# Patient Record
Sex: MALE | Race: WHITE | NOT HISPANIC OR LATINO | Employment: OTHER | URBAN - METROPOLITAN AREA
[De-identification: names, ages, dates, MRNs, and addresses within clinical notes are randomized per-mention and may not be internally consistent; named-entity substitution may affect disease eponyms.]

---

## 2017-01-03 ENCOUNTER — COMMUNICATION - HEALTHEAST (OUTPATIENT)
Dept: SCHEDULING | Facility: CLINIC | Age: 82
End: 2017-01-03

## 2017-01-05 ENCOUNTER — RECORDS - HEALTHEAST (OUTPATIENT)
Dept: ADMINISTRATIVE | Facility: OTHER | Age: 82
End: 2017-01-05

## 2017-01-16 ENCOUNTER — COMMUNICATION - HEALTHEAST (OUTPATIENT)
Dept: INTERNAL MEDICINE | Facility: CLINIC | Age: 82
End: 2017-01-16

## 2017-01-16 DIAGNOSIS — I10 ESSENTIAL HYPERTENSION, BENIGN: ICD-10-CM

## 2017-01-16 DIAGNOSIS — E78.00 PURE HYPERCHOLESTEROLEMIA: ICD-10-CM

## 2017-01-26 ENCOUNTER — RECORDS - HEALTHEAST (OUTPATIENT)
Dept: ADMINISTRATIVE | Facility: OTHER | Age: 82
End: 2017-01-26

## 2017-02-13 ENCOUNTER — COMMUNICATION - HEALTHEAST (OUTPATIENT)
Dept: INTERNAL MEDICINE | Facility: CLINIC | Age: 82
End: 2017-02-13

## 2017-02-27 ENCOUNTER — OFFICE VISIT - HEALTHEAST (OUTPATIENT)
Dept: INTERNAL MEDICINE | Facility: CLINIC | Age: 82
End: 2017-02-27

## 2017-02-27 DIAGNOSIS — I10 HYPERTENSION: ICD-10-CM

## 2017-02-27 DIAGNOSIS — E11.9 TYPE 2 DIABETES MELLITUS (H): ICD-10-CM

## 2017-02-27 DIAGNOSIS — E78.1 HYPERTRIGLYCERIDEMIA: ICD-10-CM

## 2017-02-27 DIAGNOSIS — N20.0 KIDNEY STONE: ICD-10-CM

## 2017-02-27 DIAGNOSIS — H10.9 CONJUNCTIVITIS OF LEFT EYE, UNSPECIFIED CONJUNCTIVITIS TYPE: ICD-10-CM

## 2017-02-27 DIAGNOSIS — E78.00 HYPERCHOLESTEROLEMIA: ICD-10-CM

## 2017-02-27 DIAGNOSIS — E78.6 LOW HDL (UNDER 40): ICD-10-CM

## 2017-03-03 ENCOUNTER — COMMUNICATION - HEALTHEAST (OUTPATIENT)
Dept: INTERNAL MEDICINE | Facility: CLINIC | Age: 82
End: 2017-03-03

## 2017-03-06 ENCOUNTER — COMMUNICATION - HEALTHEAST (OUTPATIENT)
Dept: ENDOCRINOLOGY | Facility: CLINIC | Age: 82
End: 2017-03-06

## 2017-03-10 ENCOUNTER — OFFICE VISIT - HEALTHEAST (OUTPATIENT)
Dept: EDUCATION SERVICES | Facility: CLINIC | Age: 82
End: 2017-03-10

## 2017-03-10 DIAGNOSIS — E11.9 DIABETES (H): ICD-10-CM

## 2017-03-13 ENCOUNTER — COMMUNICATION - HEALTHEAST (OUTPATIENT)
Dept: EDUCATION SERVICES | Facility: CLINIC | Age: 82
End: 2017-03-13

## 2017-03-20 ENCOUNTER — COMMUNICATION - HEALTHEAST (OUTPATIENT)
Dept: EDUCATION SERVICES | Facility: CLINIC | Age: 82
End: 2017-03-20

## 2017-03-27 ENCOUNTER — OFFICE VISIT - HEALTHEAST (OUTPATIENT)
Dept: INTERNAL MEDICINE | Facility: CLINIC | Age: 82
End: 2017-03-27

## 2017-03-27 DIAGNOSIS — J20.9 ACUTE BRONCHITIS: ICD-10-CM

## 2017-03-27 DIAGNOSIS — E78.6 LOW HDL (UNDER 40): ICD-10-CM

## 2017-03-27 DIAGNOSIS — E11.9 DIABETES MELLITUS TYPE 2, CONTROLLED (H): ICD-10-CM

## 2017-03-27 DIAGNOSIS — I10 ESSENTIAL HYPERTENSION, BENIGN: ICD-10-CM

## 2017-03-27 DIAGNOSIS — E78.1 HYPERTRIGLYCERIDEMIA: ICD-10-CM

## 2017-03-27 DIAGNOSIS — Z77.090 ASBESTOS EXPOSURE: ICD-10-CM

## 2017-03-27 DIAGNOSIS — N18.30 CHRONIC KIDNEY DISEASE, STAGE III (MODERATE) (H): ICD-10-CM

## 2017-04-04 ENCOUNTER — COMMUNICATION - HEALTHEAST (OUTPATIENT)
Dept: INTERNAL MEDICINE | Facility: CLINIC | Age: 82
End: 2017-04-04

## 2017-04-04 DIAGNOSIS — J98.11 ATELECTASIS: ICD-10-CM

## 2017-04-05 ENCOUNTER — RECORDS - HEALTHEAST (OUTPATIENT)
Dept: GENERAL RADIOLOGY | Age: 82
End: 2017-04-05

## 2017-04-05 DIAGNOSIS — J98.11 ATELECTASIS: ICD-10-CM

## 2017-04-07 ENCOUNTER — COMMUNICATION - HEALTHEAST (OUTPATIENT)
Dept: INTERNAL MEDICINE | Facility: CLINIC | Age: 82
End: 2017-04-07

## 2017-04-07 DIAGNOSIS — R91.1 SOLITARY PULMONARY NODULE: ICD-10-CM

## 2017-04-17 ENCOUNTER — HOSPITAL ENCOUNTER (OUTPATIENT)
Dept: CT IMAGING | Facility: HOSPITAL | Age: 82
Discharge: HOME | End: 2017-04-17
Attending: INTERNAL MEDICINE

## 2017-04-17 DIAGNOSIS — R91.1 SOLITARY PULMONARY NODULE: ICD-10-CM

## 2017-04-22 ENCOUNTER — COMMUNICATION - HEALTHEAST (OUTPATIENT)
Dept: INTERNAL MEDICINE | Facility: CLINIC | Age: 82
End: 2017-04-22

## 2017-04-26 ENCOUNTER — COMMUNICATION - HEALTHEAST (OUTPATIENT)
Dept: INTERNAL MEDICINE | Facility: CLINIC | Age: 82
End: 2017-04-26

## 2017-05-02 ENCOUNTER — COMMUNICATION - HEALTHEAST (OUTPATIENT)
Dept: INTERNAL MEDICINE | Facility: CLINIC | Age: 82
End: 2017-05-02

## 2017-05-02 DIAGNOSIS — E11.9 DIABETES MELLITUS (H): ICD-10-CM

## 2017-05-05 ENCOUNTER — OFFICE VISIT - HEALTHEAST (OUTPATIENT)
Dept: INTERNAL MEDICINE | Facility: CLINIC | Age: 82
End: 2017-05-05

## 2017-05-05 ENCOUNTER — AMBULATORY - HEALTHEAST (OUTPATIENT)
Dept: INTERNAL MEDICINE | Facility: CLINIC | Age: 82
End: 2017-05-05

## 2017-05-05 DIAGNOSIS — E11.9 DIABETES MELLITUS TYPE 2, CONTROLLED (H): ICD-10-CM

## 2017-05-05 DIAGNOSIS — Z09 FOLLOW UP: ICD-10-CM

## 2017-05-08 ENCOUNTER — COMMUNICATION - HEALTHEAST (OUTPATIENT)
Dept: INTERNAL MEDICINE | Facility: CLINIC | Age: 82
End: 2017-05-08

## 2017-05-08 DIAGNOSIS — E11.9 DIABETES MELLITUS (H): ICD-10-CM

## 2017-05-09 ENCOUNTER — RECORDS - HEALTHEAST (OUTPATIENT)
Dept: ADMINISTRATIVE | Facility: OTHER | Age: 82
End: 2017-05-09

## 2017-05-09 ENCOUNTER — COMMUNICATION - HEALTHEAST (OUTPATIENT)
Dept: INTERNAL MEDICINE | Facility: CLINIC | Age: 82
End: 2017-05-09

## 2017-05-09 DIAGNOSIS — E11.9 DIABETES (H): ICD-10-CM

## 2017-05-18 ENCOUNTER — RECORDS - HEALTHEAST (OUTPATIENT)
Dept: ADMINISTRATIVE | Facility: OTHER | Age: 82
End: 2017-05-18

## 2017-05-19 ENCOUNTER — AMBULATORY - HEALTHEAST (OUTPATIENT)
Dept: INTERNAL MEDICINE | Facility: CLINIC | Age: 82
End: 2017-05-19

## 2017-05-19 ENCOUNTER — COMMUNICATION - HEALTHEAST (OUTPATIENT)
Dept: INTERNAL MEDICINE | Facility: CLINIC | Age: 82
End: 2017-05-19

## 2017-05-19 DIAGNOSIS — E11.9 DIABETES MELLITUS, TYPE 2 (H): ICD-10-CM

## 2017-05-22 ENCOUNTER — COMMUNICATION - HEALTHEAST (OUTPATIENT)
Dept: INTERNAL MEDICINE | Facility: CLINIC | Age: 82
End: 2017-05-22

## 2017-05-22 DIAGNOSIS — E78.00 PURE HYPERCHOLESTEROLEMIA: ICD-10-CM

## 2017-05-22 DIAGNOSIS — E11.9 DIABETES MELLITUS, TYPE 2 (H): ICD-10-CM

## 2017-05-22 DIAGNOSIS — K21.9 ESOPHAGEAL REFLUX: ICD-10-CM

## 2017-05-22 DIAGNOSIS — I10 ESSENTIAL HYPERTENSION, BENIGN: ICD-10-CM

## 2017-05-25 ENCOUNTER — COMMUNICATION - HEALTHEAST (OUTPATIENT)
Dept: INTERNAL MEDICINE | Facility: CLINIC | Age: 82
End: 2017-05-25

## 2017-06-19 ENCOUNTER — HOSPITAL ENCOUNTER (OUTPATIENT)
Dept: LAB | Age: 82
Setting detail: SPECIMEN
Discharge: HOME | End: 2017-06-19

## 2017-06-19 ENCOUNTER — OFFICE VISIT - HEALTHEAST (OUTPATIENT)
Dept: INTERNAL MEDICINE | Facility: CLINIC | Age: 82
End: 2017-06-19

## 2017-06-19 DIAGNOSIS — R35.0 FREQUENT URINATION: ICD-10-CM

## 2017-06-19 DIAGNOSIS — N30.00 ACUTE CYSTITIS WITHOUT HEMATURIA: ICD-10-CM

## 2017-06-21 ENCOUNTER — COMMUNICATION - HEALTHEAST (OUTPATIENT)
Dept: FAMILY MEDICINE | Facility: CLINIC | Age: 82
End: 2017-06-21

## 2017-08-04 ENCOUNTER — COMMUNICATION - HEALTHEAST (OUTPATIENT)
Dept: INTERNAL MEDICINE | Facility: CLINIC | Age: 82
End: 2017-08-04

## 2017-08-04 ENCOUNTER — RECORDS - HEALTHEAST (OUTPATIENT)
Dept: ADMINISTRATIVE | Facility: OTHER | Age: 82
End: 2017-08-04

## 2017-08-22 ENCOUNTER — OFFICE VISIT - HEALTHEAST (OUTPATIENT)
Dept: INTERNAL MEDICINE | Facility: CLINIC | Age: 82
End: 2017-08-22

## 2017-08-22 ENCOUNTER — COMMUNICATION - HEALTHEAST (OUTPATIENT)
Dept: INTERNAL MEDICINE | Facility: CLINIC | Age: 82
End: 2017-08-22

## 2017-08-22 DIAGNOSIS — H61.23 HEARING LOSS DUE TO CERUMEN IMPACTION, BILATERAL: ICD-10-CM

## 2017-10-23 ENCOUNTER — RECORDS - HEALTHEAST (OUTPATIENT)
Dept: ADMINISTRATIVE | Facility: OTHER | Age: 82
End: 2017-10-23

## 2017-12-12 ENCOUNTER — COMMUNICATION - HEALTHEAST (OUTPATIENT)
Dept: INTERNAL MEDICINE | Facility: CLINIC | Age: 82
End: 2017-12-12

## 2017-12-12 DIAGNOSIS — E11.9 DIABETES MELLITUS, TYPE 2 (H): ICD-10-CM

## 2017-12-20 ENCOUNTER — OFFICE VISIT - HEALTHEAST (OUTPATIENT)
Dept: INTERNAL MEDICINE | Facility: CLINIC | Age: 82
End: 2017-12-20

## 2017-12-20 DIAGNOSIS — E78.5 HYPERLIPIDEMIA: ICD-10-CM

## 2017-12-20 DIAGNOSIS — Z09 FOLLOW UP: ICD-10-CM

## 2017-12-20 DIAGNOSIS — E11.9 DIABETES MELLITUS TYPE 2, CONTROLLED (H): ICD-10-CM

## 2017-12-20 LAB — HBA1C MFR BLD: 6.2 % (ref 3.5–6)

## 2017-12-21 ENCOUNTER — COMMUNICATION - HEALTHEAST (OUTPATIENT)
Dept: INTERNAL MEDICINE | Facility: CLINIC | Age: 82
End: 2017-12-21

## 2018-01-09 ENCOUNTER — RECORDS - HEALTHEAST (OUTPATIENT)
Dept: ADMINISTRATIVE | Facility: OTHER | Age: 83
End: 2018-01-09

## 2018-01-22 ENCOUNTER — COMMUNICATION - HEALTHEAST (OUTPATIENT)
Dept: INTERNAL MEDICINE | Facility: CLINIC | Age: 83
End: 2018-01-22

## 2018-01-22 DIAGNOSIS — K21.9 ESOPHAGEAL REFLUX: ICD-10-CM

## 2018-01-22 DIAGNOSIS — E78.00 PURE HYPERCHOLESTEROLEMIA: ICD-10-CM

## 2018-01-22 DIAGNOSIS — I10 ESSENTIAL HYPERTENSION, BENIGN: ICD-10-CM

## 2018-01-22 DIAGNOSIS — E11.9 DIABETES MELLITUS, TYPE 2 (H): ICD-10-CM

## 2018-01-29 ENCOUNTER — OFFICE VISIT - HEALTHEAST (OUTPATIENT)
Dept: PODIATRY | Facility: CLINIC | Age: 83
End: 2018-01-29

## 2018-01-29 DIAGNOSIS — L60.2 ONYCHAUXIS: ICD-10-CM

## 2018-01-29 DIAGNOSIS — N18.30 CHRONIC KIDNEY DISEASE, STAGE III (MODERATE) (H): ICD-10-CM

## 2018-01-29 DIAGNOSIS — M79.673 PAIN OF FOOT, UNSPECIFIED LATERALITY: ICD-10-CM

## 2018-01-29 ASSESSMENT — MIFFLIN-ST. JEOR: SCORE: 1746.9

## 2018-01-30 ENCOUNTER — COMMUNICATION - HEALTHEAST (OUTPATIENT)
Dept: INTERNAL MEDICINE | Facility: CLINIC | Age: 83
End: 2018-01-30

## 2018-01-30 DIAGNOSIS — I10 ESSENTIAL HYPERTENSION, BENIGN: ICD-10-CM

## 2018-01-30 DIAGNOSIS — E78.00 PURE HYPERCHOLESTEROLEMIA: ICD-10-CM

## 2018-01-31 ENCOUNTER — OFFICE VISIT - HEALTHEAST (OUTPATIENT)
Dept: FAMILY MEDICINE | Facility: CLINIC | Age: 83
End: 2018-01-31

## 2018-01-31 DIAGNOSIS — K21.9 GERD (GASTROESOPHAGEAL REFLUX DISEASE): ICD-10-CM

## 2018-01-31 DIAGNOSIS — E78.00 PURE HYPERCHOLESTEROLEMIA: ICD-10-CM

## 2018-01-31 DIAGNOSIS — E11.9 DIABETES MELLITUS, TYPE 2 (H): ICD-10-CM

## 2018-01-31 DIAGNOSIS — N18.30 CHRONIC KIDNEY DISEASE, STAGE III (MODERATE) (H): ICD-10-CM

## 2018-01-31 DIAGNOSIS — I10 ESSENTIAL HYPERTENSION, BENIGN: ICD-10-CM

## 2018-02-01 ENCOUNTER — AMBULATORY - HEALTHEAST (OUTPATIENT)
Dept: LAB | Facility: CLINIC | Age: 83
End: 2018-02-01

## 2018-02-01 ENCOUNTER — COMMUNICATION - HEALTHEAST (OUTPATIENT)
Dept: LAB | Facility: CLINIC | Age: 83
End: 2018-02-01

## 2018-02-01 ENCOUNTER — AMBULATORY - HEALTHEAST (OUTPATIENT)
Dept: NURSING | Facility: CLINIC | Age: 83
End: 2018-02-01

## 2018-02-01 DIAGNOSIS — N18.9 CKD (CHRONIC KIDNEY DISEASE): ICD-10-CM

## 2018-02-01 DIAGNOSIS — E78.00 PURE HYPERCHOLESTEROLEMIA: ICD-10-CM

## 2018-02-01 DIAGNOSIS — I10 ESSENTIAL HYPERTENSION, BENIGN: ICD-10-CM

## 2018-02-01 DIAGNOSIS — N18.30 CHRONIC KIDNEY DISEASE, STAGE III (MODERATE) (H): ICD-10-CM

## 2018-02-01 LAB
ALT SERPL W P-5'-P-CCNC: 16 U/L (ref 0–45)
ANION GAP SERPL CALCULATED.3IONS-SCNC: 10 MMOL/L (ref 5–18)
BUN SERPL-MCNC: 43 MG/DL (ref 8–28)
CALCIUM SERPL-MCNC: 10 MG/DL (ref 8.5–10.5)
CHLORIDE BLD-SCNC: 104 MMOL/L (ref 98–107)
CO2 SERPL-SCNC: 26 MMOL/L (ref 22–31)
CREAT SERPL-MCNC: 2.11 MG/DL (ref 0.7–1.3)
GFR SERPL CREATININE-BSD FRML MDRD: 30 ML/MIN/1.73M2
GLUCOSE BLD-MCNC: 220 MG/DL (ref 70–125)
POTASSIUM BLD-SCNC: 3.7 MMOL/L (ref 3.5–5)
SODIUM SERPL-SCNC: 140 MMOL/L (ref 136–145)

## 2018-02-02 ENCOUNTER — COMMUNICATION - HEALTHEAST (OUTPATIENT)
Dept: FAMILY MEDICINE | Facility: CLINIC | Age: 83
End: 2018-02-02

## 2018-02-09 ENCOUNTER — COMMUNICATION - HEALTHEAST (OUTPATIENT)
Dept: INTERNAL MEDICINE | Facility: CLINIC | Age: 83
End: 2018-02-09

## 2018-02-09 ENCOUNTER — OFFICE VISIT - HEALTHEAST (OUTPATIENT)
Dept: FAMILY MEDICINE | Facility: CLINIC | Age: 83
End: 2018-02-09

## 2018-02-09 DIAGNOSIS — N18.30 CHRONIC KIDNEY DISEASE, STAGE III (MODERATE) (H): ICD-10-CM

## 2018-02-09 DIAGNOSIS — I10 ESSENTIAL HYPERTENSION, BENIGN: ICD-10-CM

## 2018-02-09 DIAGNOSIS — E78.00 PURE HYPERCHOLESTEROLEMIA: ICD-10-CM

## 2018-02-09 DIAGNOSIS — E11.9 DIABETES MELLITUS TYPE 2, CONTROLLED (H): ICD-10-CM

## 2018-02-09 LAB
ANION GAP SERPL CALCULATED.3IONS-SCNC: 8 MMOL/L (ref 5–18)
BUN SERPL-MCNC: 33 MG/DL (ref 8–28)
CALCIUM SERPL-MCNC: 10 MG/DL (ref 8.5–10.5)
CHLORIDE BLD-SCNC: 108 MMOL/L (ref 98–107)
CO2 SERPL-SCNC: 26 MMOL/L (ref 22–31)
CREAT SERPL-MCNC: 1.81 MG/DL (ref 0.7–1.3)
GFR SERPL CREATININE-BSD FRML MDRD: 36 ML/MIN/1.73M2
GLUCOSE BLD-MCNC: 153 MG/DL (ref 70–125)
POTASSIUM BLD-SCNC: 3.7 MMOL/L (ref 3.5–5)
SODIUM SERPL-SCNC: 142 MMOL/L (ref 136–145)

## 2018-02-12 ENCOUNTER — COMMUNICATION - HEALTHEAST (OUTPATIENT)
Dept: INTERNAL MEDICINE | Facility: CLINIC | Age: 83
End: 2018-02-12

## 2018-02-12 ENCOUNTER — RECORDS - HEALTHEAST (OUTPATIENT)
Dept: LAB | Facility: HOSPITAL | Age: 83
End: 2018-02-12

## 2018-02-12 LAB
ALBUMIN SERPL-MCNC: 3.6 G/DL (ref 3.5–5)
ALP SERPL-CCNC: 46 U/L (ref 45–120)
ALT SERPL W P-5'-P-CCNC: 16 U/L (ref 0–45)
AST SERPL W P-5'-P-CCNC: 20 U/L (ref 0–40)
BILIRUB DIRECT SERPL-MCNC: 0.3 MG/DL
BILIRUB SERPL-MCNC: 1.2 MG/DL (ref 0–1)
PROT SERPL-MCNC: 6.9 G/DL (ref 6–8)

## 2018-02-21 ENCOUNTER — COMMUNICATION - HEALTHEAST (OUTPATIENT)
Dept: FAMILY MEDICINE | Facility: CLINIC | Age: 83
End: 2018-02-21

## 2018-02-21 DIAGNOSIS — I10 ESSENTIAL HYPERTENSION, BENIGN: ICD-10-CM

## 2018-03-01 ENCOUNTER — RECORDS - HEALTHEAST (OUTPATIENT)
Dept: ADMINISTRATIVE | Facility: OTHER | Age: 83
End: 2018-03-01

## 2018-03-06 ENCOUNTER — COMMUNICATION - HEALTHEAST (OUTPATIENT)
Dept: FAMILY MEDICINE | Facility: CLINIC | Age: 83
End: 2018-03-06

## 2018-03-06 DIAGNOSIS — I10 ESSENTIAL HYPERTENSION, BENIGN: ICD-10-CM

## 2018-03-12 ENCOUNTER — COMMUNICATION - HEALTHEAST (OUTPATIENT)
Dept: INTERNAL MEDICINE | Facility: CLINIC | Age: 83
End: 2018-03-12

## 2018-03-12 DIAGNOSIS — E11.9 DIABETES MELLITUS, TYPE 2 (H): ICD-10-CM

## 2018-03-14 ENCOUNTER — OFFICE VISIT - HEALTHEAST (OUTPATIENT)
Dept: FAMILY MEDICINE | Facility: CLINIC | Age: 83
End: 2018-03-14

## 2018-03-14 ENCOUNTER — COMMUNICATION - HEALTHEAST (OUTPATIENT)
Dept: FAMILY MEDICINE | Facility: CLINIC | Age: 83
End: 2018-03-14

## 2018-03-14 DIAGNOSIS — I10 ESSENTIAL HYPERTENSION, BENIGN: ICD-10-CM

## 2018-03-14 DIAGNOSIS — E78.00 PURE HYPERCHOLESTEROLEMIA: ICD-10-CM

## 2018-03-14 DIAGNOSIS — L98.9 SKIN LESION: ICD-10-CM

## 2018-03-14 DIAGNOSIS — E11.9 DIABETES MELLITUS TYPE 2, CONTROLLED (H): ICD-10-CM

## 2018-03-15 ENCOUNTER — AMBULATORY - HEALTHEAST (OUTPATIENT)
Dept: FAMILY MEDICINE | Facility: CLINIC | Age: 83
End: 2018-03-15

## 2018-03-15 DIAGNOSIS — L98.9 SKIN LESION: ICD-10-CM

## 2018-03-16 LAB
LAB AP CHARGES (HE HISTORICAL CONVERSION): NORMAL
PATH REPORT.COMMENTS IMP SPEC: NORMAL
PATH REPORT.FINAL DX SPEC: NORMAL
PATH REPORT.GROSS SPEC: NORMAL
PATH REPORT.MICROSCOPIC SPEC OTHER STN: NORMAL
PATH REPORT.RELEVANT HX SPEC: NORMAL
RESULT FLAG (HE HISTORICAL CONVERSION): NORMAL

## 2018-03-19 ENCOUNTER — COMMUNICATION - HEALTHEAST (OUTPATIENT)
Dept: FAMILY MEDICINE | Facility: CLINIC | Age: 83
End: 2018-03-19

## 2018-03-22 ENCOUNTER — COMMUNICATION - HEALTHEAST (OUTPATIENT)
Dept: FAMILY MEDICINE | Facility: CLINIC | Age: 83
End: 2018-03-22

## 2018-03-22 DIAGNOSIS — E11.9 DIABETES MELLITUS, TYPE 2 (H): ICD-10-CM

## 2018-03-23 ENCOUNTER — AMBULATORY - HEALTHEAST (OUTPATIENT)
Dept: FAMILY MEDICINE | Facility: CLINIC | Age: 83
End: 2018-03-23

## 2018-04-02 ENCOUNTER — AMBULATORY - HEALTHEAST (OUTPATIENT)
Dept: PODIATRY | Facility: CLINIC | Age: 83
End: 2018-04-02

## 2018-04-02 DIAGNOSIS — L60.2 ONYCHAUXIS: ICD-10-CM

## 2018-04-02 DIAGNOSIS — M79.673 PAIN OF FOOT, UNSPECIFIED LATERALITY: ICD-10-CM

## 2018-04-02 DIAGNOSIS — N18.30 CHRONIC KIDNEY DISEASE, STAGE III (MODERATE) (H): ICD-10-CM

## 2018-04-16 ENCOUNTER — OFFICE VISIT - HEALTHEAST (OUTPATIENT)
Dept: FAMILY MEDICINE | Facility: CLINIC | Age: 83
End: 2018-04-16

## 2018-04-16 DIAGNOSIS — Z79.4 CONTROLLED TYPE 2 DIABETES MELLITUS WITHOUT COMPLICATION, WITH LONG-TERM CURRENT USE OF INSULIN (H): ICD-10-CM

## 2018-04-16 DIAGNOSIS — N18.30 CHRONIC KIDNEY DISEASE, STAGE III (MODERATE) (H): ICD-10-CM

## 2018-04-16 DIAGNOSIS — I10 ESSENTIAL HYPERTENSION, BENIGN: ICD-10-CM

## 2018-04-16 DIAGNOSIS — F03.90 DEMENTIA (H): ICD-10-CM

## 2018-04-16 DIAGNOSIS — E78.00 PURE HYPERCHOLESTEROLEMIA: ICD-10-CM

## 2018-04-16 DIAGNOSIS — E11.9 CONTROLLED TYPE 2 DIABETES MELLITUS WITHOUT COMPLICATION, WITH LONG-TERM CURRENT USE OF INSULIN (H): ICD-10-CM

## 2018-04-16 LAB
ANION GAP SERPL CALCULATED.3IONS-SCNC: 10 MMOL/L (ref 5–18)
BUN SERPL-MCNC: 43 MG/DL (ref 8–28)
CALCIUM SERPL-MCNC: 10.1 MG/DL (ref 8.5–10.5)
CHLORIDE BLD-SCNC: 106 MMOL/L (ref 98–107)
CO2 SERPL-SCNC: 24 MMOL/L (ref 22–31)
CREAT SERPL-MCNC: 2.21 MG/DL (ref 0.7–1.3)
GFR SERPL CREATININE-BSD FRML MDRD: 29 ML/MIN/1.73M2
GLUCOSE BLD-MCNC: 136 MG/DL (ref 70–125)
HBA1C MFR BLD: 6 % (ref 3.5–6)
POTASSIUM BLD-SCNC: 4.2 MMOL/L (ref 3.5–5)
SODIUM SERPL-SCNC: 140 MMOL/L (ref 136–145)

## 2018-05-04 ENCOUNTER — COMMUNICATION - HEALTHEAST (OUTPATIENT)
Dept: FAMILY MEDICINE | Facility: CLINIC | Age: 83
End: 2018-05-04

## 2018-05-04 DIAGNOSIS — E78.00 PURE HYPERCHOLESTEROLEMIA: ICD-10-CM

## 2018-05-07 ENCOUNTER — COMMUNICATION - HEALTHEAST (OUTPATIENT)
Dept: FAMILY MEDICINE | Facility: CLINIC | Age: 83
End: 2018-05-07

## 2018-05-07 DIAGNOSIS — E78.00 PURE HYPERCHOLESTEROLEMIA: ICD-10-CM

## 2018-05-16 ENCOUNTER — COMMUNICATION - HEALTHEAST (OUTPATIENT)
Dept: INTERNAL MEDICINE | Facility: CLINIC | Age: 83
End: 2018-05-16

## 2018-05-16 DIAGNOSIS — E11.9 DIABETES MELLITUS, TYPE 2 (H): ICD-10-CM

## 2018-05-17 ENCOUNTER — COMMUNICATION - HEALTHEAST (OUTPATIENT)
Dept: FAMILY MEDICINE | Facility: CLINIC | Age: 83
End: 2018-05-17

## 2018-05-17 DIAGNOSIS — I10 ESSENTIAL HYPERTENSION, BENIGN: ICD-10-CM

## 2018-05-17 DIAGNOSIS — E11.9 DIABETES MELLITUS, TYPE 2 (H): ICD-10-CM

## 2018-05-24 ENCOUNTER — AMBULATORY - HEALTHEAST (OUTPATIENT)
Dept: LAB | Facility: CLINIC | Age: 83
End: 2018-05-24

## 2018-05-24 DIAGNOSIS — N18.9 CKD (CHRONIC KIDNEY DISEASE): ICD-10-CM

## 2018-05-24 LAB
ANION GAP SERPL CALCULATED.3IONS-SCNC: 9 MMOL/L (ref 5–18)
BUN SERPL-MCNC: 28 MG/DL (ref 8–28)
CALCIUM SERPL-MCNC: 10.1 MG/DL (ref 8.5–10.5)
CHLORIDE BLD-SCNC: 106 MMOL/L (ref 98–107)
CO2 SERPL-SCNC: 25 MMOL/L (ref 22–31)
CREAT SERPL-MCNC: 1.71 MG/DL (ref 0.7–1.3)
GFR SERPL CREATININE-BSD FRML MDRD: 39 ML/MIN/1.73M2
GLUCOSE BLD-MCNC: 170 MG/DL (ref 70–125)
POTASSIUM BLD-SCNC: 3.8 MMOL/L (ref 3.5–5)
SODIUM SERPL-SCNC: 140 MMOL/L (ref 136–145)

## 2018-06-18 ENCOUNTER — COMMUNICATION - HEALTHEAST (OUTPATIENT)
Dept: INTERNAL MEDICINE | Facility: CLINIC | Age: 83
End: 2018-06-18

## 2018-06-18 DIAGNOSIS — I10 ESSENTIAL HYPERTENSION, BENIGN: ICD-10-CM

## 2018-06-19 ENCOUNTER — COMMUNICATION - HEALTHEAST (OUTPATIENT)
Dept: ADMINISTRATIVE | Facility: CLINIC | Age: 83
End: 2018-06-19

## 2018-06-19 DIAGNOSIS — E11.9 DIABETES MELLITUS, TYPE 2 (H): ICD-10-CM

## 2018-07-17 ENCOUNTER — OFFICE VISIT - HEALTHEAST (OUTPATIENT)
Dept: PODIATRY | Facility: CLINIC | Age: 83
End: 2018-07-17

## 2018-07-17 DIAGNOSIS — L60.2 ONYCHAUXIS: ICD-10-CM

## 2018-07-17 DIAGNOSIS — E11.9 TYPE 2 DIABETES MELLITUS WITHOUT COMPLICATION, WITHOUT LONG-TERM CURRENT USE OF INSULIN (H): ICD-10-CM

## 2018-07-17 DIAGNOSIS — B35.1 NAIL FUNGUS: ICD-10-CM

## 2018-09-05 ENCOUNTER — COMMUNICATION - HEALTHEAST (OUTPATIENT)
Dept: FAMILY MEDICINE | Facility: CLINIC | Age: 83
End: 2018-09-05

## 2018-09-05 DIAGNOSIS — I10 ESSENTIAL HYPERTENSION, BENIGN: ICD-10-CM

## 2018-09-24 ENCOUNTER — COMMUNICATION - HEALTHEAST (OUTPATIENT)
Dept: INTERNAL MEDICINE | Facility: CLINIC | Age: 83
End: 2018-09-24

## 2018-09-24 DIAGNOSIS — I10 ESSENTIAL HYPERTENSION, BENIGN: ICD-10-CM

## 2018-09-24 DIAGNOSIS — E11.9 DIABETES MELLITUS, TYPE 2 (H): ICD-10-CM

## 2018-10-10 ENCOUNTER — OFFICE VISIT - HEALTHEAST (OUTPATIENT)
Dept: FAMILY MEDICINE | Facility: CLINIC | Age: 83
End: 2018-10-10

## 2018-10-10 DIAGNOSIS — I10 ESSENTIAL HYPERTENSION, BENIGN: ICD-10-CM

## 2018-10-10 DIAGNOSIS — E78.00 PURE HYPERCHOLESTEROLEMIA: ICD-10-CM

## 2018-10-10 DIAGNOSIS — Z79.4 CONTROLLED TYPE 2 DIABETES MELLITUS WITHOUT COMPLICATION, WITH LONG-TERM CURRENT USE OF INSULIN (H): ICD-10-CM

## 2018-10-10 DIAGNOSIS — E11.9 CONTROLLED TYPE 2 DIABETES MELLITUS WITHOUT COMPLICATION, WITH LONG-TERM CURRENT USE OF INSULIN (H): ICD-10-CM

## 2018-10-10 DIAGNOSIS — N18.30 CHRONIC KIDNEY DISEASE, STAGE III (MODERATE) (H): ICD-10-CM

## 2018-10-29 ENCOUNTER — AMBULATORY - HEALTHEAST (OUTPATIENT)
Dept: LAB | Facility: CLINIC | Age: 83
End: 2018-10-29

## 2018-10-29 DIAGNOSIS — N18.30 CHRONIC KIDNEY DISEASE, STAGE III (MODERATE) (H): ICD-10-CM

## 2018-10-29 DIAGNOSIS — E78.5 HYPERLIPIDEMIA: ICD-10-CM

## 2018-10-29 DIAGNOSIS — Z79.4 CONTROLLED TYPE 2 DIABETES MELLITUS WITHOUT COMPLICATION, WITH LONG-TERM CURRENT USE OF INSULIN (H): ICD-10-CM

## 2018-10-29 DIAGNOSIS — E11.9 CONTROLLED TYPE 2 DIABETES MELLITUS WITHOUT COMPLICATION, WITH LONG-TERM CURRENT USE OF INSULIN (H): ICD-10-CM

## 2018-10-29 DIAGNOSIS — I10 ESSENTIAL HYPERTENSION, BENIGN: ICD-10-CM

## 2018-10-29 LAB
ANION GAP SERPL CALCULATED.3IONS-SCNC: 12 MMOL/L (ref 5–18)
BUN SERPL-MCNC: 25 MG/DL (ref 8–28)
CALCIUM SERPL-MCNC: 10.8 MG/DL (ref 8.5–10.5)
CHLORIDE BLD-SCNC: 107 MMOL/L (ref 98–107)
CHOLEST SERPL-MCNC: 151 MG/DL
CO2 SERPL-SCNC: 24 MMOL/L (ref 22–31)
CREAT SERPL-MCNC: 1.68 MG/DL (ref 0.7–1.3)
CREAT UR-MCNC: 371.3 MG/DL
FASTING STATUS PATIENT QL REPORTED: ABNORMAL
GFR SERPL CREATININE-BSD FRML MDRD: 39 ML/MIN/1.73M2
GLUCOSE BLD-MCNC: 169 MG/DL (ref 70–125)
HBA1C MFR BLD: 6.6 % (ref 3.5–6)
HDLC SERPL-MCNC: 31 MG/DL
LDLC SERPL CALC-MCNC: 54 MG/DL
MICROALBUMIN UR-MCNC: 30.54 MG/DL (ref 0–1.99)
MICROALBUMIN/CREAT UR: 82.3 MG/G
POTASSIUM BLD-SCNC: 4.2 MMOL/L (ref 3.5–5)
SODIUM SERPL-SCNC: 143 MMOL/L (ref 136–145)
TRIGL SERPL-MCNC: 329 MG/DL

## 2018-11-02 ENCOUNTER — COMMUNICATION - HEALTHEAST (OUTPATIENT)
Dept: INTERNAL MEDICINE | Facility: CLINIC | Age: 83
End: 2018-11-02

## 2018-11-06 ENCOUNTER — OFFICE VISIT - HEALTHEAST (OUTPATIENT)
Dept: FAMILY MEDICINE | Facility: CLINIC | Age: 83
End: 2018-11-06

## 2018-11-06 DIAGNOSIS — I10 ESSENTIAL HYPERTENSION, BENIGN: ICD-10-CM

## 2018-11-06 DIAGNOSIS — R80.9 MICROALBUMINURIA: ICD-10-CM

## 2018-11-06 DIAGNOSIS — N18.30 CHRONIC KIDNEY DISEASE, STAGE III (MODERATE) (H): ICD-10-CM

## 2018-11-06 DIAGNOSIS — Z79.4 TYPE 2 DIABETES MELLITUS WITHOUT COMPLICATION, WITH LONG-TERM CURRENT USE OF INSULIN (H): ICD-10-CM

## 2018-11-06 DIAGNOSIS — E11.9 TYPE 2 DIABETES MELLITUS WITHOUT COMPLICATION, WITH LONG-TERM CURRENT USE OF INSULIN (H): ICD-10-CM

## 2018-11-06 DIAGNOSIS — E78.1 HYPERTRIGLYCERIDEMIA: ICD-10-CM

## 2018-11-19 ENCOUNTER — COMMUNICATION - HEALTHEAST (OUTPATIENT)
Dept: SCHEDULING | Facility: CLINIC | Age: 83
End: 2018-11-19

## 2018-11-23 ENCOUNTER — OFFICE VISIT - HEALTHEAST (OUTPATIENT)
Dept: FAMILY MEDICINE | Facility: CLINIC | Age: 83
End: 2018-11-23

## 2018-11-23 DIAGNOSIS — Z79.4 TYPE 2 DIABETES MELLITUS WITHOUT COMPLICATION, WITH LONG-TERM CURRENT USE OF INSULIN (H): ICD-10-CM

## 2018-11-23 DIAGNOSIS — E11.9 TYPE 2 DIABETES MELLITUS WITHOUT COMPLICATION, WITH LONG-TERM CURRENT USE OF INSULIN (H): ICD-10-CM

## 2018-11-23 DIAGNOSIS — I10 ESSENTIAL HYPERTENSION, BENIGN: ICD-10-CM

## 2018-11-29 ENCOUNTER — COMMUNICATION - HEALTHEAST (OUTPATIENT)
Dept: FAMILY MEDICINE | Facility: CLINIC | Age: 83
End: 2018-11-29

## 2018-11-30 ENCOUNTER — AMBULATORY - HEALTHEAST (OUTPATIENT)
Dept: FAMILY MEDICINE | Facility: CLINIC | Age: 83
End: 2018-11-30

## 2018-11-30 ENCOUNTER — COMMUNICATION - HEALTHEAST (OUTPATIENT)
Dept: FAMILY MEDICINE | Facility: CLINIC | Age: 83
End: 2018-11-30

## 2018-11-30 DIAGNOSIS — E11.9 DIABETES MELLITUS, TYPE 2 (H): ICD-10-CM

## 2018-12-06 ENCOUNTER — COMMUNICATION - HEALTHEAST (OUTPATIENT)
Dept: INTERNAL MEDICINE | Facility: CLINIC | Age: 83
End: 2018-12-06

## 2018-12-06 DIAGNOSIS — K21.9 GERD (GASTROESOPHAGEAL REFLUX DISEASE): ICD-10-CM

## 2018-12-12 ENCOUNTER — COMMUNICATION - HEALTHEAST (OUTPATIENT)
Dept: FAMILY MEDICINE | Facility: CLINIC | Age: 83
End: 2018-12-12

## 2018-12-12 DIAGNOSIS — E11.9 DIABETES MELLITUS, TYPE 2 (H): ICD-10-CM

## 2018-12-20 ENCOUNTER — COMMUNICATION - HEALTHEAST (OUTPATIENT)
Dept: FAMILY MEDICINE | Facility: CLINIC | Age: 83
End: 2018-12-20

## 2018-12-20 DIAGNOSIS — Z79.4 CONTROLLED TYPE 2 DIABETES MELLITUS WITHOUT COMPLICATION, WITH LONG-TERM CURRENT USE OF INSULIN (H): ICD-10-CM

## 2018-12-20 DIAGNOSIS — E11.9 CONTROLLED TYPE 2 DIABETES MELLITUS WITHOUT COMPLICATION, WITH LONG-TERM CURRENT USE OF INSULIN (H): ICD-10-CM

## 2019-01-16 ENCOUNTER — OFFICE VISIT - HEALTHEAST (OUTPATIENT)
Dept: FAMILY MEDICINE | Facility: CLINIC | Age: 84
End: 2019-01-16

## 2019-01-16 DIAGNOSIS — Z79.4 CONTROLLED TYPE 2 DIABETES MELLITUS WITHOUT COMPLICATION, WITH LONG-TERM CURRENT USE OF INSULIN (H): ICD-10-CM

## 2019-01-16 DIAGNOSIS — E11.9 CONTROLLED TYPE 2 DIABETES MELLITUS WITHOUT COMPLICATION, WITH LONG-TERM CURRENT USE OF INSULIN (H): ICD-10-CM

## 2019-01-16 DIAGNOSIS — I10 ESSENTIAL HYPERTENSION, BENIGN: ICD-10-CM

## 2019-01-22 ENCOUNTER — RECORDS - HEALTHEAST (OUTPATIENT)
Dept: ADMINISTRATIVE | Facility: OTHER | Age: 84
End: 2019-01-22

## 2019-01-23 ENCOUNTER — COMMUNICATION - HEALTHEAST (OUTPATIENT)
Dept: PEDIATRICS | Facility: CLINIC | Age: 84
End: 2019-01-23

## 2019-01-23 DIAGNOSIS — E11.9 DIABETES MELLITUS, TYPE 2 (H): ICD-10-CM

## 2019-01-28 ENCOUNTER — COMMUNICATION - HEALTHEAST (OUTPATIENT)
Dept: INTERNAL MEDICINE | Facility: CLINIC | Age: 84
End: 2019-01-28

## 2019-01-28 DIAGNOSIS — E78.00 PURE HYPERCHOLESTEROLEMIA: ICD-10-CM

## 2019-02-04 ENCOUNTER — COMMUNICATION - HEALTHEAST (OUTPATIENT)
Dept: FAMILY MEDICINE | Facility: CLINIC | Age: 84
End: 2019-02-04

## 2019-02-04 DIAGNOSIS — E11.9 DIABETES MELLITUS, TYPE 2 (H): ICD-10-CM

## 2019-02-18 ENCOUNTER — RECORDS - HEALTHEAST (OUTPATIENT)
Dept: LAB | Facility: HOSPITAL | Age: 84
End: 2019-02-18

## 2019-02-18 LAB
ALBUMIN SERPL-MCNC: 3.9 G/DL (ref 3.5–5)
ALP SERPL-CCNC: 63 U/L (ref 45–120)
ALT SERPL W P-5'-P-CCNC: 27 U/L (ref 0–45)
AST SERPL W P-5'-P-CCNC: 29 U/L (ref 0–40)
BILIRUB DIRECT SERPL-MCNC: 0.3 MG/DL
BILIRUB SERPL-MCNC: 0.9 MG/DL (ref 0–1)
PROT SERPL-MCNC: 6.9 G/DL (ref 6–8)

## 2019-02-28 ENCOUNTER — COMMUNICATION - HEALTHEAST (OUTPATIENT)
Dept: FAMILY MEDICINE | Facility: CLINIC | Age: 84
End: 2019-02-28

## 2019-02-28 DIAGNOSIS — E11.9 CONTROLLED TYPE 2 DIABETES MELLITUS WITHOUT COMPLICATION, WITH LONG-TERM CURRENT USE OF INSULIN (H): ICD-10-CM

## 2019-02-28 DIAGNOSIS — Z79.4 CONTROLLED TYPE 2 DIABETES MELLITUS WITHOUT COMPLICATION, WITH LONG-TERM CURRENT USE OF INSULIN (H): ICD-10-CM

## 2019-02-28 DIAGNOSIS — E11.9 DIABETES MELLITUS, TYPE 2 (H): ICD-10-CM

## 2019-03-06 ENCOUNTER — OFFICE VISIT - HEALTHEAST (OUTPATIENT)
Dept: FAMILY MEDICINE | Facility: CLINIC | Age: 84
End: 2019-03-06

## 2019-03-06 DIAGNOSIS — R80.9 MICROALBUMINURIA: ICD-10-CM

## 2019-03-06 DIAGNOSIS — N18.30 CHRONIC KIDNEY DISEASE, STAGE III (MODERATE) (H): ICD-10-CM

## 2019-03-06 DIAGNOSIS — Z79.4 CONTROLLED TYPE 2 DIABETES MELLITUS WITHOUT COMPLICATION, WITH LONG-TERM CURRENT USE OF INSULIN (H): ICD-10-CM

## 2019-03-06 DIAGNOSIS — I10 ESSENTIAL HYPERTENSION, BENIGN: ICD-10-CM

## 2019-03-06 DIAGNOSIS — E11.9 CONTROLLED TYPE 2 DIABETES MELLITUS WITHOUT COMPLICATION, WITH LONG-TERM CURRENT USE OF INSULIN (H): ICD-10-CM

## 2019-03-06 LAB
ANION GAP SERPL CALCULATED.3IONS-SCNC: 9 MMOL/L (ref 5–18)
BUN SERPL-MCNC: 22 MG/DL (ref 8–28)
CALCIUM SERPL-MCNC: 10.1 MG/DL (ref 8.5–10.5)
CHLORIDE BLD-SCNC: 108 MMOL/L (ref 98–107)
CO2 SERPL-SCNC: 24 MMOL/L (ref 22–31)
CREAT SERPL-MCNC: 1.63 MG/DL (ref 0.7–1.3)
GFR SERPL CREATININE-BSD FRML MDRD: 41 ML/MIN/1.73M2
GLUCOSE BLD-MCNC: 272 MG/DL (ref 70–125)
HBA1C MFR BLD: 7.5 % (ref 3.5–6)
POTASSIUM BLD-SCNC: 3.8 MMOL/L (ref 3.5–5)
SODIUM SERPL-SCNC: 141 MMOL/L (ref 136–145)

## 2019-03-07 ENCOUNTER — AMBULATORY - HEALTHEAST (OUTPATIENT)
Dept: FAMILY MEDICINE | Facility: CLINIC | Age: 84
End: 2019-03-07

## 2019-03-07 ENCOUNTER — COMMUNICATION - HEALTHEAST (OUTPATIENT)
Dept: FAMILY MEDICINE | Facility: CLINIC | Age: 84
End: 2019-03-07

## 2019-03-08 ENCOUNTER — COMMUNICATION - HEALTHEAST (OUTPATIENT)
Dept: FAMILY MEDICINE | Facility: CLINIC | Age: 84
End: 2019-03-08

## 2019-04-03 ENCOUNTER — OFFICE VISIT - HEALTHEAST (OUTPATIENT)
Dept: FAMILY MEDICINE | Facility: CLINIC | Age: 84
End: 2019-04-03

## 2019-04-03 DIAGNOSIS — Z79.4 TYPE 2 DIABETES MELLITUS WITHOUT COMPLICATION, WITH LONG-TERM CURRENT USE OF INSULIN (H): ICD-10-CM

## 2019-04-03 DIAGNOSIS — L57.0 ACTINIC KERATOSIS: ICD-10-CM

## 2019-04-03 DIAGNOSIS — E11.9 TYPE 2 DIABETES MELLITUS WITHOUT COMPLICATION, WITH LONG-TERM CURRENT USE OF INSULIN (H): ICD-10-CM

## 2019-04-08 ENCOUNTER — COMMUNICATION - HEALTHEAST (OUTPATIENT)
Dept: INTERNAL MEDICINE | Facility: CLINIC | Age: 84
End: 2019-04-08

## 2019-04-08 DIAGNOSIS — I10 ESSENTIAL HYPERTENSION, BENIGN: ICD-10-CM

## 2019-04-18 ENCOUNTER — COMMUNICATION - HEALTHEAST (OUTPATIENT)
Dept: FAMILY MEDICINE | Facility: CLINIC | Age: 84
End: 2019-04-18

## 2019-04-18 DIAGNOSIS — E11.9 TYPE 2 DIABETES MELLITUS WITHOUT COMPLICATION, WITH LONG-TERM CURRENT USE OF INSULIN (H): ICD-10-CM

## 2019-04-18 DIAGNOSIS — Z79.4 TYPE 2 DIABETES MELLITUS WITHOUT COMPLICATION, WITH LONG-TERM CURRENT USE OF INSULIN (H): ICD-10-CM

## 2019-04-29 ENCOUNTER — OFFICE VISIT - HEALTHEAST (OUTPATIENT)
Dept: FAMILY MEDICINE | Facility: CLINIC | Age: 84
End: 2019-04-29

## 2019-04-29 DIAGNOSIS — Z79.4 TYPE 2 DIABETES MELLITUS WITHOUT COMPLICATION, WITH LONG-TERM CURRENT USE OF INSULIN (H): ICD-10-CM

## 2019-04-29 DIAGNOSIS — E11.9 TYPE 2 DIABETES MELLITUS WITHOUT COMPLICATION, WITH LONG-TERM CURRENT USE OF INSULIN (H): ICD-10-CM

## 2019-05-16 ENCOUNTER — OFFICE VISIT - HEALTHEAST (OUTPATIENT)
Dept: FAMILY MEDICINE | Facility: CLINIC | Age: 84
End: 2019-05-16

## 2019-05-16 DIAGNOSIS — H26.9 CATARACT OF RIGHT EYE, UNSPECIFIED CATARACT TYPE: ICD-10-CM

## 2019-05-16 DIAGNOSIS — N18.30 CHRONIC KIDNEY DISEASE, STAGE III (MODERATE) (H): ICD-10-CM

## 2019-05-16 DIAGNOSIS — Z01.818 PRE-OP EVALUATION: ICD-10-CM

## 2019-05-16 DIAGNOSIS — R53.81 PHYSICAL DEBILITY: ICD-10-CM

## 2019-05-16 DIAGNOSIS — I10 ESSENTIAL HYPERTENSION, BENIGN: ICD-10-CM

## 2019-05-16 DIAGNOSIS — F03.90 DEMENTIA WITHOUT BEHAVIORAL DISTURBANCE, UNSPECIFIED DEMENTIA TYPE: ICD-10-CM

## 2019-05-16 DIAGNOSIS — E11.9 TYPE 2 DIABETES MELLITUS WITHOUT COMPLICATION, WITH LONG-TERM CURRENT USE OF INSULIN (H): ICD-10-CM

## 2019-05-16 DIAGNOSIS — M25.50 PAIN IN JOINT, MULTIPLE SITES: ICD-10-CM

## 2019-05-16 DIAGNOSIS — Z79.4 TYPE 2 DIABETES MELLITUS WITHOUT COMPLICATION, WITH LONG-TERM CURRENT USE OF INSULIN (H): ICD-10-CM

## 2019-05-16 LAB
ALBUMIN SERPL-MCNC: 3.8 G/DL (ref 3.5–5)
ALP SERPL-CCNC: 61 U/L (ref 45–120)
ALT SERPL W P-5'-P-CCNC: 42 U/L (ref 0–45)
ANION GAP SERPL CALCULATED.3IONS-SCNC: 12 MMOL/L (ref 5–18)
AST SERPL W P-5'-P-CCNC: 31 U/L (ref 0–40)
ATRIAL RATE - MUSE: 74 BPM
BASOPHILS # BLD AUTO: 0 THOU/UL (ref 0–0.2)
BASOPHILS NFR BLD AUTO: 1 % (ref 0–2)
BILIRUB SERPL-MCNC: 1 MG/DL (ref 0–1)
BUN SERPL-MCNC: 24 MG/DL (ref 8–28)
CALCIUM SERPL-MCNC: 10.4 MG/DL (ref 8.5–10.5)
CHLORIDE BLD-SCNC: 104 MMOL/L (ref 98–107)
CO2 SERPL-SCNC: 24 MMOL/L (ref 22–31)
CREAT SERPL-MCNC: 1.6 MG/DL (ref 0.7–1.3)
DIASTOLIC BLOOD PRESSURE - MUSE: NORMAL MMHG
EOSINOPHIL # BLD AUTO: 0.2 THOU/UL (ref 0–0.4)
EOSINOPHIL NFR BLD AUTO: 3 % (ref 0–6)
ERYTHROCYTE [DISTWIDTH] IN BLOOD BY AUTOMATED COUNT: 14.1 % (ref 11–14.5)
GFR SERPL CREATININE-BSD FRML MDRD: 42 ML/MIN/1.73M2
GLUCOSE BLD-MCNC: 311 MG/DL (ref 70–125)
HCT VFR BLD AUTO: 42.9 % (ref 40–54)
HGB BLD-MCNC: 14.8 G/DL (ref 14–18)
INTERPRETATION ECG - MUSE: NORMAL
LYMPHOCYTES # BLD AUTO: 1.5 THOU/UL (ref 0.8–4.4)
LYMPHOCYTES NFR BLD AUTO: 24 % (ref 20–40)
MCH RBC QN AUTO: 30.8 PG (ref 27–34)
MCHC RBC AUTO-ENTMCNC: 34.6 G/DL (ref 32–36)
MCV RBC AUTO: 89 FL (ref 80–100)
MONOCYTES # BLD AUTO: 0.4 THOU/UL (ref 0–0.9)
MONOCYTES NFR BLD AUTO: 7 % (ref 2–10)
NEUTROPHILS # BLD AUTO: 4.2 THOU/UL (ref 2–7.7)
NEUTROPHILS NFR BLD AUTO: 66 % (ref 50–70)
P AXIS - MUSE: -11 DEGREES
PLATELET # BLD AUTO: 188 THOU/UL (ref 140–440)
PMV BLD AUTO: 6.8 FL (ref 7–10)
POTASSIUM BLD-SCNC: 3.7 MMOL/L (ref 3.5–5)
PR INTERVAL - MUSE: 186 MS
PROT SERPL-MCNC: 6.5 G/DL (ref 6–8)
QRS DURATION - MUSE: 108 MS
QT - MUSE: 402 MS
QTC - MUSE: 446 MS
R AXIS - MUSE: 12 DEGREES
RBC # BLD AUTO: 4.82 MILL/UL (ref 4.4–6.2)
SODIUM SERPL-SCNC: 140 MMOL/L (ref 136–145)
SYSTOLIC BLOOD PRESSURE - MUSE: NORMAL MMHG
T AXIS - MUSE: 19 DEGREES
VENTRICULAR RATE- MUSE: 74 BPM
WBC: 6.4 THOU/UL (ref 4–11)

## 2019-05-16 ASSESSMENT — MIFFLIN-ST. JEOR: SCORE: 1775.02

## 2019-05-22 ENCOUNTER — COMMUNICATION - HEALTHEAST (OUTPATIENT)
Dept: FAMILY MEDICINE | Facility: CLINIC | Age: 84
End: 2019-05-22

## 2019-06-21 ENCOUNTER — COMMUNICATION - HEALTHEAST (OUTPATIENT)
Dept: FAMILY MEDICINE | Facility: CLINIC | Age: 84
End: 2019-06-21

## 2019-06-21 DIAGNOSIS — E78.00 PURE HYPERCHOLESTEROLEMIA: ICD-10-CM

## 2019-06-21 DIAGNOSIS — E11.9 DIABETES MELLITUS, TYPE 2 (H): ICD-10-CM

## 2019-07-05 ENCOUNTER — OFFICE VISIT - HEALTHEAST (OUTPATIENT)
Dept: FAMILY MEDICINE | Facility: CLINIC | Age: 84
End: 2019-07-05

## 2019-07-05 DIAGNOSIS — E11.9 TYPE 2 DIABETES MELLITUS WITHOUT COMPLICATION, WITH LONG-TERM CURRENT USE OF INSULIN (H): ICD-10-CM

## 2019-07-05 DIAGNOSIS — M54.50 MIDLINE LOW BACK PAIN WITHOUT SCIATICA, UNSPECIFIED CHRONICITY: ICD-10-CM

## 2019-07-05 DIAGNOSIS — Z79.4 TYPE 2 DIABETES MELLITUS WITHOUT COMPLICATION, WITH LONG-TERM CURRENT USE OF INSULIN (H): ICD-10-CM

## 2019-10-14 ENCOUNTER — COMMUNICATION - HEALTHEAST (OUTPATIENT)
Dept: FAMILY MEDICINE | Facility: CLINIC | Age: 84
End: 2019-10-14

## 2019-10-14 DIAGNOSIS — E11.9 DIABETES MELLITUS, TYPE 2 (H): ICD-10-CM

## 2019-10-23 ENCOUNTER — COMMUNICATION - HEALTHEAST (OUTPATIENT)
Dept: FAMILY MEDICINE | Facility: CLINIC | Age: 84
End: 2019-10-23

## 2019-10-23 DIAGNOSIS — I10 ESSENTIAL HYPERTENSION, BENIGN: ICD-10-CM

## 2019-11-21 ENCOUNTER — OFFICE VISIT - HEALTHEAST (OUTPATIENT)
Dept: FAMILY MEDICINE | Facility: CLINIC | Age: 84
End: 2019-11-21

## 2019-11-21 DIAGNOSIS — Z79.4 TYPE 2 DIABETES MELLITUS WITHOUT COMPLICATION, WITH LONG-TERM CURRENT USE OF INSULIN (H): ICD-10-CM

## 2019-11-21 DIAGNOSIS — M54.9 UPPER BACK PAIN: ICD-10-CM

## 2019-11-21 DIAGNOSIS — K21.9 GERD (GASTROESOPHAGEAL REFLUX DISEASE): ICD-10-CM

## 2019-11-21 DIAGNOSIS — R32 URINARY INCONTINENCE, UNSPECIFIED TYPE: ICD-10-CM

## 2019-11-21 DIAGNOSIS — E11.9 TYPE 2 DIABETES MELLITUS WITHOUT COMPLICATION, WITH LONG-TERM CURRENT USE OF INSULIN (H): ICD-10-CM

## 2019-11-21 DIAGNOSIS — E78.00 PURE HYPERCHOLESTEROLEMIA: ICD-10-CM

## 2019-11-21 DIAGNOSIS — R29.898 WEAKNESS OF BOTH LOWER EXTREMITIES: ICD-10-CM

## 2019-11-21 LAB
ALBUMIN SERPL-MCNC: 3.7 G/DL (ref 3.5–5)
ALBUMIN UR-MCNC: ABNORMAL MG/DL
ALP SERPL-CCNC: 68 U/L (ref 45–120)
ALT SERPL W P-5'-P-CCNC: 39 U/L (ref 0–45)
ANION GAP SERPL CALCULATED.3IONS-SCNC: 9 MMOL/L (ref 5–18)
APPEARANCE UR: CLEAR
AST SERPL W P-5'-P-CCNC: 30 U/L (ref 0–40)
BACTERIA #/AREA URNS HPF: ABNORMAL HPF
BILIRUB SERPL-MCNC: 0.8 MG/DL (ref 0–1)
BILIRUB UR QL STRIP: NEGATIVE
BUN SERPL-MCNC: 26 MG/DL (ref 8–28)
CALCIUM SERPL-MCNC: 10.2 MG/DL (ref 8.5–10.5)
CHLORIDE BLD-SCNC: 107 MMOL/L (ref 98–107)
CHOLEST SERPL-MCNC: 153 MG/DL
CO2 SERPL-SCNC: 25 MMOL/L (ref 22–31)
COLOR UR AUTO: YELLOW
CREAT SERPL-MCNC: 2 MG/DL (ref 0.7–1.3)
FASTING STATUS PATIENT QL REPORTED: ABNORMAL
GFR SERPL CREATININE-BSD FRML MDRD: 32 ML/MIN/1.73M2
GLUCOSE BLD-MCNC: 318 MG/DL (ref 70–125)
GLUCOSE UR STRIP-MCNC: ABNORMAL MG/DL
HBA1C MFR BLD: 7.8 % (ref 3.5–6)
HDLC SERPL-MCNC: 29 MG/DL
HGB UR QL STRIP: ABNORMAL
HYALINE CASTS #/AREA URNS LPF: ABNORMAL LPF
KETONES UR STRIP-MCNC: ABNORMAL MG/DL
LDLC SERPL CALC-MCNC: ABNORMAL MG/DL
LEUKOCYTE ESTERASE UR QL STRIP: NEGATIVE
NITRATE UR QL: NEGATIVE
PH UR STRIP: 5.5 [PH] (ref 5–8)
POTASSIUM BLD-SCNC: 4 MMOL/L (ref 3.5–5)
PROT SERPL-MCNC: 6.7 G/DL (ref 6–8)
RBC #/AREA URNS AUTO: ABNORMAL HPF
SODIUM SERPL-SCNC: 141 MMOL/L (ref 136–145)
SP GR UR STRIP: >=1.03 (ref 1–1.03)
SQUAMOUS #/AREA URNS AUTO: ABNORMAL LPF
TRIGL SERPL-MCNC: 487 MG/DL
UROBILINOGEN UR STRIP-ACNC: ABNORMAL
WBC #/AREA URNS AUTO: ABNORMAL HPF

## 2019-11-25 ENCOUNTER — COMMUNICATION - HEALTHEAST (OUTPATIENT)
Dept: INTERNAL MEDICINE | Facility: CLINIC | Age: 84
End: 2019-11-25

## 2020-01-08 ENCOUNTER — COMMUNICATION - HEALTHEAST (OUTPATIENT)
Dept: SCHEDULING | Facility: CLINIC | Age: 85
End: 2020-01-08

## 2020-01-15 ENCOUNTER — OFFICE VISIT - HEALTHEAST (OUTPATIENT)
Dept: FAMILY MEDICINE | Facility: CLINIC | Age: 85
End: 2020-01-15

## 2020-01-15 DIAGNOSIS — L91.8 SKIN TAG: ICD-10-CM

## 2020-01-15 DIAGNOSIS — I10 ESSENTIAL HYPERTENSION, BENIGN: ICD-10-CM

## 2020-01-16 ENCOUNTER — COMMUNICATION - HEALTHEAST (OUTPATIENT)
Dept: FAMILY MEDICINE | Facility: CLINIC | Age: 85
End: 2020-01-16

## 2020-01-16 DIAGNOSIS — I10 ESSENTIAL HYPERTENSION, BENIGN: ICD-10-CM

## 2020-02-19 ENCOUNTER — AMBULATORY - HEALTHEAST (OUTPATIENT)
Dept: LAB | Facility: CLINIC | Age: 85
End: 2020-02-19

## 2020-02-19 DIAGNOSIS — G30.1 ALZHEIMER'S DISEASE WITH LATE ONSET (H): ICD-10-CM

## 2020-02-19 DIAGNOSIS — F02.80 ALZHEIMER'S DISEASE WITH LATE ONSET (H): ICD-10-CM

## 2020-02-19 LAB
ALBUMIN SERPL-MCNC: 3.4 G/DL (ref 3.5–5)
ALP SERPL-CCNC: 64 U/L (ref 45–120)
ALT SERPL W P-5'-P-CCNC: 33 U/L (ref 0–45)
AST SERPL W P-5'-P-CCNC: 24 U/L (ref 0–40)
BILIRUB DIRECT SERPL-MCNC: 0.3 MG/DL
BILIRUB SERPL-MCNC: 1.1 MG/DL (ref 0–1)
PROT SERPL-MCNC: 6.2 G/DL (ref 6–8)

## 2020-03-16 ENCOUNTER — COMMUNICATION - HEALTHEAST (OUTPATIENT)
Dept: FAMILY MEDICINE | Facility: CLINIC | Age: 85
End: 2020-03-16

## 2020-03-16 DIAGNOSIS — Z79.4 TYPE 2 DIABETES MELLITUS WITHOUT COMPLICATION, WITH LONG-TERM CURRENT USE OF INSULIN (H): ICD-10-CM

## 2020-03-16 DIAGNOSIS — E11.9 TYPE 2 DIABETES MELLITUS WITHOUT COMPLICATION, WITH LONG-TERM CURRENT USE OF INSULIN (H): ICD-10-CM

## 2020-03-16 DIAGNOSIS — E11.9 DIABETES MELLITUS, TYPE 2 (H): ICD-10-CM

## 2020-03-25 ENCOUNTER — OFFICE VISIT - HEALTHEAST (OUTPATIENT)
Dept: FAMILY MEDICINE | Facility: CLINIC | Age: 85
End: 2020-03-25

## 2020-03-25 DIAGNOSIS — E11.9 TYPE 2 DIABETES MELLITUS WITHOUT COMPLICATION, WITH LONG-TERM CURRENT USE OF INSULIN (H): ICD-10-CM

## 2020-03-25 DIAGNOSIS — Z79.4 TYPE 2 DIABETES MELLITUS WITHOUT COMPLICATION, WITH LONG-TERM CURRENT USE OF INSULIN (H): ICD-10-CM

## 2020-04-10 ENCOUNTER — COMMUNICATION - HEALTHEAST (OUTPATIENT)
Dept: SCHEDULING | Facility: CLINIC | Age: 85
End: 2020-04-10

## 2020-04-10 DIAGNOSIS — I10 ESSENTIAL HYPERTENSION, BENIGN: ICD-10-CM

## 2020-04-20 ENCOUNTER — COMMUNICATION - HEALTHEAST (OUTPATIENT)
Dept: SCHEDULING | Facility: CLINIC | Age: 85
End: 2020-04-20

## 2020-04-20 DIAGNOSIS — E11.9 DIABETES MELLITUS, TYPE 2 (H): ICD-10-CM

## 2020-06-12 ENCOUNTER — COMMUNICATION - HEALTHEAST (OUTPATIENT)
Dept: FAMILY MEDICINE | Facility: CLINIC | Age: 85
End: 2020-06-12

## 2020-06-12 DIAGNOSIS — Z79.4 TYPE 2 DIABETES MELLITUS WITHOUT COMPLICATION, WITH LONG-TERM CURRENT USE OF INSULIN (H): ICD-10-CM

## 2020-06-12 DIAGNOSIS — I10 ESSENTIAL HYPERTENSION, BENIGN: ICD-10-CM

## 2020-06-12 DIAGNOSIS — E78.00 PURE HYPERCHOLESTEROLEMIA: ICD-10-CM

## 2020-06-12 DIAGNOSIS — E11.9 TYPE 2 DIABETES MELLITUS WITHOUT COMPLICATION, WITH LONG-TERM CURRENT USE OF INSULIN (H): ICD-10-CM

## 2020-06-19 ENCOUNTER — OFFICE VISIT - HEALTHEAST (OUTPATIENT)
Dept: FAMILY MEDICINE | Facility: CLINIC | Age: 85
End: 2020-06-19

## 2020-06-19 ENCOUNTER — COMMUNICATION - HEALTHEAST (OUTPATIENT)
Dept: FAMILY MEDICINE | Facility: CLINIC | Age: 85
End: 2020-06-19

## 2020-06-19 DIAGNOSIS — Z79.4 TYPE 2 DIABETES MELLITUS WITHOUT COMPLICATION, WITH LONG-TERM CURRENT USE OF INSULIN (H): ICD-10-CM

## 2020-06-19 DIAGNOSIS — E11.9 TYPE 2 DIABETES MELLITUS WITHOUT COMPLICATION, WITH LONG-TERM CURRENT USE OF INSULIN (H): ICD-10-CM

## 2020-06-19 ASSESSMENT — MIFFLIN-ST. JEOR: SCORE: 1769.58

## 2020-07-06 ENCOUNTER — AMBULATORY - HEALTHEAST (OUTPATIENT)
Dept: LAB | Facility: CLINIC | Age: 85
End: 2020-07-06

## 2020-07-06 ENCOUNTER — OFFICE VISIT - HEALTHEAST (OUTPATIENT)
Dept: FAMILY MEDICINE | Facility: CLINIC | Age: 85
End: 2020-07-06

## 2020-07-06 DIAGNOSIS — E11.9 TYPE 2 DIABETES MELLITUS WITHOUT COMPLICATION, WITH LONG-TERM CURRENT USE OF INSULIN (H): ICD-10-CM

## 2020-07-06 DIAGNOSIS — Z79.4 TYPE 2 DIABETES MELLITUS WITHOUT COMPLICATION, WITH LONG-TERM CURRENT USE OF INSULIN (H): ICD-10-CM

## 2020-07-06 LAB — HBA1C MFR BLD: 7.4 % (ref 3.5–6)

## 2020-07-07 ENCOUNTER — COMMUNICATION - HEALTHEAST (OUTPATIENT)
Dept: FAMILY MEDICINE | Facility: CLINIC | Age: 85
End: 2020-07-07

## 2020-08-13 ENCOUNTER — COMMUNICATION - HEALTHEAST (OUTPATIENT)
Dept: SCHEDULING | Facility: CLINIC | Age: 85
End: 2020-08-13

## 2020-08-13 DIAGNOSIS — E11.9 DIABETES MELLITUS, TYPE 2 (H): ICD-10-CM

## 2020-09-16 ENCOUNTER — COMMUNICATION - HEALTHEAST (OUTPATIENT)
Dept: FAMILY MEDICINE | Facility: CLINIC | Age: 85
End: 2020-09-16

## 2020-09-16 DIAGNOSIS — K21.9 GERD (GASTROESOPHAGEAL REFLUX DISEASE): ICD-10-CM

## 2020-09-16 DIAGNOSIS — I10 ESSENTIAL HYPERTENSION, BENIGN: ICD-10-CM

## 2020-09-25 ENCOUNTER — COMMUNICATION - HEALTHEAST (OUTPATIENT)
Dept: FAMILY MEDICINE | Facility: CLINIC | Age: 85
End: 2020-09-25

## 2020-09-25 DIAGNOSIS — E11.9 TYPE 2 DIABETES MELLITUS WITHOUT COMPLICATION, WITH LONG-TERM CURRENT USE OF INSULIN (H): ICD-10-CM

## 2020-09-25 DIAGNOSIS — I10 ESSENTIAL HYPERTENSION, BENIGN: ICD-10-CM

## 2020-09-25 DIAGNOSIS — E78.00 PURE HYPERCHOLESTEROLEMIA: ICD-10-CM

## 2020-09-25 DIAGNOSIS — Z79.4 TYPE 2 DIABETES MELLITUS WITHOUT COMPLICATION, WITH LONG-TERM CURRENT USE OF INSULIN (H): ICD-10-CM

## 2020-10-07 ENCOUNTER — OFFICE VISIT - HEALTHEAST (OUTPATIENT)
Dept: FAMILY MEDICINE | Facility: CLINIC | Age: 85
End: 2020-10-07

## 2020-10-07 DIAGNOSIS — I10 ESSENTIAL HYPERTENSION, BENIGN: ICD-10-CM

## 2020-10-07 DIAGNOSIS — E11.9 TYPE 2 DIABETES MELLITUS WITHOUT COMPLICATION, WITH LONG-TERM CURRENT USE OF INSULIN (H): ICD-10-CM

## 2020-10-07 DIAGNOSIS — Z23 NEED FOR VACCINATION: ICD-10-CM

## 2020-10-07 DIAGNOSIS — Z79.4 TYPE 2 DIABETES MELLITUS WITHOUT COMPLICATION, WITH LONG-TERM CURRENT USE OF INSULIN (H): ICD-10-CM

## 2020-10-07 LAB
ALBUMIN SERPL-MCNC: 3.6 G/DL (ref 3.5–5)
ALP SERPL-CCNC: 59 U/L (ref 45–120)
ALT SERPL W P-5'-P-CCNC: 31 U/L (ref 0–45)
ANION GAP SERPL CALCULATED.3IONS-SCNC: 10 MMOL/L (ref 5–18)
AST SERPL W P-5'-P-CCNC: 25 U/L (ref 0–40)
BILIRUB SERPL-MCNC: 0.8 MG/DL (ref 0–1)
BUN SERPL-MCNC: 29 MG/DL (ref 8–28)
CALCIUM SERPL-MCNC: 9.8 MG/DL (ref 8.5–10.5)
CHLORIDE BLD-SCNC: 106 MMOL/L (ref 98–107)
CO2 SERPL-SCNC: 25 MMOL/L (ref 22–31)
CREAT SERPL-MCNC: 1.99 MG/DL (ref 0.7–1.3)
GFR SERPL CREATININE-BSD FRML MDRD: 32 ML/MIN/1.73M2
GLUCOSE BLD-MCNC: 246 MG/DL (ref 70–125)
HBA1C MFR BLD: 7.5 %
POTASSIUM BLD-SCNC: 3.9 MMOL/L (ref 3.5–5)
PROT SERPL-MCNC: 6.4 G/DL (ref 6–8)
SODIUM SERPL-SCNC: 141 MMOL/L (ref 136–145)

## 2020-10-09 ENCOUNTER — COMMUNICATION - HEALTHEAST (OUTPATIENT)
Dept: FAMILY MEDICINE | Facility: CLINIC | Age: 85
End: 2020-10-09

## 2020-10-27 ENCOUNTER — COMMUNICATION - HEALTHEAST (OUTPATIENT)
Dept: SCHEDULING | Facility: CLINIC | Age: 85
End: 2020-10-27

## 2020-10-27 DIAGNOSIS — E11.9 DIABETES MELLITUS, TYPE 2 (H): ICD-10-CM

## 2021-01-01 ENCOUNTER — RECORDS - HEALTHEAST (OUTPATIENT)
Dept: ADMINISTRATIVE | Facility: OTHER | Age: 86
End: 2021-01-01

## 2021-01-01 ENCOUNTER — LAB (OUTPATIENT)
Dept: FAMILY MEDICINE | Facility: CLINIC | Age: 86
End: 2021-01-01
Attending: UROLOGY
Payer: MEDICARE

## 2021-01-01 ENCOUNTER — ANESTHESIA EVENT (OUTPATIENT)
Dept: SURGERY | Facility: HOSPITAL | Age: 86
End: 2021-01-01
Payer: MEDICARE

## 2021-01-01 ENCOUNTER — OFFICE VISIT - HEALTHEAST (OUTPATIENT)
Dept: FAMILY MEDICINE | Facility: CLINIC | Age: 86
End: 2021-01-01

## 2021-01-01 ENCOUNTER — MEDICAL CORRESPONDENCE (OUTPATIENT)
Dept: HEALTH INFORMATION MANAGEMENT | Facility: CLINIC | Age: 86
End: 2021-01-01

## 2021-01-01 ENCOUNTER — OFFICE VISIT (OUTPATIENT)
Dept: FAMILY MEDICINE | Facility: CLINIC | Age: 86
End: 2021-01-01
Payer: MEDICARE

## 2021-01-01 ENCOUNTER — SURGERY - HEALTHEAST (OUTPATIENT)
Dept: SURGERY | Facility: HOSPITAL | Age: 86
End: 2021-01-01
Payer: MEDICARE

## 2021-01-01 ENCOUNTER — VIRTUAL VISIT (OUTPATIENT)
Dept: FAMILY MEDICINE | Facility: CLINIC | Age: 86
End: 2021-01-01
Payer: MEDICARE

## 2021-01-01 ENCOUNTER — TRANSITIONAL CARE UNIT VISIT (OUTPATIENT)
Dept: GERIATRICS | Facility: CLINIC | Age: 86
End: 2021-01-01
Payer: MEDICARE

## 2021-01-01 ENCOUNTER — TELEPHONE (OUTPATIENT)
Dept: FAMILY MEDICINE | Facility: CLINIC | Age: 86
End: 2021-01-01

## 2021-01-01 ENCOUNTER — HOSPITAL ENCOUNTER (OUTPATIENT)
Facility: HOSPITAL | Age: 86
Setting detail: OBSERVATION
Discharge: HOME | End: 2021-08-06
Attending: UROLOGY | Admitting: UROLOGY
Payer: MEDICARE

## 2021-01-01 ENCOUNTER — COMMUNICATION - HEALTHEAST (OUTPATIENT)
Dept: SCHEDULING | Facility: CLINIC | Age: 86
End: 2021-01-01

## 2021-01-01 ENCOUNTER — COMMUNICATION - HEALTHEAST (OUTPATIENT)
Dept: INTERNAL MEDICINE | Facility: CLINIC | Age: 86
End: 2021-01-01

## 2021-01-01 ENCOUNTER — APPOINTMENT (OUTPATIENT)
Dept: RADIOLOGY | Facility: HOSPITAL | Age: 86
End: 2021-01-01
Attending: UROLOGY
Payer: MEDICARE

## 2021-01-01 ENCOUNTER — APPOINTMENT (OUTPATIENT)
Dept: LAB | Facility: CLINIC | Age: 86
End: 2021-01-01
Payer: MEDICARE

## 2021-01-01 ENCOUNTER — HEALTH MAINTENANCE LETTER (OUTPATIENT)
Age: 86
End: 2021-01-01

## 2021-01-01 ENCOUNTER — DOCUMENTATION ONLY (OUTPATIENT)
Dept: OTHER | Facility: CLINIC | Age: 86
End: 2021-01-01

## 2021-01-01 ENCOUNTER — ANESTHESIA (OUTPATIENT)
Dept: SURGERY | Facility: HOSPITAL | Age: 86
End: 2021-01-01
Payer: MEDICARE

## 2021-01-01 ENCOUNTER — LAB REQUISITION (OUTPATIENT)
Dept: LAB | Facility: CLINIC | Age: 86
End: 2021-01-01
Payer: MEDICARE

## 2021-01-01 ENCOUNTER — AMBULATORY - HEALTHEAST (OUTPATIENT)
Dept: LAB | Facility: CLINIC | Age: 86
End: 2021-01-01

## 2021-01-01 ENCOUNTER — PATIENT OUTREACH (OUTPATIENT)
Dept: CARE COORDINATION | Facility: CLINIC | Age: 86
End: 2021-01-01

## 2021-01-01 VITALS
HEIGHT: 70 IN | WEIGHT: 223.3 LBS | BODY MASS INDEX: 32.14 KG/M2 | HEIGHT: 72 IN | BODY MASS INDEX: 32.04 KG/M2 | WEIGHT: 224 LBS | BODY MASS INDEX: 32.14 KG/M2 | BODY MASS INDEX: 31.6 KG/M2

## 2021-01-01 VITALS
TEMPERATURE: 99.1 F | HEART RATE: 78 BPM | DIASTOLIC BLOOD PRESSURE: 86 MMHG | HEIGHT: 70 IN | SYSTOLIC BLOOD PRESSURE: 151 MMHG | WEIGHT: 204.8 LBS | BODY MASS INDEX: 29.32 KG/M2 | RESPIRATION RATE: 96 BRPM

## 2021-01-01 VITALS
RESPIRATION RATE: 20 BRPM | DIASTOLIC BLOOD PRESSURE: 74 MMHG | TEMPERATURE: 97.8 F | HEART RATE: 70 BPM | OXYGEN SATURATION: 97 % | WEIGHT: 203.9 LBS | BODY MASS INDEX: 29.26 KG/M2 | SYSTOLIC BLOOD PRESSURE: 148 MMHG

## 2021-01-01 VITALS
RESPIRATION RATE: 18 BRPM | WEIGHT: 206.5 LBS | TEMPERATURE: 98.4 F | DIASTOLIC BLOOD PRESSURE: 81 MMHG | BODY MASS INDEX: 29.56 KG/M2 | OXYGEN SATURATION: 99 % | HEIGHT: 70 IN | SYSTOLIC BLOOD PRESSURE: 135 MMHG | HEART RATE: 72 BPM

## 2021-01-01 VITALS
OXYGEN SATURATION: 96 % | RESPIRATION RATE: 14 BRPM | SYSTOLIC BLOOD PRESSURE: 110 MMHG | TEMPERATURE: 98.8 F | DIASTOLIC BLOOD PRESSURE: 70 MMHG | HEART RATE: 90 BPM

## 2021-01-01 VITALS
HEART RATE: 64 BPM | TEMPERATURE: 97.3 F | WEIGHT: 189 LBS | HEIGHT: 72 IN | SYSTOLIC BLOOD PRESSURE: 143 MMHG | RESPIRATION RATE: 18 BRPM | DIASTOLIC BLOOD PRESSURE: 64 MMHG | BODY MASS INDEX: 25.6 KG/M2 | OXYGEN SATURATION: 93 %

## 2021-01-01 VITALS
HEART RATE: 83 BPM | HEIGHT: 70 IN | SYSTOLIC BLOOD PRESSURE: 150 MMHG | TEMPERATURE: 98.3 F | RESPIRATION RATE: 20 BRPM | DIASTOLIC BLOOD PRESSURE: 98 MMHG | BODY MASS INDEX: 29.56 KG/M2 | WEIGHT: 206.5 LBS | OXYGEN SATURATION: 94 %

## 2021-01-01 VITALS
HEART RATE: 70 BPM | DIASTOLIC BLOOD PRESSURE: 83 MMHG | WEIGHT: 203.9 LBS | HEIGHT: 70 IN | TEMPERATURE: 98.2 F | SYSTOLIC BLOOD PRESSURE: 156 MMHG | RESPIRATION RATE: 20 BRPM | OXYGEN SATURATION: 84 % | BODY MASS INDEX: 29.19 KG/M2

## 2021-01-01 VITALS
SYSTOLIC BLOOD PRESSURE: 122 MMHG | OXYGEN SATURATION: 95 % | DIASTOLIC BLOOD PRESSURE: 78 MMHG | WEIGHT: 241 LBS | BODY MASS INDEX: 35.85 KG/M2 | HEART RATE: 79 BPM

## 2021-01-01 VITALS — OXYGEN SATURATION: 97 % | HEART RATE: 68 BPM | DIASTOLIC BLOOD PRESSURE: 60 MMHG | SYSTOLIC BLOOD PRESSURE: 140 MMHG

## 2021-01-01 VITALS — SYSTOLIC BLOOD PRESSURE: 173 MMHG | DIASTOLIC BLOOD PRESSURE: 94 MMHG

## 2021-01-01 DIAGNOSIS — M54.50 CHRONIC BILATERAL LOW BACK PAIN WITHOUT SCIATICA: ICD-10-CM

## 2021-01-01 DIAGNOSIS — E11.9 TYPE 2 DIABETES MELLITUS WITHOUT COMPLICATION, WITH LONG-TERM CURRENT USE OF INSULIN (H): ICD-10-CM

## 2021-01-01 DIAGNOSIS — Z79.4 TYPE 2 DIABETES MELLITUS WITHOUT COMPLICATION, WITH LONG-TERM CURRENT USE OF INSULIN (H): ICD-10-CM

## 2021-01-01 DIAGNOSIS — A41.9 SEPSIS, DUE TO UNSPECIFIED ORGANISM, UNSPECIFIED WHETHER ACUTE ORGAN DYSFUNCTION PRESENT (H): Primary | ICD-10-CM

## 2021-01-01 DIAGNOSIS — N13.30 HYDRONEPHROSIS, UNSPECIFIED HYDRONEPHROSIS TYPE: ICD-10-CM

## 2021-01-01 DIAGNOSIS — I10 ESSENTIAL HYPERTENSION: ICD-10-CM

## 2021-01-01 DIAGNOSIS — E78.00 HYPERCHOLESTEROLEMIA: ICD-10-CM

## 2021-01-01 DIAGNOSIS — N18.32 STAGE 3B CHRONIC KIDNEY DISEASE (H): ICD-10-CM

## 2021-01-01 DIAGNOSIS — T14.8XXA BRUISE: Primary | ICD-10-CM

## 2021-01-01 DIAGNOSIS — Z11.59 ENCOUNTER FOR SCREENING FOR OTHER VIRAL DISEASES: ICD-10-CM

## 2021-01-01 DIAGNOSIS — I10 ESSENTIAL HYPERTENSION, BENIGN: ICD-10-CM

## 2021-01-01 DIAGNOSIS — I10 ESSENTIAL HYPERTENSION: Primary | ICD-10-CM

## 2021-01-01 DIAGNOSIS — D49.4 BLADDER TUMOR: ICD-10-CM

## 2021-01-01 DIAGNOSIS — R53.81 PHYSICAL DECONDITIONING: ICD-10-CM

## 2021-01-01 DIAGNOSIS — R35.0 INCREASED FREQUENCY OF URINATION: Primary | ICD-10-CM

## 2021-01-01 DIAGNOSIS — Z71.89 OTHER SPECIFIED COUNSELING: ICD-10-CM

## 2021-01-01 DIAGNOSIS — K59.00 CONSTIPATION, UNSPECIFIED CONSTIPATION TYPE: ICD-10-CM

## 2021-01-01 DIAGNOSIS — G30.1 LATE ONSET ALZHEIMER'S DISEASE WITH BEHAVIORAL DISTURBANCE (H): ICD-10-CM

## 2021-01-01 DIAGNOSIS — Z01.818 PRE-OP EVALUATION: Primary | ICD-10-CM

## 2021-01-01 DIAGNOSIS — Z53.9 DIAGNOSIS NOT YET DEFINED: Primary | ICD-10-CM

## 2021-01-01 DIAGNOSIS — R35.0 URINARY FREQUENCY: ICD-10-CM

## 2021-01-01 DIAGNOSIS — R31.9 HEMATURIA, UNSPECIFIED: ICD-10-CM

## 2021-01-01 DIAGNOSIS — R35.0 INCREASED URINARY FREQUENCY: Primary | ICD-10-CM

## 2021-01-01 DIAGNOSIS — N20.0 NEPHROLITHIASIS: ICD-10-CM

## 2021-01-01 DIAGNOSIS — G89.29 CHRONIC BILATERAL LOW BACK PAIN WITHOUT SCIATICA: ICD-10-CM

## 2021-01-01 DIAGNOSIS — N20.1 URETERAL STONE: Primary | ICD-10-CM

## 2021-01-01 DIAGNOSIS — R40.0 SOMNOLENCE: ICD-10-CM

## 2021-01-01 DIAGNOSIS — K21.9 GASTROESOPHAGEAL REFLUX DISEASE WITHOUT ESOPHAGITIS: ICD-10-CM

## 2021-01-01 DIAGNOSIS — R63.4 WEIGHT LOSS: ICD-10-CM

## 2021-01-01 DIAGNOSIS — F02.818 LATE ONSET ALZHEIMER'S DISEASE WITH BEHAVIORAL DISTURBANCE (H): ICD-10-CM

## 2021-01-01 DIAGNOSIS — N12 PYELONEPHRITIS: ICD-10-CM

## 2021-01-01 DIAGNOSIS — R68.89 COLD FEELING: ICD-10-CM

## 2021-01-01 DIAGNOSIS — R35.0 INCREASED FREQUENCY OF URINATION: ICD-10-CM

## 2021-01-01 DIAGNOSIS — Z01.818 PRE-OP EVALUATION: ICD-10-CM

## 2021-01-01 LAB
ALBUMIN UR-MCNC: >=300 MG/DL
ALBUMIN UR-MCNC: ABNORMAL G/DL
ANION GAP SERPL CALCULATED.3IONS-SCNC: 10 MMOL/L (ref 5–18)
ANION GAP SERPL CALCULATED.3IONS-SCNC: 12 MMOL/L (ref 5–18)
ANION GAP SERPL CALCULATED.3IONS-SCNC: 6 MMOL/L (ref 5–18)
APPEARANCE STONE: NORMAL
APPEARANCE UR: ABNORMAL
APPEARANCE UR: CLEAR
BACTERIA #/AREA URNS HPF: ABNORMAL /HPF
BACTERIA #/AREA URNS HPF: ABNORMAL /[HPF]
BACTERIA SPEC CULT: ABNORMAL
BACTERIA UR CULT: ABNORMAL
BILIRUB UR QL STRIP: NEGATIVE
BILIRUB UR QL STRIP: NEGATIVE
BUN SERPL-MCNC: 37 MG/DL (ref 8–28)
BUN SERPL-MCNC: 39 MG/DL (ref 8–28)
BUN SERPL-MCNC: 44 MG/DL (ref 8–28)
CALCIUM SERPL-MCNC: 9.2 MG/DL (ref 8.5–10.5)
CALCIUM SERPL-MCNC: 9.4 MG/DL (ref 8.5–10.5)
CALCIUM SERPL-MCNC: 9.9 MG/DL (ref 8.5–10.5)
CHLORIDE BLD-SCNC: 108 MMOL/L (ref 98–107)
CHLORIDE BLD-SCNC: 109 MMOL/L (ref 98–107)
CHLORIDE BLD-SCNC: 111 MMOL/L (ref 98–107)
CO2 SERPL-SCNC: 20 MMOL/L (ref 22–31)
CO2 SERPL-SCNC: 21 MMOL/L (ref 22–31)
CO2 SERPL-SCNC: 22 MMOL/L (ref 22–31)
COLOR UR AUTO: YELLOW
COLOR UR AUTO: YELLOW
COMPN STONE: NORMAL
CREAT SERPL-MCNC: 2.23 MG/DL (ref 0.7–1.3)
CREAT SERPL-MCNC: 2.28 MG/DL (ref 0.7–1.3)
CREAT SERPL-MCNC: 2.46 MG/DL (ref 0.7–1.3)
ERYTHROCYTE [DISTWIDTH] IN BLOOD BY AUTOMATED COUNT: 15.2 % (ref 10–15)
ERYTHROCYTE [DISTWIDTH] IN BLOOD BY AUTOMATED COUNT: 15.7 % (ref 10–15)
GFR SERPL CREATININE-BSD FRML MDRD: 23 ML/MIN/1.73M2
GFR SERPL CREATININE-BSD FRML MDRD: 25 ML/MIN/1.73M2
GFR SERPL CREATININE-BSD FRML MDRD: 26 ML/MIN/1.73M2
GLUCOSE BLD-MCNC: 110 MG/DL (ref 70–125)
GLUCOSE BLD-MCNC: 127 MG/DL (ref 70–125)
GLUCOSE BLD-MCNC: 154 MG/DL (ref 70–125)
GLUCOSE BLDC GLUCOMTR-MCNC: 104 MG/DL (ref 70–125)
GLUCOSE BLDC GLUCOMTR-MCNC: 110 MG/DL (ref 70–125)
GLUCOSE BLDC GLUCOMTR-MCNC: 130 MG/DL (ref 70–125)
GLUCOSE BLDC GLUCOMTR-MCNC: 156 MG/DL (ref 70–125)
GLUCOSE BLDC GLUCOMTR-MCNC: 165 MG/DL (ref 70–125)
GLUCOSE BLDC GLUCOMTR-MCNC: 170 MG/DL (ref 70–125)
GLUCOSE BLDC GLUCOMTR-MCNC: 232 MG/DL (ref 70–125)
GLUCOSE UR STRIP-MCNC: 100 MG/DL
GLUCOSE UR STRIP-MCNC: ABNORMAL MG/DL
HCT VFR BLD AUTO: 31.8 % (ref 40–53)
HCT VFR BLD AUTO: 36.6 % (ref 40–53)
HGB BLD-MCNC: 10.2 G/DL (ref 13.3–17.7)
HGB BLD-MCNC: 10.8 G/DL (ref 13.3–17.7)
HGB BLD-MCNC: 12 G/DL (ref 13.3–17.7)
HGB UR QL STRIP: NEGATIVE
HGB UR QL STRIP: NEGATIVE
HOLD SPECIMEN: NORMAL
KETONES UR STRIP-MCNC: NEGATIVE MG/DL
KETONES UR STRIP-MCNC: NEGATIVE MG/DL
LEUKOCYTE ESTERASE UR QL STRIP: ABNORMAL
LEUKOCYTE ESTERASE UR QL STRIP: ABNORMAL
MCH RBC QN AUTO: 26.4 PG (ref 26.5–33)
MCH RBC QN AUTO: 29.3 PG (ref 26.5–33)
MCHC RBC AUTO-ENTMCNC: 32.8 G/DL (ref 31.5–36.5)
MCHC RBC AUTO-ENTMCNC: 34 G/DL (ref 31.5–36.5)
MCV RBC AUTO: 81 FL (ref 78–100)
MCV RBC AUTO: 86 FL (ref 78–100)
NITRATE UR QL: NEGATIVE
NITRATE UR QL: NEGATIVE
NUMBER STONE: NORMAL
PATH REPORT.COMMENTS IMP SPEC: ABNORMAL
PATH REPORT.COMMENTS IMP SPEC: YES
PATH REPORT.FINAL DX SPEC: ABNORMAL
PATH REPORT.GROSS SPEC: ABNORMAL
PATH REPORT.MICROSCOPIC SPEC OTHER STN: ABNORMAL
PATH REPORT.RELEVANT HX SPEC: ABNORMAL
PATHOLOGY SYNOPTIC REPORT: ABNORMAL
PH UR STRIP: 5 [PH] (ref 5–8)
PH UR STRIP: 5.5 [PH] (ref 5–8)
PHOTO IMAGE: ABNORMAL
PLATELET # BLD AUTO: 184 10E3/UL (ref 150–450)
PLATELET # BLD AUTO: 204 10E3/UL (ref 150–450)
POTASSIUM BLD-SCNC: 3.3 MMOL/L (ref 3.5–5)
POTASSIUM BLD-SCNC: 3.4 MMOL/L (ref 3.5–5)
POTASSIUM BLD-SCNC: 3.7 MMOL/L (ref 3.5–5)
POTASSIUM BLD-SCNC: 3.9 MMOL/L (ref 3.5–5)
RBC # BLD AUTO: 3.68 10E6/UL (ref 4.4–5.9)
RBC # BLD AUTO: 4.54 10E6/UL (ref 4.4–5.9)
RBC #/AREA URNS AUTO: ABNORMAL /HPF
RBC #/AREA URNS AUTO: ABNORMAL HPF
SARS-COV-2 RNA RESP QL NAA+PROBE: NEGATIVE
SIZE STONE: NORMAL MM
SODIUM SERPL-SCNC: 139 MMOL/L (ref 136–145)
SODIUM SERPL-SCNC: 140 MMOL/L (ref 136–145)
SODIUM SERPL-SCNC: 140 MMOL/L (ref 136–145)
SP GR UR STRIP: 1.02 (ref 1–1.03)
SP GR UR STRIP: >=1.03 (ref 1–1.03)
SPECIMEN WT: 420 MG
SQUAMOUS #/AREA URNS AUTO: ABNORMAL /LPF
SQUAMOUS #/AREA URNS AUTO: ABNORMAL LPF
TSH SERPL DL<=0.005 MIU/L-ACNC: 1.86 UIU/ML (ref 0.3–5)
UROBILINOGEN UR STRIP-ACNC: 0.2 E.U./DL
UROBILINOGEN UR STRIP-ACNC: ABNORMAL
WBC # BLD AUTO: 5.1 10E3/UL (ref 4–11)
WBC # BLD AUTO: 6.3 10E3/UL (ref 4–11)
WBC #/AREA URNS AUTO: >100 /HPF
WBC #/AREA URNS AUTO: ABNORMAL HPF
WBC CLUMPS #/AREA URNS HPF: PRESENT /[HPF]

## 2021-01-01 PROCEDURE — 85027 COMPLETE CBC AUTOMATED: CPT

## 2021-01-01 PROCEDURE — 360N000083 HC SURGERY LEVEL 3 W/ FLUORO, PER MIN: Performed by: UROLOGY

## 2021-01-01 PROCEDURE — 99215 OFFICE O/P EST HI 40 MIN: CPT | Performed by: FAMILY MEDICINE

## 2021-01-01 PROCEDURE — 85027 COMPLETE CBC AUTOMATED: CPT | Performed by: FAMILY MEDICINE

## 2021-01-01 PROCEDURE — 250N000025 HC SEVOFLURANE, PER MIN: Performed by: UROLOGY

## 2021-01-01 PROCEDURE — 96372 THER/PROPH/DIAG INJ SC/IM: CPT | Performed by: INTERNAL MEDICINE

## 2021-01-01 PROCEDURE — 258N000003 HC RX IP 258 OP 636: Performed by: HOSPITALIST

## 2021-01-01 PROCEDURE — 81001 URINALYSIS AUTO W/SCOPE: CPT

## 2021-01-01 PROCEDURE — 99309 SBSQ NF CARE MODERATE MDM 30: CPT | Performed by: FAMILY MEDICINE

## 2021-01-01 PROCEDURE — 36415 COLL VENOUS BLD VENIPUNCTURE: CPT | Performed by: FAMILY MEDICINE

## 2021-01-01 PROCEDURE — 250N000013 HC RX MED GY IP 250 OP 250 PS 637: Performed by: HOSPITALIST

## 2021-01-01 PROCEDURE — 96372 THER/PROPH/DIAG INJ SC/IM: CPT | Performed by: HOSPITALIST

## 2021-01-01 PROCEDURE — 99309 SBSQ NF CARE MODERATE MDM 30: CPT | Performed by: NURSE PRACTITIONER

## 2021-01-01 PROCEDURE — 250N000012 HC RX MED GY IP 250 OP 636 PS 637: Performed by: INTERNAL MEDICINE

## 2021-01-01 PROCEDURE — 258N000003 HC RX IP 258 OP 636: Performed by: ANESTHESIOLOGY

## 2021-01-01 PROCEDURE — 88305 TISSUE EXAM BY PATHOLOGIST: CPT | Mod: TC | Performed by: UROLOGY

## 2021-01-01 PROCEDURE — 80048 BASIC METABOLIC PNL TOTAL CA: CPT | Performed by: HOSPITALIST

## 2021-01-01 PROCEDURE — 250N000013 HC RX MED GY IP 250 OP 250 PS 637: Performed by: NURSE PRACTITIONER

## 2021-01-01 PROCEDURE — 99316 NF DSCHRG MGMT 30 MIN+: CPT | Performed by: FAMILY MEDICINE

## 2021-01-01 PROCEDURE — 255N000002 HC RX 255 OP 636: Performed by: UROLOGY

## 2021-01-01 PROCEDURE — 84132 ASSAY OF SERUM POTASSIUM: CPT | Performed by: INTERNAL MEDICINE

## 2021-01-01 PROCEDURE — U0005 INFEC AGEN DETEC AMPLI PROBE: HCPCS

## 2021-01-01 PROCEDURE — P9604 ONE-WAY ALLOW PRORATED TRIP: HCPCS | Performed by: FAMILY MEDICINE

## 2021-01-01 PROCEDURE — G0378 HOSPITAL OBSERVATION PER HR: HCPCS

## 2021-01-01 PROCEDURE — 250N000013 HC RX MED GY IP 250 OP 250 PS 637: Performed by: UROLOGY

## 2021-01-01 PROCEDURE — 87186 SC STD MICRODIL/AGAR DIL: CPT

## 2021-01-01 PROCEDURE — 250N000009 HC RX 250: Performed by: NURSE ANESTHETIST, CERTIFIED REGISTERED

## 2021-01-01 PROCEDURE — 88307 TISSUE EXAM BY PATHOLOGIST: CPT | Mod: 26 | Performed by: PATHOLOGY

## 2021-01-01 PROCEDURE — 36415 COLL VENOUS BLD VENIPUNCTURE: CPT | Performed by: HOSPITALIST

## 2021-01-01 PROCEDURE — 272N000001 HC OR GENERAL SUPPLY STERILE: Performed by: UROLOGY

## 2021-01-01 PROCEDURE — 80048 BASIC METABOLIC PNL TOTAL CA: CPT

## 2021-01-01 PROCEDURE — 99214 OFFICE O/P EST MOD 30 MIN: CPT | Performed by: INTERNAL MEDICINE

## 2021-01-01 PROCEDURE — 36415 COLL VENOUS BLD VENIPUNCTURE: CPT | Performed by: UROLOGY

## 2021-01-01 PROCEDURE — 999N000141 HC STATISTIC PRE-PROCEDURE NURSING ASSESSMENT: Performed by: UROLOGY

## 2021-01-01 PROCEDURE — 370N000017 HC ANESTHESIA TECHNICAL FEE, PER MIN: Performed by: UROLOGY

## 2021-01-01 PROCEDURE — 710N000009 HC RECOVERY PHASE 1, LEVEL 1, PER MIN: Performed by: UROLOGY

## 2021-01-01 PROCEDURE — 84443 ASSAY THYROID STIM HORMONE: CPT | Performed by: FAMILY MEDICINE

## 2021-01-01 PROCEDURE — C2617 STENT, NON-COR, TEM W/O DEL: HCPCS | Performed by: UROLOGY

## 2021-01-01 PROCEDURE — 85018 HEMOGLOBIN: CPT | Performed by: FAMILY MEDICINE

## 2021-01-01 PROCEDURE — 250N000012 HC RX MED GY IP 250 OP 636 PS 637: Performed by: HOSPITALIST

## 2021-01-01 PROCEDURE — 99207 PR CDG-CODE CATEGORY CHANGED: CPT | Performed by: INTERNAL MEDICINE

## 2021-01-01 PROCEDURE — 36415 COLL VENOUS BLD VENIPUNCTURE: CPT

## 2021-01-01 PROCEDURE — 87086 URINE CULTURE/COLONY COUNT: CPT

## 2021-01-01 PROCEDURE — 36415 COLL VENOUS BLD VENIPUNCTURE: CPT | Performed by: INTERNAL MEDICINE

## 2021-01-01 PROCEDURE — 74420 UROGRAPHY RTRGR +-KUB: CPT

## 2021-01-01 PROCEDURE — C1894 INTRO/SHEATH, NON-LASER: HCPCS | Performed by: UROLOGY

## 2021-01-01 PROCEDURE — 99442 PR PHYSICIAN TELEPHONE EVALUATION 11-20 MIN: CPT | Mod: 95 | Performed by: FAMILY MEDICINE

## 2021-01-01 PROCEDURE — 250N000013 HC RX MED GY IP 250 OP 250 PS 637: Performed by: ANESTHESIOLOGY

## 2021-01-01 PROCEDURE — C1769 GUIDE WIRE: HCPCS | Performed by: UROLOGY

## 2021-01-01 PROCEDURE — 250N000011 HC RX IP 250 OP 636: Performed by: ANESTHESIOLOGY

## 2021-01-01 PROCEDURE — 258N000001 HC RX 258: Performed by: UROLOGY

## 2021-01-01 PROCEDURE — G0180 MD CERTIFICATION HHA PATIENT: HCPCS | Performed by: FAMILY MEDICINE

## 2021-01-01 PROCEDURE — 99213 OFFICE O/P EST LOW 20 MIN: CPT | Performed by: HOSPITALIST

## 2021-01-01 PROCEDURE — 99207 PR CDG-CODE CATEGORY CHANGED: CPT | Performed by: HOSPITALIST

## 2021-01-01 PROCEDURE — 99305 1ST NF CARE MODERATE MDM 35: CPT | Performed by: FAMILY MEDICINE

## 2021-01-01 PROCEDURE — 250N000011 HC RX IP 250 OP 636: Performed by: NURSE ANESTHETIST, CERTIFIED REGISTERED

## 2021-01-01 PROCEDURE — 250N000011 HC RX IP 250 OP 636: Performed by: NURSE PRACTITIONER

## 2021-01-01 PROCEDURE — U0003 INFECTIOUS AGENT DETECTION BY NUCLEIC ACID (DNA OR RNA); SEVERE ACUTE RESPIRATORY SYNDROME CORONAVIRUS 2 (SARS-COV-2) (CORONAVIRUS DISEASE [COVID-19]), AMPLIFIED PROBE TECHNIQUE, MAKING USE OF HIGH THROUGHPUT TECHNOLOGIES AS DESCRIBED BY CMS-2020-01-R: HCPCS

## 2021-01-01 PROCEDURE — 82365 CALCULUS SPECTROSCOPY: CPT | Performed by: UROLOGY

## 2021-01-01 DEVICE — URETERAL STENT
Type: IMPLANTABLE DEVICE | Site: BLADDER | Status: FUNCTIONAL
Brand: PERCUFLEX™ PLUS

## 2021-01-01 RX ORDER — LIDOCAINE 40 MG/G
CREAM TOPICAL
Status: DISCONTINUED | OUTPATIENT
Start: 2021-01-01 | End: 2021-01-01 | Stop reason: HOSPADM

## 2021-01-01 RX ORDER — HYDROMORPHONE HCL IN WATER/PF 6 MG/30 ML
0.2 PATIENT CONTROLLED ANALGESIA SYRINGE INTRAVENOUS
Status: DISCONTINUED | OUTPATIENT
Start: 2021-01-01 | End: 2021-01-01 | Stop reason: HOSPADM

## 2021-01-01 RX ORDER — SODIUM CHLORIDE 9 MG/ML
INJECTION, SOLUTION INTRAVENOUS CONTINUOUS
Status: ACTIVE | OUTPATIENT
Start: 2021-01-01 | End: 2021-01-01

## 2021-01-01 RX ORDER — INSULIN ASPART 100 [IU]/ML
INJECTION, SOLUTION INTRAVENOUS; SUBCUTANEOUS
COMMUNITY
Start: 2021-01-01 | End: 2022-01-01

## 2021-01-01 RX ORDER — CEPHALEXIN 500 MG/1
500 CAPSULE ORAL 3 TIMES DAILY
Qty: 21 CAPSULE | Refills: 0 | Status: SHIPPED | OUTPATIENT
Start: 2021-01-01 | End: 2021-01-01

## 2021-01-01 RX ORDER — NICOTINE POLACRILEX 4 MG
15-30 LOZENGE BUCCAL
Status: DISCONTINUED | OUTPATIENT
Start: 2021-01-01 | End: 2021-01-01 | Stop reason: HOSPADM

## 2021-01-01 RX ORDER — HYDRALAZINE HYDROCHLORIDE 100 MG/1
100 TABLET, FILM COATED ORAL 4 TIMES DAILY
Qty: 360 TABLET | Refills: 1 | Status: SHIPPED | OUTPATIENT
Start: 2021-01-01 | End: 2022-01-01

## 2021-01-01 RX ORDER — MULTIVITAMIN,THERAPEUTIC
1 TABLET ORAL DAILY
Status: ON HOLD | COMMUNITY
End: 2022-01-01

## 2021-01-01 RX ORDER — FENTANYL CITRATE 50 UG/ML
INJECTION, SOLUTION INTRAMUSCULAR; INTRAVENOUS PRN
Status: DISCONTINUED | OUTPATIENT
Start: 2021-01-01 | End: 2021-01-01

## 2021-01-01 RX ORDER — HYDROCODONE BITARTRATE AND ACETAMINOPHEN 5; 325 MG/1; MG/1
TABLET ORAL
COMMUNITY
End: 2022-01-01

## 2021-01-01 RX ORDER — CLONIDINE HYDROCHLORIDE 0.1 MG/1
0.1 TABLET ORAL 2 TIMES DAILY PRN
COMMUNITY
End: 2021-01-01

## 2021-01-01 RX ORDER — AMLODIPINE BESYLATE 10 MG/1
10 TABLET ORAL DAILY
Qty: 90 TABLET | Refills: 1 | Status: SHIPPED | OUTPATIENT
Start: 2021-01-01 | End: 2022-01-01

## 2021-01-01 RX ORDER — ONDANSETRON 4 MG/1
4 TABLET, ORALLY DISINTEGRATING ORAL EVERY 30 MIN PRN
Status: DISCONTINUED | OUTPATIENT
Start: 2021-01-01 | End: 2021-01-01 | Stop reason: HOSPADM

## 2021-01-01 RX ORDER — AMLODIPINE BESYLATE 5 MG/1
TABLET ORAL
COMMUNITY
Start: 2021-01-01 | End: 2022-01-01 | Stop reason: ALTCHOICE

## 2021-01-01 RX ORDER — AMLODIPINE BESYLATE 5 MG/1
10 TABLET ORAL DAILY
Status: DISCONTINUED | OUTPATIENT
Start: 2021-01-01 | End: 2021-01-01 | Stop reason: HOSPADM

## 2021-01-01 RX ORDER — NALOXONE HYDROCHLORIDE 0.4 MG/ML
0.2 INJECTION, SOLUTION INTRAMUSCULAR; INTRAVENOUS; SUBCUTANEOUS
Status: DISCONTINUED | OUTPATIENT
Start: 2021-01-01 | End: 2021-01-01 | Stop reason: HOSPADM

## 2021-01-01 RX ORDER — ACETAMINOPHEN 325 MG/1
975 TABLET ORAL ONCE
Status: COMPLETED | OUTPATIENT
Start: 2021-01-01 | End: 2021-01-01

## 2021-01-01 RX ORDER — ONDANSETRON 2 MG/ML
4 INJECTION INTRAMUSCULAR; INTRAVENOUS EVERY 30 MIN PRN
Status: DISCONTINUED | OUTPATIENT
Start: 2021-01-01 | End: 2021-01-01 | Stop reason: HOSPADM

## 2021-01-01 RX ORDER — GLIMEPIRIDE 4 MG/1
4 TABLET ORAL
Qty: 90 TABLET | Refills: 1 | Status: SHIPPED | OUTPATIENT
Start: 2021-01-01 | End: 2022-01-01

## 2021-01-01 RX ORDER — ESMOLOL HYDROCHLORIDE 10 MG/ML
INJECTION INTRAVENOUS PRN
Status: DISCONTINUED | OUTPATIENT
Start: 2021-01-01 | End: 2021-01-01

## 2021-01-01 RX ORDER — POTASSIUM CHLORIDE 1500 MG/1
20 TABLET, EXTENDED RELEASE ORAL ONCE
Status: COMPLETED | OUTPATIENT
Start: 2021-01-01 | End: 2021-01-01

## 2021-01-01 RX ORDER — CEFAZOLIN SODIUM 2 G/100ML
2 INJECTION, SOLUTION INTRAVENOUS SEE ADMIN INSTRUCTIONS
Status: DISCONTINUED | OUTPATIENT
Start: 2021-01-01 | End: 2021-01-01 | Stop reason: HOSPADM

## 2021-01-01 RX ORDER — SIMVASTATIN 10 MG
20 TABLET ORAL AT BEDTIME
Status: DISCONTINUED | OUTPATIENT
Start: 2021-01-01 | End: 2021-01-01 | Stop reason: HOSPADM

## 2021-01-01 RX ORDER — HYDROCODONE BITARTRATE AND ACETAMINOPHEN 5; 325 MG/1; MG/1
1 TABLET ORAL EVERY 6 HOURS PRN
Qty: 18 TABLET | Refills: 0 | Status: SHIPPED | OUTPATIENT
Start: 2021-01-01 | End: 2021-01-01

## 2021-01-01 RX ORDER — HYDRALAZINE HYDROCHLORIDE 50 MG/1
TABLET, FILM COATED ORAL
COMMUNITY
End: 2021-01-01

## 2021-01-01 RX ORDER — CLONIDINE HYDROCHLORIDE 0.1 MG/1
0.1 TABLET ORAL 2 TIMES DAILY PRN
Qty: 60 TABLET | Refills: 5 | Status: SHIPPED | OUTPATIENT
Start: 2021-01-01 | End: 2022-01-01

## 2021-01-01 RX ORDER — CLONIDINE HYDROCHLORIDE 0.1 MG/1
0.1 TABLET ORAL 2 TIMES DAILY
Status: DISCONTINUED | OUTPATIENT
Start: 2021-01-01 | End: 2021-01-01 | Stop reason: HOSPADM

## 2021-01-01 RX ORDER — HYDRALAZINE HYDROCHLORIDE 100 MG/1
100 TABLET, FILM COATED ORAL 4 TIMES DAILY
COMMUNITY
End: 2021-01-01

## 2021-01-01 RX ORDER — ONDANSETRON 2 MG/ML
INJECTION INTRAMUSCULAR; INTRAVENOUS PRN
Status: DISCONTINUED | OUTPATIENT
Start: 2021-01-01 | End: 2021-01-01

## 2021-01-01 RX ORDER — CEFAZOLIN SODIUM 2 G/100ML
2 INJECTION, SOLUTION INTRAVENOUS
Status: COMPLETED | OUTPATIENT
Start: 2021-01-01 | End: 2021-01-01

## 2021-01-01 RX ORDER — DEXAMETHASONE SODIUM PHOSPHATE 4 MG/ML
INJECTION, SOLUTION INTRA-ARTICULAR; INTRALESIONAL; INTRAMUSCULAR; INTRAVENOUS; SOFT TISSUE PRN
Status: DISCONTINUED | OUTPATIENT
Start: 2021-01-01 | End: 2021-01-01

## 2021-01-01 RX ORDER — NALOXONE HYDROCHLORIDE 0.4 MG/ML
0.4 INJECTION, SOLUTION INTRAMUSCULAR; INTRAVENOUS; SUBCUTANEOUS
Status: DISCONTINUED | OUTPATIENT
Start: 2021-01-01 | End: 2021-01-01 | Stop reason: HOSPADM

## 2021-01-01 RX ORDER — SIMVASTATIN 20 MG
20 TABLET ORAL EVERY EVENING
Qty: 90 TABLET | Refills: 1 | Status: SHIPPED | OUTPATIENT
Start: 2021-01-01 | End: 2022-01-01

## 2021-01-01 RX ORDER — RIVASTIGMINE 13.3 MG/24H
1 PATCH, EXTENDED RELEASE TRANSDERMAL DAILY
Status: DISCONTINUED | OUTPATIENT
Start: 2021-01-01 | End: 2021-01-01 | Stop reason: CLARIF

## 2021-01-01 RX ORDER — ONDANSETRON 2 MG/ML
4 INJECTION INTRAMUSCULAR; INTRAVENOUS EVERY 6 HOURS PRN
Status: DISCONTINUED | OUTPATIENT
Start: 2021-01-01 | End: 2021-01-01 | Stop reason: HOSPADM

## 2021-01-01 RX ORDER — PROPOFOL 10 MG/ML
INJECTION, EMULSION INTRAVENOUS PRN
Status: DISCONTINUED | OUTPATIENT
Start: 2021-01-01 | End: 2021-01-01

## 2021-01-01 RX ORDER — FENTANYL CITRATE 50 UG/ML
25 INJECTION, SOLUTION INTRAMUSCULAR; INTRAVENOUS EVERY 5 MIN PRN
Status: DISCONTINUED | OUTPATIENT
Start: 2021-01-01 | End: 2021-01-01 | Stop reason: HOSPADM

## 2021-01-01 RX ORDER — MEPERIDINE HYDROCHLORIDE 25 MG/ML
12.5 INJECTION INTRAMUSCULAR; INTRAVENOUS; SUBCUTANEOUS
Status: DISCONTINUED | OUTPATIENT
Start: 2021-01-01 | End: 2021-01-01 | Stop reason: HOSPADM

## 2021-01-01 RX ORDER — CEPHALEXIN 500 MG/1
500 CAPSULE ORAL 3 TIMES DAILY
COMMUNITY
Start: 2021-01-01 | End: 2021-01-01

## 2021-01-01 RX ORDER — HALOPERIDOL 5 MG/ML
1 INJECTION INTRAMUSCULAR
Status: DISCONTINUED | OUTPATIENT
Start: 2021-01-01 | End: 2021-01-01 | Stop reason: HOSPADM

## 2021-01-01 RX ORDER — CEPHALEXIN 500 MG/1
CAPSULE ORAL
COMMUNITY
End: 2022-01-01

## 2021-01-01 RX ORDER — MEMANTINE HYDROCHLORIDE 10 MG/1
10 TABLET ORAL 2 TIMES DAILY
Status: DISCONTINUED | OUTPATIENT
Start: 2021-01-01 | End: 2021-01-01 | Stop reason: HOSPADM

## 2021-01-01 RX ORDER — ONDANSETRON 4 MG/1
4 TABLET, ORALLY DISINTEGRATING ORAL EVERY 6 HOURS PRN
Status: DISCONTINUED | OUTPATIENT
Start: 2021-01-01 | End: 2021-01-01 | Stop reason: HOSPADM

## 2021-01-01 RX ORDER — LIDOCAINE HYDROCHLORIDE 20 MG/ML
INJECTION, SOLUTION INFILTRATION; PERINEURAL PRN
Status: DISCONTINUED | OUTPATIENT
Start: 2021-01-01 | End: 2021-01-01

## 2021-01-01 RX ORDER — HYDRALAZINE HYDROCHLORIDE 50 MG/1
100 TABLET, FILM COATED ORAL 4 TIMES DAILY
Status: DISCONTINUED | OUTPATIENT
Start: 2021-01-01 | End: 2021-01-01 | Stop reason: HOSPADM

## 2021-01-01 RX ORDER — METOPROLOL SUCCINATE 50 MG/1
50 TABLET, EXTENDED RELEASE ORAL DAILY
Qty: 90 TABLET | Refills: 3 | Status: SHIPPED | OUTPATIENT
Start: 2021-01-01 | End: 2022-01-01

## 2021-01-01 RX ORDER — SODIUM CHLORIDE, SODIUM LACTATE, POTASSIUM CHLORIDE, CALCIUM CHLORIDE 600; 310; 30; 20 MG/100ML; MG/100ML; MG/100ML; MG/100ML
INJECTION, SOLUTION INTRAVENOUS CONTINUOUS
Status: DISCONTINUED | OUTPATIENT
Start: 2021-01-01 | End: 2021-01-01 | Stop reason: HOSPADM

## 2021-01-01 RX ORDER — CEFDINIR 300 MG/1
CAPSULE ORAL
COMMUNITY
End: 2022-01-01

## 2021-01-01 RX ORDER — LABETALOL HYDROCHLORIDE 5 MG/ML
5 INJECTION, SOLUTION INTRAVENOUS EVERY 5 MIN PRN
Status: COMPLETED | OUTPATIENT
Start: 2021-01-01 | End: 2021-01-01

## 2021-01-01 RX ORDER — METOPROLOL SUCCINATE 25 MG/1
50 TABLET, EXTENDED RELEASE ORAL DAILY
Status: DISCONTINUED | OUTPATIENT
Start: 2021-01-01 | End: 2021-01-01 | Stop reason: HOSPADM

## 2021-01-01 RX ORDER — OXYCODONE HYDROCHLORIDE 5 MG/1
5 TABLET ORAL EVERY 4 HOURS PRN
Status: DISCONTINUED | OUTPATIENT
Start: 2021-01-01 | End: 2021-01-01 | Stop reason: HOSPADM

## 2021-01-01 RX ORDER — HYDRALAZINE HYDROCHLORIDE 20 MG/ML
10 INJECTION INTRAMUSCULAR; INTRAVENOUS EVERY 6 HOURS PRN
Status: DISCONTINUED | OUTPATIENT
Start: 2021-01-01 | End: 2021-01-01 | Stop reason: HOSPADM

## 2021-01-01 RX ORDER — DEXTROSE MONOHYDRATE 25 G/50ML
25-50 INJECTION, SOLUTION INTRAVENOUS
Status: DISCONTINUED | OUTPATIENT
Start: 2021-01-01 | End: 2021-01-01 | Stop reason: HOSPADM

## 2021-01-01 RX ORDER — RIVASTIGMINE 4.6 MG/24H
1 PATCH, EXTENDED RELEASE TRANSDERMAL DAILY
Status: DISCONTINUED | OUTPATIENT
Start: 2021-01-01 | End: 2021-01-01 | Stop reason: HOSPADM

## 2021-01-01 RX ORDER — POLYETHYLENE GLYCOL 3350 17 G/17G
17 POWDER, FOR SOLUTION ORAL DAILY
Qty: 510 G | Refills: 0 | Status: SHIPPED | OUTPATIENT
Start: 2021-01-01 | End: 2022-01-01

## 2021-01-01 RX ORDER — RIVASTIGMINE 9.5 MG/24H
1 PATCH, EXTENDED RELEASE TRANSDERMAL DAILY
Status: DISCONTINUED | OUTPATIENT
Start: 2021-01-01 | End: 2021-01-01 | Stop reason: HOSPADM

## 2021-01-01 RX ORDER — AMLODIPINE BESYLATE 10 MG/1
10 TABLET ORAL DAILY
Qty: 90 TABLET | Refills: 1 | Status: SHIPPED | OUTPATIENT
Start: 2021-01-01 | End: 2021-01-01

## 2021-01-01 RX ADMIN — INSULIN ASPART 2 UNITS: 100 INJECTION, SOLUTION INTRAVENOUS; SUBCUTANEOUS at 12:23

## 2021-01-01 RX ADMIN — CLONIDINE HYDROCHLORIDE 0.1 MG: 0.1 TABLET ORAL at 08:24

## 2021-01-01 RX ADMIN — ROCURONIUM BROMIDE 40 MG: 10 INJECTION, SOLUTION INTRAVENOUS at 19:14

## 2021-01-01 RX ADMIN — MEMANTINE HYDROCHLORIDE 10 MG: 10 TABLET, FILM COATED ORAL at 23:01

## 2021-01-01 RX ADMIN — METOPROLOL SUCCINATE 50 MG: 25 TABLET, EXTENDED RELEASE ORAL at 08:24

## 2021-01-01 RX ADMIN — HYDRALAZINE HYDROCHLORIDE 100 MG: 50 TABLET ORAL at 22:52

## 2021-01-01 RX ADMIN — LABETALOL HYDROCHLORIDE 5 MG: 5 INJECTION, SOLUTION INTRAVENOUS at 21:00

## 2021-01-01 RX ADMIN — ESMOLOL HYDROCHLORIDE 30 MG: 100 INJECTION, SOLUTION INTRAVENOUS at 20:27

## 2021-01-01 RX ADMIN — LABETALOL HYDROCHLORIDE 5 MG: 5 INJECTION, SOLUTION INTRAVENOUS at 21:07

## 2021-01-01 RX ADMIN — ESMOLOL HYDROCHLORIDE 20 MG: 100 INJECTION, SOLUTION INTRAVENOUS at 20:30

## 2021-01-01 RX ADMIN — PROPOFOL 20 MG: 10 INJECTION, EMULSION INTRAVENOUS at 20:12

## 2021-01-01 RX ADMIN — SIMVASTATIN 20 MG: 10 TABLET, FILM COATED ORAL at 22:52

## 2021-01-01 RX ADMIN — PROPOFOL 150 MG: 10 INJECTION, EMULSION INTRAVENOUS at 19:14

## 2021-01-01 RX ADMIN — INSULIN GLARGINE 10 UNITS: 100 INJECTION, SOLUTION SUBCUTANEOUS at 09:53

## 2021-01-01 RX ADMIN — INSULIN ASPART 1 UNITS: 100 INJECTION, SOLUTION INTRAVENOUS; SUBCUTANEOUS at 08:19

## 2021-01-01 RX ADMIN — ESMOLOL HYDROCHLORIDE 30 MG: 100 INJECTION, SOLUTION INTRAVENOUS at 20:34

## 2021-01-01 RX ADMIN — LIDOCAINE HYDROCHLORIDE 60 MG: 20 INJECTION, SOLUTION INFILTRATION; PERINEURAL at 19:14

## 2021-01-01 RX ADMIN — POTASSIUM CHLORIDE 20 MEQ: 20 TABLET, EXTENDED RELEASE ORAL at 00:10

## 2021-01-01 RX ADMIN — FENTANYL CITRATE 100 MCG: 50 INJECTION, SOLUTION INTRAMUSCULAR; INTRAVENOUS at 19:14

## 2021-01-01 RX ADMIN — LABETALOL HYDROCHLORIDE 5 MG: 5 INJECTION, SOLUTION INTRAVENOUS at 21:12

## 2021-01-01 RX ADMIN — CLONIDINE HYDROCHLORIDE 0.1 MG: 0.1 TABLET ORAL at 22:52

## 2021-01-01 RX ADMIN — CEFAZOLIN SODIUM 2 G: 2 INJECTION, SOLUTION INTRAVENOUS at 19:21

## 2021-01-01 RX ADMIN — OXYCODONE HYDROCHLORIDE 5 MG: 5 TABLET ORAL at 06:34

## 2021-01-01 RX ADMIN — DEXAMETHASONE SODIUM PHOSPHATE 4 MG: 4 INJECTION, SOLUTION INTRA-ARTICULAR; INTRALESIONAL; INTRAMUSCULAR; INTRAVENOUS; SOFT TISSUE at 19:24

## 2021-01-01 RX ADMIN — RIVASTIGMINE TRANSDERMAL SYSTEM 1 PATCH: 9.5 PATCH, EXTENDED RELEASE TRANSDERMAL at 08:22

## 2021-01-01 RX ADMIN — RIVASTIGMINE 1 PATCH: 4.6 PATCH TRANSDERMAL at 08:22

## 2021-01-01 RX ADMIN — LABETALOL HYDROCHLORIDE 5 MG: 5 INJECTION, SOLUTION INTRAVENOUS at 20:47

## 2021-01-01 RX ADMIN — SUGAMMADEX 200 MG: 100 INJECTION, SOLUTION INTRAVENOUS at 20:25

## 2021-01-01 RX ADMIN — OMEPRAZOLE 20 MG: 20 CAPSULE, DELAYED RELEASE ORAL at 06:34

## 2021-01-01 RX ADMIN — HYDRALAZINE HYDROCHLORIDE 100 MG: 50 TABLET ORAL at 08:25

## 2021-01-01 RX ADMIN — INSULIN ASPART 1 UNITS: 100 INJECTION, SOLUTION INTRAVENOUS; SUBCUTANEOUS at 17:27

## 2021-01-01 RX ADMIN — ONDANSETRON 4 MG: 2 INJECTION INTRAMUSCULAR; INTRAVENOUS at 20:03

## 2021-01-01 RX ADMIN — ACETAMINOPHEN 975 MG: 325 TABLET ORAL at 16:17

## 2021-01-01 RX ADMIN — SODIUM CHLORIDE: 9 INJECTION, SOLUTION INTRAVENOUS at 00:12

## 2021-01-01 RX ADMIN — HYDRALAZINE HYDROCHLORIDE 100 MG: 50 TABLET ORAL at 17:30

## 2021-01-01 RX ADMIN — ROCURONIUM BROMIDE 10 MG: 10 INJECTION, SOLUTION INTRAVENOUS at 19:47

## 2021-01-01 RX ADMIN — HYDRALAZINE HYDROCHLORIDE 100 MG: 50 TABLET ORAL at 14:11

## 2021-01-01 RX ADMIN — ESMOLOL HYDROCHLORIDE 20 MG: 100 INJECTION, SOLUTION INTRAVENOUS at 20:38

## 2021-01-01 RX ADMIN — SODIUM CHLORIDE, POTASSIUM CHLORIDE, SODIUM LACTATE AND CALCIUM CHLORIDE: 600; 310; 30; 20 INJECTION, SOLUTION INTRAVENOUS at 16:18

## 2021-01-01 RX ADMIN — AMLODIPINE BESYLATE 10 MG: 5 TABLET ORAL at 08:25

## 2021-01-01 RX ADMIN — MEMANTINE HYDROCHLORIDE 10 MG: 10 TABLET, FILM COATED ORAL at 08:25

## 2021-01-01 RX ADMIN — PROPOFOL 30 MG: 10 INJECTION, EMULSION INTRAVENOUS at 19:49

## 2021-01-01 ASSESSMENT — COLUMBIA-SUICIDE SEVERITY RATING SCALE - C-SSRS
2. HAVE YOU ACTUALLY HAD ANY THOUGHTS OF KILLING YOURSELF IN THE PAST MONTH?: NO
1. IN THE PAST MONTH, HAVE YOU WISHED YOU WERE DEAD OR WISHED YOU COULD GO TO SLEEP AND NOT WAKE UP?: NO

## 2021-01-01 ASSESSMENT — MIFFLIN-ST. JEOR
SCORE: 1585.3
SCORE: 1712.31
SCORE: 1621.13
SCORE: 1632.93
SCORE: 1709.13
SCORE: 1632.93
SCORE: 1625.22

## 2021-01-01 ASSESSMENT — PATIENT HEALTH QUESTIONNAIRE - PHQ9: SUM OF ALL RESPONSES TO PHQ QUESTIONS 1-9: 16

## 2021-02-05 ENCOUNTER — VIRTUAL VISIT (OUTPATIENT)
Dept: NEUROLOGY | Facility: CLINIC | Age: 86
End: 2021-02-05
Payer: MEDICARE

## 2021-02-05 ENCOUNTER — AMBULATORY - HEALTHEAST (OUTPATIENT)
Dept: LAB | Facility: CLINIC | Age: 86
End: 2021-02-05

## 2021-02-05 VITALS — BODY MASS INDEX: 35.55 KG/M2 | HEIGHT: 69 IN | WEIGHT: 240 LBS

## 2021-02-05 DIAGNOSIS — G30.1 ALZHEIMER'S DISEASE WITH LATE ONSET (H): ICD-10-CM

## 2021-02-05 DIAGNOSIS — G30.1 LATE ONSET ALZHEIMER'S DISEASE WITHOUT BEHAVIORAL DISTURBANCE (H): Primary | ICD-10-CM

## 2021-02-05 DIAGNOSIS — F02.80 LATE ONSET ALZHEIMER'S DISEASE WITHOUT BEHAVIORAL DISTURBANCE (H): Primary | ICD-10-CM

## 2021-02-05 DIAGNOSIS — F02.80 ALZHEIMER'S DISEASE WITH LATE ONSET (H): ICD-10-CM

## 2021-02-05 PROBLEM — E78.00 PURE HYPERCHOLESTEROLEMIA: Status: ACTIVE | Noted: 2021-02-05

## 2021-02-05 PROBLEM — N18.30 CHRONIC KIDNEY DISEASE, STAGE 3 (H): Status: ACTIVE | Noted: 2021-02-05

## 2021-02-05 PROBLEM — I10 ESSENTIAL HYPERTENSION, BENIGN: Status: ACTIVE | Noted: 2021-02-05

## 2021-02-05 PROBLEM — H90.5 SENSORINEURAL HEARING LOSS (SNHL): Status: ACTIVE | Noted: 2021-02-05

## 2021-02-05 PROBLEM — E11.9 TYPE 2 DIABETES MELLITUS (H): Status: ACTIVE | Noted: 2021-02-05

## 2021-02-05 PROCEDURE — 99443 PR PHYSICIAN TELEPHONE EVALUATION 21-30 MIN: CPT | Performed by: PSYCHIATRY & NEUROLOGY

## 2021-02-05 RX ORDER — INSULIN GLARGINE 100 [IU]/ML
26 INJECTION, SOLUTION SUBCUTANEOUS
COMMUNITY
Start: 2021-01-20 | End: 2022-01-01

## 2021-02-05 RX ORDER — ASPIRIN 81 MG/1
81 TABLET, CHEWABLE ORAL DAILY
Status: ON HOLD | COMMUNITY
End: 2022-01-01

## 2021-02-05 RX ORDER — RIVASTIGMINE 13.3 MG/24H
1 PATCH, EXTENDED RELEASE TRANSDERMAL DAILY
COMMUNITY
Start: 2021-01-12 | End: 2021-02-05

## 2021-02-05 RX ORDER — METOPROLOL SUCCINATE 50 MG/1
50 TABLET, EXTENDED RELEASE ORAL
COMMUNITY
Start: 2020-09-30 | End: 2021-01-01

## 2021-02-05 RX ORDER — SIMVASTATIN 20 MG
20 TABLET ORAL
COMMUNITY
Start: 2020-09-30 | End: 2021-01-01

## 2021-02-05 RX ORDER — DULAGLUTIDE 1.5 MG/.5ML
INJECTION, SOLUTION SUBCUTANEOUS
COMMUNITY
Start: 2021-01-20 | End: 2022-01-01

## 2021-02-05 RX ORDER — AMLODIPINE BESYLATE 10 MG/1
TABLET ORAL
COMMUNITY
Start: 2021-01-28 | End: 2021-01-01

## 2021-02-05 RX ORDER — RIVASTIGMINE 13.3 MG/24H
1 PATCH, EXTENDED RELEASE TRANSDERMAL DAILY
Qty: 30 PATCH | Refills: 11 | Status: SHIPPED | OUTPATIENT
Start: 2021-02-05 | End: 2022-01-01

## 2021-02-05 RX ORDER — MEMANTINE HYDROCHLORIDE 10 MG/1
10 TABLET ORAL 2 TIMES DAILY
COMMUNITY
Start: 2021-01-12 | End: 2021-02-05

## 2021-02-05 RX ORDER — MEMANTINE HYDROCHLORIDE 10 MG/1
10 TABLET ORAL 2 TIMES DAILY
Qty: 60 TABLET | Refills: 11 | Status: SHIPPED | OUTPATIENT
Start: 2021-02-05 | End: 2022-01-01

## 2021-02-05 RX ORDER — LISINOPRIL 20 MG/1
TABLET ORAL
COMMUNITY
Start: 2021-01-28 | End: 2021-01-01

## 2021-02-05 RX ORDER — GLIMEPIRIDE 4 MG/1
TABLET ORAL
COMMUNITY
Start: 2020-09-30 | End: 2021-01-01

## 2021-02-05 SDOH — HEALTH STABILITY: MENTAL HEALTH: HOW OFTEN DO YOU HAVE A DRINK CONTAINING ALCOHOL?: NOT ASKED

## 2021-02-05 SDOH — HEALTH STABILITY: MENTAL HEALTH: HOW MANY STANDARD DRINKS CONTAINING ALCOHOL DO YOU HAVE ON A TYPICAL DAY?: NOT ASKED

## 2021-02-05 SDOH — HEALTH STABILITY: MENTAL HEALTH: HOW OFTEN DO YOU HAVE 6 OR MORE DRINKS ON ONE OCCASION?: NOT ASKED

## 2021-02-05 ASSESSMENT — MIFFLIN-ST. JEOR: SCORE: 1774.01

## 2021-02-05 NOTE — NURSING NOTE
Chief Complaint   Patient presents with     Dementia     Annual follow up- complains of low back pain, difficulty walking, memory worsening, loss of appetite      Phone visit     Call [phone]     Zabrina Dowling CMA on 2/5/2021 at 9:35 AM

## 2021-02-05 NOTE — LETTER
2021         RE: Laz Kingston   [place] [facility] [place] [address]        Dear Colleague,    Thank you for referring your patient, Laz Kingston, to the [facility] [place]. Please see a copy of my visit note below.    NEUROLOGY FOLLOW UP VISIT  NOTE       [facility] [place]  [address] [place] [address]  Tel: [phone]  Fax: [phone]  [email or web address]     Laz Kingston,  1935, MRN [id number]  PCP: Bianca Rodriguez, [phone]  Date: 2021     ASSESSMENT & PLAN     Diagnosis code  1. Late onset Alzheimer's disease without behavioral disturbance (H)     Late onset Alzheimer's dementia  85-year-old male with late onset Alzheimer's dementia with progressive decline.  He is currently on Exelon and Namenda.  He is pretty much total care and his wife has bathe him and also to help him change his clothes.  She realizes that his condition is advanced and not much can be done.  I have refilled her prescription both for Namenda and Exelon.  I am checking hepatic profile regular follow-up will be in 1 year.    Lower back pain  Patient is complaining of lower back pain that tends to respond to Tylenol.  This mostly is in the morning and likely is due to arthritic changes.  However, wife reports occasionally he has bladder incontinence.  I recommended getting an MRI of his lumbar spine to rule out stenosis but due to his advanced age wife does not think he will be a candidate for any kind of surgical intervention and would rather not do any testing.  Follow-up will be in 1 year.    Thank you again for this referral, please feel free to contact me if you have any questions.    Adair Hernandez MD  [facility]  (Formerly, [facility]     HISTORY OF PRESENT ILLNESS     Patient is a 85-year-old male with advanced Alzheimer's dementia, HTN, HLD, DM who returns for  yearly follow-up. He continues to have decline in his cognition. At times he has some behavioral issues but wife does not think it is a major problem. Most of the time he sits at home watching TV and is minimally interactive. He also needs prompting for personal hygiene. His son helps him to get to the bathroom. Currently he is on Namenda and Exelon. Work-up in the past included PET scan that was consistent with Alzheimer's dementia. He could not tolerate Aricept and was switched to Exelon and due to the progressive decline Namenda was added. There is no history of any hallucinations.  Since the last visit he reports difficulty walking, memory is gradually getting worse and he has loss of appetite.  According to wife when he wakes up he complains of back pain but after she gives him 1 Tylenol his symptoms improved.  Occasionally he has some urine incontinence but no bowel incontinence.  He does not complain of back pain during the daytime.     PROBLEM LIST   Patient Active Problem List   Diagnosis Code     Type 2 diabetes mellitus without complication, with long-term current use of insulin (H) E11.9, Z79.4     Hearing loss H91.90     Hypercholesterolemia E78.00     Late onset Alzheimer disease (H) G30.1, F02.80     Hypertension I10     Chronic kidney disease, stage 3 N18.30     Essential hypertension, benign I10     Gastroesophageal reflux disease K21.9     S/P total knee replacement Z96.659         PAST MEDICAL & SURGICAL HISTORY     Past Medical History:   Patient  has no past medical history on file.    Surgical History:  He  has no past surgical history on file.     SOCIAL HISTORY     Reviewed, and he  reports that he has quit smoking. His smoking use included cigarettes. He quit after 4.00 years of use. He has never used smokeless tobacco. He reports previous alcohol use.     FAMILY HISTORY     Reviewed, and family history includes Alzheimer Disease in his mother; Coronary Artery Disease in his father.  "    ALLERGIES     Allergies   Allergen Reactions     Ibuprofen      Other reaction(s): GI Upset  Rash on legs, per wife. TBrinkMD - 10/19/15  Other reaction(s): GI Upset           REVIEW OF SYSTEMS     A 12 point review of system was performed and was negative except as outlined in the history of present illness.     HOME MEDICATIONS       Current Outpatient Medications:      amLODIPine (NORVASC) 10 MG tablet, , Disp: , Rfl:      aspirin (ASA) 81 MG chewable tablet, Take 81 mg by mouth, Disp: , Rfl:      glimepiride (AMARYL) 4 MG tablet, TK 1 T PO QD WITH FIRST MEAL OF THE DAY, Disp: , Rfl:      LANTUS SOLOSTAR 100 UNIT/ML soln, ADMINISTER 48 UNITS UNDER THE SKIN EVERY NIGHT AT BEDTIME, Disp: , Rfl:      lisinopril (ZESTRIL) 20 MG tablet, , Disp: , Rfl:      memantine (NAMENDA) 10 MG tablet, Take 1 tablet (10 mg) by mouth 2 times daily, Disp: 60 tablet, Rfl: 11     metoprolol succinate ER (TOPROL-XL) 50 MG 24 hr tablet, Take 50 mg by mouth, Disp: , Rfl:      omeprazole (PRILOSEC) 20 MG DR capsule, TAKE 1 DAILY, 1/2 HOUR  BEFORE BREAKFAST FOR  HEARTBURN., Disp: , Rfl:      rivastigmine (EXELON) 13.3 MG/24HR 24 hr patch, Apply 1 patch topically daily, Disp: 30 patch, Rfl: 11     simvastatin (ZOCOR) 20 MG tablet, Take 20 mg by mouth, Disp: , Rfl:      TRULICITY 1.5 MG/0.5ML pen, INJECT 1.5 MG UNDER THE SKIN ONCE A WEEK, Disp: , Rfl:       PHYSICAL EXAM     Vital signs  Ht 1.753 m (5' 9\")   Wt 108.9 kg (240 lb)   BMI 35.44 kg/m      Weight:   240 lbs 0 oz    Patient is alert on oriented x1 he mumbles few words hearing appeared normal.  Rest of exam was not possible on the phone     DIAGNOSTIC STUDIES     PERTINENT RADIOLOGY  Following imaging studies were reviewed:     PET scan done in 2014 was consistent with Alzheimer s dementia    MRI BRAIN 2/12/2014  1.  No acute intracranial findings. No evidence for recent ischemia,  intracranial hemorrhage or mass.  2.  Mild generalized atrophy slightly more pronounced within " "the temporal lobe  structures with more pronounced involvement of the right versus left  hippocampus.  3.  Presumed changes of chronic small vessel ischemic injury within the white  matter. Few small foci of hemosiderin deposition from remote bleeding.  4.  Mild inflammatory changes within the paranasal sinuses.    NEUROPSYCHOLOGICAL EVALUATION 4/17/2014  DIAGNOSTIC IMPRESSIONS:    1. Dementia, Unspecified, without behavioral disturbance.   2. Rule out depressive disorder, not otherwise specified.    RECOMMENDATIONS:    1. Mr. Kingston should continue to work with Dr. Hernandez to address concerns related to cognitive changes.  2. Consideration of cognitive enhancing medications would be appropriate as tolerated.  3. Mr. Kingston is strongly encouraged to contact the Alzheimer s Association. It is further recommended that they request the booklet \"Living Well\" to provide excellent information regarding how to address early stages of cognitive declines. Additionally, the [place] may wish to consider setting up a consultation with the Alzheimer s Association to explore available resources within the community for individuals and care providers.  4. Mr. Kingston is strongly encouraged to continue to engage socially as much as tolerated. This may help in minimizing impact of depression and assists with keeping brain functioning engaged.  5. At this time there does not appear to be significant cognitive deficits in areas typically associated with driving. However, given recent difficulty of becoming confused and losing track where he is going while driving, restricting such to familiar places will minimize these issues. An on-road driving evaluation may be beneficial if concerns of Mr. Kingston or care providers are increased in the future.  6. Recheck hearing acuity and make hearing aid adjustments as needed.  7. Retest cognitive functioning in 6 to 12 months depending on observed changes to determine the " "presence of any changes.  8. Emphasize that this is not \"the end\" and that there is a lot of evidence of well-preserved   functions. Quality of life can continue to be quite high, particularly with the above recommendations.  9. Continue to monitor emotional functionings for signs of changes. If depression including increased irritability continues to worsen, consider a combination of psychotherapy and SSRI medications.  10. Mr. Kingston and his wife may wish to explore assisted living facilities as a potential long term disposition change if cognitive functioning continues to decline. Discussing this with the Alzheimer s Association to determine available resources within the community is recommended.       PERTINENT LABS  Following labs were reviewed:  Component Name Value Ref Range   Sodium 141 136 - 145 mmol/L   Potassium 3.9 3.5 - 5 mmol/L   Chloride 106 98 - 107 mmol/L   CO2 25 22 - 31 mmol/L   Anion Gap, Calculation 10 5 - 18 mmol/L   Glucose 246 (H) 70 - 125 mg/dL   BUN 29 (H) 8 - 28 mg/dL   Creatinine 1.99 (H) 0.7 - 1.3 mg/dL   GFR MDRD Af Amer 39 (L) >60 mL/min/1.73m2   GFR MDRD Non Af Amer 32 (L) >60 mL/min/1.73m2   Bilirubin, Total 0.8 0 - 1 mg/dL   Calcium 9.8 8.5 - 10.5 mg/dL   Protein, Total 6.4 6 - 8 g/dL   Albumin 3.6 3.5 - 5 g/dL   Alkaline Phosphatase 59 45 - 120 U/L   AST 25 0 - 40 U/L   ALT 31 0 - 45 U/L   Specimen Collected on   Blood 10/7/2020 12:19 PM               TELEPHONE VISIT CONSENT   This telephone visit was done in lieu of a face to face visit due to COVID 19 pandemic.   Total Time: visit 30 minutes     Total time spent for face to face visit, reviewing labs/imaging studies, counseling and coordination of care was: 45 Minutes spent on the date of the encounter doing chart review, review of outside records, review of test results, interpretation of tests, patient visit, documentation and discussion with family       This note was dictated using voice recognition software.  Any " grammatical or context distortions are unintentional and inherent to the software.             Again, thank you for allowing me to participate in the care of your patient.        Sincerely,        Adair Hernandez MD

## 2021-02-05 NOTE — PROGRESS NOTES
NEUROLOGY FOLLOW UP VISIT  NOTE       [facility] NEUROLOGY [place]  [address] [place] [address]  Tel: [phone]  Fax: [phone]  [email or web address]     Laz Kingston,  1935, MRN [id number]  PCP: Bianca Rodriguez, [phone]  Date: 2021     ASSESSMENT & PLAN     Diagnosis code  1. Late onset Alzheimer's disease without behavioral disturbance (H)     Late onset Alzheimer's dementia  85-year-old male with late onset Alzheimer's dementia with progressive decline.  He is currently on Exelon and Namenda.  He is pretty much total care and his wife has bathe him and also to help him change his clothes.  She realizes that his condition is advanced and not much can be done.  I have refilled her prescription both for Namenda and Exelon.  I am checking hepatic profile regular follow-up will be in 1 year.    Lower back pain  Patient is complaining of lower back pain that tends to respond to Tylenol.  This mostly is in the morning and likely is due to arthritic changes.  However, wife reports occasionally he has bladder incontinence.  I recommended getting an MRI of his lumbar spine to rule out stenosis but due to his advanced age wife does not think he will be a candidate for any kind of surgical intervention and would rather not do any testing.  Follow-up will be in 1 year.    Thank you again for this referral, please feel free to contact me if you have any questions.    Adair Hernandez MD  [facility]  ([facility], [facility]     HISTORY OF PRESENT ILLNESS     Patient is a 85-year-old male with advanced Alzheimer's dementia, HTN, HLD, DM who returns for yearly follow-up. He continues to have decline in his cognition. At times he has some behavioral issues but wife does not think it is a major problem. Most of the time he sits at home watching TV and is minimally interactive. He also needs prompting for personal  hygiene. His son helps him to get to the bathroom. Currently he is on Namenda and Exelon. Work-up in the past included PET scan that was consistent with Alzheimer's dementia. He could not tolerate Aricept and was switched to Exelon and due to the progressive decline Namenda was added. There is no history of any hallucinations.  Since the last visit he reports difficulty walking, memory is gradually getting worse and he has loss of appetite.  According to wife when he wakes up he complains of back pain but after she gives him 1 Tylenol his symptoms improved.  Occasionally he has some urine incontinence but no bowel incontinence.  He does not complain of back pain during the daytime.     PROBLEM LIST   Patient Active Problem List   Diagnosis Code     Type 2 diabetes mellitus without complication, with long-term current use of insulin (H) E11.9, Z79.4     Hearing loss H91.90     Hypercholesterolemia E78.00     Late onset Alzheimer disease (H) G30.1, F02.80     Hypertension I10     Chronic kidney disease, stage 3 N18.30     Essential hypertension, benign I10     Gastroesophageal reflux disease K21.9     S/P total knee replacement Z96.659         PAST MEDICAL & SURGICAL HISTORY     Past Medical History:   Patient  has no past medical history on file.    Surgical History:  He  has no past surgical history on file.     SOCIAL HISTORY     Reviewed, and he  reports that he has quit smoking. His smoking use included cigarettes. He quit after 4.00 years of use. He has never used smokeless tobacco. He reports previous alcohol use.     FAMILY HISTORY     Reviewed, and family history includes Alzheimer Disease in his mother; Coronary Artery Disease in his father.     ALLERGIES     Allergies   Allergen Reactions     Ibuprofen      Other reaction(s): GI Upset  Rash on legs, per wife. TBrinkMD - 10/19/15  Other reaction(s): GI Upset           REVIEW OF SYSTEMS     A 12 point review of system was performed and was negative except as  "outlined in the history of present illness.     HOME MEDICATIONS       Current Outpatient Medications:      amLODIPine (NORVASC) 10 MG tablet, , Disp: , Rfl:      aspirin (ASA) 81 MG chewable tablet, Take 81 mg by mouth, Disp: , Rfl:      glimepiride (AMARYL) 4 MG tablet, TK 1 T PO QD WITH FIRST MEAL OF THE DAY, Disp: , Rfl:      LANTUS SOLOSTAR 100 UNIT/ML soln, ADMINISTER 48 UNITS UNDER THE SKIN EVERY NIGHT AT BEDTIME, Disp: , Rfl:      lisinopril (ZESTRIL) 20 MG tablet, , Disp: , Rfl:      memantine (NAMENDA) 10 MG tablet, Take 1 tablet (10 mg) by mouth 2 times daily, Disp: 60 tablet, Rfl: 11     metoprolol succinate ER (TOPROL-XL) 50 MG 24 hr tablet, Take 50 mg by mouth, Disp: , Rfl:      omeprazole (PRILOSEC) 20 MG DR capsule, TAKE 1 DAILY, 1/2 HOUR  BEFORE BREAKFAST FOR  HEARTBURN., Disp: , Rfl:      rivastigmine (EXELON) 13.3 MG/24HR 24 hr patch, Apply 1 patch topically daily, Disp: 30 patch, Rfl: 11     simvastatin (ZOCOR) 20 MG tablet, Take 20 mg by mouth, Disp: , Rfl:      TRULICITY 1.5 MG/0.5ML pen, INJECT 1.5 MG UNDER THE SKIN ONCE A WEEK, Disp: , Rfl:       PHYSICAL EXAM     Vital signs  Ht 1.753 m (5' 9\")   Wt 108.9 kg (240 lb)   BMI 35.44 kg/m      Weight:   240 lbs 0 oz    Patient is alert on oriented x1 he mumbles few words hearing appeared normal.  Rest of exam was not possible on the phone     DIAGNOSTIC STUDIES     PERTINENT RADIOLOGY  Following imaging studies were reviewed:     PET scan done in 2014 was consistent with Alzheimer s dementia    MRI BRAIN 2/12/2014  1.  No acute intracranial findings. No evidence for recent ischemia,  intracranial hemorrhage or mass.  2.  Mild generalized atrophy slightly more pronounced within the temporal lobe  structures with more pronounced involvement of the right versus left  hippocampus.  3.  Presumed changes of chronic small vessel ischemic injury within the white  matter. Few small foci of hemosiderin deposition from remote bleeding.  4.  Mild " "inflammatory changes within the paranasal sinuses.    NEUROPSYCHOLOGICAL EVALUATION 4/17/2014  DIAGNOSTIC IMPRESSIONS:    1. Dementia, Unspecified, without behavioral disturbance.   2. Rule out depressive disorder, not otherwise specified.    RECOMMENDATIONS:    1. Mr. Kingston should continue to work with Dr. Hernandez to address concerns related to cognitive changes.  2. Consideration of cognitive enhancing medications would be appropriate as tolerated.  3. Mr. Kingston is strongly encouraged to contact the Alzheimer s Association. It is further recommended that they request the booklet \"Living Well\" to provide excellent information regarding how to address early stages of cognitive declines. Additionally, the [place] may wish to consider setting up a consultation with the Alzheimer s Association to explore available resources within the community for individuals and care providers.  4. Mr. Kingston is strongly encouraged to continue to engage socially as much as tolerated. This may help in minimizing impact of depression and assists with keeping brain functioning engaged.  5. At this time there does not appear to be significant cognitive deficits in areas typically associated with driving. However, given recent difficulty of becoming confused and losing track where he is going while driving, restricting such to familiar places will minimize these issues. An on-road driving evaluation may be beneficial if concerns of Mr. Kingston or care providers are increased in the future.  6. Recheck hearing acuity and make hearing aid adjustments as needed.  7. Retest cognitive functioning in 6 to 12 months depending on observed changes to determine the presence of any changes.  8. Emphasize that this is not \"the end\" and that there is a lot of evidence of well-preserved   functions. Quality of life can continue to be quite high, particularly with the above recommendations.  9. Continue to monitor emotional " functionings for signs of changes. If depression including increased irritability continues to worsen, consider a combination of psychotherapy and SSRI medications.  10. Mr. Kingston and his wife may wish to explore assisted living facilities as a potential long term disposition change if cognitive functioning continues to decline. Discussing this with the Alzheimer s Association to determine available resources within the community is recommended.       PERTINENT LABS  Following labs were reviewed:  Component Name Value Ref Range   Sodium 141 136 - 145 mmol/L   Potassium 3.9 3.5 - 5 mmol/L   Chloride 106 98 - 107 mmol/L   CO2 25 22 - 31 mmol/L   Anion Gap, Calculation 10 5 - 18 mmol/L   Glucose 246 (H) 70 - 125 mg/dL   BUN 29 (H) 8 - 28 mg/dL   Creatinine 1.99 (H) 0.7 - 1.3 mg/dL   GFR MDRD Af Amer 39 (L) >60 mL/min/1.73m2   GFR MDRD Non Af Amer 32 (L) >60 mL/min/1.73m2   Bilirubin, Total 0.8 0 - 1 mg/dL   Calcium 9.8 8.5 - 10.5 mg/dL   Protein, Total 6.4 6 - 8 g/dL   Albumin 3.6 3.5 - 5 g/dL   Alkaline Phosphatase 59 45 - 120 U/L   AST 25 0 - 40 U/L   ALT 31 0 - 45 U/L   Specimen Collected on   Blood 10/7/2020 12:19 PM               TELEPHONE VISIT CONSENT   This telephone visit was done in lieu of a face to face visit due to COVID 19 pandemic.   Total Time: visit 30 minutes     Total time spent for face to face visit, reviewing labs/imaging studies, counseling and coordination of care was: 45 Minutes spent on the date of the encounter doing chart review, review of outside records, review of test results, interpretation of tests, patient visit, documentation and discussion with family       This note was dictated using voice recognition software.  Any grammatical or context distortions are unintentional and inherent to the software.

## 2021-02-12 ENCOUNTER — AMBULATORY - HEALTHEAST (OUTPATIENT)
Dept: NURSING | Facility: CLINIC | Age: 86
End: 2021-02-12

## 2021-02-15 ENCOUNTER — AMBULATORY - HEALTHEAST (OUTPATIENT)
Dept: LAB | Facility: CLINIC | Age: 86
End: 2021-02-15

## 2021-02-15 ENCOUNTER — OFFICE VISIT - HEALTHEAST (OUTPATIENT)
Dept: FAMILY MEDICINE | Facility: CLINIC | Age: 86
End: 2021-02-15

## 2021-02-15 DIAGNOSIS — H61.23 BILATERAL IMPACTED CERUMEN: ICD-10-CM

## 2021-02-15 DIAGNOSIS — E11.9 TYPE 2 DIABETES MELLITUS WITHOUT COMPLICATION, WITH LONG-TERM CURRENT USE OF INSULIN (H): ICD-10-CM

## 2021-02-15 DIAGNOSIS — F02.80 ALZHEIMER'S DISEASE WITH LATE ONSET (H): ICD-10-CM

## 2021-02-15 DIAGNOSIS — N18.4 CKD (CHRONIC KIDNEY DISEASE) STAGE 4, GFR 15-29 ML/MIN (H): ICD-10-CM

## 2021-02-15 DIAGNOSIS — I10 ESSENTIAL HYPERTENSION, BENIGN: ICD-10-CM

## 2021-02-15 DIAGNOSIS — E78.00 PURE HYPERCHOLESTEROLEMIA: ICD-10-CM

## 2021-02-15 DIAGNOSIS — Z79.4 TYPE 2 DIABETES MELLITUS WITHOUT COMPLICATION, WITH LONG-TERM CURRENT USE OF INSULIN (H): ICD-10-CM

## 2021-02-15 DIAGNOSIS — G30.1 ALZHEIMER'S DISEASE WITH LATE ONSET (H): ICD-10-CM

## 2021-02-15 LAB
ALBUMIN SERPL-MCNC: 3.5 G/DL (ref 3.5–5)
ALP SERPL-CCNC: 62 U/L (ref 45–120)
ALT SERPL W P-5'-P-CCNC: 23 U/L (ref 0–45)
ANION GAP SERPL CALCULATED.3IONS-SCNC: 8 MMOL/L (ref 5–18)
AST SERPL W P-5'-P-CCNC: 21 U/L (ref 0–40)
BILIRUB DIRECT SERPL-MCNC: 0.2 MG/DL
BILIRUB SERPL-MCNC: 0.6 MG/DL (ref 0–1)
BUN SERPL-MCNC: 33 MG/DL (ref 8–28)
CALCIUM SERPL-MCNC: 9.8 MG/DL (ref 8.5–10.5)
CHLORIDE BLD-SCNC: 105 MMOL/L (ref 98–107)
CHOLEST SERPL-MCNC: 160 MG/DL
CO2 SERPL-SCNC: 27 MMOL/L (ref 22–31)
CREAT SERPL-MCNC: 2.15 MG/DL (ref 0.7–1.3)
FASTING STATUS PATIENT QL REPORTED: YES
GFR SERPL CREATININE-BSD FRML MDRD: 29 ML/MIN/1.73M2
GLUCOSE BLD-MCNC: 261 MG/DL (ref 70–125)
HBA1C MFR BLD: 7.2 %
HDLC SERPL-MCNC: 32 MG/DL
LDLC SERPL CALC-MCNC: 56 MG/DL
POTASSIUM BLD-SCNC: 3.8 MMOL/L (ref 3.5–5)
PROT SERPL-MCNC: 6.4 G/DL (ref 6–8)
SODIUM SERPL-SCNC: 140 MMOL/L (ref 136–145)
TRIGL SERPL-MCNC: 359 MG/DL

## 2021-02-17 ENCOUNTER — COMMUNICATION - HEALTHEAST (OUTPATIENT)
Dept: FAMILY MEDICINE | Facility: CLINIC | Age: 86
End: 2021-02-17

## 2021-02-22 ENCOUNTER — AMBULATORY - HEALTHEAST (OUTPATIENT)
Dept: LAB | Facility: CLINIC | Age: 86
End: 2021-02-22

## 2021-02-22 DIAGNOSIS — N18.4 CKD (CHRONIC KIDNEY DISEASE) STAGE 4, GFR 15-29 ML/MIN (H): ICD-10-CM

## 2021-02-22 LAB
ANION GAP SERPL CALCULATED.3IONS-SCNC: 7 MMOL/L (ref 5–18)
BUN SERPL-MCNC: 27 MG/DL (ref 8–28)
CALCIUM SERPL-MCNC: 9.5 MG/DL (ref 8.5–10.5)
CHLORIDE BLD-SCNC: 105 MMOL/L (ref 98–107)
CO2 SERPL-SCNC: 28 MMOL/L (ref 22–31)
CREAT SERPL-MCNC: 1.96 MG/DL (ref 0.7–1.3)
GFR SERPL CREATININE-BSD FRML MDRD: 33 ML/MIN/1.73M2
GLUCOSE BLD-MCNC: 279 MG/DL (ref 70–125)
POTASSIUM BLD-SCNC: 3.7 MMOL/L (ref 3.5–5)
SODIUM SERPL-SCNC: 140 MMOL/L (ref 136–145)

## 2021-02-24 ENCOUNTER — COMMUNICATION - HEALTHEAST (OUTPATIENT)
Dept: INTERNAL MEDICINE | Facility: CLINIC | Age: 86
End: 2021-02-24

## 2021-02-25 ENCOUNTER — COMMUNICATION - HEALTHEAST (OUTPATIENT)
Dept: INTERNAL MEDICINE | Facility: CLINIC | Age: 86
End: 2021-02-25

## 2021-03-05 ENCOUNTER — AMBULATORY - HEALTHEAST (OUTPATIENT)
Dept: NURSING | Facility: CLINIC | Age: 86
End: 2021-03-05

## 2021-04-05 ENCOUNTER — COMMUNICATION - HEALTHEAST (OUTPATIENT)
Dept: ADMINISTRATIVE | Facility: CLINIC | Age: 86
End: 2021-04-05

## 2021-04-05 DIAGNOSIS — E11.9 DIABETES MELLITUS, TYPE 2 (H): ICD-10-CM

## 2021-04-05 DIAGNOSIS — E11.9 TYPE 2 DIABETES MELLITUS WITHOUT COMPLICATION, WITH LONG-TERM CURRENT USE OF INSULIN (H): ICD-10-CM

## 2021-04-05 DIAGNOSIS — Z79.4 TYPE 2 DIABETES MELLITUS WITHOUT COMPLICATION, WITH LONG-TERM CURRENT USE OF INSULIN (H): ICD-10-CM

## 2021-04-06 ENCOUNTER — COMMUNICATION - HEALTHEAST (OUTPATIENT)
Dept: INTERNAL MEDICINE | Facility: CLINIC | Age: 86
End: 2021-04-06

## 2021-04-06 DIAGNOSIS — Z79.4 TYPE 2 DIABETES MELLITUS WITHOUT COMPLICATION, WITH LONG-TERM CURRENT USE OF INSULIN (H): ICD-10-CM

## 2021-04-06 DIAGNOSIS — E11.9 DIABETES MELLITUS, TYPE 2 (H): ICD-10-CM

## 2021-04-06 DIAGNOSIS — E11.9 TYPE 2 DIABETES MELLITUS WITHOUT COMPLICATION, WITH LONG-TERM CURRENT USE OF INSULIN (H): ICD-10-CM

## 2021-05-02 ENCOUNTER — RECORDS - HEALTHEAST (OUTPATIENT)
Dept: ADMINISTRATIVE | Facility: OTHER | Age: 86
End: 2021-05-02

## 2021-05-02 ENCOUNTER — COMMUNICATION - HEALTHEAST (OUTPATIENT)
Dept: SCHEDULING | Facility: CLINIC | Age: 86
End: 2021-05-02

## 2021-05-05 ENCOUNTER — COMMUNICATION - HEALTHEAST (OUTPATIENT)
Dept: HOME HEALTH SERVICES | Facility: HOME HEALTH | Age: 86
End: 2021-05-05

## 2021-05-07 ENCOUNTER — OFFICE VISIT - HEALTHEAST (OUTPATIENT)
Dept: PHARMACY | Facility: CLINIC | Age: 86
End: 2021-05-07

## 2021-05-07 ENCOUNTER — OFFICE VISIT - HEALTHEAST (OUTPATIENT)
Dept: FAMILY MEDICINE | Facility: CLINIC | Age: 86
End: 2021-05-07

## 2021-05-07 ENCOUNTER — COMMUNICATION - HEALTHEAST (OUTPATIENT)
Dept: INTERNAL MEDICINE | Facility: CLINIC | Age: 86
End: 2021-05-07

## 2021-05-07 DIAGNOSIS — N18.30 ACUTE RENAL FAILURE WITH ACUTE TUBULAR NECROSIS SUPERIMPOSED ON STAGE 3 CHRONIC KIDNEY DISEASE, UNSPECIFIED WHETHER STAGE 3A OR 3B CKD (H): ICD-10-CM

## 2021-05-07 DIAGNOSIS — A41.9 SEPSIS, DUE TO UNSPECIFIED ORGANISM, UNSPECIFIED WHETHER ACUTE ORGAN DYSFUNCTION PRESENT (H): ICD-10-CM

## 2021-05-07 DIAGNOSIS — E11.9 TYPE 2 DIABETES MELLITUS WITHOUT COMPLICATION, WITH LONG-TERM CURRENT USE OF INSULIN (H): ICD-10-CM

## 2021-05-07 DIAGNOSIS — K21.9 GASTROESOPHAGEAL REFLUX DISEASE, UNSPECIFIED WHETHER ESOPHAGITIS PRESENT: ICD-10-CM

## 2021-05-07 DIAGNOSIS — I10 ESSENTIAL HYPERTENSION, BENIGN: ICD-10-CM

## 2021-05-07 DIAGNOSIS — Z79.4 TYPE 2 DIABETES MELLITUS WITHOUT COMPLICATION, WITH LONG-TERM CURRENT USE OF INSULIN (H): ICD-10-CM

## 2021-05-07 DIAGNOSIS — R41.89 COGNITIVE AND BEHAVIORAL CHANGES: ICD-10-CM

## 2021-05-07 DIAGNOSIS — N17.9 AKI (ACUTE KIDNEY INJURY) (H): ICD-10-CM

## 2021-05-07 DIAGNOSIS — R46.89 COGNITIVE AND BEHAVIORAL CHANGES: ICD-10-CM

## 2021-05-07 DIAGNOSIS — N17.0 ACUTE RENAL FAILURE WITH ACUTE TUBULAR NECROSIS SUPERIMPOSED ON STAGE 3 CHRONIC KIDNEY DISEASE, UNSPECIFIED WHETHER STAGE 3A OR 3B CKD (H): ICD-10-CM

## 2021-05-07 LAB
ANION GAP SERPL CALCULATED.3IONS-SCNC: 11 MMOL/L (ref 5–18)
BUN SERPL-MCNC: 28 MG/DL (ref 8–28)
CALCIUM SERPL-MCNC: 9.2 MG/DL (ref 8.5–10.5)
CHLORIDE BLD-SCNC: 107 MMOL/L (ref 98–107)
CO2 SERPL-SCNC: 22 MMOL/L (ref 22–31)
CREAT SERPL-MCNC: 2.37 MG/DL (ref 0.7–1.3)
GFR SERPL CREATININE-BSD FRML MDRD: 26 ML/MIN/1.73M2
GLUCOSE BLD-MCNC: 298 MG/DL (ref 70–125)
POTASSIUM BLD-SCNC: 3.9 MMOL/L (ref 3.5–5)
SODIUM SERPL-SCNC: 140 MMOL/L (ref 136–145)

## 2021-05-07 PROCEDURE — 99605 MTMS BY PHARM NP 15 MIN: CPT | Performed by: PHARMACIST

## 2021-05-07 ASSESSMENT — MIFFLIN-ST. JEOR: SCORE: 1706.87

## 2021-05-10 ENCOUNTER — COMMUNICATION - HEALTHEAST (OUTPATIENT)
Dept: INTERNAL MEDICINE | Facility: CLINIC | Age: 86
End: 2021-05-10

## 2021-05-10 ENCOUNTER — COMMUNICATION - HEALTHEAST (OUTPATIENT)
Dept: SCHEDULING | Facility: CLINIC | Age: 86
End: 2021-05-10

## 2021-05-10 DIAGNOSIS — I10 BENIGN ESSENTIAL HYPERTENSION: ICD-10-CM

## 2021-05-11 ENCOUNTER — COMMUNICATION - HEALTHEAST (OUTPATIENT)
Dept: FAMILY MEDICINE | Facility: CLINIC | Age: 86
End: 2021-05-11

## 2021-05-13 ENCOUNTER — COMMUNICATION - HEALTHEAST (OUTPATIENT)
Dept: SCHEDULING | Facility: CLINIC | Age: 86
End: 2021-05-13

## 2021-05-13 ENCOUNTER — COMMUNICATION - HEALTHEAST (OUTPATIENT)
Dept: INTERNAL MEDICINE | Facility: CLINIC | Age: 86
End: 2021-05-13

## 2021-05-13 DIAGNOSIS — I10 BENIGN ESSENTIAL HYPERTENSION: ICD-10-CM

## 2021-05-14 ENCOUNTER — RECORDS - HEALTHEAST (OUTPATIENT)
Dept: ADMINISTRATIVE | Facility: OTHER | Age: 86
End: 2021-05-14

## 2021-05-19 ENCOUNTER — OFFICE VISIT - HEALTHEAST (OUTPATIENT)
Dept: FAMILY MEDICINE | Facility: CLINIC | Age: 86
End: 2021-05-19

## 2021-05-19 ENCOUNTER — RECORDS - HEALTHEAST (OUTPATIENT)
Dept: ADMINISTRATIVE | Facility: OTHER | Age: 86
End: 2021-05-19

## 2021-05-19 DIAGNOSIS — N18.4 CKD (CHRONIC KIDNEY DISEASE) STAGE 4, GFR 15-29 ML/MIN (H): ICD-10-CM

## 2021-05-19 DIAGNOSIS — I10 ESSENTIAL HYPERTENSION, BENIGN: ICD-10-CM

## 2021-05-19 DIAGNOSIS — Z79.4 TYPE 2 DIABETES MELLITUS WITHOUT COMPLICATION, WITH LONG-TERM CURRENT USE OF INSULIN (H): ICD-10-CM

## 2021-05-19 DIAGNOSIS — E11.9 TYPE 2 DIABETES MELLITUS WITHOUT COMPLICATION, WITH LONG-TERM CURRENT USE OF INSULIN (H): ICD-10-CM

## 2021-05-25 ENCOUNTER — RECORDS - HEALTHEAST (OUTPATIENT)
Dept: ADMINISTRATIVE | Facility: OTHER | Age: 86
End: 2021-05-25

## 2021-05-26 ENCOUNTER — RECORDS - HEALTHEAST (OUTPATIENT)
Dept: ADMINISTRATIVE | Facility: CLINIC | Age: 86
End: 2021-05-26

## 2021-05-26 ENCOUNTER — OFFICE VISIT - HEALTHEAST (OUTPATIENT)
Dept: FAMILY MEDICINE | Facility: CLINIC | Age: 86
End: 2021-05-26

## 2021-05-26 DIAGNOSIS — Z79.4 TYPE 2 DIABETES MELLITUS WITHOUT COMPLICATION, WITH LONG-TERM CURRENT USE OF INSULIN (H): ICD-10-CM

## 2021-05-26 DIAGNOSIS — I10 ESSENTIAL HYPERTENSION, BENIGN: ICD-10-CM

## 2021-05-26 DIAGNOSIS — E11.9 TYPE 2 DIABETES MELLITUS WITHOUT COMPLICATION, WITH LONG-TERM CURRENT USE OF INSULIN (H): ICD-10-CM

## 2021-05-27 ENCOUNTER — RECORDS - HEALTHEAST (OUTPATIENT)
Dept: ADMINISTRATIVE | Facility: OTHER | Age: 86
End: 2021-05-27

## 2021-05-27 ENCOUNTER — RECORDS - HEALTHEAST (OUTPATIENT)
Dept: ADMINISTRATIVE | Facility: CLINIC | Age: 86
End: 2021-05-27

## 2021-05-27 NOTE — TELEPHONE ENCOUNTER
Pharmacy requested refill of Lotrel and Metoprolol.    Last OV: 04/03/2019    Last Refill: 11/06/2018    Upcoming appt 4/29/2019

## 2021-05-27 NOTE — TELEPHONE ENCOUNTER
Medication Question or Clarification  Who is calling: Other: Wife  What medication are you calling about? (include dose and sig)   dulaglutide (TRULICITY) 0.75 mg/0.5 mL PnIj 4 Syringe 0 4/3/2019     Sig - Route: Inject 0.75 mg under the skin once a week. - Subcutaneous    Sent to pharmacy as: dulaglutide (TRULICITY) 0.75 mg/0.5 mL PnIj        Who prescribed the medication?: Adan Rodriguez MD  What is your question/concern?: wife is calling to say one of these injections broke and I am 1 vial short for next week. I need a new Rx for 1 and I also requested a refill on this.  Pharmacy: [facility] [place]  Okay to leave a detailed message?: Yes  Site CMT - Please call the pharmacy to obtain any additional needed information.

## 2021-05-27 NOTE — PATIENT INSTRUCTIONS - HE
1. Type 2 diabetes mellitus without complication, with long-term current use of insulin (H)  - dulaglutide (TRULICITY) 0.75 mg/0.5 mL PnIj; Inject 0.75 mg under the skin once a week.  Dispense: 4 Syringe; Refill: 0    Continue with Lantus, and may increase the dosing to 50 units every 2 units from 45, every 2-3 nights.   Continue on Amaryl as is.     Follow up in a month to recheck Diabetes and skin.

## 2021-05-27 NOTE — TELEPHONE ENCOUNTER
Refill pended, please advise.    Last visit 04/03/19 with Dr. Rodriguez:  Assessment/Plan:        1. Type 2 diabetes mellitus without complication, with long-term current use of insulin (H)  Discussed addition of :   - dulaglutide (TRULICITY) 0.75 mg/0.5 mL PnIj; Inject 0.75 mg under the skin once a week.  Dispense: 4 Syringe; Refill: 0     Continue with Lantus, and may increase the dosing to 50 units every 2 units from 45, every 2-3 nights.   Continue on Amaryl as is.      Follow up in a month to recheck Diabetes and skin.      30 minutes or greater were spent with the patient today with greater than 50% of the times spent in counseling and management of care.        2. Actinic keratosis      14 lesions treated with liquid nitrogen

## 2021-05-27 NOTE — PROGRESS NOTES
Assessment/Plan:        1. Type 2 diabetes mellitus without complication, with long-term current use of insulin (H)  Discussed addition of :   - dulaglutide (TRULICITY) 0.75 mg/0.5 mL PnIj; Inject 0.75 mg under the skin once a week.  Dispense: 4 Syringe; Refill: 0    Continue with Lantus, and may increase the dosing to 50 units every 2 units from 45, every 2-3 nights.   Continue on Amaryl as is.     Follow up in a month to recheck Diabetes and skin.     30 minutes or greater were spent with the patient today with greater than 50% of the times spent in counseling and management of care.      2. Actinic keratosis      14 lesions treated with liquid nitrogen         Subjective:    Patient ID:   Laz Kingston is a 84 y.o. male here for med check/ review     Diabetes:     Lantus now at 45 units  BG : low 200's     He is also having a few skin lesions on his forearm, would like to be checked.   He also reports that at some point needs to have cataracts removed.       Review of Systems  Allergy: reviewed  General : negative  Ophthalmic : See HPI   ENT : negative  Respiratory : no cough, shortness of breath, or wheezing  Cardiovascular : no chest pain or dyspnea on exertion  Gastrointestinal : no abdominal pain, change in bowel habits, or black or bloody stools  Genito-Urinary :  no dysuria, trouble voiding, or hematuria  Dermatological : See HPI   Musculoskeletal : negative  Neurological : negative  Hematological and Lymphatic : negative  Endocrine : See HPI     The following patient's history were reviewed and updated as appropriate:   He  has a past medical history of 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 Ackerman - January, 2017.) (1/10/2017)..      Outpatient  "Encounter Medications as of 4/3/2019   Medication Sig Dispense Refill     amLODIPine-benazepril (LOTREL) 10-20 mg per capsule Take 1 capsule by mouth daily. 90 capsule 1     aspirin 81 mg chewable tablet Chew 81 mg daily.       blood glucose test strips Contour Next Test Strips :  Test twice daily as instructed. 200 strip 3     glimepiride (AMARYL) 4 MG tablet TAKE 1 TABLET BY MOUTH  DAILY, WITH THE FIRST MEAL  OF THE DAY FOR DIABETES. 90 tablet 1     insulin glargine (LANTUS SOLOSTAR U-100 INSULIN) 100 unit/mL (3 mL) pen Inject 40 Units under the skin at bedtime. 5 adj dose pen 5     LANCETS MISC Use As Directed 3 (three) times a day. [employer] Microlet Lancets Miscellaneous       linagliptin 5 mg Tab Take 1 tablet (5 mg total) by mouth once daily. With or without food 30 tablet 0     memantine (NAMENDA) 10 MG tablet        metoprolol succinate (TOPROL XL) 50 MG 24 hr tablet Take 1 tablet (50 mg total) by mouth daily. 90 tablet 1     omeprazole (PRILOSEC) 20 MG capsule TAKE 1 DAILY, 1/2 HOUR  BEFORE BREAKFAST FOR  HEARTBURN.. 90 capsule 3     pen needle, diabetic (BD ULTRA-FINE BRITTANY PEN NEEDLE) 32 gauge x 5/32\" Ndle Inject 1 each under the skin daily. 100 each 3     rivastigmine (EXELON) 13.3 mg/24 hour PT24 patch Apply 1 patch topically daily.   6     simvastatin (ZOCOR) 20 MG tablet Take 1 tablet (20 mg total) by mouth at bedtime. . 90 tablet 1     No facility-administered encounter medications on file as of 4/3/2019.          Objective:   /78 (Patient Site: Right Arm, Patient Position: Sitting, Cuff Size: Adult Large)   Pulse 98   Wt (!) 245 lb (111.1 kg)   SpO2 94%   BMI 36.44 kg/m        Physical Exam  General Appearance:    Alert, well hydrated, no distress   Throat:   mucous membranes moist, pharynx normal without lesions   Neck:   Supple, symmetrical, trachea midline, no adenopathy;     thyroid:  no enlargement/tenderness/nodules;    Lungs:     clear to auscultation, no wheezes, rales or rhonchi, " symmetric air entry     Heart:    Regular rate and rhythm, S1 and S2 normal, no murmur, rub   or gallop, no edema    Skin:   A few AK's on the forearms noted, no other rashes or lesions

## 2021-05-28 NOTE — PROGRESS NOTES
Assessment/Plan:        1. Type 2 diabetes mellitus without complication, with long-term current use of insulin (H)  Continue current management   Keep BG under 180   Follow up in 1-3 months     As for concerns about movement and getting out of the chair, he clearly demonstrated that there is no need for a chair lift at this time.     We'll revisit this issue in a few months if it is brought forward.         Subjective:    Patient ID:   Laz Kingston is a 84 y.o. male presenting with his wife with a follow up on Diabetes, and concerns about him not being able to get up from the chair as easy.  she has brought a form to request for a chair lift.  However, the patient contested the idea and said there is nothing wrong with me trying to get out of the chair.  He then went on to standing up to show.     As for the diabetes, the blood glucose levels have been in the mid 100's mostly below 180.  She said that will be increasing the Lantus to 40 units and currently at 38 units.   There are no problems with using the Trulicity.        Review of Systems  Allergy: reviewed  General : negative  A complete 5 point review of systems was obtained and is negative other than what is stated in the HPI.      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 Ackerman - January, 2017.) (1/10/2017)..      Outpatient Encounter Medications as of 4/29/2019   Medication Sig Dispense Refill     amLODIPine-benazepril (LOTREL) 10-20 mg per capsule Take 1 capsule by mouth daily. 90 capsule 1     aspirin 81 mg chewable tablet Chew 81 mg daily.       blood glucose test strips Contour Next  "Test Strips :  Test twice daily as instructed. 200 strip 3     dulaglutide (TRULICITY) 0.75 mg/0.5 mL PnIj Inject 0.75 mg under the skin once a week. 4 Syringe 0     glimepiride (AMARYL) 4 MG tablet TAKE 1 TABLET BY MOUTH  DAILY, WITH THE FIRST MEAL  OF THE DAY FOR DIABETES. 90 tablet 1     insulin glargine (LANTUS SOLOSTAR U-100 INSULIN) 100 unit/mL (3 mL) pen Inject 40 Units under the skin at bedtime. 5 adj dose pen 5     LANCETS MISC Use As Directed 3 (three) times a day. [employer]UM[employer] Microlet Lancets Miscellaneous       memantine (NAMENDA) 10 MG tablet        metoprolol succinate (TOPROL XL) 50 MG 24 hr tablet Take 1 tablet (50 mg total) by mouth daily. 90 tablet 1     omeprazole (PRILOSEC) 20 MG capsule TAKE 1 DAILY, 1/2 HOUR  BEFORE BREAKFAST FOR  HEARTBURN.. 90 capsule 3     pen needle, diabetic (BD ULTRA-FINE BRITTANY PEN NEEDLE) 32 gauge x 5/32\" Ndle Inject 1 each under the skin daily. 100 each 3     rivastigmine (EXELON) 13.3 mg/24 hour PT24 patch Apply 1 patch topically daily.   6     simvastatin (ZOCOR) 20 MG tablet Take 1 tablet (20 mg total) by mouth at bedtime. . 90 tablet 1     No facility-administered encounter medications on file as of 4/29/2019.          Objective:   /78 (Patient Site: Right Arm, Patient Position: Sitting, Cuff Size: Adult Large)   Pulse 98   Wt (!) 244 lb 8 oz (110.9 kg)   SpO2 93%   BMI 36.37 kg/m        Physical Exam  General Appearance:    Alert, well hydrated, no distress   Lungs:     clear to auscultation, no wheezes, rales or rhonchi, symmetric air entry     Heart:    Regular rate and rhythm, S1 and S2 normal, no murmur, rub   or gallop, no edema    Skin:   Skin color, texture, turgor normal, no rashes or lesions      "

## 2021-05-28 NOTE — PROGRESS NOTES
Patient Will check with the pharmacy on [facility] to see if the insurance would cover up to 3 months at a time.     His wife is also requesting a prescription for a lift chair, as he has difficulty with getting out of his chair, causing balance problems and risk for falls.

## 2021-05-28 NOTE — PROGRESS NOTES
Preoperative Exam    Scheduled Procedure: R eye cataract   Surgery Date:  5/28/19  Surgery Location: [facility]     Surgeon:  Dr. Mcbride     Assessment/Plan:     1. Pre-op evaluation    - HM1(CBC and Differential)  - Electrocardiogram Perform - Clinic  - Comprehensive Metabolic Panel  - HM1 (CBC with Diff)    2. Cataract of right eye, unspecified cataract type    3. Type 2 diabetes mellitus without complication, with long-term current use of insulin (H)  Refill   - dulaglutide (TRULICITY) 0.75 mg/0.5 mL PnIj; Inject 0.75 mg under the skin once a week.  Dispense: 4 Syringe; Refill: 0    4. Essential hypertension, benign  Normotensive     5. Chronic Kidney Disease, Stage 3       Surgical Procedure Risk: Low (reported cardiac risk generally < 1%)  Have you had prior anesthesia?: Yes  Have you or any family members had a previous anesthesia reaction:  No  Do you or any family members have a history of a clotting or bleeding disorder?: No  Cardiac Risk Assessment: no increased risk for major cardiac complications    Patient approved for surgery with general or local anesthesia.        Functional Status: Independent  Patient plans to recover at home with family.     Subjective:      Laz Kingston is a 84 y.o. male who presents for a preoperative consultation undergoing the aforementioned procedure for treatment of cataract.     All other systems reviewed and are negative, other than those listed in the HPI.    Pertinent History  Do you have difficulty breathing or chest pain after walking up a flight of stairs: Yes: Possible SOB  History of obstructive sleep apnea: No  Steroid use in the last 6 months: No  Frequent Aspirin/NSAID use: Yes: Baby asprin everyday   Prior Blood Transfusion: No  Prior Blood Transfusion Reaction: No  If for some reason prior to, during or after the procedure, if it is medically indicated, would you be willing to have a blood transfusion?:  There is no transfusion  "refusal.    Current Outpatient Medications   Medication Sig Dispense Refill     amLODIPine-benazepril (LOTREL) 10-20 mg per capsule Take 1 capsule by mouth daily. 90 capsule 1     aspirin 81 mg chewable tablet Chew 81 mg daily.       blood glucose test strips Contour Next Test Strips :  Test twice daily as instructed. 200 strip 3     dulaglutide (TRULICITY) 0.75 mg/0.5 mL PnIj Inject 0.75 mg under the skin once a week. 4 Syringe 0     glimepiride (AMARYL) 4 MG tablet TAKE 1 TABLET BY MOUTH  DAILY, WITH THE FIRST MEAL  OF THE DAY FOR DIABETES. 90 tablet 1     insulin glargine (LANTUS SOLOSTAR U-100 INSULIN) 100 unit/mL (3 mL) pen Inject 40 Units under the skin at bedtime. 5 adj dose pen 5     LANCETS MISC Use As Directed 3 (three) times a day. [employer] Microlet Lancets Miscellaneous       memantine (NAMENDA) 10 MG tablet        metoprolol succinate (TOPROL XL) 50 MG 24 hr tablet Take 1 tablet (50 mg total) by mouth daily. 90 tablet 1     omeprazole (PRILOSEC) 20 MG capsule TAKE 1 DAILY, 1/2 HOUR  BEFORE BREAKFAST FOR  HEARTBURN.. 90 capsule 3     pen needle, diabetic (BD ULTRA-FINE BRITTANY PEN NEEDLE) 32 gauge x 5/32\" Ndle Inject 1 each under the skin daily. 100 each 3     rivastigmine (EXELON) 13.3 mg/24 hour PT24 patch Apply 1 patch topically daily.   6     simvastatin (ZOCOR) 20 MG tablet Take 1 tablet (20 mg total) by mouth at bedtime. . 90 tablet 1     No current facility-administered medications for this visit.         Allergies   Allergen Reactions     Ibuprofen      Rash on legs, per wife. TBrinkMD - 10/19/15       Patient Active Problem List   Diagnosis     Hypercholesterolemia     Esophageal Reflux     Arthralgias In Multiple Sites     Essential hypertension, benign     Nephrolithiasis     Chronic Kidney Disease, Stage 3     Diabetes mellitus type 2     Chronic diarrhea - not an issue as far as he is concerned.  TBrinkMD - 10/19/15     HISTORY of prostate cancer - has had a prostatectomy - sees Dr. 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     Hearing loss - bilateral hearing aids, but does not use them.     Skin tag - right face, in advance of and below the ear lobe.     **NO** diabetic retinopathy - Felice Brannon MD - [facility] - 1/26/17     Cataract - RIGHT eye - Felice Brannon MD - [facility] - 1/25/16     Dementia - treated by Dr. Hernandez - [facility]     Elevated AST (SGOT) - Dr. Hernandez - 11/29/16 - 46 (0-45)     Eye pain, left - injection of the lower half of his left eye sclera, started today.     Splenomegaly - 16 cm, stable versus April and December, 2014 ([facility] imaging for stone for Dr. 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 Ackerman - January, 2017.) 1/10/2017       Past Surgical History:   Procedure Laterality Date     JOINT REPLACEMENT Bilateral     Both knees     ID ARTHROPLASTY TIBIAL PLATEAU      Description: Knee Replacement;  Recorded: 11/06/2013;     PROSTATECTOMY         Social History     Socioeconomic History     Marital status:      Spouse name: Not on file     Number of children: Not on file     Years of education: Not on file     Highest education level: Not on file   Occupational History     Not on file   Social Needs     Financial resource strain: Not on file     Food insecurity:     Worry: Not on file     Inability: Not on file     Transportation needs:     Medical: Not on file     Non-medical: Not on file   Tobacco Use     Smoking status: Former Smoker     Smokeless tobacco: Never  "Used     Tobacco comment: Quit prior to 1966.   Substance and Sexual Activity     Alcohol use: No     Comment: Never a problem for him, he states.     Drug use: No     Sexual activity: Not on file   Lifestyle     Physical activity:     Days per week: Not on file     Minutes per session: Not on file     Stress: Not on file   Relationships     Social connections:     Talks on phone: Not on file     Gets together: Not on file     Attends Synagogue service: Not on file     Active member of club or organization: Not on file     Attends meetings of clubs or organizations: Not on file     Relationship status: Not on file     Intimate partner violence:     Fear of current or ex partner: Not on file     Emotionally abused: Not on file     Physically abused: Not on file     Forced sexual activity: Not on file   Other Topics Concern     Not on file   Social History Narrative     Not on file             Objective:     Vitals:    05/16/19 1002   Weight: (!) 242 lb 3.2 oz (109.9 kg)   Height: 5' 8.75\" (1.746 m)         Physical Exam:  General Appearance:    Alert, well hydrated, no distress,    Eyes:    PERRL, conjunctiva clear,    Throat:   Lips, mucosa, and tongue normal; teeth and gums normal   Neck:   Supple, symmetrical, trachea midline, no adenopathy;        thyroid:  No enlargement/tenderness/nodules; no carotid    bruit or JVD   Lungs:     Clear to auscultation bilaterally, respirations unlabored   Heart:    Regular rate and rhythm, S1 and S2 normal, no murmur, rub   or gallop   Abdomen:     Obese, Soft, non-tender, normal bowel sounds, no rebound or guarding, no masses, no organomegaly   Extremities:    atraumatic, no cyanosis or edema   Skin:   Skin color, texture, turgor normal, no rashes or lesions          Labs:  Recent Results (from the past 168 hour(s))   Electrocardiogram Perform - Clinic    Collection Time: 05/16/19 10:13 AM   Result Value Ref Range    SYSTOLIC BLOOD PRESSURE  mmHg    DIASTOLIC BLOOD PRESSURE  " mmHg    VENTRICULAR RATE 74 BPM    ATRIAL RATE 74 BPM    P-R INTERVAL 186 ms    QRS DURATION 108 ms    Q-T INTERVAL 402 ms    QTC CALCULATION (BEZET) 446 ms    P Axis -11 degrees    R AXIS 12 degrees    T AXIS 19 degrees    MUSE DIAGNOSIS       Normal sinus rhythm  Normal ECG  No previous ECGs available  Confirmed by DAMEON PERAZA MD LOC: ([id number]) on 5/16/2019 2:42:25 PM     Comprehensive Metabolic Panel    Collection Time: 05/16/19 10:34 AM   Result Value Ref Range    Sodium 140 136 - 145 mmol/L    Potassium 3.7 3.5 - 5.0 mmol/L    Chloride 104 98 - 107 mmol/L    CO2 24 22 - 31 mmol/L    Anion Gap, Calculation 12 5 - 18 mmol/L    Glucose 311 (H) 70 - 125 mg/dL    BUN 24 8 - 28 mg/dL    Creatinine 1.60 (H) 0.70 - 1.30 mg/dL    GFR MDRD Af Amer 50 (L) >60 mL/min/1.73m2    GFR MDRD Non Af Amer 42 (L) >60 mL/min/1.73m2    Bilirubin, Total 1.0 0.0 - 1.0 mg/dL    Calcium 10.4 8.5 - 10.5 mg/dL    Protein, Total 6.5 6.0 - 8.0 g/dL    Albumin 3.8 3.5 - 5.0 g/dL    Alkaline Phosphatase 61 45 - 120 U/L    AST 31 0 - 40 U/L    ALT 42 0 - 45 U/L   HM1 (CBC with Diff)    Collection Time: 05/16/19 10:34 AM   Result Value Ref Range    WBC 6.4 4.0 - 11.0 thou/uL    RBC 4.82 4.40 - 6.20 mill/uL    Hemoglobin 14.8 14.0 - 18.0 g/dL    Hematocrit 42.9 40.0 - 54.0 %    MCV 89 80 - 100 fL    MCH 30.8 27.0 - 34.0 pg    MCHC 34.6 32.0 - 36.0 g/dL    RDW 14.1 11.0 - 14.5 %    Platelets 188 140 - 440 thou/uL    MPV 6.8 (L) 7.0 - 10.0 fL    Neutrophils % 66 50 - 70 %    Lymphocytes % 24 20 - 40 %    Monocytes % 7 2 - 10 %    Eosinophils % 3 0 - 6 %    Basophils % 1 0 - 2 %    Neutrophils Absolute 4.2 2.0 - 7.7 thou/uL    Lymphocytes Absolute 1.5 0.8 - 4.4 thou/uL    Monocytes Absolute 0.4 0.0 - 0.9 thou/uL    Eosinophils Absolute 0.2 0.0 - 0.4 thou/uL    Basophils Absolute 0.0 0.0 - 0.2 thou/uL       Immunization History   Administered Date(s) Administered     Influenza high dose, seasonal 10/19/2015, 09/27/2017     Influenza, inj,  historic,unspecified 10/04/2016     Influenza,seasonal quad, PF 10/07/2014     Pneumo Conj 13-V (2010&after) 04/21/2015     Pneumo Polysac 23-V 07/28/2014     Tdap 07/28/2014           Electronically signed by Adan Rodriguez MD 05/16/19 10:03 AM

## 2021-05-29 NOTE — TELEPHONE ENCOUNTER
[facility] requesting refill of Zocor and Amaryl     Last Refill:  Zocor 1/29/19                     Amaryl 2/4/19    Last OV: 4/29/19           1. Type 2 diabetes mellitus without complication, with long-term current use of insulin (H)  Continue current management   Keep BG under 180   Follow up in 1-3 months      As for concerns about movement and getting out of the chair, he clearly demonstrated that there is no need for a chair lift at this time.      We'll revisit this issue in a few months if it is brought forward.          Subjective:    Patient ID:   Laz Kingston is a 84 y.o. male presenting with his wife with a follow up on Diabetes, and concerns about him not being able to get up from the chair as easy.  she has brought a form to request for a chair lift.  However, the patient contested the idea and said there is nothing wrong with me trying to get out of the chair.  He then went on to standing up to show.      As for the diabetes, the blood glucose levels have been in the mid 100's mostly below 180.  She said that will be increasing the Lantus to 40 units and currently at 38 units.   There are no problems with using the Trulicity.

## 2021-05-30 ENCOUNTER — RECORDS - HEALTHEAST (OUTPATIENT)
Dept: ADMINISTRATIVE | Facility: CLINIC | Age: 86
End: 2021-05-30

## 2021-05-30 VITALS — BODY MASS INDEX: 33.8 KG/M2 | WEIGHT: 227.25 LBS

## 2021-05-30 VITALS — WEIGHT: 228.4 LBS | BODY MASS INDEX: 33.97 KG/M2

## 2021-05-30 VITALS — BODY MASS INDEX: 34.03 KG/M2 | WEIGHT: 228.8 LBS

## 2021-05-30 NOTE — PROGRESS NOTES
Assessment/Plan:        Problem List Items Addressed This Visit        ENT/CARD/PULM/ENDO Problems    Type 2 diabetes mellitus without complication, with long-term current use of insulin (H)    Relevant Medications    Discussed keeping the blood glucose lower than 180   Continue with   glimepiride (AMARYL) 4 MG tablet    dulaglutide (TRULICITY) 0.75 mg/0.5 mL PnIj    Increase Lantus up to 44 units nightly   follow-up in 3 to 6 months      Other Visit Diagnoses     Midline low back pain without sciatica, unspecified chronicity    -  Primary        Exam findings were discussed and recommended physical therapy.  His wife stating that he would never follow through with the recommendation.   Also advised taking tylenol prn.     At the conclusion of the encounter the plan of care, disposition and all questions were answered and reviewed, and the patient acknowledged understanding and was involved in the decision making regarding the overall care plan.           Subjective:    Patient ID:   Laz Kingston is a 84 y.o. male was presenting with his wife is complaining of having lower mid back pain with waxing and waning severity over the past 6 months mainly worse in the mornings.  There is no pain radiation.      Diabetes    Blood sugars averaging anywhere between 150-200s.  On Trulicity, glimepiride and needs them refilled  Lantus taking 42 units    Lab Results   Component Value Date    HGBA1C 7.5 (H) 03/06/2019            Review of Systems  Allergy: reviewed  General : negative  A complete 5 point review of systems was obtained and is negative other than what is stated in the HPI.       The following patient's history were reviewed and updated as appropriate:   He  has a past medical history of BCE (basal cell epithelioma) - [facility] ([facility]), Diabetes mellitus (H), Dysplastic nevus - with atypia - 2014 - [facility] ([facility]), Onychomycosis, and Splenomegaly - 16 cm,  "stable versus April and December, 2014 ([facility] imaging for stone for Dr. Ackerman - January, 2017.) (1/10/2017)..      Outpatient Encounter Medications as of 7/5/2019   Medication Sig Dispense Refill     amLODIPine-benazepril (LOTREL) 10-20 mg per capsule Take 1 capsule by mouth daily. 90 capsule 1     aspirin 81 mg chewable tablet Chew 81 mg daily.       blood glucose test strips Contour Next Test Strips :  Test twice daily as instructed. 200 strip 3     dulaglutide (TRULICITY) 0.75 mg/0.5 mL PnIj Inject 0.75 mg under the skin once a week. 4 Syringe 0     glimepiride (AMARYL) 4 MG tablet TAKE 1 TABLET BY MOUTH  DAILY, WITH THE FIRST MEAL  OF THE DAY FOR DIABETES. 90 tablet 1     insulin glargine (LANTUS SOLOSTAR U-100 INSULIN) 100 unit/mL (3 mL) pen Inject 40 Units under the skin at bedtime. 5 adj dose pen 5     LANCETS MISC Use As Directed 3 (three) times a day. [employer] Microlet Lancets Miscellaneous       memantine (NAMENDA) 10 MG tablet        metoprolol succinate (TOPROL XL) 50 MG 24 hr tablet Take 1 tablet (50 mg total) by mouth daily. 90 tablet 1     omeprazole (PRILOSEC) 20 MG capsule TAKE 1 DAILY, 1/2 HOUR  BEFORE BREAKFAST FOR  HEARTBURN.. 90 capsule 3     pen needle, diabetic (BD ULTRA-FINE BRITTANY PEN NEEDLE) 32 gauge x 5/32\" Ndle Inject 1 each under the skin daily. 100 each 3     rivastigmine (EXELON) 13.3 mg/24 hour PT24 patch Apply 1 patch topically daily.   6     simvastatin (ZOCOR) 20 MG tablet Take 1 tablet (20 mg total) by mouth at bedtime. . 90 tablet 1     No facility-administered encounter medications on file as of 7/5/2019.        Objective:   /80 (Patient Site: Right Arm, Patient Position: Sitting, Cuff Size: Adult Large)   Pulse 85   Wt (!) 234 lb (106.1 kg)   SpO2 93%   BMI 34.81 kg/m        Physical Exam  General Appearance:    Alert, well hydrated, no distress   Throat:   mucous membranes moist, pharynx normal without lesions   Neck:   Supple, symmetrical, trachea midline, no adenopathy; "     thyroid:  no enlargement/tenderness/nodules;    Lungs:     clear to auscultation, no wheezes, rales or rhonchi, symmetric air entry     Heart:    Regular rate and rhythm, S1 and S2 normal, no murmur, rub   or gallop, no edema    Back:  Normal to inspection , palpable tenderness to the mid lower back musculature   Skin:   Skin color, texture, turgor normal, no rashes or lesions

## 2021-05-31 ENCOUNTER — RECORDS - HEALTHEAST (OUTPATIENT)
Dept: ADMINISTRATIVE | Facility: CLINIC | Age: 86
End: 2021-05-31

## 2021-05-31 VITALS — BODY MASS INDEX: 34.96 KG/M2 | HEIGHT: 69 IN | WEIGHT: 236 LBS

## 2021-05-31 VITALS — BODY MASS INDEX: 35.08 KG/M2 | WEIGHT: 235.8 LBS

## 2021-05-31 VITALS — WEIGHT: 233 LBS | BODY MASS INDEX: 34.66 KG/M2

## 2021-05-31 VITALS — BODY MASS INDEX: 34.21 KG/M2 | WEIGHT: 230 LBS

## 2021-05-31 VITALS — WEIGHT: 237.6 LBS | BODY MASS INDEX: 35.34 KG/M2

## 2021-05-31 VITALS — WEIGHT: 237.9 LBS | BODY MASS INDEX: 35.39 KG/M2

## 2021-06-01 VITALS — WEIGHT: 235.6 LBS | BODY MASS INDEX: 35.05 KG/M2

## 2021-06-01 VITALS — WEIGHT: 233 LBS | BODY MASS INDEX: 34.66 KG/M2

## 2021-06-01 VITALS — BODY MASS INDEX: 34.79 KG/M2 | WEIGHT: 233.9 LBS

## 2021-06-02 ENCOUNTER — RECORDS - HEALTHEAST (OUTPATIENT)
Dept: ADMINISTRATIVE | Facility: CLINIC | Age: 86
End: 2021-06-02

## 2021-06-02 ENCOUNTER — COMMUNICATION - HEALTHEAST (OUTPATIENT)
Dept: SCHEDULING | Facility: CLINIC | Age: 86
End: 2021-06-02

## 2021-06-02 VITALS — WEIGHT: 245 LBS | BODY MASS INDEX: 36.44 KG/M2

## 2021-06-02 VITALS — BODY MASS INDEX: 36.52 KG/M2 | WEIGHT: 245.5 LBS

## 2021-06-02 VITALS — BODY MASS INDEX: 36.16 KG/M2 | WEIGHT: 243.1 LBS

## 2021-06-02 VITALS — BODY MASS INDEX: 36.19 KG/M2 | WEIGHT: 243.3 LBS

## 2021-06-02 VITALS — BODY MASS INDEX: 36.68 KG/M2 | WEIGHT: 246.6 LBS

## 2021-06-02 NOTE — TELEPHONE ENCOUNTER
Refill Approved    Rx renewed per Medication Renewal Policy. Medication was last renewed on 4/18/19.    Aleisha Suggs, [facility] Triage/Med Refill 10/23/2019     Requested Prescriptions   Pending Prescriptions Disp Refills     amLODIPine-benazepril (LOTREL) 10-20 mg per capsule 90 capsule 1     Sig: Take 1 capsule by mouth daily.       Ace Inhibitors Refill Protocol Passed - 10/23/2019  7:48 AM        Passed - PCP or prescribing provider visit in past 12 months       Last office visit with prescriber/PCP: 7/5/2019 Adan Rodriguez MD OR same dept: 7/5/2019 Adan Rodriguez MD OR same specialty: 7/5/2019 Adan Rodriguez MD  Last physical: 5/16/2019 Last MTM visit: Visit date not found   Next visit within 3 mo: Visit date not found  Next physical within 3 mo: Visit date not found  Prescriber OR PCP: Adan Rodriguez MD  Last diagnosis associated with med order: 1. Essential hypertension, benign  - amLODIPine-benazepril (LOTREL) 10-20 mg per capsule; Take 1 capsule by mouth daily.  Dispense: 90 capsule; Refill: 1  - metoprolol succinate (TOPROL XL) 50 MG 24 hr tablet; Take 1 tablet (50 mg total) by mouth daily.  Dispense: 90 tablet; Refill: 1    If protocol passes may refill for 12 months if within 3 months of last provider visit (or a total of 15 months).             Passed - Serum Potassium in past 12 months     Lab Results   Component Value Date    Potassium 3.7 05/16/2019             Passed - Blood pressure filed in past 12 months     BP Readings from Last 1 Encounters:   07/05/19 138/80             Passed - Serum Creatinine in past 12 months     Creatinine   Date Value Ref Range Status   05/16/2019 1.60 (H) 0.70 - 1.30 mg/dL Final             metoprolol succinate (TOPROL XL) 50 MG 24 hr tablet 90 tablet 1     Sig: Take 1 tablet (50 mg total) by mouth daily.       Beta-Blockers Refill Protocol Passed - 10/23/2019  7:48 AM        Passed - PCP or prescribing provider visit in past 12  months or next 3 months     Last office visit with prescriber/PCP: 7/5/2019 Adan Rodriguez MD OR same dept: 7/5/2019 Adan Rodriguez MD OR same specialty: 7/5/2019 Adan Rodriguez MD  Last physical: 5/16/2019 Last MTM visit: Visit date not found   Next visit within 3 mo: Visit date not found  Next physical within 3 mo: Visit date not found  Prescriber OR PCP: Adan Rodriguez MD  Last diagnosis associated with med order: 1. Essential hypertension, benign  - amLODIPine-benazepril (LOTREL) 10-20 mg per capsule; Take 1 capsule by mouth daily.  Dispense: 90 capsule; Refill: 1  - metoprolol succinate (TOPROL XL) 50 MG 24 hr tablet; Take 1 tablet (50 mg total) by mouth daily.  Dispense: 90 tablet; Refill: 1    If protocol passes may refill for 12 months if within 3 months of last provider visit (or a total of 15 months).             Passed - Blood pressure filed in past 12 months     BP Readings from Last 1 Encounters:   07/05/19 138/80

## 2021-06-02 NOTE — TELEPHONE ENCOUNTER
RN cannot approve Refill Request    RN can NOT refill this medication Protocol failed and NO refill given        Aleisha Suggs, [facility] Triage/Med Refill 10/15/2019    Requested Prescriptions   Pending Prescriptions Disp Refills     insulin glargine (LANTUS SOLOSTAR U-100 INSULIN) 100 unit/mL (3 mL) pen 5 adj dose pen 5     Sig: Inject 40 Units under the skin at bedtime.       Insulin/GLP-1 Refill Protocol Failed - 10/14/2019  7:37 AM        Failed - A1C in last 6 months     Hemoglobin A1c   Date Value Ref Range Status   03/06/2019 7.5 (H) 3.5 - 6.0 % Final               Passed - Visit with PCP or prescribing provider visit in last 6 months     Last office visit with prescriber/PCP: 7/5/2019 OR same dept: 7/5/2019 Adan Rodriguez MD OR same specialty: 7/5/2019 Adan Rodriguez MD Last physical: 5/16/2019 Last MTM visit: Visit date not found     Next appt within 3 mo: Visit date not found  Next physical within 3 mo: Visit date not found  Prescriber OR PCP: Adan Rodriguez MD  Last diagnosis associated with med order: 1. Diabetes mellitus, type 2 (H)  - insulin glargine (LANTUS SOLOSTAR U-100 INSULIN) 100 unit/mL (3 mL) pen; Inject 40 Units under the skin at bedtime.  Dispense: 5 adj dose pen; Refill: 5    If protocol passes may refill for 6 months if within 3 months of last provider visit (or a total of 9 months).              Passed - Microalbumin in last year     Microalbumin, Random Urine   Date Value Ref Range Status   10/29/2018 30.54 (H) 0.00 - 1.99 mg/dL Final                  Passed - Blood pressure in last year     BP Readings from Last 1 Encounters:   07/05/19 138/80             Passed - Creatinine done in last year     Creatinine   Date Value Ref Range Status   05/16/2019 1.60 (H) 0.70 - 1.30 mg/dL Final

## 2021-06-03 VITALS — BODY MASS INDEX: 35.87 KG/M2 | HEIGHT: 69 IN | WEIGHT: 242.2 LBS

## 2021-06-03 VITALS — BODY MASS INDEX: 34.81 KG/M2 | WEIGHT: 234 LBS

## 2021-06-03 VITALS — BODY MASS INDEX: 36.37 KG/M2 | WEIGHT: 244.5 LBS

## 2021-06-03 NOTE — TELEPHONE ENCOUNTER
Patient Returning Call  Reason for call:  Patient's wife returning call to the clinic. Patient gave verbal approval to discuss medical information.   Information relayed to patient:  Yes as instructed and wife agrees with plan.  Patient has additional questions:  No  If YES, what are your questions/concerns:  none  Okay to leave a detailed message?: Yes

## 2021-06-03 NOTE — TELEPHONE ENCOUNTER
----- Message from Adan Rodriguez MD sent at 11/25/2019  3:34 PM CST -----  pls call:     Elevated HgA1c, elevated serum Glucose, elevated serum Creatinine with decrease in renal function, positive urine for sugar, and protein, no infection     Better care of diabetes is advised. Recommend the current plan as discussed in office and follow up in 3 months    Elevated Triglycerides   Continue current management with the statin

## 2021-06-03 NOTE — PROGRESS NOTES
Assessment/Plan:        1. Type 2 diabetes mellitus without complication, with long-term current use of insulin (H)  - Glycosylated Hemoglobin A1c  - Comprehensive Metabolic Panel  Elevated HgA1c, elevated serum Glucose, elevated serum Creatinine with decrease in renal function, positive urine for sugar, and protein, no infection     Better care of diabetes is advised.   Take extra dose of Trulicity weekly,   Continue on amaryl and lantus as is     follow up in 3 months     Refill:   - glimepiride (AMARYL) 4 MG tablet; TAKE 1 TABLET BY MOUTH  DAILY, WITH THE FIRST MEAL  OF THE DAY FOR DIABETES.  Dispense: 90 tablet; Refill: 1  - dulaglutide (TRULICITY) 1.5 mg/0.5 mL PnIj; Inject 1.5 mg under the skin once a week.  Dispense: 5 Syringe; Refill: 3      2. GERD (gastroesophageal reflux disease)  Refill   - omeprazole (PRILOSEC) 20 MG capsule; TAKE 1 DAILY, 1/2 HOUR  BEFORE BREAKFAST FOR  HEARTBURN.  Dispense: 90 capsule; Refill: 3    3. Hypercholesterolemia  - simvastatin (ZOCOR) 20 MG tablet; Take 1 tablet (20 mg total) by mouth at bedtime. .  Dispense: 90 tablet; Refill: 1  - Lipid Profile    4. Urinary incontinence, unspecified type  - Urinalysis-UC if Indicated    5. Upper back pain/ leg weakness   Consider PT , however patient refusing further tx at this time.        40 minutes or greater were spent with the patient today with greater than 50% of the times spent in counseling and management of care.       Subjective:    Patient ID:   Laz Kingston is a 84 y.o. male here for med check/ review.     DIABETES:   Having higher BG readings close to 200  takingTrulicity 0.75 mg weekly, Lantus 48 units at night and glimepiride 4 milligrams daily     His wife is reporting that he his legs are progressively getting weaker, attesting to sitting all day with little or no movement. He is also having pain in his upper back recently       Review of Systems  Allergy: reviewed  General : negative  A complete 5 point review of  "systems was obtained and is negative other than what is stated in the HPI.      The following patient's history were reviewed and updated as appropriate:   He  has a past medical history of BCE (basal cell epithelioma) - [facility]), Diabetes mellitus (H), Dysplastic nevus - with atypia - 2014 [facility] ([facility]), Onychomycosis, and Splenomegaly - 16 cm, stable versus April and December, 2014 ([facility] imaging for stone for Dr. Ackerman - January, 2017.) (1/10/2017)..      Outpatient Encounter Medications as of 11/21/2019   Medication Sig Dispense Refill     amLODIPine-benazepril (LOTREL) 10-20 mg per capsule Take 1 capsule by mouth daily. 90 capsule 2     aspirin 81 mg chewable tablet Chew 81 mg daily.       blood glucose test strips Contour Next Test Strips :  Test twice daily as instructed. 200 strip 3     dulaglutide (TRULICITY) 0.75 mg/0.5 mL PnIj Inject 0.75 mg under the skin once a week. 4 Syringe 5     glimepiride (AMARYL) 4 MG tablet TAKE 1 TABLET BY MOUTH  DAILY, WITH THE FIRST MEAL  OF THE DAY FOR DIABETES. 90 tablet 1     insulin glargine (LANTUS SOLOSTAR U-100 INSULIN) 100 unit/mL (3 mL) pen Inject 40 Units under the skin at bedtime. 5 adj dose pen 3     LANCETS MISC Use As Directed 3 (three) times a day. [employer] Microlet Lancets Miscellaneous       memantine (NAMENDA) 10 MG tablet        metoprolol succinate (TOPROL XL) 50 MG 24 hr tablet Take 1 tablet (50 mg total) by mouth daily. 90 tablet 2     omeprazole (PRILOSEC) 20 MG capsule TAKE 1 DAILY, 1/2 HOUR  BEFORE BREAKFAST FOR  HEARTBURN.. 90 capsule 3     pen needle, diabetic (BD ULTRA-FINE BRITTANY PEN NEEDLE) 32 gauge x 5/32\" Ndle Inject 1 each under the skin daily. 100 each 3     rivastigmine (EXELON) 13.3 mg/24 hour PT24 patch Apply 1 patch topically daily.   6     simvastatin (ZOCOR) 20 MG tablet Take 1 tablet (20 mg total) by mouth at bedtime. . 90 tablet 1     No facility-administered encounter " medications on file as of 11/21/2019.          Objective:   /78 (Patient Site: Right Arm, Patient Position: Sitting, Cuff Size: Adult Large)   Pulse 79   Wt (!) 241 lb (109.3 kg)   SpO2 95%   BMI 35.85 kg/m        Physical Exam  General Appearance:    Alert, well hydrated, no distress   Throat:   mucous membranes moist, pharynx normal without lesions   Neck:   Supple, symmetrical, trachea midline, no adenopathy;     thyroid:  no enlargement/tenderness/nodules;    Lungs:     clear to auscultation, no wheezes, rales or rhonchi, symmetric air entry     Heart:    Regular rate and rhythm, S1 and S2 normal, no murmur, rub   or gallop, no edema    Musc/ EXT:   decreased thigh muscle bulk, palpable tenderness to the upper back musculature.     Skin:   Skin color, texture, turgor normal, no rashes or lesions

## 2021-06-05 NOTE — TELEPHONE ENCOUNTER
Change in pharmacy. Medication last filled 1/15/2020 by provider qty 90 refill 0.       José Miguel Lopez, RN Supervisor Triage Nurse Advisor

## 2021-06-05 NOTE — TELEPHONE ENCOUNTER
Medication Question or Clarification  Who is calling: Wife  What medication are you calling about (include dose and sig)?:   amLODIPine-benazepril (LOTREL) 10-20 mg per capsule 90 capsule 2 10/23/2019     Sig - Route: Take 1 capsule by mouth daily. - Oral    Sent to pharmacy as: amLODIPine 10 mg-benazepril 20 mg capsule (LOTREL)    E-Prescribing Status: Receipt confirmed by pharmacy (10/23/2019 10:27 AM CDT)        Who prescribed the medication?: Adan Rodriguez MD    What is your question/concern?: This medication, the co-pay has gone up to 120.00 dollars, this is so expensive, asking if there is another medication that would be more cost effective for the patient please advise.  Requested Pharmacy: [facility]   Okay to leave a detailed message?: Yes

## 2021-06-05 NOTE — PROGRESS NOTES
Assessment/Plan:        1. Essential hypertension, benign  Treatment options reviewed, and discussed sending 2 prescriptions for lisinopril and amlodipine to check with her pharmacy for the price versus the price of the combo.  In addition we will send the medication to [place] for better pricing    - amLODIPine (NORVASC) 10 MG tablet; Take 1 tablet (10 mg total) by mouth daily.  Dispense: 90 tablet; Refill: 0  - lisinopril (PRINIVIL,ZESTRIL) 20 MG tablet; Take 1 tablet (20 mg total) by mouth daily.  Dispense: 90 tablet; Refill: 0    2. Skin tag  Exam findings were discussed and plan to excise.   Skin tag  snipped off after cleansing the skin with alcohol using a sterile iris scissors. Silver nitrate used to control minor bleeding. The tag was not sent for analysis due to its usual and common presentation       At the conclusion of the encounter the plan of care, disposition and all questions were answered and reviewed, and the patient acknowledged understanding and was involved in the decision making regarding the overall care plan.           Subjective:    Patient ID:   Laz Kingston is a 84 y.o. male comes in with his wife following up on his blood pressure, stating that his pharmacy is now charging his genetic Lotrel $140, substantially higher than what he was previously paying for.  The current combination however is working well for him with no side effects or intolerance keeping his blood pressure at goal.    He also has a skin tag in his right forehead , causing him discomfort when being touched or rubbed on.     Review of Systems  Allergy: reviewed  General : negative  A complete 5 point review of systems was obtained and is negative other than what is stated in the HPI.     The following patient's history were reviewed and updated as appropriate:   He  has a past medical history of BCE (basal cell epithelioma) - [facility] Diabetes mellitus (H), Dysplastic  "nevus - with atypia - 2014 - [facility] (10/19/2015), Onychomycosis, and Splenomegaly - 16 cm, stable versus April and December, 2014 ([facility] imaging for stone for Dr. Ackerman - January, 2017.) (1/10/2017)..      Outpatient Encounter Medications as of 1/15/2020   Medication Sig Dispense Refill     amLODIPine-benazepril (LOTREL) 10-20 mg per capsule Take 1 capsule by mouth daily. 90 capsule 2     aspirin 81 mg chewable tablet Chew 81 mg daily.       blood glucose test strips Contour Next Test Strips :  Test twice daily as instructed. 200 strip 3     dulaglutide (TRULICITY) 1.5 mg/0.5 mL PnIj Inject 1.5 mg under the skin once a week. 5 Syringe 3     glimepiride (AMARYL) 4 MG tablet TAKE 1 TABLET BY MOUTH  DAILY, WITH THE FIRST MEAL  OF THE DAY FOR DIABETES. 90 tablet 1     insulin glargine (LANTUS SOLOSTAR U-100 INSULIN) 100 unit/mL (3 mL) pen Inject 40 Units under the skin at bedtime. 5 adj dose pen 3     LANCETS MISC Use As Directed 3 (three) times a day. [facility] Microlet Lancets Miscellaneous       memantine (NAMENDA) 10 MG tablet        metoprolol succinate (TOPROL XL) 50 MG 24 hr tablet Take 1 tablet (50 mg total) by mouth daily. 90 tablet 2     omeprazole (PRILOSEC) 20 MG capsule TAKE 1 DAILY, 1/2 HOUR  BEFORE BREAKFAST FOR  HEARTBURN. 90 capsule 3     pen needle, diabetic (BD ULTRA-FINE BRITTANY PEN NEEDLE) 32 gauge x 5/32\" Ndle Inject 1 each under the skin daily. 100 each 3     rivastigmine (EXELON) 13.3 mg/24 hour PT24 patch Apply 1 patch topically daily.   6     simvastatin (ZOCOR) 20 MG tablet Take 1 tablet (20 mg total) by mouth at bedtime. . 90 tablet 1     dulaglutide (TRULICITY) 0.75 mg/0.5 mL PnIj Inject 0.75 mg under the skin once a week. 4 Syringe 5     No facility-administered encounter medications on file as of 1/15/2020.          Objective:   /72 (Patient Site: Right Arm, Patient Position: Sitting, Cuff Size: Adult Regular)   Pulse 83   SpO2 96%       Physical Exam  General Appearance:    " Alert, well hydrated, no distress   Throat:   mucous membranes moist, pharynx normal without lesions   Neck:   Supple, symmetrical, trachea midline, no adenopathy;     thyroid:  no enlargement/tenderness/nodules;    Lungs:     clear to auscultation, no wheezes, rales or rhonchi, symmetric air entry     Heart:    Regular rate and rhythm, S1 and S2 normal, no murmur, rub   or gallop, no edema    Skin:   Skin tag noted on the right forehead, no other rashes or lesions

## 2021-06-05 NOTE — TELEPHONE ENCOUNTER
Refill Request  Did you contact pharmacy: No  Medication name:   Requested Prescriptions     Pending Prescriptions Disp Refills     amLODIPine (NORVASC) 10 MG tablet 90 tablet 0     Sig: Take 1 tablet (10 mg total) by mouth daily.     lisinopril (PRINIVIL,ZESTRIL) 20 MG tablet 90 tablet 0     Sig: Take 1 tablet (20 mg total) by mouth daily.     Who prescribed the medication: Dr Rodriguez    Requested Pharmacy: PLEASE change pharmacy to [facility] per patients wife.    Okay to leave a detailed message: yes

## 2021-06-06 NOTE — TELEPHONE ENCOUNTER
Who is calling:  Patient spouse   Reason for Call:  Caller is demanding for refills to be sent over as soon as possible due to will be out this week. Caller stated that she will like to be able to pick by today.   Date of last appointment with primary care:   Okay to leave a detailed message: Yes

## 2021-06-06 NOTE — TELEPHONE ENCOUNTER
Last Office Visit  1/15/2020 Adan Rodriguez MD  Notes:      1. Essential hypertension, benign  Treatment options reviewed, and discussed sending 2 prescriptions for lisinopril and amlodipine to check with her pharmacy for the price versus the price of the combo.  In addition we will send the medication to [facility] for better pricing     - amLODIPine (NORVASC) 10 MG tablet; Take 1 tablet (10 mg total) by mouth daily.  Dispense: 90 tablet; Refill: 0  - lisinopril (PRINIVIL,ZESTRIL) 20 MG tablet; Take 1 tablet (20 mg total) by mouth daily.  Dispense: 90 tablet; Refill: 0     11/21/2019- Dr Rodriguez  1. Type 2 diabetes mellitus without complication, with long-term current use of insulin (H)  - Glycosylated Hemoglobin A1c  - Comprehensive Metabolic Panel  Elevated HgA1c, elevated serum Glucose, elevated serum Creatinine with decrease in renal function, positive urine for sugar, and protein, no infection       Last Filled:  dulaglutide (TRULICITY) 1.5 mg/0.5 mL PnIj  5 Syringe  3  11/21/2019   --    Sig - Route: Inject 1.5 mg under the skin once a week. - Subcutaneous    Class: Print    Notes to Pharmacy: This will replace the 0.75 mg dosage     insulin glargine (LANTUS SOLOSTAR U-100 INSULIN) 100 unit/mL (3 mL) pen  5 adj dose pen  3  10/15/2019   No    Sig - Route: Inject 40 Units under the skin at bedtime. - Subcutaneous    Sent to pharmacy as: insulin glargine (U-100) 100 unit/mL (3 mL) subcutaneous pen (LANTUS SOLOSTAR U-100 INSULIN)    Notes to Pharmacy: If LANTUS is not covered by insurance, may substitute BASAGLAR at same dose and frequency.      E-Prescribing Status: Receipt confirmed by pharmacy (10/15/2019  8:21 AM CDT)        Next OV:  Visit date not found        Medication teed up for provider signature

## 2021-06-06 NOTE — TELEPHONE ENCOUNTER
RN cannot approve Refill Request    RN can NOT refill this medication Protocol failed and NO refill given.      Aleisha Suggs, [facility] Triage/Med Refill 3/17/2020    Requested Prescriptions   Pending Prescriptions Disp Refills     insulin glargine (LANTUS SOLOSTAR U-100 INSULIN) 100 unit/mL (3 mL) pen 5 adj dose pen 3     Sig: Inject 40 Units under the skin at bedtime.       Insulin/GLP-1 Refill Protocol Failed - 3/16/2020 12:31 PM        Failed - Microalbumin in last year     Microalbumin, Random Urine   Date Value Ref Range Status   10/29/2018 30.54 (H) 0.00 - 1.99 mg/dL Final                  Passed - Visit with PCP or prescribing provider visit in last 6 months     Last office visit with prescriber/PCP: 1/15/2020 OR same dept: 1/15/2020 Adan Rodriguez MD OR same specialty: 1/15/2020 Adan Rodriguez MD Last physical: Visit date not found Last MTM visit: Visit date not found     Next appt within 3 mo: Visit date not found  Next physical within 3 mo: Visit date not found  Prescriber OR PCP: Adan Rodriguez MD  Last diagnosis associated with med order: 1. Diabetes mellitus, type 2 (H)  - insulin glargine (LANTUS SOLOSTAR U-100 INSULIN) 100 unit/mL (3 mL) pen; Inject 40 Units under the skin at bedtime.  Dispense: 5 adj dose pen; Refill: 3    2. Type 2 diabetes mellitus without complication, with long-term current use of insulin (H)  - dulaglutide (TRULICITY) 1.5 mg/0.5 mL PnIj; Inject 1.5 mg under the skin once a week.  Dispense: 5 Syringe; Refill: 3    If protocol passes may refill for 6 months if within 3 months of last provider visit (or a total of 9 months).              Passed - A1C in last 6 months     Hemoglobin A1c   Date Value Ref Range Status   11/21/2019 7.8 (H) 3.5 - 6.0 % Final               Passed - Blood pressure in last year     BP Readings from Last 1 Encounters:   01/15/20 126/72             Passed - Creatinine done in last year     Creatinine   Date Value Ref Range  Status   11/21/2019 2.00 (H) 0.70 - 1.30 mg/dL Final                dulaglutide (TRULICITY) 1.5 mg/0.5 mL PnIj 5 Syringe 3     Sig: Inject 1.5 mg under the skin once a week.       Insulin/GLP-1 Refill Protocol Failed - 3/16/2020 12:31 PM        Failed - Microalbumin in last year     Microalbumin, Random Urine   Date Value Ref Range Status   10/29/2018 30.54 (H) 0.00 - 1.99 mg/dL Final                  Passed - Visit with PCP or prescribing provider visit in last 6 months     Last office visit with prescriber/PCP: 1/15/2020 OR same dept: 1/15/2020 Adan Rodriguez MD OR same specialty: 1/15/2020 Adan Rodriguez MD Last physical: Visit date not found Last MTM visit: Visit date not found     Next appt within 3 mo: Visit date not found  Next physical within 3 mo: Visit date not found  Prescriber OR PCP: Adan Rodriguez MD  Last diagnosis associated with med order: 1. Diabetes mellitus, type 2 (H)  - insulin glargine (LANTUS SOLOSTAR U-100 INSULIN) 100 unit/mL (3 mL) pen; Inject 40 Units under the skin at bedtime.  Dispense: 5 adj dose pen; Refill: 3    2. Type 2 diabetes mellitus without complication, with long-term current use of insulin (H)  - dulaglutide (TRULICITY) 1.5 mg/0.5 mL PnIj; Inject 1.5 mg under the skin once a week.  Dispense: 5 Syringe; Refill: 3    If protocol passes may refill for 6 months if within 3 months of last provider visit (or a total of 9 months).              Passed - A1C in last 6 months     Hemoglobin A1c   Date Value Ref Range Status   11/21/2019 7.8 (H) 3.5 - 6.0 % Final               Passed - Blood pressure in last year     BP Readings from Last 1 Encounters:   01/15/20 126/72             Passed - Creatinine done in last year     Creatinine   Date Value Ref Range Status   11/21/2019 2.00 (H) 0.70 - 1.30 mg/dL Final

## 2021-06-06 NOTE — TELEPHONE ENCOUNTER
Refill Request  Did you contact pharmacy: No  Medication name:   Requested Prescriptions     Pending Prescriptions Disp Refills     insulin glargine (LANTUS SOLOSTAR U-100 INSULIN) 100 unit/mL (3 mL) pen 5 adj dose pen 3     Sig: Inject 40 Units under the skin at bedtime.     dulaglutide (TRULICITY) 1.5 mg/0.5 mL PnIj 5 Syringe 3     Sig: Inject 1.5 mg under the skin once a week.     Who prescribed the medication: Adan Rodriguez MD    [facility]  Is patient out of medication: No.  3 days left  Patient notified refills processed in 3 business days:  yes  Okay to leave a detailed message: yes

## 2021-06-07 NOTE — PROGRESS NOTES
"Laz Kingston is a 85 y.o. male who is being evaluated via a billable telephone visit.      The patient has been notified of following:     \"This telephone visit will be conducted via a call between you and your physician/provider. We have found that certain health care needs can be provided without the need for a physical exam.  This service lets us provide the care you need with a short phone conversation.  If a prescription is necessary we can send it directly to your pharmacy.  If lab work is needed we can place an order for that and you can then stop by our lab to have the test done at a later time.    If during the course of the call the physician/provider feels a telephone visit is not appropriate, you will not be charged for this service.\"         Laz Kingston complains of    Chief Complaint   Patient presents with     Diabetes     BS varies 143, 183, 174.        I have reviewed and updated the patient's Past Medical History, Social History, Family History and Medication List.    ALLERGIES  Ibuprofen    Additional provider notes:    Information provided by his wife.   She reports that the blood sugars have been better but still in the high 100's and low 200's.  He has been compliant with Trulicity 1.5 mg weekly, Amaryl 4 mg daily and been using Lantus up to 48 units at bedtime.    He is otherwise doing well with no additional concerns and no need for refills at this time       Assessment/Plan:  1. Type 2 diabetes mellitus without complication, with long-term current use of insulin (H)  Blood sugars are not at goal  Discussed better control of his diabetes with cutting back on carbs and avoiding too much sweets  Increase dose of Lantus to 50 units at bedtime  Continue with Amaryl at 4 mg daily, and Trulicity 1.5 mg weekly    Monitor blood sugar with a goal to keep below 180  Follow-up to recheck in office in 3 months    Phone call duration:  22 minutes    [facility]      "

## 2021-06-07 NOTE — TELEPHONE ENCOUNTER
RN cannot approve Refill Request    RN can NOT refill this medication Protocol failed and NO refill given.       Aleisha Suggs, [facility] Triage/Med Refill 4/20/2020    Requested Prescriptions   Pending Prescriptions Disp Refills     insulin glargine (LANTUS SOLOSTAR U-100 INSULIN) 100 unit/mL (3 mL) pen 5 adj dose pen 0     Sig: Inject 40 Units under the skin at bedtime.       Insulin/GLP-1 Refill Protocol Failed - 4/20/2020  9:14 AM        Failed - Microalbumin in last year     Microalbumin, Random Urine   Date Value Ref Range Status   10/29/2018 30.54 (H) 0.00 - 1.99 mg/dL Final                  Passed - Visit with PCP or prescribing provider visit in last 6 months     Last office visit with prescriber/PCP: 1/15/2020 OR same dept: Visit date not found OR same specialty: Visit date not found Last physical: Visit date not found Last MTM visit: Visit date not found     Next appt within 3 mo: Visit date not found  Next physical within 3 mo: Visit date not found  Prescriber OR PCP: Adan Rodriguez MD  Last diagnosis associated with med order: 1. Diabetes mellitus, type 2 (H)  - insulin glargine (LANTUS SOLOSTAR U-100 INSULIN) 100 unit/mL (3 mL) pen; Inject 40 Units under the skin at bedtime.  Dispense: 5 adj dose pen; Refill: 0    If protocol passes may refill for 6 months if within 3 months of last provider visit (or a total of 9 months).              Passed - A1C in last 6 months     Hemoglobin A1c   Date Value Ref Range Status   11/21/2019 7.8 (H) 3.5 - 6.0 % Final               Passed - Blood pressure in last year     BP Readings from Last 1 Encounters:   01/15/20 126/72             Passed - Creatinine done in last year     Creatinine   Date Value Ref Range Status   11/21/2019 2.00 (H) 0.70 - 1.30 mg/dL Final

## 2021-06-07 NOTE — TELEPHONE ENCOUNTER
Refill Approved    Rx renewed per Medication Renewal Policy. Medication was last renewed on 1/16/20.    Aleisha Suggs, [facility] Triage/Med Refill 4/10/2020     Requested Prescriptions   Pending Prescriptions Disp Refills     amLODIPine (NORVASC) 10 MG tablet 90 tablet 0     Sig: Take 1 tablet (10 mg total) by mouth daily.       Calcium-Channel Blockers Protocol Passed - 4/10/2020 10:23 AM        Passed - PCP or prescribing provider visit in past 12 months or next 3 months     Last office visit with prescriber/PCP: 1/15/2020 Adan Rodriguez MD OR [facility] dept: Visit date not found OR same specialty: Visit date not found  Last physical: 5/16/2019 Last MTM visit: Visit date not found   Next visit within 3 mo: Visit date not found  Next physical within 3 mo: Visit date not found  Prescriber OR PCP: Adan Rodriguez MD  Last diagnosis associated with med order: 1. Essential hypertension, benign  - amLODIPine (NORVASC) 10 MG tablet; Take 1 tablet (10 mg total) by mouth daily.  Dispense: 90 tablet; Refill: 0  - lisinopriL (PRINIVIL,ZESTRIL) 20 MG tablet; Take 1 tablet (20 mg total) by mouth daily.  Dispense: 90 tablet; Refill: 0    If protocol passes may refill for 12 months if within 3 months of last provider visit (or a total of 15 months).             Passed - Blood pressure filed in past 12 months     BP Readings from Last 1 Encounters:   01/15/20 126/72                lisinopriL (PRINIVIL,ZESTRIL) 20 MG tablet 90 tablet 0     Sig: Take 1 tablet (20 mg total) by mouth daily.       Ace Inhibitors Refill Protocol Passed - 4/10/2020 10:23 AM        Passed - PCP or prescribing provider visit in past 12 months       Last office visit with prescriber/PCP: 1/15/2020 Adan Rodriguez MD OR [facility] de[facility]: Visit date not found OR same specialty: Visit date not found  Last physical: 5/16/2019 Last MTM visit: Visit date not found   Next visit within 3 mo: Visit date not found  Next physical within 3 mo: Visit  date not found  Prescriber OR PCP: Adan Rodriguez MD  Last diagnosis associated with med order: 1. Essential hypertension, benign  - amLODIPine (NORVASC) 10 MG tablet; Take 1 tablet (10 mg total) by mouth daily.  Dispense: 90 tablet; Refill: 0  - lisinopriL (PRINIVIL,ZESTRIL) 20 MG tablet; Take 1 tablet (20 mg total) by mouth daily.  Dispense: 90 tablet; Refill: 0    If protocol passes may refill for 12 months if within 3 months of last provider visit (or a total of 15 months).             Passed - Serum Potassium in past 12 months     Lab Results   Component Value Date    Potassium 4.0 11/21/2019             Passed - Blood pressure filed in past 12 months     BP Readings from Last 1 Encounters:   01/15/20 126/72             Passed - Serum Creatinine in past 12 months     Creatinine   Date Value Ref Range Status   11/21/2019 2.00 (H) 0.70 - 1.30 mg/dL Final

## 2021-06-07 NOTE — TELEPHONE ENCOUNTER
Refill Request  Did you contact pharmacy: Yes.  Date: Today  Medication name:   Requested Prescriptions     Pending Prescriptions Disp Refills     insulin glargine (LANTUS SOLOSTAR U-100 INSULIN) 100 unit/mL (3 mL) pen 5 adj dose pen 0     Sig: Inject 40 Units under the skin at bedtime.     Who prescribed the medication: Adan Rodriguez MD   Requested Pharmacy: [facility]  Is patient out of medication: No.  1 days left  Patient notified refills processed in 3 business days:  yes  Okay to leave a detailed message: Yes

## 2021-06-08 NOTE — TELEPHONE ENCOUNTER
Refill Request: Zocor  Last filled: 11.21.2019 disp 90 refills 1  Last visit: 11.21.2019  Hypercholesterolemia  - simvastatin (ZOCOR) 20 MG tablet; Take 1 tablet (20 mg total) by mouth at bedtime. .  Dispense: 90 tablet; Refill: 1  - Lipid Profile    Refill Request: Metoprolol   Last filled: 10.23.2019 disp 90 refills 2  Last visit: no mention of medication     Refill request: Amaryl   Last filled: 11.21.2019 disp 90 refills 1  Last visit: 3.25.2020  Type 2 diabetes mellitus without complication, with long-term current use of insulin (H)  Blood sugars are not at goal  Discussed better control of his diabetes with cutting back on carbs and avoiding too much sweets  Increase dose of Lantus to 50 units at bedtime  Continue with Amaryl at 4 mg daily, and Trulicity 1.5 mg weekly     Monitor blood sugar with a goal to keep below 180  Follow-up to recheck in office in 3 months    Next appointment is 7.6.2020

## 2021-06-09 NOTE — PROGRESS NOTES
3/27/2017     This very pleasant 82-year-old gentleman comes today with his wife.  He has had a cognitive decline, and his wife is the person who is in charge at this visit and with regard to health concerns, and general.    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 Ackerman - .) 1/10/2017       Social History     Social History     Marital status:      Spouse name: N/A     Number of children: N/A     Years of education: N/A     Occupational History     Not on file.     Social History Main Topics     Smoking status: Former Smoker     Smokeless tobacco: Never Used      Comment: Quit prior to .     Alcohol use No      Comment: Never a problem for him, he states.     Drug use: No     Sexual activity: Not on file     Other Topics Concern     Not on file     Social History Narrative       Family History   Problem Relation Age of Onset     Heart failure Mother      Heart disease Father 56     Two heart attacks     Heart failure Sister       at 47.     Kidney disease Sister      Diabetes Daughter      No Medical Problems Son      No Medical Problems Son      Diabetes Daughter        As part of this visit I have today personally reviewed past medical history, family medical history, social history (specifically including tobacco use), current medications and intolerances/allergies.  I have updated and/or or corrected these areas of history as may have been appropriate.    Allergies   Allergen Reactions     Ibuprofen      Rash on legs, per wife. TBrinkMD - 10/19/15  "      Current Outpatient Prescriptions   Medication Sig Note     amLODIPine (NORVASC) 10 MG tablet Take 1 tablet (10 mg total) by mouth daily.      blood sugar diagnostic Strp Contour Next Test Strips 100's  Test Five Times a DAy      glimepiride (AMARYL) 4 MG tablet Take 1 daily, with the first meal of the  day, for diabetes.      insulin glargine (LANTUS SOLOSTAR) 100 unit/mL (3 mL) pen Inject 10 Units under the skin daily. (Patient taking differently: Inject 20 Units under the skin daily. )      labetalol (TRANDATE; NORMODYNE) 100 MG tablet Take 1 tablet by mouth two  times daily      LANCETS MISC Use As Directed 3 (three) times a day. Tyrel Microlet Lancets Miscellaneous      lisinopril (PRINIVIL,ZESTRIL) 20 MG tablet Take 1 tablet by mouth  daily for blood pressure      NAMENDA TITRATION NEW tablet pack FPD 12/14/2016: Dr. Hernandez - [facility] - 12/14/2016      omeprazole (PRILOSEC) 20 MG capsule Take 1 capsule (20 mg total) by mouth daily.      pen needle, diabetic (BD ULTRA-FINE BRITTANY PEN NEEDLES) 32 gauge x 5/32\" Ndle Inject 1 each under the skin 4 (four) times a day.      pioglitazone (ACTOS) 15 MG tablet Add 1 tablet daily to your current program for diabetes.      potassium citrate (UROCIT-K) 10 mEq (1,080 mg) SR tablet Take one tablet by mouth twice daily. 10/19/2015: History of kidney stones.  10/19/2015      rivastigmine (EXELON) 13.3 mg/24 hour PT24 patch Apply 1 patch topically daily.  12/14/2016: Dr. Hernandez - [facility].  12/14/2016      simvastatin (ZOCOR) 20 MG tablet Take 1 tablet by mouth at  Bedtime, for cholesterol.        Patient Active Problem List   Diagnosis     Hypercholesterolemia     Esophageal Reflux     Arthralgias In Multiple Sites     BP - high blood pressure     Nephrolithiasis     Chronic Kidney Disease, Stage 3     Diabetes mellitus type 2     Chronic diarrhea - not an issue as far as he is concerned.  TBrinkMD - 10/19/15     HISTORY of prostate cancer - has " had a prostatectomy - sees Dr. 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     Hearing loss - bilateral hearing aids, but does not use them.     Skin tag - right face, in advance of and below the ear lobe.     **NO** diabetic retinopathy - Felice Brannon MD - [facility] - 1/26/17     Cataract - RIGHT eye - Felice Brannon MD - [facility] - 1/25/16     Dementia - treated by Dr. Hernandez - [facility]     Elevated AST (SGOT) - Dr. Hernandez - 11/29/16 - 46 (0-45)     Eye pain, left - injection of the lower half of his left eye sclera, started today.     Splenomegaly - 16 cm, stable versus April and December, 2014 ([facility] imaging for stone for Dr. Ackerman - January, 2017.)       Diabetes -his most recent hemoglobin  A1c was higher than desirable, though I could make a point for allowing of value less than 8% in his circumstance.  He has had a lot of blood sugars 200 and higher, of late, however.  I reviewed those blood sugars with him and his wife today.  I indicated that there was a reasonable point it could be made for gradually increasing his Lantus, 2 units every 2 days until he reaches a specific goal or gets 3 out of 4 blood sugars in the morning less than 140.  He does have microalbuminuria.  Lab Results   Component Value Date    HGBA1C 7.2 (H) 12/14/2016     Lab Results   Component Value Date    MICROALBUR 13.40 (H) 12/14/2016    [id number] 11.44 10/23/2015       Hypovitaminosis D -most recent vitamin D was satisfactory.  Vitamin D, Total (25-Hydroxy)   Date Value Ref Range Status   07/28/2014 39.3 30.0 - 80.0 ng/mL Final       Lipid status -he is on simvastatin, 20 mg daily.  Because of his age, he does not have a high potency statin mandated.  I will keep him on simvastatin.  Note that he does have a low HDL cholesterol and marginally elevated triglycerides.  Neither of these mandates intervention in my opinion given  his age and comorbidities.  Lab Results   Component Value Date    CHOL 146 10/19/2015    CHOL 169 10/30/2014     Lab Results   Component Value Date    HDL 31 (L) 10/19/2015    HDL 36 (L) 10/30/2014     Lab Results   Component Value Date    LDLCALC 76 10/19/2015    LDLCALC 101 10/30/2014     Lab Results   Component Value Date    TRIG 197 (H) 10/19/2015    TRIG 161 (H) 10/30/2014     Thyroid status -most recent TSH was normal within 5 years.  He is not on levothyroxine.  Lab Results   Component Value Date    TSH 1.41 12/14/2016       CBC monitoring -most recent CBC was fine, though it is outdated.  Lab Results   Component Value Date    WBC 4.0 10/19/2015    HGB 14.2 10/19/2015    HCT 41.2 10/19/2015    MCV 84 10/19/2015     10/19/2015       CMP review -most recent CMP showed a stage 3 chronic kidney disease and a minimally elevated bilirubin, the latter not judged clinically significant.  Results for orders placed or performed in visit on 10/30/14   Comprehensive Metabolic Panel   Result Value Ref Range    Sodium 141 136 - 145 mmol/L    Potassium 4.1 3.5 - 5.0 mmol/L    Chloride 106 98 - 107 mmol/L    CO2 24 22 - 31 mmol/L    Anion Gap, Calculation 11 5 - 18 mmol/L    Glucose 129 (H) 70 - 125 mg/dL    BUN 29 (H) 8 - 28 mg/dL    Creatinine 1.24 0.70 - 1.30 mg/dL    GFR MDRD Af Amer >60 >60 mL/min/1.73m2    GFR MDRD Non Af Amer 57 (L) >60 mL/min/1.73m2    Bilirubin, Total 1.1 (H) 0.0 - 1.0 mg/dL    Calcium 9.9 8.5 - 10.5 mg/dL    Protein, Total 6.5 6.0 - 8.0 g/dL    Albumin 3.8 3.5 - 5.0 g/dL    Alkaline Phosphatase 56 45 - 120 U/L    AST 18 0 - 40 U/L    ALT 14 0 - 45 U/L     CC:  Cough    Onset - about a week ago.  Fever - denied.  Shaking chills - denied.  Sweats - denied.  Sputum - denied.  Bloody - denied.  Salty in the mouth - denied.  Pleurisy - denied.  Throat hurts with cough.  Sore throat otherwise - denied.  Ears - denied.  Nose - denied.  Asthma - denied.  TB - denied.  Pneumonia - denied.    [facility] -  "wife had something similar about \"a month ago.\"  His is worse than hers or the the illness that the kids have had.    Exposed to asbestos when he worked for the [employer].  Updating his chest x-ray seems reasonable.  He has not had one recently.    He coughs at night and struggles to breathe.      Chest - coarse dry rales, left base in the posterior and lateral segments.  This was not heard at his physical in December, 2016.  However, when I went back and listened again, after several more deep breaths and a few coughs, the noise was gone.  Heart -regular and without murmur.  Ears -normal drums and canals.  His ears do not appear to be contributing to his cough.  Nose -patent nares without lesions.  Throat -no pharyngitis        Impression    1.  Viral bronchitis with a clear chest, ultimately.  2.  Type 2 diabetes  3.  Dementia, likely Alzheimer's disease.  4.  Obesity.  5.  Stage 3 chronic kidney disease.  6.  Hypercholesterolemia with depressed HDL and slightly increased triglyceride  7.  History of asbestos exposure when he was working for the [employer].    Plan    1.  Tylenol 3 prescribed today for his cough.  2.  I discussed the \"call me if\" issues.  3.  Increase Lantus by 2 units every other day until he gets to 40 units or as 3 out of 4 blood sugars fasting less than 140.  4.  Return visit.  5.  I will plan on updating labs in a future visit.  6.  Chest x-ray today because of his asbestos exposure.  I do not believe I will find anything that gives me information about his current bronchitis.      Much or all of the text in this note was generated through the use of Dragon Dictate voice-to-text software.  Errors in spelling or words which seem out of context are unintentional.  Dragon has a significant issue with pronouns and, of course, homonyms.  Errors with words of this sort may escape editing.      Patient Instructions   1.  What about your Lantus dose?  This is the insulin used at or near bedtime every " night.     A.  You are now at 20 units     B.  You have been increasing by 2 units every other day and have gone from 10 to 20 units.     C.  Your fasting sugar is still at or above 200.     D.  I would like your fasting sugar to get below 140, and maybe even lower.     E.  Keep increasing your Lantus by 2 units every other day until you get to       40 units, or ...      You get 3 out of 4 sugars less than 140.    2.  Call if you have problems.           Not applicable

## 2021-06-09 NOTE — PROGRESS NOTES
2017     Visit Vitals     /82 (Patient Site: Right Arm, Patient Position: Sitting, Cuff Size: Adult Regular)     Pulse 78     Wt (!) 228 lb 12.8 oz (103.8 kg)     SpO2 98%     BMI 34.03 kg/m2       Past Medical History:   Diagnosis Date     BCE (basal cell epithelioma) - basal cell skin cancer - [facility] 10/19/2015     Dysplastic nevus - with atypia - 2014 - [facility] 10/19/2015     Splenomegaly - 16 cm, stable versus April and  ([facility] for stone for Dr. Ackerman - .) 1/10/2017       Social History     Social History     Marital status:      Spouse name: N/A     Number of children: N/A     Years of education: N/A     Occupational History     Not on file.     Social History Main Topics     Smoking status: Former Smoker     Smokeless tobacco: Never Used      Comment: Quit prior to .     Alcohol use No      Comment: Never a problem for him, he states.     Drug use: No     Sexual activity: Not on file     Other Topics Concern     Not on file     Social History Narrative       Family History   Problem Relation Age of Onset     Heart failure Mother      Heart disease Father 56     Two heart attacks     Heart failure Sister       at 47.     Kidney disease Sister      Diabetes Daughter      No Medical Problems Son      No Medical Problems Son      Diabetes Daughter        As part of this visit I have today personally reviewed past medical history, family medical history, social history (specifically including tobacco use), current medications and intolerances/allergies.  I have updated and/or or corrected these areas of history as may have been appropriate.    Allergies   Allergen Reactions     Ibuprofen      Rash on legs, per wife. TBrinkMD - 10/19/15       Current Outpatient Prescriptions   Medication Sig Note     amLODIPine (NORVASC) 10 MG tablet Take 1 tablet (10 mg total) by mouth daily.      blood sugar diagnostic Strp Contour Next Test Strips  100's  Test Five Times a DAy      glimepiride (AMARYL) 4 MG tablet Take 1 daily, with the first meal of the  day, for diabetes.      labetalol (TRANDATE; NORMODYNE) 100 MG tablet Take 1 tablet by mouth two  times daily      LANCETS MISC Use As Directed 3 (three) times a day. Tyrel Microlet Lancets Miscellaneous      lisinopril (PRINIVIL,ZESTRIL) 20 MG tablet Take 1 tablet by mouth  daily for blood pressure      NAMENDA TITRATION NEW tablet pack FPD 12/14/2016: Dr. [facility] - [facility] - 12/14/2016      omeprazole (PRILOSEC) 20 MG capsule Take 1 capsule (20 mg total) by mouth daily.      potassium citrate (UROCIT-K) 10 mEq (1,080 mg) SR tablet Take one tablet by mouth twice daily. 10/19/2015: History of kidney stones.  10/19/2015      rivastigmine (EXELON) 13.3 mg/24 hour PT24 patch  12/14/2016: Dr. Hernandez - [facility].  12/14/2016      simvastatin (ZOCOR) 20 MG tablet Take 1 tablet by mouth at  Bedtime, for cholesterol.        Patient Active Problem List   Diagnosis     Hypercholesterolemia     Esophageal Reflux     Arthralgias In Multiple Sites     BP - high blood pressure     Nephrolithiasis     Chronic Kidney Disease, Stage 3     Diabetes mellitus type 2     Chronic diarrhea - not an issue as far as he is concerned.  TBrinkMD - 10/19/15     HISTORY of prostate cancer - has had a prostatectomy - sees Dr. Tyron Boles mandibularis - bony lumps inside the lower jaw     Diastasis recti - large bulge in abdomen - NOT a hernia.     Hearing loss - bilateral hearing aids, but does not use them.     Skin tag - right face, in advance of and below the ear lobe.     **NO** diabetic retinopathy - Felice Brannon MD - [facility] - 1/26/17     Cataract - RIGHT eye - Felice Brannon MD - [facility] - 1/25/16     Dementia - treated by Dr. Hernandez - [facility]     Elevated AST (SGOT) - Dr. Hernandez - 11/29/16 - 46 (0-45)     Eye pain, left - injection of the lower half of his left eye  sclera, started today.     Splenomegaly - 16 cm, stable versus April and December, 2014 ([facility] for stone for Dr. Ackerman - January, 2017.)       Diabetes - most recent hemoglobin A1c was elevated, and I would normally consider the satisfactory given his comorbid conditions, but his most recent blood sugars have been generously elevated.  It seems clear that he needs to have something added to his program.  See below.  Lab Results   Component Value Date    HGBA1C 7.2 (H) 12/14/2016     Lab Results   Component Value Date    MICROALBUR 13.40 (H) 12/14/2016    KOMN18GCE 11.44 10/23/2015     Lipid status - he has low HDL cholesterol.  He is on simvastatin.  His triglycerides are not high enough to justify additional treatment.  Lab Results   Component Value Date    CHOL 146 10/19/2015    CHOL 169 10/30/2014     Lab Results   Component Value Date    HDL 31 (L) 10/19/2015    HDL 36 (L) 10/30/2014     Lab Results   Component Value Date    LDLCALC 76 10/19/2015    LDLCALC 101 10/30/2014     Lab Results   Component Value Date    TRIG 197 (H) 10/19/2015    TRIG 161 (H) 10/30/2014     Thyroid status - most recent TSH was satisfactory.  He   Lab Results   Component Value Date    TSH 1.41 12/14/2016       CBC monitoring -   Lab Results   Component Value Date    WBC 4.0 10/19/2015    HGB 14.2 10/19/2015    HCT 41.2 10/19/2015    MCV 84 10/19/2015     10/19/2015     Stage 3 chronic kidney disease.  I reviewed his most recent CMP.  His GFR was 42 on December 14.  Results for orders placed or performed in visit on 12/14/16   Basic Metabolic Panel   Result Value Ref Range    Sodium 140 136 - 145 mmol/L    Potassium 4.3 3.5 - 5.0 mmol/L    Chloride 105 98 - 107 mmol/L    CO2 24 22 - 31 mmol/L    Anion Gap, Calculation 11 5 - 18 mmol/L    Glucose 316 (H) 70 - 125 mg/dL    Calcium 10.2 8.5 - 10.5 mg/dL    BUN 28 8 - 28 mg/dL    Creatinine 1.61 (H) 0.70 - 1.30 mg/dL    GFR MDRD Af Amer 50 (L) >60 mL/min/1.73m2    GFR MDRD Non  Af Amer 42 (L) >60 mL/min/1.73m2      Chief complaint: Left eye redness    Present illness: - started a couple days ago.  Nothing makes it better.  Nothing seems to make it worse.  He denies pain.  On examination he has sparing of the lateral globe.  I need an eye opinion.  The vascularity is more discrete than I would expect with a typical conjunctivitis.  I am calling [facility].  He will be seen this afternoon at the [place] office.  I reviewed the last time I sent him to [facility], for a similar issue.  The report makes no mention of the issue with the left eye, the reason I sent him there.  I therefore do not know what was found, or what the diagnosis was at that visit.    Ears - he seems to have been losing hearing.  His wife wonders about excess wax.  Right ear has excess soft wax.   I cannot see the drum.  The left one has some wax and the drum is seen.  I will have my MA clean them out.  He does not wear his hearing aids.  He doesn't like them.  My medical assistant rinsed out both ears.    DM - his last A1c was 7.2c on 12/14/16.  This is OK with his comorbid disease, but his FBS values have been far too high lately, since the first of the year.  He tends to run about 300.  (297, 280, 300, 304).  I discussed this with him and his wife.  She is the first and we'll be providing the type of support that he needs, as his dementia makes it difficult for him to take control of this himself.     Rx: Pioglitazone, 15 mg daily.  I do not find a history of congestive heart failure.    Physical examination  General - this is an 82-year-old  American gentleman who is obese and in no obvious physical pain.  Left eye - he has obvious conjunctival vascularity which is located medially and superiorly, but not inferolaterally.  I see no discharge.  I find no foreign body.  This clearly needs an eye opinion.  Ears-he has wax bilaterally.  My medical assistant rinsed both ears.      Impression    1.  Chronic  conjunctival injection, left eye   2.  Obesity   3.  Type 2 diabetes, apparently not ideally controlled.  4.  Low HDL  5.  Hypertriglyceridemia.  6.  Ceruminosis, both ears.  7.  Stage 3 chronic kidney disease.  8.  Kidney stone disease.    Plan    1.  I personally called and arranged for an appointment for him to be seen at [facility] this afternoon.    2.  Add pioglitazone, 15 mg daily.  Discussed.    3.  Call fasting blood sugars at the end of this week.  4.  Return visit in one month.  5.  I made certain that he understood that the chlorthalidone is being given for his renal stone disease, not high blood pressure.      Much or all of the text in this note was generated through the use of Dragon Dictate voice-to-text software.  Errors in spelling or words which seem out of context are unintentional.  Dragon has a significant issue with pronouns and, of course, homonyms.  Errors with words of this sort may escape editing.        Patient Instructions     1. Chlorthalidone is a blood pressure pill, but Dr. Chaudhari is giving it to you to cut down on the risk of stones, not for your blood pressure.    2.  Your blood pressure is fine today.  Visit Vitals     /82 (Patient Site: Right Arm, Patient Position: Sitting, Cuff Size: Adult Regular)     Pulse 78     Wt (!) 228 lb 12.8 oz (103.8 kg)     SpO2 98%     BMI 34.03 kg/m2      3.  [facility] - Dr. Mcbride at 2:00 PM at [place].    4.  Add another drug for diabetes - take pioglitazone 15 mg each AM.    5.  Call fasting blood sugars to [phone] on Friday morning, 3/3/17.    6.  Come back and see me in a month.  Make that appointment as you leave today, please.

## 2021-06-09 NOTE — PROGRESS NOTES
"Laz Kingston is a 85 y.o. male who is being evaluated via a billable telephone visit.      The patient has been notified of following:     \"This telephone visit will be conducted via a call between you and your physician/provider. We have found that certain health care needs can be provided without the need for a physical exam.  This service lets us provide the care you need with a short phone conversation.  If a prescription is necessary we can send it directly to your pharmacy.  If lab work is needed we can place an order for that and you can then stop by our lab to have the test done at a later time.    Telephone visits are billed at different rates depending on your insurance coverage. During this emergency period, for some insurers they may be billed the same as an in-person visit.  Please reach out to your insurance provider with any questions.    If during the course of the call the physician/provider feels a telephone visit is not appropriate, you will not be charged for this service.\"    Patient has given verbal consent to a Telephone visit? Yes    What phone number would you like to be contacted at? [phone]    Patient would like to receive their AVS by AVS Preference: Mail a copy.    Laz is Tele-visiting, accompanied by his wife ( who is mostly helping him with the interview )   He is otherwise doing well with no significant health status changes.       DIABETES:  Bs: 170-200 range   Needs his HgA1c , and does not need med refills today.     No other concerns.       Assessment/Plan:  1. Type 2 diabetes mellitus without complication, with long-term current use of insulin (H)  Recheck   - Glycosylated Hemoglobin A1c; Future  - goal to keep BS below 180   Recheck in 3 months         Phone call duration:  15 minutes    Carly Jackson CMA  "

## 2021-06-09 NOTE — PROGRESS NOTES
"Laz Kingston is a 85 y.o. male who is being evaluated via a billable telephone visit.      The patient has been notified of following:     \"This telephone visit will be conducted via a call between you and your physician/provider. We have found that certain health care needs can be provided without the need for a physical exam.  This service lets us provide the care you need with a short phone conversation.  If a prescription is necessary we can send it directly to your pharmacy.  If lab work is needed we can place an order for that and you can then stop by our lab to have the test done at a later time.    Telephone visits are billed at different rates depending on your insurance coverage. During this emergency period, for some insurers they may be billed the same as an in-person visit.  Please reach out to your insurance provider with any questions.    If during the course of the call the physician/provider feels a telephone visit is not appropriate, you will not be charged for this service.\"    Patient has given verbal consent to a Telephone visit? Yes    What phone number would you like to be contacted at? [phone]    He is needing a refill on Trulicity.  His wife is stating that his BS numbers are about the same anywhere in between 175-200.   There are no other concerns, and been taking the other two diabetic meds without any issues or concerns.   He has a FACE-TO-FACE  Visit scheduled in 2 wks.     Assessment/Plan:  1. Type 2 diabetes mellitus without complication, with long-term current use of insulin (H)  Refill discussed   Will come in for FACE-TO-FACE in 2 weeks.   Labs are due then.     Refill:   - dulaglutide (TRULICITY) 1.5 mg/0.5 mL PnIj; Inject 1.5 mg under the skin once a week.  Dispense: 5 Syringe; Refill: 5        Phone call duration:  10 minutes    Adan Rodriguez MD    "

## 2021-06-09 NOTE — TELEPHONE ENCOUNTER
RN cannot approve Refill Request    RN can NOT refill this medication PCP messaged that patient is overdue for Labs. Last office visit: 1/15/2020 Adan Rodriguez MD Last Physical: 5/16/2019 Last MTM visit: Visit date not found Last visit same specialty: 1/15/2020 Adan Rodriguez MD.  Next visit within 3 mo: Visit date not found  Next physical within 3 mo: Visit date not found      Guevara Alex, [facility] Triage/Med Refill 6/19/2020    Requested Prescriptions   Pending Prescriptions Disp Refills     dulaglutide (TRULICITY) 1.5 mg/0.5 mL PnIj 5 Syringe 0     Sig: Inject 1.5 mg under the skin once a week.       Insulin/GLP-1 Refill Protocol Failed - 6/19/2020 12:13 PM        Failed - A1C in last 6 months     Hemoglobin A1c   Date Value Ref Range Status   11/21/2019 7.8 (H) 3.5 - 6.0 % Final               Failed - Microalbumin in last year     Microalbumin, Random Urine   Date Value Ref Range Status   10/29/2018 30.54 (H) 0.00 - 1.99 mg/dL Final                  Passed - Visit with PCP or prescribing provider visit in last 6 months     Last office visit with prescriber/PCP: 1/15/2020 OR same dept: 1/15/2020 Adan Rodriguez MD OR same specialty: 1/15/2020 Adan Rodriguez MD Last physical: Visit date not found Last MTM visit: Visit date not found     Next appt within 3 mo: Visit date not found  Next physical within 3 mo: Visit date not found  Prescriber OR PCP: Adan Rodriguez MD  Last diagnosis associated with med order: 1. Type 2 diabetes mellitus without complication, with long-term current use of insulin (H)  - dulaglutide (TRULICITY) 1.5 mg/0.5 mL PnIj; Inject 1.5 mg under the skin once a week.  Dispense: 5 Syringe; Refill: 0    If protocol passes may refill for 6 months if within 3 months of last provider visit (or a total of 9 months).              Passed - Blood pressure in last year     BP Readings from Last 1 Encounters:   01/15/20 126/72             Passed - Creatinine  done in last year     Creatinine   Date Value Ref Range Status   11/21/2019 2.00 (H) 0.70 - 1.30 mg/dL Final

## 2021-06-10 NOTE — TELEPHONE ENCOUNTER
RN cannot approve Refill Request    RN can NOT refill this medication Protocol failed and NO refill given. Last office visit: Visit date not found Last Physical: Visit date not found Last MTM visit: Visit date not found Last visit same specialty: Visit date not found.  Next visit within 3 mo: Visit date not found  Next physical within 3 mo: Visit date not found      Aleisha Suggs, [facility] Triage/Med Refill 8/13/2020    Requested Prescriptions   Pending Prescriptions Disp Refills     LANTUS SOLOSTAR U-100 INSULIN 100 unit/mL (3 mL) pen [Pharmacy Med Name: LANTUS SOLOSTAR PEN INJ 3ML] 15 mL 0     Sig: INJECT 48 UNITS SUBCUTANEOUSLY EVERY NIGHT AT BEDTIME       Insulin/GLP-1 Refill Protocol Failed - 8/13/2020  9:28 AM        Failed - Visit with PCP or prescribing provider visit in last 6 months     Last office visit with prescriber/PCP: Visit date not found OR same dept: Visit date not found OR same specialty: Visit date not found Last physical: Visit date not found Last MTM visit: Visit date not found     Next appt within 3 mo: Visit date not found  Next physical within 3 mo: Visit date not found  Prescriber OR PCP: Nicole Ramirez PA-C  Last diagnosis associated with med order: 1. Diabetes mellitus, type 2 (H)  - LANTUS SOLOSTAR U-100 INSULIN 100 unit/mL (3 mL) pen [Pharmacy Med Name: LANTUS SOLOSTAR PEN INJ 3ML]; INJECT 48 UNITS SUBCUTANEOUSLY EVERY NIGHT AT BEDTIME  Dispense: 15 mL; Refill: 0    If protocol passes may refill for 6 months if within 3 months of last provider visit (or a total of 9 months).              Failed - Microalbumin in last year     Microalbumin, Random Urine   Date Value Ref Range Status   10/29/2018 30.54 (H) 0.00 - 1.99 mg/dL Final                  Passed - A1C in last 6 months     Hemoglobin A1c   Date Value Ref Range Status   07/06/2020 7.4 (H) 3.5 - 6.0 % Final               Passed - Blood pressure in last year     BP Readings from Last 1 Encounters:   01/15/20 126/72              Passed - Creatinine done in last year     Creatinine   Date Value Ref Range Status   11/21/2019 2.00 (H) 0.70 - 1.30 mg/dL Final

## 2021-06-11 NOTE — TELEPHONE ENCOUNTER
RN cannot approve Refill Request    RN can NOT refill this medication PCP messaged that patient is overdue for Labs. Last office visit: 1/15/2020 Adan Rodriguez MD Last Physical: 5/16/2019 Last MTM visit: Visit date not found Last visit same specialty: 1/15/2020 Adan Rodriguez MD.  Next visit within 3 mo: Visit date not found  Next physical within 3 mo: Visit date not found      Elizabeth Kellogg, [facility] Triage/Med Refill 9/27/2020    Requested Prescriptions   Pending Prescriptions Disp Refills     metoprolol succinate (TOPROL XL) 50 MG 24 hr tablet 90 tablet 0     Sig: Take 1 tablet (50 mg total) by mouth daily.       Beta-Blockers Refill Protocol Passed - 9/25/2020  4:55 PM        Passed - PCP or prescribing provider visit in past 12 months or next 3 months     Last office visit with prescriber/PCP: 1/15/2020 Adan Rodriguez MD OR same dept: 1/15/2020 Adan Rodriguez MD OR same specialty: 1/15/2020 Adan Rodriguez MD  Last physical: 5/16/2019 Last MTM visit: Visit date not found   Next visit within 3 mo: Visit date not found  Next physical within 3 mo: Visit date not found  Prescriber OR PCP: Adan Rodriguez MD  Last diagnosis associated with med order: 1. Essential hypertension, benign  - metoprolol succinate (TOPROL XL) 50 MG 24 hr tablet; Take 1 tablet (50 mg total) by mouth daily.  Dispense: 90 tablet; Refill: 0    2. Hypercholesterolemia  - simvastatin (ZOCOR) 20 MG tablet; Take 1 tablet (20 mg total) by mouth at bedtime. .  Dispense: 90 tablet; Refill: 0    3. Type 2 diabetes mellitus without complication, with long-term current use of insulin (H)  - glimepiride (AMARYL) 4 MG tablet; TAKE 1 TABLET BY MOUTH  DAILY, WITH THE FIRST MEAL  OF THE DAY FOR DIABETES.  Dispense: 90 tablet; Refill: 0    If protocol passes may refill for 12 months if within 3 months of last provider visit (or a total of 15 months).             Passed - Blood pressure filed in past 12  months     BP Readings from Last 1 Encounters:   01/15/20 126/72                simvastatin (ZOCOR) 20 MG tablet 90 tablet 0     Sig: Take 1 tablet (20 mg total) by mouth at bedtime. .       Statins Refill Protocol (Hmg CoA Reductase Inhibitors) Passed - 9/25/2020  4:55 PM        Passed - PCP or prescribing provider visit in past 12 months      Last office visit with prescriber/PCP: 1/15/2020 Adan Rodriguez MD OR same dept: 1/15/2020 Adan Rodriguez MD OR same specialty: 1/15/2020 Adan Rodriguez MD  Last physical: 5/16/2019 Last MTM visit: Visit date not found   Next visit within 3 mo: Visit date not found  Next physical within 3 mo: Visit date not found  Prescriber OR PCP: Adan Rodriguez MD  Last diagnosis associated with med order: 1. Essential hypertension, benign  - metoprolol succinate (TOPROL XL) 50 MG 24 hr tablet; Take 1 tablet (50 mg total) by mouth daily.  Dispense: 90 tablet; Refill: 0    2. Hypercholesterolemia  - simvastatin (ZOCOR) 20 MG tablet; Take 1 tablet (20 mg total) by mouth at bedtime. .  Dispense: 90 tablet; Refill: 0    3. Type 2 diabetes mellitus without complication, with long-term current use of insulin (H)  - glimepiride (AMARYL) 4 MG tablet; TAKE 1 TABLET BY MOUTH  DAILY, WITH THE FIRST MEAL  OF THE DAY FOR DIABETES.  Dispense: 90 tablet; Refill: 0    If protocol passes may refill for 12 months if within 3 months of last provider visit (or a total of 15 months).                glimepiride (AMARYL) 4 MG tablet 90 tablet 0     Sig: TAKE 1 TABLET BY MOUTH  DAILY, WITH THE FIRST MEAL  OF THE DAY FOR DIABETES.       Oral Hypoglycemics Refill Protocol Failed - 9/25/2020  4:55 PM        Failed - Microalbumin in last year      Microalbumin, Random Urine   Date Value Ref Range Status   10/29/2018 30.54 (H) 0.00 - 1.99 mg/dL Final                  Passed - Visit with PCP or prescribing provider visit in last 6 months       Last office visit with prescriber/PCP: Visit  date not found OR same dept: 1/15/2020 Adan Rodriguez MD OR same specialty: 1/15/2020 Adan Rodriguez MD Last physical: Visit date not found Last MTM visit: Visit date not found         Next appt within 3 mo: Visit date not found  Next physical within 3 mo: Visit date not found  Prescriber OR PCP: Adan Rodriguez MD  Last diagnosis associated with med order: 1. Essential hypertension, benign  - metoprolol succinate (TOPROL XL) 50 MG 24 hr tablet; Take 1 tablet (50 mg total) by mouth daily.  Dispense: 90 tablet; Refill: 0    2. Hypercholesterolemia  - simvastatin (ZOCOR) 20 MG tablet; Take 1 tablet (20 mg total) by mouth at bedtime. .  Dispense: 90 tablet; Refill: 0    3. Type 2 diabetes mellitus without complication, with long-term current use of insulin (H)  - glimepiride (AMARYL) 4 MG tablet; TAKE 1 TABLET BY MOUTH  DAILY, WITH THE FIRST MEAL  OF THE DAY FOR DIABETES.  Dispense: 90 tablet; Refill: 0     If protocol passes may refill for 12 months if within 3 months of last provider visit (or a total of 15 months).           Passed - A1C in last 6 months     Hemoglobin A1c   Date Value Ref Range Status   07/06/2020 7.4 (H) 3.5 - 6.0 % Final               Passed - Blood pressure in last year     BP Readings from Last 1 Encounters:   01/15/20 126/72             Passed - Serum creatinine in last year     Creatinine   Date Value Ref Range Status   11/21/2019 2.00 (H) 0.70 - 1.30 mg/dL Final

## 2021-06-11 NOTE — PROGRESS NOTES
Chief Complaint   Patient presents with     Urinary Frequency       HISTORY OF PRESENT ILLNESS:  Patient is a 82-year-old male with one-week complaint of increased urinary frequency and increased nocturia.  He has Alzheimer dementia and his wife provides much of the history.  She states previously he reported 1-2 times during the night but for the last 1 week has been voiding 4-5 times.  Increased frequency during the day also.  She denies noting any blood in his urine and patient also denies this.  No fevers or chills.  No abdominal pain, flank pain, nausea or vomiting.  History includes prostatectomy 15 years ago.  History also includes insulin-dependent diabetes mellitus.    Allergies   Allergen Reactions     Ibuprofen      Rash on legs, per wife. TBrinkMD - 10/19/15       Current Outpatient Prescriptions   Medication Sig Dispense Refill     acetaminophen-codeine (TYLENOL #3) 300-30 mg per tablet 1/2 tablet up to every four hours prn cough. 20 tablet 0     amLODIPine (NORVASC) 10 MG tablet Take 1 tablet by mouth  daily 90 tablet 3     blood glucose test strips Contour Next Test Strips :  Test twice daily as instructed. 200 strip 3     chlorthalidone (HYGROTEN) 25 MG tablet        glimepiride (AMARYL) 4 MG tablet TAKE 1 DAILY, WITH THE  FIRST MEAL OF THE DAY FOR  DIABETES. 90 tablet 1     insulin glargine (LANTUS SOLOSTAR) 100 unit/mL (3 mL) pen Inject 10 Units under the skin daily. (Patient taking differently: Inject 32 Units under the skin daily. ) 5 adj dose pen 3     labetalol (TRANDATE; NORMODYNE) 100 MG tablet Take 1 tablet by mouth two  times daily 180 tablet 3     LANCETS MISC Use As Directed 3 (three) times a day. Tyrel Microlet Lancets Miscellaneous       lisinopril (PRINIVIL,ZESTRIL) 20 MG tablet Take 1 tablet by mouth  daily for blood pressure 90 tablet 3     memantine (NAMENDA) 10 MG tablet        NAMENDA TITRATION NEW tablet pack FPD  0     neomycin-polymyxin-dexamethasone (MAXITROL) 3.5mg/mL-10,000  "unit/mL-0.1 % ophthalmic suspension SHAKE LQ AND INT 1 GTT IN OS QID  0     omeprazole (PRILOSEC) 20 MG capsule Take 1 capsule (20 mg total) by mouth daily. 90 capsule 3     omeprazole (PRILOSEC) 20 MG capsule TAKE 1 DAILY, 1/2 HOUR  BEFORE BREAKFAST FOR  HEARTBURN. 90 capsule 2     pen needle, diabetic (BD ULTRA-FINE BRITTANY PEN NEEDLES) 32 gauge x 5/32\" Ndle Inject 1 each under the skin 4 (four) times a day. 100 each 3     pioglitazone (ACTOS) 15 MG tablet Add 1 tablet daily to your current program for diabetes. 90 tablet 3     potassium citrate (UROCIT-K) 10 mEq (1,080 mg) SR tablet Take one tablet by mouth twice daily. 180 tablet 3     rivastigmine (EXELON) 13.3 mg/24 hour PT24 patch Apply 1 patch topically daily.   6     simvastatin (ZOCOR) 20 MG tablet TAKE 1 TABLET BY MOUTH AT  BEDTIME FOR CHOLESTEROL. 90 tablet 3     ciprofloxacin HCl (CIPRO) 250 MG tablet Take 1 tablet (250 mg total) by mouth 2 (two) times a day for 5 days. 10 tablet 0     No current facility-administered medications for this visit.        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 Ackerman - January, 2017.) 1/10/2017       History   Smoking Status     Former Smoker   Smokeless Tobacco     Never Used     Comment: Quit prior to 1966.       Immunization History   Administered Date(s) Administered     Influenza high dose, seasonal 10/19/2015     Influenza, inj, historic 10/04/2016     Influenza,seasonal quad, PF 10/07/2014     Pneumo Conj 13-V (2010&after) 04/21/2015     Pneumo Polysac 23-V 07/28/2014     Tdap 07/28/2014       REVIEW OF SYSTEMS:  Pertinent positives as in HPI    Vitals:    06/19/17 1131   BP: 112/68   Pulse: 97   SpO2: 96%   Weight: (!) 230 lb (104.3 kg)       PHYSICAL " EXAM:  GENERAL:Alert, respirations non-labored  NECK:No adenopathy  CARDIOVASCULAR:Regular S1 and S2  LUNGS:Clear bilaterally with good air movement  ABDOMEN:Soft, no tenderness  BACK: No CVA tenderness    Recent Results (from the past 240 hour(s))   Urinalysis-UC if Indicated    Collection Time: 06/19/17 11:39 AM   Result Value Ref Range    Color, UA Yellow Colorless, Yellow, Straw, Light Yellow    Clarity, UA Slightly Cloudy (!) Clear    Glucose,  mg/dL (!) Negative    Bilirubin, UA Small (!) Negative    Ketones, UA Trace (!) Negative    Specific Gravity, UA 1.020 1.005 - 1.030    Blood, UA Large (!) Negative    pH, UA 5.5 5.0 - 8.0    Protein,  mg/dL (!) Negative mg/dL    Urobilinogen, UA 0.2 E.U./dL 0.2 E.U./dL, 1.0 E.U./dL    Nitrite, UA Negative Negative    Leukocytes, UA Small (!) Negative    Bacteria, UA Moderate (!) None Seen hpf    RBC, UA 25-50 (!) None Seen, 0-2 hpf    WBC, UA 10-25 (!) None Seen, 0-5 hpf    Squam Epithel, UA 0-5 None Seen, 0-5 lpf    Hyaline Casts, UA 0-5 0-5, None Seen lpf           IMPRESSION:  Urinary tract infection, uncomplicated    PLAN:  Cipro 250 mg twice daily for 5 days.  Most recent creatinine and GFR reviewed and this dose should be safe.  He does not have history of recurrent urinary tract infection since prostatectomy so at this time no need for urology referral assuming he responds to medication problem resolves.  Culture sent today and is pending.

## 2021-06-11 NOTE — TELEPHONE ENCOUNTER
Medication Request  Medication name:   amLODIPine (NORVASC) 10 MG tablet  90 tablet  2  4/10/2020      Sig - Route: Take 1 tablet (10 mg total) by mouth daily. - Oral     Sent to pharmacy as: amLODIPine 10 mg tablet (NORVASC)     E-Prescribing Status: Receipt confirmed by pharmacy (4/10/2020 12:24 PM CDT)       lisinopriL (PRINIVIL,ZESTRIL) 20 MG tablet  90 tablet  2  4/10/2020      Sig - Route: Take 1 tablet (20 mg total) by mouth daily. - Oral     Sent to pharmacy as: lisinopriL 20 mg tablet (PRINIVIL,ZESTRIL)     E-Prescribing Status: Receipt confirmed by pharmacy (4/10/2020 12:24 PM CDT)       omeprazole (PRILOSEC) 20 MG capsule  90 capsule  3  11/21/2019      Sig: TAKE 1 DAILY, 1/2 HOUR  BEFORE BREAKFAST FOR  HEARTBURN.     Sent to pharmacy as: omeprazole 20 mg capsule,delayed release (PriLOSEC)     E-Prescribing Status: Receipt confirmed by pharmacy (11/21/2019  1:33 PM CST)         Requested Pharmacy: [id number]  [facility]  Reason for request:   Patient is requesting new scripts be sent to local pharmacy.  Patient does not want mail delivery pharmacy anymore.  When did you use medication last?:    Currently taking.  Patient offered appointment:  patient declined  Okay to leave a detailed message: yes

## 2021-06-11 NOTE — TELEPHONE ENCOUNTER
Last Office Visit  7/06/2020 Adan Rodriguez MD  Notes:  1. Type 2 diabetes mellitus without complication, with long-term current use of insulin (H)  Recheck   - Glycosylated Hemoglobin A1c; Future  - goal to keep BS below 180   Recheck in 3 months        Last Filled:  amLODIPine (NORVASC) 10 MG tablet  90 tablet  2  4/10/2020   No    Sig - Route: Take 1 tablet (10 mg total) by mouth daily. - Oral    Sent to pharmacy as: amLODIPine 10 mg tablet (NORVASC)    E-Prescribing Status: Receipt confirmed by pharmacy (4/10/2020 12:24 PM CDT)      lisinopriL (PRINIVIL,ZESTRIL) 20 MG tablet  90 tablet  2  4/10/2020   No    Sig - Route: Take 1 tablet (20 mg total) by mouth daily. - Oral    Sent to pharmacy as: lisinopriL 20 mg tablet (PRINIVIL,ZESTRIL)    E-Prescribing Status: Receipt confirmed by pharmacy (4/10/2020 12:24 PM CDT)      omeprazole (PRILOSEC) 20 MG capsule  90 capsule  3  11/21/2019   No    Sig: TAKE 1 DAILY, 1/2 HOUR  BEFORE BREAKFAST FOR  HEARTBURN.    Sent to pharmacy as: omeprazole 20 mg capsule,delayed release (PriLOSEC)    E-Prescribing Status: Receipt confirmed by pharmacy (11/21/2019  1:33 PM CST)        Next OV:  10/7/2020 Adan Rodriguez MD    Patient is requesting new scripts be sent to local pharmacy.  Patient does not want mail delivery pharmacy anymore.    Medication teed up for provider signature

## 2021-06-12 NOTE — TELEPHONE ENCOUNTER
Refill Approved    Rx renewed per Medication Renewal Policy. Medication was last renewed on 2/28/19.    Aleisha Suggs, [facility] Triage/Med Refill 10/28/2020     Requested Prescriptions   Pending Prescriptions Disp Refills     blood glucose test strips 200 strip 3     Sig: Contour Next Test Strips :  Test twice daily as instructed.       Diabetic Supplies Refill Protocol Passed - 10/27/2020  7:03 PM        Passed - Visit with PCP or prescribing provider visit in last 6 months     Last office visit with prescriber/PCP: 10/7/2020 Adan Rodriguez MD OR same dept: Visit date not found OR same specialty: Visit date not found  Last physical: 5/16/2019 Last MTM visit: Visit date not found   Next visit within 3 mo: Visit date not found  Next physical within 3 mo: Visit date not found  Prescriber OR PCP: Adan Rodriguez MD  Last diagnosis associated with med order: 1. Diabetes mellitus, type 2 (H)  - blood glucose test strips; Contour Next Test Strips :  Test twice daily as instructed.  Dispense: 200 strip; Refill: 3    If protocol passes may refill for 12 months if within 3 months of last provider visit (or a total of 15 months).             Passed - A1C in last 6 months     Hemoglobin A1c   Date Value Ref Range Status   10/07/2020 7.5 (H) <=5.6 % Final     Comment:     Normal <5.7% Prediabete 5.7-6.4% Diabletes 6.5% or higher - adopted from ADA consensus guidelines

## 2021-06-12 NOTE — PROGRESS NOTES
Assessment/Plan:        1. Essential hypertension, benign  At goal.   Continue current management     Refill     - lisinopriL (PRINIVIL,ZESTRIL) 20 MG tablet; Take 1 tablet (20 mg total) by mouth daily.  Dispense: 90 tablet; Refill: 1  - amLODIPine (NORVASC) 10 MG tablet; Take 1 tablet (10 mg total) by mouth daily.  Dispense: 90 tablet; Refill: 1    2. Need for vaccination      3. Type 2 diabetes mellitus without complication, with long-term current use of insulin (H)  rechck the labs  - Comprehensive Metabolic Panel  - Glycosylated Hemoglobin A1c   Discussed increasing dose of lantus to 50 units   Keep BS below 180           At the conclusion of the encounter the plan of care, disposition and all questions were answered and reviewed, and the patient acknowledged understanding and was involved in the decision making regarding the overall care plan.           Subjective:    Patient ID:   Laz Kingston is a 85 y.o. male here for med check and follow up     1. Need for vaccination   Asking for his flu shot      2. Essential hypertension, benign   On amlodipine 10 mg daily, lisinopril 20 mg, Metroprolol so 50 mg daily  Doing well on the current plan with no issues or side effects       3. Type 2 diabetes mellitus without complication  Managed on Trulicity 1.5 mg weekly, glimepiride 4 mg daily and Lantus 48 units nightly  BS: Averaging under 200 , or in the upper 100s   No concerns or side effects          Review of Systems  Allergy: reviewed  General : negative  Musculoskeletal : Walking and balance is poor, refuses PHYSICAL THERAPY   A complete 7 point review of systems was obtained and is negative other than what is stated in the HPI.        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 Ackerman - January, 2017.) (1/10/2017)..      Outpatient Encounter Medications as of 10/7/2020   Medication Sig Dispense Refill     amLODIPine (NORVASC) 10 MG tablet Take 1 tablet (10 mg total) by mouth daily. 90 tablet 1     amLODIPine-benazepril (LOTREL) 10-20 mg per capsule Take 1 capsule by mouth daily. 90 capsule 2     aspirin 81 mg chewable tablet Chew 81 mg daily.       blood glucose test strips Contour Next Test Strips :  Test twice daily as instructed. 200 strip 3     dulaglutide (TRULICITY) 1.5 mg/0.5 mL PnIj Inject 1.5 mg under the skin once a week. 5 Syringe 5     glimepiride (AMARYL) 4 MG tablet TAKE 1 TABLET BY MOUTH  DAILY, WITH THE FIRST MEAL  OF THE DAY FOR DIABETES. 90 tablet 0     LANCETS MISC Use As Directed 3 (three) times a day. [employer] Microlet Lancets Miscellaneous       LANTUS SOLOSTAR U-100 INSULIN 100 unit/mL (3 mL) pen INJECT 48 UNITS SUBCUTANEOUSLY EVERY NIGHT AT BEDTIME 15 mL 5     lisinopriL (PRINIVIL,ZESTRIL) 20 MG tablet Take 1 tablet (20 mg total) by mouth daily. 90 tablet 1     memantine (NAMENDA) 10 MG tablet        metoprolol succinate (TOPROL XL) 50 MG 24 hr tablet Take 1 tablet (50 mg total) by mouth daily. 90 tablet 0     omeprazole (PRILOSEC) 20 MG capsule TAKE 1 DAILY, 1/2 HOUR  BEFORE BREAKFAST FOR  HEARTBURN. 90 capsule 3     pen needle, diabetic (BD ULTRA-FINE BRITTANY PEN NEEDLE) 32 gauge x 5/32\" Ndle Inject 1 each under the skin daily. 100 each 3     rivastigmine (EXELON) 13.3 mg/24 hour PT24 patch Apply 1 patch topically daily.   6     simvastatin (ZOCOR) 20 MG tablet Take 1 tablet (20 mg total) by mouth at bedtime. . 90 tablet 0     No facility-administered encounter medications on file as of 10/7/2020.          Objective:   /82 (Patient Site: Right Arm, Patient Position: Sitting, Cuff Size: Adult Regular)   Pulse 64   Wt (!) 231 lb " (104.8 kg)   SpO2 94%   BMI 34.36 kg/m        Physical Exam  General Appearance:    Alert, well hydrated, no distress, sitting in the wheelchair    Throat:   mucous membranes moist, pharynx normal without lesions   Neck:   Supple, symmetrical, trachea midline, no adenopathy;     thyroid:  no enlargement/tenderness/nodules;    Lungs:     clear to auscultation, no wheezes, rales or rhonchi, symmetric air entry     Heart:    Regular rate and rhythm, S1 and S2 normal, no murmur, rub   or gallop, +1edema  Bilaterally    Skin:   No other rashes or lesions

## 2021-06-15 NOTE — PROGRESS NOTES
"ASSESSMENT: Onychauxis, diabetes, stage III chronic kidney disease, foot pain.    PLAN: Toenails were debrided manually and mechanically x10. Return to clinic in nine weeks.         SUBJECTIVE: The patient presents to the [facility] as a new patient. Toenails are long, thick and painful.  He was diagnosed with diabetes about 10 years ago.  He denies numbness or tingling.  He denies history of open sores.  Nails are difficult to manage at home because of thickness.    OBJECTIVE:  Ht 5' 8.75\" (1.746 m)  Wt (!) 236 lb (107 kg)  BMI 35.11 kg/m2  General: Pleasant 82 y.o. male in no acute distress.  Vascular: DP pulses are palpable. PT pulses are diminished. Pedal hair is diminished. Feet are warm to the touch.  Cardiac: Pulse is regular.  Lymphatic: No edema at the ankles.  Neuro: Sensation in the feet is grossly intact to light touch.  Derm: Toenails are elongated, thickened and dystrophic with discoloration and subungual debris. Skin is thin and shiny but intact.   Musculoskeletal: Hammertoes.     Past Medical History:   Diagnosis Date     BCE (basal cell epithelioma) - basal cell skin cancer - [facility] 10/19/2015     Diabetes mellitus      Dysplastic nevus - with atypia - 2014 - [facility] 10/19/2015     Onychomycosis      Splenomegaly - 16 cm, stable versus April and December, 2014 ([facility] imaging for stone for Dr. Ackerman - January, 2017.) 1/10/2017       He has a past surgical history that includes pr arthroplasty tibial plateau; Joint replacement (Bilateral); and Prostatectomy.    Allergies   Allergen Reactions     Ibuprofen      Rash on legs, per wife. TBrinkMD - 10/19/15       Current Outpatient Prescriptions   Medication Sig Dispense Refill     acetaminophen-codeine (TYLENOL #3) 300-30 mg per tablet 1/2 tablet up to every four hours prn cough. 20 tablet 0     amLODIPine (NORVASC) 10 MG tablet Take 1 tablet by mouth  daily 90 tablet 3     blood glucose test strips Contour Next Test " "Strips :  Test twice daily as instructed. 200 strip 3     chlorthalidone (HYGROTEN) 25 MG tablet        glimepiride (AMARYL) 4 MG tablet TAKE 1 TABLET BY MOUTH  DAILY, WITH THE FIRST MEAL  OF THE DAY FOR DIABETES. 90 tablet 3     insulin glargine (LANTUS SOLOSTAR) 100 unit/mL (3 mL) pen Inject 10 Units under the skin daily. (Patient taking differently: Inject 32 Units under the skin daily. ) 5 adj dose pen 3     labetalol (TRANDATE; NORMODYNE) 100 MG tablet Take 1 tablet by mouth two  times daily 180 tablet 3     LANCETS MISC Use As Directed 3 (three) times a day. [employer] Microlet Lancets Miscellaneous       lisinopril (PRINIVIL,ZESTRIL) 20 MG tablet Take 1 tablet by mouth  daily for blood pressure 90 tablet 0     memantine (NAMENDA) 10 MG tablet        mirabegron (MYRBETRIQ) 25 mg Tb24 ER tablet Take 25 mg by mouth daily.        NAMENDA TITRATION NEW tablet pack FPD  0     omeprazole (PRILOSEC) 20 MG capsule TAKE 1 DAILY, 1/2 HOUR  BEFORE BREAKFAST FOR  HEARTBURN. 90 capsule 2     pen needle, diabetic (BD ULTRA-FINE BRITTANY PEN NEEDLES) 32 gauge x 5/32\" Ndle Inject 1 each under the skin 4 (four) times a day. 100 each 3     pioglitazone (ACTOS) 15 MG tablet Add 1 tablet daily to your current program for diabetes. 90 tablet 3     potassium citrate (UROCIT-K) 10 mEq (1,080 mg) SR tablet Take one tablet by mouth twice daily. 180 tablet 3     rivastigmine (EXELON) 13.3 mg/24 hour PT24 patch Apply 1 patch topically daily.   6     simvastatin (ZOCOR) 20 MG tablet TAKE 1 TABLET BY MOUTH AT  BEDTIME FOR CHOLESTEROL. 90 tablet 3     No current facility-administered medications for this visit.        Family History:  family history includes Diabetes in his daughter and daughter; Heart disease (age of onset: 56) in his father; Heart failure in his mother and sister; Kidney disease in his sister; No Medical Problems in his son and son.    Social History:  Reviewed, and he reports that he has quit smoking. He has never used smokeless " tobacco. He reports that he does not drink alcohol or use illicit drugs.    Review of Systems:  A 12 point comprehensive review of systems was negative except as noted.

## 2021-06-15 NOTE — TELEPHONE ENCOUNTER
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  He had labs done on 2/22/21, and she hasn't heard back yet if his meds are changing.  She would like a call back at .

## 2021-06-15 NOTE — PROGRESS NOTES
Assessment/Plan:        1. Essential hypertension, benign  - Basic Metabolic Panel    Continue on   - amLODIPine (NORVASC) 10 MG tablet; Take 1 tablet by mouth  daily  Dispense: 90 tablet; Refill: 0  - labetalol (TRANDATE; NORMODYNE) 100 MG tablet; Take 1 tablet by mouth two  times daily  Dispense: 180 tablet; Refill: 0  - lisinopril (PRINIVIL,ZESTRIL) 20 MG tablet; Take 1 tablet by mouth  daily for blood pressure  Dispense: 90 tablet; Refill: 0    - chlorthalidone (HYGROTEN) 25 MG tablet; Take 0.5 tablets (12.5 mg total) by mouth daily.; Refill: 0        2. Diabetes mellitus type 2  Keep BG below 150    Continue on  - glimepiride (AMARYL) 4 MG tablet; TAKE 1 TABLET BY MOUTH  DAILY, WITH THE FIRST MEAL  OF THE DAY FOR DIABETES.  Dispense: 90 tablet; Refill: 0  - insulin glargine (LANTUS SOLOSTAR) 100 unit/mL (3 mL) pen; Inject 34 Units under the skin at bedtime.  Dispense: 5 adj dose pen; Refill: 2    Recheck A1c in 3 months.       3. Chronic Kidney Disease, Stage 3  - Basic Metabolic Panel     4. Hypercholesterolemia  Continue on Simvastatin    follow up in 3 months.             Subjective:    Patient ID:   Laz Kingston is a 83 y.o. male comes in follow up of his BP, and diabetes, in accompany of his wife.   There has been no additional issues or concerns.  Current management is well tolerated, and BP has been stable.       BG averaging below 150.        allergies, current medications and problem list.    Review of Systems  A complete 10 point review of systems was obtained and is negative other than what is stated in the HPI.           Objective:   /62 (Patient Site: Right Arm, Patient Position: Sitting, Cuff Size: Adult Large)  Pulse 65  Temp 97.8  F (36.6  C) (Oral)   Wt (!) 237 lb 9.6 oz (107.8 kg)  SpO2 96%  BMI 35.34 kg/m2      Physical Exam  General Appearance:    Alert, well hydrated, no distress   Throat:   mucous membranes moist, pharynx normal without lesions   Neck:   Supple,  symmetrical, trachea midline, no adenopathy;     thyroid:  no enlargement/tenderness/nodules;    Lungs:     clear to auscultation, no wheezes, rales or rhonchi, symmetric air entry     Heart:    Regular rate and rhythm, S1 and S2 normal, no murmur, rub   or gallop, no edema    Skin:   Skin color, texture, turgor normal, no rashes or lesions

## 2021-06-15 NOTE — PROGRESS NOTES
"Assessment/Plan:        1. Benign Essential Hypertension  Chart and exam findings were reviewed  With given history of renal insufficiency and review of lower blood pressure discussed discontinuation of chlorthalidone    Plan  Continue on:   - amLODIPine (NORVASC) 10 MG tablet; Take 1 tablet by mouth  daily  Dispense: 90 tablet; Refill: 0  - labetalol (TRANDATE; NORMODYNE) 100 MG tablet; Take 1 tablet by mouth two  times daily  Dispense: 180 tablet; Refill: 0  - lisinopril (PRINIVIL,ZESTRIL) 20 MG tablet; Take 1 tablet by mouth  daily for blood pressure  Dispense: 90 tablet; Refill: 0    Discontinue   Chlorthalidone  Potassium tabs    Orders Placed This Encounter   Procedures     Basic Metabolic Panel     ALT (SGPT)   Labs to be done within a week.       2. Diabetes mellitus, type 2  Chart and meds were reviewed and meds reconciled  Patient may discontinue Actos    Continue on  - glimepiride (AMARYL) 4 MG tablet; TAKE 1 TABLET BY MOUTH  DAILY, WITH THE FIRST MEAL  OF THE DAY FOR DIABETES.  Dispense: 90 tablet; Refill: 0  - insulin glargine (LANTUS SOLOSTAR) 100 unit/mL (3 mL) pen; Inject 34 Units under the skin at bedtime.  Dispense: 5 adj dose pen; Refill: 2      - blood glucose test strips; Contour Next Test Strips :  Test twice daily as instructed.  Dispense: 300 strip; Refill: 1  - pen needle, diabetic (BD ULTRA-FINE BRITTANY PEN NEEDLES) 32 gauge x 5/32\" Ndle; Inject 1 each under the skin daily.  Dispense: 100 each; Refill: 3    Discontinue:   Actos       3. Hypercholesterolemia  Recheck labs  - ALT (SGPT)  - simvastatin (ZOCOR) 20 MG tablet; TAKE 1 TABLET BY MOUTH AT  BEDTIME FOR CHOLESTEROL.  Dispense: 90 tablet; Refill: 0    4. Chronic Kidney Disease, Stage 3  Recheck creatinine within a week      5. GERD (gastroesophageal reflux disease)  - omeprazole (PRILOSEC) 20 MG capsule; TAKE 1 DAILY, 1/2 HOUR  BEFORE BREAKFAST FOR  HEARTBURN.  Dispense: 90 capsule; Refill: 2         Subjective:    Patient ID:   Laz PAYNE" Vashti is a 82 y.o. male here with his wife, to establish care.   Wife asking to review meds and see if any of them can be eliminated.     Issues to discuss and review:     1. Benign Essential Hypertension   Managed on amlodipine 10 mg, labetalol, chlorthalidone , and lisinopril  His blood pressures are being noted to be low on his current regimen.   No side effects     2. Diabetes mellitus, type 2   Managed on Lantus, glimepiride, and Actos  No side effects reported  Lab Results   Component Value Date    HGBA1C 6.2 (H) 12/20/2017         3. Hypercholesterolemia   Managed on simvastatin 20 mg  No side effects reported     4. Chronic Kidney Disease, Stage 3   History of  Lab Results   Component Value Date    CREATININE 2.12 (H) 12/20/2017         5. GERD (gastroesophageal reflux disease)   On omeprazole as needed        6  Dementia - treated by Dr. Hernandez - [facility]            allergies, current medications, past family history, past medical history, past social history, past surgical history and problem list.    Review of Systems  A complete 10 point review of systems was obtained and is negative other than what is stated in the HPI.            Objective:   /60 (Patient Site: Right Arm, Patient Position: Sitting, Cuff Size: Adult Large)  Pulse 64  Temp 97.9  F (36.6  C) (Oral)   Wt (!) 235 lb 12.8 oz (107 kg)  SpO2 93%  BMI 35.08 kg/m2      Physical Exam  General Appearance:    Alert, cooperative, well hydrated,  no distress,    Eyes:    PERRL, conjunctiva/corneas clear,    Throat:   Lips, mucosa, and tongue normal;  gums normal   Neck:   Supple, symmetrical, trachea midline, no adenopathy;        thyroid:  No enlargement/tenderness/nodules; no carotid    bruit or JVD   Lungs:     Clear to auscultation bilaterally, respirations unlabored   Heart:    Regular rate and rhythm, S1 and S2 normal, no murmur, rub   or gallop   Abdomen:     Soft, non-tender, normal bowel sounds, no  rebound or guarding, no masses, no organomegaly   Extremities:   Extremities normal, atraumatic, no cyanosis or edema   Skin:   Skin color, texture, turgor normal, no rashes or lesions

## 2021-06-15 NOTE — TELEPHONE ENCOUNTER
A call to the patient on the commented result notes were previously requested.  Please review chart and contact patient.

## 2021-06-15 NOTE — PROGRESS NOTES
"    Assessment & Plan     Hypercholesterolemia  On   - simvastatin (ZOCOR) 20 MG tablet; Take 1 tablet (20 mg total) by mouth at bedtime. .  - Lipid Profile  Continue current management     Essential hypertension, benign  Elevated blood pressure   His wife is stating that he is a bit worked up today, but his blood pressure is usually within the normal limits.   Plan:   Continue current management   - metoprolol succinate (TOPROL XL) 50 MG 24 hr tablet; Take 1 tablet (50 mg total) by mouth daily.  - lisinopriL (PRINIVIL,ZESTRIL) 20 MG tablet; Take 1 tablet (20 mg total) by mouth daily.  - amLODIPine (NORVASC) 10 MG tablet; Take 1 tablet (10 mg total) by mouth daily.    Type 2 diabetes mellitus without complication, with long-term current use of insulin (H)  Recheck   - Glycosylated Hemoglobin A1c  - Comprehensive Metabolic Panel  We will follow-up to the results of the study and manage accordingly.      Refill;   - glimepiride (AMARYL) 4 MG tablet; TAKE 1 TABLET BY MOUTH  DAILY, WITH THE FIRST MEAL  OF THE DAY FOR DIABETES.  Continue current management       Bilateral impacted cerumen  Lavaged with improving symptoms       Review of the result(s) of each unique test - as stated above      46 minutes spent on the date of the encounter doing chart review, review of test results, patient visit and documentation        BMI:   Estimated body mass index is 34.96 kg/m  as calculated from the following:    Height as of 6/19/20: 5' 8.75\" (1.746 m).    Weight as of this encounter: 235 lb (106.6 kg).     Return in about 6 months (around 8/15/2021) for or sooner with any issues or concerns.    Adan Rodriguez MD  [facility]   Laz Kignston is 86 y.o. and presenting with his wife for med check and follow-up of the following    1. Hypercholesterolemia    2. Essential hypertension, benign    3. Type 2 diabetes mellitus      Overall doing well with the meds and just needing them " refilled    He is concerned about hearing going bad, and wondering the ears are plugged with wax        Objective    BP (!) 162/102 (Patient Position: Sitting)   Pulse 74   Temp 98.3  F (36.8  C)   Wt (!) 235 lb (106.6 kg)   SpO2 95%   BMI 34.96 kg/m    Body mass index is 34.96 kg/m .     Physical Exam  General Appearance:    Alert, well hydrated, no distress   Ears:   Bilateral cerumen impaction, lavaged     Lungs:     clear to auscultation, no wheezes, rales or rhonchi, symmetric air entry     Heart:    Regular rate and rhythm, S1 and S2 normal, no murmur, rub   or gallop, no edema    Skin:   Skin color, texture, turgor normal, no rashes or lesions

## 2021-06-15 NOTE — TELEPHONE ENCOUNTER
----- Message from Adan Rodriguez MD sent at 2/17/2021  8:46 AM CST -----  Please call patient :   Elevated Triglycerides   Continue current statin management

## 2021-06-16 NOTE — TELEPHONE ENCOUNTER
Last Office Visit  2/15/2021-Dr Rodriguez    Notes:  Hypercholesterolemia  On   - simvastatin (ZOCOR) 20 MG tablet; Take 1 tablet (20 mg total) by mouth at bedtime. .  - Lipid Profile  Continue current management      Essential hypertension, benign  Elevated blood pressure   His wife is stating that he is a bit worked up today, but his blood pressure is usually within the normal limits.   Plan:   Continue current management   - metoprolol succinate (TOPROL XL) 50 MG 24 hr tablet; Take 1 tablet (50 mg total) by mouth daily.  - lisinopriL (PRINIVIL,ZESTRIL) 20 MG tablet; Take 1 tablet (20 mg total) by mouth daily.  - amLODIPine (NORVASC) 10 MG tablet; Take 1 tablet (10 mg total) by mouth daily.     Type 2 diabetes mellitus without complication, with long-term current use of insulin (H)  Recheck   - Glycosylated Hemoglobin A1c  - Comprehensive Metabolic Panel  We will follow-up to the results of the study and manage accordingly.       Refill;   - glimepiride (AMARYL) 4 MG tablet; TAKE 1 TABLET BY MOUTH  DAILY, WITH THE FIRST MEAL  OF THE DAY FOR DIABETES.  Continue current management         Bilateral impacted cerumen  Lavaged with improving symptoms     Last Filled:  LANTUS SOLOSTAR U-100 INSULIN 100 unit/mL (3 mL) pen 15 mL 5 8/13/2020  No   Sig: INJECT 48 UNITS SUBCUTANEOUSLY EVERY NIGHT AT BEDTIME   Sent to pharmacy as: Lantus Solostar U-100 Insulin 100 unit/mL (3 mL) subcutaneous pen (insulin glargine)   E-Prescribing Status: Receipt confirmed by pharmacy (8/13/2020  4:39 PM CDT)       Next OV:  Visit date not found        Medication teed up for provider signature

## 2021-06-16 NOTE — PROGRESS NOTES
Assessment/Plan:          1. Essential hypertension/ CKD   normotensive     Lisinopril, and chlorthalidone was recently switched to one combo (lisinopril/ HCTZ) for ease of use,  Has not been received it in the mail yet ( mail order)   Will be switching to the new medication once available.     Continue on:     - amLODIPine (NORVASC) 10 MG tablet; Take 1 tablet by mouth  daily  Dispense: 90 tablet; Refill: 0  - labetalol (TRANDATE; NORMODYNE) 100 MG tablet; Take 1 tablet by mouth two  times daily  Dispense: 180 tablet; Refill: 0        2. Diabetes mellitus type 2  BG have been averaging in the low 100's.      Continue on  - glimepiride (AMARYL) 4 MG tablet; TAKE 1 TABLET BY MOUTH  DAILY, WITH THE FIRST MEAL  OF THE DAY FOR DIABETES.  Dispense: 90 tablet; Refill: 0  - insulin glargine (LANTUS SOLOSTAR) 100 unit/mL (3 mL) pen; Inject 34 Units under the skin at bedtime.  Dispense: 5 adj dose pen;     Recheck A1c in 2 months.         3. Hypercholesterolemia  Continue on Simvastatin    4. Skin lesion    exam findings were discussed and advised a follow up for excisional biopsy.           Subjective:    Patient ID:   Laz Kingston is a 83 y.o. male here to follow up on     1. Essential hypertension, benign   Continuing on the current meds prescribed, however, have not received the the Lisinopril/HCTZ one in the mail yet, and been taking the lisinopril and cutting the Chlorthalidone in 1/2 until it arrives.   No other concerns and the current regimen has been working just fine, no side effects.        2. Hypercholesterolemia   - been eating healthier, cutting back in the Ice creams.   Continuing on the statin.      3. Diabetes mellitus type 2,  The BG have been averaging in the lower 100's.   No other concerns.     No refills are needed today.        4- skin lesion, as it appears like a skin tag on the corner of his mouth for the past   2 months and getting bigger.  He has a H/O skin cancer and wants to have it  checked out.          allergies, current medications, past medical history and problem list.    Review of Systems  A complete 7 point review of systems was obtained and is negative other than what is stated in the HPI.             Objective:   /64  Pulse 60  Wt (!) 233 lb (105.7 kg)  SpO2 97%  BMI 34.66 kg/m2      Physical Exam  General Appearance:    Alert, well hydrated, no distress,    Eyes:    PERRL, conjunctiva/corneas clear,    Throat:   Lips, mucosa, and tongue normal;    Neck:   Supple, symmetrical, trachea midline, no adenopathy;        thyroid:  No enlargement/tenderness/nodules; no carotid    bruit or JVD   Lungs:     Clear to auscultation bilaterally, respirations unlabored   Heart:    Regular rate and rhythm, S1 and S2 normal, no murmur, rub   or gallop   Abdomen:     Soft, non-tender, normal bowel sounds, no rebound or guarding, no masses, no organomegaly   Extremities:   Extremities normal, atraumatic, no cyanosis or edema   Skin:   Flesh colored pedunculated Skin lesion located on the right lateral corner of the bottom lip , no other rashes or lesions

## 2021-06-16 NOTE — TELEPHONE ENCOUNTER
Pt needs refill on insulin pen ASAP. Only has one pen left. Please call pt's wife at [phone].

## 2021-06-16 NOTE — TELEPHONE ENCOUNTER
Refill Request  Did you contact pharmacy: Yes  Medication name:   Requested Prescriptions     Pending Prescriptions Disp Refills     insulin glargine (LANTUS SOLOSTAR U-100 INSULIN) 100 unit/mL (3 mL) pen 15 mL 5     Sig: INJECT 48 UNITS SUBCUTANEOUSLY EVERY NIGHT AT BEDTIME     Who prescribed the medication: Dr. BALES  Requested Pharmacy: [facility]  Is patient out of medication: Yes  Patient notified refills processed in 3 business days:  yes  Okay to leave a detailed message: yes

## 2021-06-16 NOTE — PROGRESS NOTES
Assessment/Plan:        1. Skin lesion  Shaved and sent to path.   Will follow up pending the study and manage accordingly.         Subjective:    Patient ID:   Laz Kingston comes in for skin lesion removal accompanied by his wife.     Location: left lateral corner of the lower lip  Size/ shape: 3 mm / domed, flesh colored.             Objective:   /60 (Patient Site: Right Arm, Patient Position: Sitting, Cuff Size: Adult Regular)  Pulse (!) 59  Temp 97.6  F (36.4  C) (Oral)   Wt (!) 233 lb 14.4 oz (106.1 kg)  SpO2 96%  BMI 34.79 kg/m2      Procedure:   Patient was positioned supine, and the area of interest, prepped and anesthetized with 1 % lidocaine.   The lesion was removed with a dermablade and collected into a formalin container, sent to path.   Tiny bleeding was touched with silver nitrate.   No complications.

## 2021-06-16 NOTE — TELEPHONE ENCOUNTER
Patient wife calling to check status of this medication refill.  States patient has enough for one dose tonight, and then will be out tomorrow.  Patient and spouse have called several times in the last couple days to get this done and it appears it hasn't been acted on.  Please complete this asap so wife can  medication before patient runs out.    Please call patient or wife when Rx has been sent so she can  from pharmacy.    [phone]. Okay to leave message if needed.

## 2021-06-16 NOTE — PROGRESS NOTES
Lisinopril, and chlorthalidone was recently switched to one combo (lisinopril/ HCTZ) for ease of use, since the patient was unable to cut the Chlorthalidone in 1/2.

## 2021-06-17 NOTE — PROGRESS NOTES
Medication Therapy Management (MTM) Encounter    Assessment:                                                      Fever: Patient is feeling better now, no fever and completed antibiotic.    HEMALATHA on CKD 3: Blood pressure remained elevated today But his hydralazine was adjusted.  BMP rechecked, serum creatinine pending.  Encouraged them to monitor blood pressure at home and they will be following with PCP in a few weeks.    Type 2 Diabetes: Blood sugars sound overall stable, would not want to be too aggressive with his blood sugar lowering.  Encouraged him to continue to closely monitor blood sugars, will be due for an A1c in a few weeks.  Patient simvastatin dose is limited due to amlodipine interaction.    Dementia: Continue to follow with neurology.    GERD: Stable.      Follow Up  As needed with MTM    Subjective & Objective                                                       Laz Kingston is a 86 y.o. male called for a transitions of care visit. he was discharged from [facility] on 5/4/2021 for fever. Reiceved verbal permission from Laz to speak with wife Patricia.    Patient consented to a telehealth visit: Yes  Chief Complaint: Transitions of care -they had a follow-up with the PCP today so no significant questions or concerns  Medication Adherence/Access: Wife sets up the medications, he is good about taking them. three times a day is easier than 4 times a day.     Fever: Presented with fatigue and fall, found to be febrile.  Patient was given IV antibiotics and per ID was discharged on cefdinir 300 mg twice daily for 3 days - completed.  No clear source for infection. Doing ok otherwise, no fever.     HEMALATHA on CKD 3: During discharge lisinopril was stopped and recommended to use hydralazine 50 mg 4 times daily.  Was recommended to check renal function and BP at follow-up -- done today, BMP pending.  Today the hydralazine was changed to 100 mg 3 times daily.  Patient continues metoprolol succinate 50 mg  daily and amlodipine 10 mg daily. They have not checked BP at home yet but they have a monitor.   Last serum creatinine = 2.28 on 5/4/2021  BP Readings from Last 3 Encounters:   05/07/21 176/80   05/04/21 (!) 180/91   02/15/21 (!) 162/102       Type 2 Diabetes: Currently taking Trulicity 1.5 mg weekly, glimepiride 4 mg daily AM, and Lantus 48 units daily.   Tests BG one times daily. Reports blood sugars this morning 133, 145, 167, 137.    Last A1c checked 2/15/2021 =7.2%.   Hypoglycemia none  Is taking simvastatin 20 mg daily.  Last lipids checked 2/15/2021  Is taking aspirin 81 mg for primary prevention.     Dementia: Continues rivastigmine patch 13.3 mg daily and memantine 10 mg twice daily.  Wife reports that this is stable.  They follow with neurology.    GERD: Continues omeprazole 20 mg daily - no issues with his stomach.         PMH: reviewed in EPIC   Allergies/ADRs: reviewed in EPIC   Alcohol: Reviewed in epic  Tobacco:   Social History     Tobacco Use   Smoking Status Former Smoker   Smokeless Tobacco Never Used   Tobacco Comment    Quit prior to 1966.     Recent Vitals:   BP Readings from Last 3 Encounters:   05/07/21 176/80   05/04/21 (!) 180/91   02/15/21 (!) 162/102      Wt Readings from Last 3 Encounters:   05/07/21 (!) 229 lb 12.8 oz (104.2 kg)   05/02/21 (!) 229 lb 8 oz (104.1 kg)   02/15/21 (!) 235 lb (106.6 kg)     ----------------  Post Discharge Medication Reconciliation Status: discharge medications reconciled and changed, per note/orders    The patient declined an after visit summary    I spent 8 minutes with this patient today;  . All changes were made via collaborative practice agreement with Adan Rodriguez MD. A copy of the visit note was provided to the patient's provider.     Meredith Farooq, Pharm.D., BCACP  Medication Therapy Management Pharmacist  [employer] [facility]    Telemedicine Visit Details    Type of service:  Telephone     Start Time: 3:02 PM  End Time  "(time video/phone call stopped): 3:10 PM    Originating Location (pt. Location): Home    Distant Location (provider location):  [place] [facility]ERAPY MANAG[facility]    Mode of Communication:   Telephone     Current Outpatient Medications   Medication Sig Dispense Refill     amLODIPine (NORVASC) 10 MG tablet Take 1 tablet (10 mg total) by mouth daily. 90 tablet 1     aspirin 81 mg chewable tablet Chew 81 mg daily.       blood glucose test strips Contour Next Test Strips :  Test twice daily as instructed. 200 strip 3     dulaglutide (TRULICITY) 1.5 mg/0.5 mL PnIj Inject 1.5 mg under the skin once a week. 5 Syringe 5     glimepiride (AMARYL) 4 MG tablet TAKE 1 TABLET BY MOUTH  DAILY, WITH THE FIRST MEAL  OF THE DAY FOR DIABETES. 90 tablet 1     hydrALAZINE (APRESOLINE) 100 MG tablet Take 1 tablet (100 mg total) by mouth 3 (three) times a day. 90 tablet 0     insulin glargine (LANTUS SOLOSTAR U-100 INSULIN) 100 unit/mL (3 mL) pen INJECT 48 UNITS SUBCUTANEOUSLY EVERY NIGHT AT BEDTIME (Patient taking differently: Inject 45-50 Units under the skin at bedtime. INJECT 48 UNITS SUBCUTANEOUSLY EVERY NIGHT AT BEDTIME) 15 mL 5     LANCETS MISC Use As Directed 3 (three) times a day. [employer] Lancets Miscellaneous       memantine (NAMENDA) 10 MG tablet Take 10 mg by mouth 2 (two) times a day.        metoprolol succinate (TOPROL XL) 50 MG 24 hr tablet Take 1 tablet (50 mg total) by mouth daily. 90 tablet 1     multivitamin therapeutic tablet Take 1 tablet by mouth daily.       omeprazole (PRILOSEC) 20 MG capsule TAKE 1 DAILY, 1/2 HOUR  BEFORE BREAKFAST FOR  HEARTBURN. 90 capsule 3     pen needle, diabetic (BD ULTRA-FINE BRITTANY PEN NEEDLE) 32 gauge x 5/32\" Ndle Inject 1 each under the skin daily. 100 each 3     rivastigmine (EXELON) 13.3 mg/24 hour PT24 patch Apply 1 patch topically daily.   6     simvastatin (ZOCOR) 20 MG tablet Take 1 tablet (20 mg total) by mouth at bedtime. . 90 tablet 3     No " current facility-administered medications for this visit.         Medication Therapy Recommendations  No medication therapy recommendations to display

## 2021-06-17 NOTE — PROGRESS NOTES
ASSESSMENT: Onychauxis, diabetes, stage III chronic kidney disease, foot pain.    PLAN: Toenails were debrided manually x10. Return to clinic in nine weeks.         SUBJECTIVE: The patient returns to the [place] clinic with toenails that are long, thick and painful.  He was diagnosed with diabetes about 10 years ago.  He denies numbness or tingling.  He denies history of open sores.  Nails are difficult to manage at home because of thickness.  His last visit with me was January 29, 2018.    OBJECTIVE:  General: Pleasant 83 y.o. male in no acute distress.  Vascular: DP pulses are palpable. PT pulses are diminished. Pedal hair is diminished. Feet are warm to the touch.  Cardiac: Pulse is regular.  Lymphatic: No edema at the ankles.  Neuro: Sensation in the feet is grossly intact to light touch.  Derm: Toenails are elongated, thickened and dystrophic with discoloration and subungual debris. Skin is thin and shiny but intact.   Musculoskeletal: Hammertoes.

## 2021-06-17 NOTE — PROGRESS NOTES
Hospital Follow-up Visit:    Assessment/Plan:     1. HEMALATHA (acute kidney injury) (H)  Status post infection ( unknown origin)   Resolved     2. Essential hypertension, benign  tx options reviewed   Plan:   Changed to 100 mg three times a day   - hydrALAZINE (APRESOLINE) 100 MG tablet; Take 1 tablet (100 mg total) by mouth 3 (three) times a day.  Dispense: 90 tablet; Refill: 0    Continue on Metoprolol XL 50 daily ,  Amlodipine low back10 mg daily     Recheck blood pressure in 5-7 days.     Recheck   Basic Metabolic Panel      3. Type 2 diabetes mellitus without complication, with long-term current use of insulin (H)  Continue current management            Subjective:       Hospital/Nursing Home/IP Rehab Facility: [facility]  Date of Admission: 050121  Date of Discharge:0504/21  Reason(s) for Admission:fall, then sepsis            Do you have any problems taking your medication regularly?  None       Have you had any changes in your medication since discharge? hydralazine       Have you had any difficulty following your discharge or treatment plan?  No    Summary of hospitalization:  Hospital discharge summary reviewed  Diagnostic Tests/Treatments reviewed.  Follow up needed: None  Other Healthcare Providers Involved in Patient's Care: Patient Care Team:  Adan Rodriguez MD as PCP - General (Family Medicine)  Amos Ackerman MD as Physician (Urology)  Adair Hernandez MD as Physician (Neurology)  Adan Rodriguez MD as Assigned PCP  Meredith Farooq, PharmD as Pharmacist (Pharmacist)      Update since discharge: {improved       Post Discharge Medication Reconciliation: discharge medications reconciled and changed, per note/orders  Plan of care communicated with: patient and family         Laz Kingston is a 86 y.o. male who presents for a hospital discharge follow up, admitted for confusion and fevers, and elevated lactic acid, HEMALATHA on CKD 3 improving on oral intake and fluids and  "encephalopathy due to underlying dementia and likely metabolic condition.  Infection was brought to control with IV antibiotic Vanco and Zosyn and changed to ceftriaxone per infectious disease and was discharged on oral cefdinir.  Lisinopril was stopped due to HEMALATHA and hydralazine was initiated 50 mg 4 times daily.  His blood pressure has intermittently been elevated, but otherwise is doing well overall with no additional concerns.        Objective:     Vitals:    05/07/21 1009   BP: (!) 182/80   Pulse: 83   Resp: 20   Temp: 98.2  F (36.8  C)   TempSrc: Temporal   SpO2: 95%   Weight: (!) 229 lb 12.8 oz (104.2 kg)   Height: 5' 8\" (1.727 m)         Physical Exam:  General Appearance:    Alert, well hydrated, no distress   Throat:   mucous membranes moist, pharynx normal without lesions   Neck:   Supple, symmetrical, trachea midline, no adenopathy;     thyroid:  no enlargement/tenderness/nodules;    Lungs:     clear to auscultation, no wheezes, rales or rhonchi, symmetric air entry     Heart:    Regular rate and rhythm, S1 and S2 normal, no murmur, rub   or gallop, no edema    Skin:   Skin color, texture, turgor normal, no rashes or lesions          Coding guidelines for this visit:  Type of Medical   Decision Making Face-to-Face Visit       within 7 Days of discharge Face-to-Face Visit        within 14 days of discharge   Moderate Complexity [id number]   High Complexity [id number]       Electronically signed by Adan Rodriguez MD 05/07/21 10:14 AM   "

## 2021-06-17 NOTE — TELEPHONE ENCOUNTER
Patricia is calling for directions on patients PB Medication.    Yesterday the patient was very lethargic, didn't eat, slept all day.  Today is up and alert, has good appetite but BP is up.    Yesterday Afternoon 225/115  8PM 249/144  Am 182/94  Just Now 208/111    Pt takes BP medication three times a day.  She is wondering what PCP would like her to do.

## 2021-06-17 NOTE — TELEPHONE ENCOUNTER
The medication has been called in. Please check with MAXIMO Storey  as she recently spoke with the patient.

## 2021-06-17 NOTE — TELEPHONE ENCOUNTER
cloNIDine HCL (CATAPRES) 0.1 MG tablet 60 tablet 0f 5/10/2021  No   Sig - Route: Take 1 tablet (0.1 mg total) by mouth 2 (two) times a day as needed. - Oral   Class: Print     This was set to print.    Please resend to pharmacy

## 2021-06-17 NOTE — PROGRESS NOTES
Assessment/Plan:        1. Controlled type 2 diabetes mellitus without complication, with long-term current use of insulin    2. Essential hypertension, benign    3. Chronic Kidney Disease, Stage 3    4. Hypercholesterolemia    5. Dementia - treated by Dr. Hernandez - [facility]       Orders Placed This Encounter   Procedures     Glycosylated Hemoglobin A1c     Basic Metabolic Panel       Continue current management.   Will follow up pending the study and manage accordingly.          Subjective:    Patient ID:   Laz Kingston is a 83 y.o. male   Chief Complaint   Patient presents with     Follow-up     1 month         1. Essential hypertension/ CKD  Doing well on current blood pressure medications  No side effects or concerns  No refills requested today           2. Diabetes mellitus type 2  BG have been averaging in the low 100's. Up to 150's at the highest.         on  - glimepiride (AMARYL) 4 MG tablet; TAKE 1 TABLET BY MOUTH  DAILY, WITH THE FIRST MEAL  OF THE DAY FOR DIABETES.  Dispense: 90 tablet; Refill: 0  - insulin glargine (LANTUS SOLOSTAR) 100 unit/mL (3 mL) pen; Inject 34 Units under the skin at bedtime.  Dispense: 5 adj dose pen;   -Actos 50 mg daily     No issues or concerns with the meds         3. Hypercholesterolemia   on Simvastatin  No aches or pains or myalgias         allergies, current medications, past medical history and problem list.    Review of Systems  A complete 7 point review of systems was obtained and is negative other than what is stated in the HPI.             Objective:   /66 (Patient Site: Right Arm, Patient Position: Sitting, Cuff Size: Adult Regular)  Pulse 61  Temp 97.6  F (36.4  C) (Oral)   Wt (!) 235 lb 9.6 oz (106.9 kg)  SpO2 95%  BMI 35.05 kg/m2      Physical Exam  General Appearance:    Alert, well hydrated, no distress,    Eyes:    PERRL, conjunctiva/corneas clear,    Throat:   Lips, mucosa, and tongue normal; teeth and gums normal    Neck:   Supple, symmetrical, trachea midline, no adenopathy;        thyroid:  No enlargement/tenderness/nodules; no carotid    bruit or JVD   Lungs:     Clear to auscultation bilaterally, respirations unlabored   Heart:    Regular rate and rhythm, S1 and S2 normal, no murmur, rub   or gallop   Abdomen:     Soft, non-tender, normal bowel sounds, no rebound or guarding, no masses, no organomegaly   Extremities:   Extremities normal, atraumatic, no cyanosis or edema   Skin:   Skin color, texture, turgor normal, no rashes or lesions

## 2021-06-17 NOTE — TELEPHONE ENCOUNTER
Called patient's pharmacy to confirm the BP medication was ordered and ready to go. Called patient's wife and let them know the prescription is ready to be picked up.

## 2021-06-17 NOTE — TELEPHONE ENCOUNTER
Dr. AMAIRANI Gomez patient's wife is calling to let you know that his BP has come down since taking clonidine.  It is now 160/90.  Any questions please call her back at .

## 2021-06-17 NOTE — TELEPHONE ENCOUNTER
Wife called back, relayed message.    She will call back on Friday with additional readings.    7PM 05/11/21 218/44    9AM 05/21/2 190/99

## 2021-06-17 NOTE — TELEPHONE ENCOUNTER
Wife is very concerned as soon as he takes his BP medicaton pt falls right to sleep just zonked out for the day.    05/17   1PM  265/139  5PM 200/109    5/18   9:30 267/144  2PM  189/103  5PM 195/110    9:30 212/108    Please call wife back and advise if she should continue to give BP medication.

## 2021-06-17 NOTE — TELEPHONE ENCOUNTER
Request for Orders    Who s Requesting: Home Care Physical Therapist    Orders being requested: requesting delay in home care PT start of care visit to 5/7/21.     Where to send Orders: Please reply to this message    Thank you,  Natividad Diamond, PT, DPT   Lead Therapist       no

## 2021-06-17 NOTE — TELEPHONE ENCOUNTER
Called and spoke with Patricia (has a consent to communicate)     Advised as below per Dr. Rodriguez      ----- Message from Adan Rodriguez MD sent at 5/10/2021  5:38 PM CDT -----  Please call patient :   Elevated creatinine, and decrease renal function    Plan:   Keep hydrated   Continue current management    Patient wife expressed understanding    Stated has been checking his BP and results are as follows    168/94 this morning at home    Therapist rechecked 149/82     Will  new BP medication today or tomorrow    Shelby Camacho, CMA

## 2021-06-17 NOTE — TELEPHONE ENCOUNTER
Dr. AMAIRANI Foster calling from [facility] looking for verbal orders for PT 2 times a week for 3 weeks, and OT eval and treat and a  eval.  Please call her back at at [phone]

## 2021-06-17 NOTE — TELEPHONE ENCOUNTER
Called and discussed with Patricia,   Confirmed the previous message.     Plan;   Addition of Clonidine to use prn with BP >180.

## 2021-06-17 NOTE — TELEPHONE ENCOUNTER
Telephone Encounter by Urszula Lu LPN at 2/25/2021 11:25 AM     Author: Urszula Lu LPN Service: -- Author Type: Licensed Nurse    Filed: 2/25/2021 11:25 AM Encounter Date: 2/25/2021 Status: Signed    : Urszula Lu LPN (Licensed Nurse)       Called and relayed message on results from 2/22     Adan Rodriguez MD   2/24/2021 11:23 AM CST      Elevated blood glucose in known diabetic.   elevatd creatinine but Stable kidney/ renal function in comparison     Advised patient does not look like any med changes at this time. They thanked for the call.

## 2021-06-17 NOTE — PROGRESS NOTES
Laz Kingston is a 86 y.o. male who is being evaluated via a billable telephone visit.      What phone number would you like to be contacted at? [phone]  How would you like to obtain your AVS? AVS Preference: Mail a copy.    Assessment & Plan     Essential hypertension, benign  Type 2 diabetes mellitus without complication, with long-term current use of insulin (H)  CKD (chronic kidney disease) stage 4, GFR 15-29 ml/min (H)    Cardiovascular meds reviewed.  Calming effect likely the effect of clonidine, and may continue to use as needed two times a day.   Discussed addition of hydralazine 100 mg, to four times a day   Monitor blood pressure    Follow up in a week.       Prescription drug management  11 minutes spent on the date of the encounter doing chart review, patient visit, documentation and discussion with family         Return in about 1 week (around 5/26/2021) for Follow up in Primary Care.    Adan Rodriguze MD  [facility]   > start hydralazine 100 mg four times a day   Keep Clonidine prn   Follow up in a week.     Subjective   Laz Kingston is 86 y.o. and presents today for med check and follow up of HTN.   His wife Patricia, reporting that he is continuing to have intermittent higher blood pressure readings, improved with the addition of Clonidine.  However, this makes him to also feel calm and sleepy, inquiring about it.            Objective    Vitals - Patient Reported  Systolic (Patient Reported): 204  Diastolic (Patient Reported): 111  Pulse (Patient Reported): 65    Physical Exam              Phone call duration: 11 minutes

## 2021-06-17 NOTE — TELEPHONE ENCOUNTER
"RN called [facility] to call in prescription for medication Clonidine. Prescription was sent in by provider, but was \"printed.\" Provider Dr. Rodriguez sent in the following below:    cloNIDine HCL (CATAPRES) 0.1 MG tablet 60 tablet 0f 5/10/2021  No   Sig - Route: Take 1 tablet (0.1 mg total) by mouth 2 (two) times a day as needed. - Oral   Class: Print   cloNIDine HCL (CATAPRES) 0.1 MG tablet [[id number]]    Electronically signed by: Adan Rodriguez MD on 05/10/21 1731 Status: Active   Ordering user: Adan Rodriguez MD 05/10/21 1731 Authorized by: Adan Rodriguez MD   Frequency: BID PRN 05/10/21 - Until Discontinued   Diagnoses   Benign essential hypertension [I10]     Pharmacist Domingo was given verbal order for the following above that provider sent in yesterday on 5/10/21. Pharmacist verbalized understanding and agrees with plan.     Maru Fair RN, BSN Nurse Triage Advisor 12:08 PM 5/11/2021         "

## 2021-06-17 NOTE — TELEPHONE ENCOUNTER
There are 2 encounters for this. Looks like call was already placed to pharmacy and verified that this is ready. Wife was already notified.    Did call and leave a voicemail as well

## 2021-06-17 NOTE — TELEPHONE ENCOUNTER
Please note, Clonidine was added on 5/10/21.   Please call pharmacy to confirm the receipt and inform the patient.

## 2021-06-17 NOTE — TELEPHONE ENCOUNTER
"Please call spouse Patricia at [phone] (home)      Please see telephone encounter from 5/10/21 (previous message left).    Patricia reporting patient continues to have elevated blood pressure readings.     Stating patient is taking blood pressure medication as prescribed.     Reporting readings today are 182/94, currently 208/111.    Denies any symptoms \"he is feeling good.\" Reporting patient is ambulating on his own, denies any symptoms including chest pain, or vision changes. Stating yesterday patient was very fatigued.    Per Virtual Visit notes on 5/7/21 states hydralazine was changed to 100 mg 3 times daily.  Patient continues metoprolol succinate 50 mg daily and amlodipine 10 mg daily.    Patricia stating she is giving patient his blood pressure medication now.    Requesting to update PCP on blood pressure reading with message.     Kristina Mckeon RN  [facility] Nurse Advisors    COVID 19 Nurse Triage Plan/Patient Instructions    Please be aware that novel coronavirus (COVID-19) may be circulating in the community. If you develop symptoms such as fever, cough, or SOB or if you have concerns about the presence of another infection including coronavirus (COVID-19), please contact your health care provider or visit  [email or web address].    Disposition/Instructions    Virtual Visit with provider recommended. Reference Visit Selection Guide.    Thank you for taking steps to prevent the spread of this virus.  o Limit your contact with others.  o Wear a simple mask to cover your cough.  o Wash your hands well and often.    Resources    [facility]: About COVID-19: [email or web address]    CDC: What to Do If You're Sick: www.cdc.gov/coronavirus/2019-ncov/about/steps-when-sick.html    CDC: Ending Home Isolation: www.cdc.gov/coronavirus/2019-ncov/hcp/disposition-in-home-patients.html     CDC: Caring for Someone: www.cdc.gov/coronavirus/2019-ncov/if-you-are-sick/care-for-someone.html     [facility]: " Interim Guidance for Hospital Discharge to Home: www.health.[facility]/diseases/coronavirus/hcp/hospdischarge.pdf    [facility] clinical trials (COVID-19 research studies): clinicalaffairs.[facility]/umn-clinical-trials     Below are the COVID-19 hotlines at the [place] Department of Health ([facility]). Interpreters are available.   o For health questions: Call 843-082-7804 or 1-412.588.3289 (7 a.m. to 7 p.m.)  o For questions about schools and childcare: Call 479-189-6026 or 1-966.929.8015 (7 a.m. to 7 p.m.)      Reason for Disposition    Systolic BP >= 180 OR Diastolic >= 110    Additional Information    Negative: Sounds like a life-threatening emergency to the triager    Negative: Pregnant > 20 weeks or postpartum (< 6 weeks after delivery) and new hand or face swelling    Negative: Pregnant > 20 weeks and BP > 140/90    Negative: Systolic BP >= 160 OR Diastolic >= 100, and any cardiac or neurologic symptoms (e.g., chest pain, difficulty breathing, unsteady gait, blurred vision)    Negative: Patient sounds very sick or weak to the triager    Negative: BP Systolic BP >= 140 OR Diastolic >= 90 and postpartum (from 0 to 6 weeks after delivery)    Negative: Systolic BP >= 180 OR Diastolic >= 110, and missed most recent dose of blood pressure medication    Protocols used: HIGH BLOOD PRESSURE-A-[place]

## 2021-06-17 NOTE — TELEPHONE ENCOUNTER
Pt's wife Patricia is calling. Consent on file.    High blood pressure.  225/123-last night.  230/134 now.  Completely asymptomatic per his wife. Denies chest pain, shortness of breath, HA, visual changes, numbness or tingling anywhere, disorientation.  She thought the his PCP was going to add another BP medication, but nothing has been sent in.  Care advice reviewed.   I advised her that I would send an urgent message to his PCP and we would call her back.  She verbalized understanding.    Reason for Disposition    Systolic BP >= 180 OR Diastolic >= 110    Additional Information    Negative: Sounds like a life-threatening emergency to the triager    Negative: Pregnant > 20 weeks or postpartum (< 6 weeks after delivery) and new hand or face swelling    Negative: Pregnant > 20 weeks and BP > 140/90    Negative: Systolic BP >= 160 OR Diastolic >= 100, and any cardiac or neurologic symptoms (e.g., chest pain, difficulty breathing, unsteady gait, blurred vision)    Negative: Patient sounds very sick or weak to the triager    Negative: BP Systolic BP >= 140 OR Diastolic >= 90 and postpartum (from 0 to 6 weeks after delivery)    Negative: Systolic BP >= 180 OR Diastolic >= 110, and missed most recent dose of blood pressure medication    Protocols used: HIGH BLOOD PRESSURE-A-[place]    Jil Marmolejo RN   [facility] Nurse Advisor  05/13/21 at 9:41 AM

## 2021-06-18 NOTE — LETTER
Letter by Adan Rodriguez MD at      Author: Adan Rodriguez MD Service: -- Author Type: --    Filed:  Encounter Date: 3/8/2019 Status: (Other)       Laz Kingston  [address] [place] [address]             March 8, 2019         Dear Mr. Kingston,    Below are the results from your recent visit:    Resulted Orders   Glycosylated Hemoglobin A1c   Result Value Ref Range    Hemoglobin A1c 7.5 (H) 3.5 - 6.0 %   Basic Metabolic Panel   Result Value Ref Range    Sodium 141 136 - 145 mmol/L    Potassium 3.8 3.5 - 5.0 mmol/L    Chloride 108 (H) 98 - 107 mmol/L    CO2 24 22 - 31 mmol/L    Anion Gap, Calculation 9 5 - 18 mmol/L    Glucose 272 (H) 70 - 125 mg/dL    Calcium 10.1 8.5 - 10.5 mg/dL    BUN 22 8 - 28 mg/dL    Creatinine 1.63 (H) 0.70 - 1.30 mg/dL    GFR MDRD Af Amer 49 (L) >60 mL/min/1.73m2    GFR MDRD Non Af Amer 41 (L) >60 mL/min/1.73m2    Narrative    Fasting Glucose reference range is 70-99 mg/dL per  American Diabetes Association (ADA) guidelines.           Stable diabetic labs renal function with no significant change    A1c has been increased to 7.5 from 6.6 check 4 months ago    Stricter diabetic management is advised and as discussed in the office follow-up in a  month to recheck               Please call with questions or contact us using [employer].    Sincerely,        Electronically signed by Adan Rodriguez MD

## 2021-06-19 NOTE — PROGRESS NOTES
ASSESSMENT: Onychomycosis, onychauxis, diabetes     PLAN: Toenails were debrided manually x10.           SUBJECTIVE: The patient returns to the clinic today complaining of long thick painful nails both feet.       OBJECTIVE:  DP pulses are palpable. PT pulses are diminished. Pedal hair is diminished. Feet are warm to the touch.  No edema at the ankles.  Sensation in the feet is grossly intact to light touch.  Toenails are elongated, thickened and dystrophic with discoloration and subungual debris. Skin is thin and shiny but intact.   Musculoskeletal: Hammertoes.

## 2021-06-19 NOTE — LETTER
Letter by Adan Rodriguez MD at      Author: Adan Rodriguez MD Service: -- Author Type: --    Filed:  Encounter Date: 5/22/2019 Status: (Other)         Laz Kingston  [address] [place] [address]             May 22, 2019         Dear Mr. Kingston,    Below are the results from your recent visit:    Resulted Orders   Comprehensive Metabolic Panel   Result Value Ref Range    Sodium 140 136 - 145 mmol/L    Potassium 3.7 3.5 - 5.0 mmol/L    Chloride 104 98 - 107 mmol/L    CO2 24 22 - 31 mmol/L    Anion Gap, Calculation 12 5 - 18 mmol/L    Glucose 311 (H) 70 - 125 mg/dL    BUN 24 8 - 28 mg/dL    Creatinine 1.60 (H) 0.70 - 1.30 mg/dL    GFR MDRD Af Amer 50 (L) >60 mL/min/1.73m2    GFR MDRD Non Af Amer 42 (L) >60 mL/min/1.73m2    Bilirubin, Total 1.0 0.0 - 1.0 mg/dL    Calcium 10.4 8.5 - 10.5 mg/dL    Protein, Total 6.5 6.0 - 8.0 g/dL    Albumin 3.8 3.5 - 5.0 g/dL    Alkaline Phosphatase 61 45 - 120 U/L    AST 31 0 - 40 U/L    ALT 42 0 - 45 U/L    Narrative    Fasting Glucose reference range is 70-99 mg/dL per  American Diabetes Association (ADA) guidelines.   HM1 (CBC with Diff)   Result Value Ref Range    WBC 6.4 4.0 - 11.0 thou/uL    RBC 4.82 4.40 - 6.20 mill/uL    Hemoglobin 14.8 14.0 - 18.0 g/dL    Hematocrit 42.9 40.0 - 54.0 %    MCV 89 80 - 100 fL    MCH 30.8 27.0 - 34.0 pg    MCHC 34.6 32.0 - 36.0 g/dL    RDW 14.1 11.0 - 14.5 %    Platelets 188 140 - 440 thou/uL    MPV 6.8 (L) 7.0 - 10.0 fL    Neutrophils % 66 50 - 70 %    Lymphocytes % 24 20 - 40 %    Monocytes % 7 2 - 10 %    Eosinophils % 3 0 - 6 %    Basophils % 1 0 - 2 %    Neutrophils Absolute 4.2 2.0 - 7.7 thou/uL    Lymphocytes Absolute 1.5 0.8 - 4.4 thou/uL    Monocytes Absolute 0.4 0.0 - 0.9 thou/uL    Eosinophils Absolute 0.2 0.0 - 0.4 thou/uL    Basophils Absolute 0.0 0.0 - 0.2 thou/uL            Elevated blood glucose    Elevated creatinine and stage 3 chronic kidney disease, however stable in comparison.     Normal  CBC          Please call with questions or contact us using [employer].    Sincerely,        Electronically signed by Adan Rodriguez MD

## 2021-06-20 NOTE — LETTER
Letter by Adan Rodriguez MD at      Author: Adan Rodriguez MD Service: -- Author Type: --    Filed:  Encounter Date: 7/7/2020 Status: (Other)         Laz Kingston  [address] [place] [address]             July 7, 2020         Dear Mr. Kingston,    Below are the results from your recent visit:    Resulted Orders   Glycosylated Hemoglobin A1c   Result Value Ref Range    Hemoglobin A1c 7.4 (H) 3.5 - 6.0 %        Improving HgA1c in comparison    Continue current management    Recheck level in 6 months     Please call with questions or contact us using [employer].    Sincerely,        Electronically signed by Adan Rdoriguez MD

## 2021-06-20 NOTE — LETTER
Letter by Adan Rodriguez MD at      Author: Adan Rodriguez MD Service: -- Author Type: --    Filed:  Encounter Date: 3/16/2020 Status: (Other)         Laz ELMER Kingston  [address] [place] [address]      03/19/20      Dear Laz,        We have attempted to contact you to schedule your Diabetic Check-up appointment with your provider.     Please call our Patient Scheduling Line at [phone] to schedule your appointment.    We believe that a strong preventative care program, including regular physicals and follow-up care is an important part of a healthy lifestyle and we are committed to helping you maintain your health.    Thank you for choosing us as your health care provider.    Sincerely,    Urszula Lu LPN - SYBIL/CA  [facility]  [address] [id number] [phone]

## 2021-06-20 NOTE — LETTER
Letter by Adan Rodriguez MD at      Author: Adan Rodriguez MD Service: -- Author Type: --    Filed:  Encounter Date: 10/9/2020 Status: (Other)         Laz Kingston  [address] [place] [address]             October 9, 2020         Dear Mr. Kingston,    Below are the results from your recent visit:    Resulted Orders   Comprehensive Metabolic Panel   Result Value Ref Range    Sodium 141 136 - 145 mmol/L    Potassium 3.9 3.5 - 5.0 mmol/L    Chloride 106 98 - 107 mmol/L    CO2 25 22 - 31 mmol/L    Anion Gap, Calculation 10 5 - 18 mmol/L    Glucose 246 (H) 70 - 125 mg/dL    BUN 29 (H) 8 - 28 mg/dL    Creatinine 1.99 (H) 0.70 - 1.30 mg/dL    GFR MDRD Af Amer 39 (L) >60 mL/min/1.73m2    GFR MDRD Non Af Amer 32 (L) >60 mL/min/1.73m2    Bilirubin, Total 0.8 0.0 - 1.0 mg/dL    Calcium 9.8 8.5 - 10.5 mg/dL    Protein, Total 6.4 6.0 - 8.0 g/dL    Albumin 3.6 3.5 - 5.0 g/dL    Alkaline Phosphatase 59 45 - 120 U/L    AST 25 0 - 40 U/L    ALT 31 0 - 45 U/L    Narrative    Fasting Glucose reference range is 70-99 mg/dL per  American Diabetes Association (ADA) guidelines.   Glycosylated Hemoglobin A1c   Result Value Ref Range    Hemoglobin A1c 7.5 (H) <=5.6 %      Comment:      Normal <5.7% Prediabete 5.7-6.4% Diabletes 6.5% or higher - adopted from ADA consensus guidelines       Elevated blood glucose renal insufficiency, stable in comparison     Please call with questions or contact us using [email or web address].    Sincerely,        Electronically signed by Adan Rodriguez MD

## 2021-06-20 NOTE — LETTER
Letter by Adan Rodriguez MD at      Author: Adan Rodriguez MD Service: -- Author Type: --    Filed:  Encounter Date: 6/12/2020 Status: (Other)         Laz ELMER Kingston  [address] [place] [address]      06/12/20      Dear Laz,      In reviewing your records, we have determined a medication check is needed before your next refill, please call the [facility] to schedule an appointment.      Medication check/review      Please call [phone] to schedule an appointment.    We believe that a strong preventative care program, including regular physicals and follow-up care is an important part of a healthy lifestyle and we are committed to helping you maintain your health.    Thank you for choosing us as your health care provider.    Sincerely,    Urszula Lu  [facility]  [address] [address]  [phone]

## 2021-06-20 NOTE — PROGRESS NOTES
Assessment/Plan:        1. Chronic Kidney Disease, Stage 3  2. Essential hypertension, benign    Cardiovascular meds were rearranged.   Will discontinue HCTZ.   Monitor GFR  - Basic Metabolic Panel;  Stable renal function with no significant change over the past 5 months  Keep hydrated  Continue current management     Start:   - amLODIPine-benazepril (LOTREL) 10-20 mg per capsule; Take 1 capsule by mouth daily.  Dispense: 30 capsule; Refill: 0  - metoprolol succinate (TOPROL XL) 50 MG 24 hr tablet; Take 1 tablet (50 mg total) by mouth daily.  Dispense: 30 tablet; Refill: 0    Discontinue:   Amlodipine  Lilsinopril / HCTZ  Labetolol    3. Hypercholesterolemia  Continue on simvastatin as is.     4. Controlled type 2 diabetes mellitus without complication, with long-term current use of insulin (H)    Discontinue:   Pioglitazone    Continue all other DM meds.     - Glycosylated Hemoglobin A1c; elevated at 6.6  - Microalbumin, Random Urine;   Microalbumin present but stable in comparison  Continue current management         45  minutes spent on this visit with more than 50% time spent on reviewing medical record, med reconciliation, education, providing supportive therapy regarding coping with chronic illness, and entering orders            Subjective:    Patient ID:   Laz Kingston is a 83 y.o. male here for med check / review.     He voices no concerns.         1. Chronic Kidney Disease, Stage 3    had Elevated creatinine and low GFR ( renal function) worse in comparison noted on the last test.      2. Essential hypertension,   On diuretic, ACE, calcium channel blocker, beta-blocker     3. Hypercholesterolemia   On moderate intensity statin     4. Controlled type 2 diabetes mellitus   B-140's.   Checking 3x / week     Lab Results   Component Value Date    HGBA1C 6.0 2018      Lab Results   Component Value Date    LDLCALC 76 10/19/2015      Lab Results   Component Value Date    MICROALBUR 50.23 (H)  "12/20/2017    ZUGS05TXP 11.44 10/23/2015                 Review of Systems  A complete 10 point review of systems was obtained and is negative other than what is stated in the HPI.      The following patient's history were reviewed and updated as appropriate:   He  has a past medical history of BCE (basal cell epithelioma) - basal cell skin cancer - [facility] (10/19/2015); Diabetes mellitus (H); Dysplastic nevus - with atypia - 2014 - [facility] (10/19/2015); Onychomycosis; and Splenomegaly - 16 cm, stable versus April and December, 2014 ([facility] imaging for stone for Dr. Ackerman - January, 2017.) (1/10/2017).  He  has a past surgical history that includes pr arthroplasty tibial plateau; Joint replacement (Bilateral); and Prostatectomy..      Outpatient Encounter Prescriptions as of 10/10/2018   Medication Sig Dispense Refill     amLODIPine (NORVASC) 10 MG tablet Take 1 tablet by mouth  daily 90 tablet 1     aspirin 81 mg chewable tablet Chew 81 mg daily.       blood glucose test strips Contour Next Test Strips :  Test twice daily as instructed. 300 strip 1     glimepiride (AMARYL) 4 MG tablet TAKE 1 TABLET BY MOUTH  DAILY, WITH THE FIRST MEAL  OF THE DAY FOR DIABETES. 90 tablet 1     insulin glargine (LANTUS SOLOSTAR U-100 INSULIN) 100 unit/mL (3 mL) pen Inject 34 Units under the skin at bedtime. 5 adj dose pen 2     labetalol (TRANDATE; NORMODYNE) 100 MG tablet Take 1 tablet by mouth two  times daily 180 tablet 0     LANCETS MISC Use As Directed 3 (three) times a day. [employer] Microlet Lancets Miscellaneous       lisinopril-hydrochlorothiazide (ZESTORETIC) 20-25 mg per tablet Take 1 tablet by mouth daily. 90 tablet 3     memantine (NAMENDA) 10 MG tablet        omeprazole (PRILOSEC) 20 MG capsule TAKE 1 DAILY, 1/2 HOUR  BEFORE BREAKFAST FOR  HEARTBURN. 90 capsule 2     pen needle, diabetic (BD ULTRA-FINE BRITTANY PEN NEEDLE) 32 gauge x 5/32\" Ndle Inject 1 each under the skin daily. 100 each 3     pioglitazone " (ACTOS) 15 MG tablet Take 1 tablet (15 mg total) by mouth daily. 90 tablet 1     rivastigmine (EXELON) 13.3 mg/24 hour PT24 patch Apply 1 patch topically daily.   6     simvastatin (ZOCOR) 20 MG tablet TAKE 1 TABLET BY MOUTH AT  BEDTIME FOR CHOLESTEROL. 90 tablet 1     acetaminophen-codeine (TYLENOL #3) 300-30 mg per tablet 1/2 tablet up to every four hours prn cough. 20 tablet 0     chlorthalidone (HYGROTEN) 25 MG tablet Take 25 mg by mouth daily. TAKE HALF TABLET A DAILY       mirabegron (MYRBETRIQ) 25 mg Tb24 ER tablet Take 25 mg by mouth daily.        No facility-administered encounter medications on file as of 10/10/2018.          Objective:   /62 (Patient Site: Right Arm, Patient Position: Sitting, Cuff Size: Adult Large)  Pulse 65  Temp 98.1  F (36.7  C) (Oral)   Wt (!) 243 lb 4.8 oz (110.4 kg)  SpO2 95%  BMI 36.19 kg/m2      Physical Exam  General Appearance:    Alert, well hydrated, no distress,    Eyes:    PERRL, conjunctiva/corneas clear,    Throat:   Lips, mucosa, and tongue normal; teeth and gums normal   Neck:   Supple, symmetrical, trachea midline, no adenopathy;        thyroid:  No enlargement/tenderness/nodules; no carotid    bruit or JVD   Lungs:     Clear to auscultation bilaterally, respirations unlabored   Heart:    Regular rate and rhythm, S1 and S2 normal, no murmur, rub   or gallop   Abdomen:     Soft, non-tender, normal bowel sounds, no rebound or guarding, no masses, no organomegaly   Extremities:   Extremities normal, atraumatic, no cyanosis or edema   Skin:   Skin color, texture, turgor normal, no rashes or lesions

## 2021-06-20 NOTE — LETTER
Letter by Adan Rodriguez MD at      Author: Adan Rodriguez MD Service: -- Author Type: --    Filed:  Encounter Date: 10/9/2020 Status: (Other)         Laz Kingston  [address] [place] [address]             October 9, 2020         Dear Mr. Kingston,    Below are the results from your recent visit:    Resulted Orders   Comprehensive Metabolic Panel   Result Value Ref Range    Sodium 141 136 - 145 mmol/L    Potassium 3.9 3.5 - 5.0 mmol/L    Chloride 106 98 - 107 mmol/L    CO2 25 22 - 31 mmol/L    Anion Gap, Calculation 10 5 - 18 mmol/L    Glucose 246 (H) 70 - 125 mg/dL    BUN 29 (H) 8 - 28 mg/dL    Creatinine 1.99 (H) 0.70 - 1.30 mg/dL    GFR MDRD Af Amer 39 (L) >60 mL/min/1.73m2    GFR MDRD Non Af Amer 32 (L) >60 mL/min/1.73m2    Bilirubin, Total 0.8 0.0 - 1.0 mg/dL    Calcium 9.8 8.5 - 10.5 mg/dL    Protein, Total 6.4 6.0 - 8.0 g/dL    Albumin 3.6 3.5 - 5.0 g/dL    Alkaline Phosphatase 59 45 - 120 U/L    AST 25 0 - 40 U/L    ALT 31 0 - 45 U/L    Narrative    Fasting Glucose reference range is 70-99 mg/dL per  American Diabetes Association (ADA) guidelines.   Glycosylated Hemoglobin A1c   Result Value Ref Range    Hemoglobin A1c 7.5 (H) <=5.6 %      Comment:      Normal <5.7% Prediabete 5.7-6.4% Diabletes 6.5% or higher - adopted from ADA consensus guidelines       Elevated A1c at 7.5   Recommended to keep at or below this level   Continue current management     Please call with questions or contact us using [email or web address].    Sincerely,        Electronically signed by Adan Rodriguez MD

## 2021-06-21 NOTE — PROGRESS NOTES
Assessment/Plan:        1. Essential hypertension, benign  Refill   - amLODIPine-benazepril (LOTREL) 10-20 mg per capsule; Take 1 capsule by mouth daily.  Dispense: 90 capsule; Refill: 1  - metoprolol succinate (TOPROL XL) 50 MG 24 hr tablet; Take 1 tablet (50 mg total) by mouth daily.  Dispense: 90 tablet; Refill: 1       2. Microalbuminuria    3. Type 2 diabetes mellitus without complication, with long-term current use of insulin (H)    4. Chronic Kidney Disease, Stage 3    5. Hypertriglyceridemia       Lab findings were discussed and questions were answered to his satisfaction.   Continue current management     follow up in 3 months     Total time spent with patient was 30  minutes with >50% of time spent in face-to-face counseling regarding the above plan.            Subjective:    Patient ID:   Laz Kingston is a 83 y.o. male with HTN, HLD, and DM comes in follow up of recent lab work with findings of   Microalbuminuria , A1c 6.6 , GFR 39 , Triglycerides 329, HDL 31    He also needs a refill on his BP meds.   No other concerns.         Review of Systems  A complete 7 point review of systems was obtained and is negative other than what is stated in the HPI.        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 Ackerman - January, 2017.) (1/10/2017)..      Outpatient Encounter Prescriptions as of 11/6/2018   Medication Sig Dispense Refill     acetaminophen-codeine (TYLENOL #3) 300-30 mg per tablet 1/2 tablet up to every four hours prn cough. 20 tablet 0     amLODIPine-benazepril (LOTREL) 10-20 mg per capsule Take 1 capsule by mouth daily. 30 capsule 0      "aspirin 81 mg chewable tablet Chew 81 mg daily.       blood glucose test strips Contour Next Test Strips :  Test twice daily as instructed. 300 strip 1     glimepiride (AMARYL) 4 MG tablet TAKE 1 TABLET BY MOUTH  DAILY, WITH THE FIRST MEAL  OF THE DAY FOR DIABETES. 90 tablet 1     insulin glargine (LANTUS SOLOSTAR U-100 INSULIN) 100 unit/mL (3 mL) pen Inject 34 Units under the skin at bedtime. 5 adj dose pen 2     LANCETS MISC Use As Directed 3 (three) times a day. [employer] Microlet Lancets Miscellaneous       memantine (NAMENDA) 10 MG tablet        metoprolol succinate (TOPROL XL) 50 MG 24 hr tablet Take 1 tablet (50 mg total) by mouth daily. 30 tablet 0     mirabegron (MYRBETRIQ) 25 mg Tb24 ER tablet Take 25 mg by mouth daily.        omeprazole (PRILOSEC) 20 MG capsule TAKE 1 DAILY, 1/2 HOUR  BEFORE BREAKFAST FOR  HEARTBURN. 90 capsule 2     pen needle, diabetic (BD ULTRA-FINE BRITTANY PEN NEEDLE) 32 gauge x 5/32\" Ndle Inject 1 each under the skin daily. 100 each 3     rivastigmine (EXELON) 13.3 mg/24 hour PT24 patch Apply 1 patch topically daily.   6     simvastatin (ZOCOR) 20 MG tablet TAKE 1 TABLET BY MOUTH AT  BEDTIME FOR CHOLESTEROL. 90 tablet 1     No facility-administered encounter medications on file as of 11/6/2018.          Objective:   /78 (Patient Site: Right Arm, Patient Position: Sitting, Cuff Size: Adult Large)  Pulse 78  Temp 97.9  F (36.6  C) (Oral)   Wt (!) 246 lb 9.6 oz (111.9 kg)  SpO2 95%  BMI 36.68 kg/m2      Physical Exam  General Appearance:    Alert, well hydrated, no distress   Throat:   mucous membranes moist, pharynx normal without lesions   Neck:   Supple, symmetrical, trachea midline, no adenopathy;     thyroid:  no enlargement/tenderness/nodules;    Lungs:     clear to auscultation, no wheezes, rales or rhonchi, symmetric air entry     Heart:    Regular rate and rhythm, S1 and S2 normal, no murmur, rub   or gallop, no edema    Skin:   Skin color, texture, turgor normal, no rashes " or lesions

## 2021-06-21 NOTE — LETTER
Letter by Adan Rodriguez MD at      Author: Adan Rodriguez MD Service: -- Author Type: --    Filed:  Encounter Date: 5/11/2021 Status: (Other)         Laz Kingston  [address] [place] [address]             May 11, 2021         Dear Mr. Kingston,    Below are the results from your recent visit:    Resulted Orders   Basic Metabolic Panel   Result Value Ref Range    Sodium 140 136 - 145 mmol/L    Potassium 3.9 3.5 - 5.0 mmol/L    Chloride 107 98 - 107 mmol/L    CO2 22 22 - 31 mmol/L    Anion Gap, Calculation 11 5 - 18 mmol/L    Glucose 298 (H) 70 - 125 mg/dL    Calcium 9.2 8.5 - 10.5 mg/dL    BUN 28 8 - 28 mg/dL    Creatinine 2.37 (H) 0.70 - 1.30 mg/dL    GFR MDRD Af Amer 32 (L) >60 mL/min/1.73m2    GFR MDRD Non Af Amer 26 (L) >60 mL/min/1.73m2    Narrative    Fasting Glucose reference range is 70-99 mg/dL per  American Diabetes Association (ADA) guidelines.         Elevated creatinine, and decreased renal function     Plan:   Keep hydrated   Continue current management     Please call with questions or contact us using [email or web address].    Sincerely,        Electronically signed by Adan Rodriguez MD

## 2021-06-21 NOTE — PROGRESS NOTES
Assessment/Plan:        1. Essential hypertension, benign  Normotensive blood pressure readings today   We discussed that no change to his blood pressure management at this time, and given his age  blood pressures up to 150 systolic should be acceptable.    Continue monitoring    2. Type 2 diabetes mellitus without complication, with long-term current use of insulin (H)  We discussed keeping the blood glucose in the range up to 180 and use of Lantus from 36-40 units along with glimepiride 4 mg daily.  Check blood sugars twice a day and follow-up as needed      Total time spent with patient was 25  minutes with >50% of time spent in face-to-face counseling regarding the above plan.                  Subjective:    Patient ID:   Laz Kingston is a 83 y.o. male with a history of hypertension, diabetes, CKD stage III, and dementia, here with his wife in follow-up of noting a high readings on his blood pressure and blood sugar.  His blood pressure is currently managed on amlodipine/benazepril 10/20 mg, metoprolol 50 mg daily and diabetes taking up to 36 units of Lantus and glimepiride 4 mg daily.  She knows that his blood pressure has been up in the 160s systolic range and blood sugar close to 200, without any change to his diet or activities.          Review of Systems  A complete 7 point review of systems was obtained and is negative other than what is stated in the HPI.        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 Ackerman - January, 2017.) (1/10/2017)..      Outpatient Encounter Medications as of 11/23/2018   Medication Sig Dispense Refill      "acetaminophen-codeine (TYLENOL #3) 300-30 mg per tablet 1/2 tablet up to every four hours prn cough. 20 tablet 0     amLODIPine-benazepril (LOTREL) 10-20 mg per capsule Take 1 capsule by mouth daily. 90 capsule 1     aspirin 81 mg chewable tablet Chew 81 mg daily.       blood glucose test strips Contour Next Test Strips :  Test twice daily as instructed. 300 strip 1     glimepiride (AMARYL) 4 MG tablet TAKE 1 TABLET BY MOUTH  DAILY, WITH THE FIRST MEAL  OF THE DAY FOR DIABETES. 90 tablet 1     insulin glargine (LANTUS SOLOSTAR U-100 INSULIN) 100 unit/mL (3 mL) pen Inject 34 Units under the skin at bedtime. 5 adj dose pen 2     LANCETS MISC Use As Directed 3 (three) times a day. [employer] Microlet Lancets Miscellaneous       memantine (NAMENDA) 10 MG tablet        metoprolol succinate (TOPROL XL) 50 MG 24 hr tablet Take 1 tablet (50 mg total) by mouth daily. 90 tablet 1     mirabegron (MYRBETRIQ) 25 mg Tb24 ER tablet Take 25 mg by mouth daily.        omeprazole (PRILOSEC) 20 MG capsule TAKE 1 DAILY, 1/2 HOUR  BEFORE BREAKFAST FOR  HEARTBURN. 90 capsule 2     pen needle, diabetic (BD ULTRA-FINE BRITTANY PEN NEEDLE) 32 gauge x 5/32\" Ndle Inject 1 each under the skin daily. 100 each 3     rivastigmine (EXELON) 13.3 mg/24 hour PT24 patch Apply 1 patch topically daily.   6     simvastatin (ZOCOR) 20 MG tablet TAKE 1 TABLET BY MOUTH AT  BEDTIME FOR CHOLESTEROL. 90 tablet 1     No facility-administered encounter medications on file as of 11/23/2018.          Objective:   /82 (Patient Site: Right Arm, Patient Position: Sitting, Cuff Size: Adult Large)   Pulse 77   Wt (!) 245 lb (111.1 kg)   SpO2 94%   BMI 36.44 kg/m        Physical Exam  General Appearance:    Alert, well hydrated, no distress   Throat:   mucous membranes moist, pharynx normal without lesions   Neck:   Supple, symmetrical, trachea midline, no adenopathy;     thyroid:  no enlargement/tenderness/nodules;    Lungs:     clear to auscultation, no wheezes, rales " or rhonchi, symmetric air entry     Heart:    Regular rate and rhythm, S1 and S2 normal, no murmur, rub   or gallop, no edema    Skin:   Skin color, texture, turgor normal, no rashes or lesions

## 2021-06-21 NOTE — LETTER
Letter by Adan Rodriguez MD at      Author: Adan Rodriguez MD Service: -- Author Type: --    Filed:  Encounter Date: 2/24/2021 Status: (Other)         Laz Kingston  [address] [place] [address]             February 24, 2021         Dear Mr. Kingston,    Below are the results from your recent visit:     Elevated blood glucose in known diabetic.    elevatd creatinine but Stable kidney/ renal function in comparison.    Resulted Orders   Basic Metabolic Panel   Result Value Ref Range    Sodium 140 136 - 145 mmol/L    Potassium 3.7 3.5 - 5.0 mmol/L    Chloride 105 98 - 107 mmol/L    CO2 28 22 - 31 mmol/L    Anion Gap, Calculation 7 5 - 18 mmol/L    Glucose 279 (H) 70 - 125 mg/dL    Calcium 9.5 8.5 - 10.5 mg/dL    BUN 27 8 - 28 mg/dL    Creatinine 1.96 (H) 0.70 - 1.30 mg/dL    GFR MDRD Af Amer 40 (L) >60 mL/min/1.73m2    GFR MDRD Non Af Amer 33 (L) >60 mL/min/1.73m2    Narrative    Fasting Glucose reference range is 70-99 mg/dL per  American Diabetes Association (ADA) guidelines.           Please call with questions or contact us using [email or web address].    Sincerely,        Electronically signed by Adan Rodriguez MD

## 2021-06-23 NOTE — PROGRESS NOTES
Assessment/Plan:        1. Controlled type 2 diabetes mellitus without complication, with long-term current use of insulin (H)    2. Essential hypertension, benign    Stable   Continue current management   Follow up in 3 months.                Subjective:    Patient ID:   Laz Kingston is a 83 y.o. male comes in follow up of      1. Essential hypertension,   Been having BP readings in the 120's and 130's systolic range for the most part.   He has no additional concerns about his BP and current management.      2. Type 2 diabetes mellitus   BG is kept around 150-160's range.  There are occasions when it shoots up to 200-300 when eating a cake or ice cream.   No additional concerns.         Review of Systems  General : negative  Ophthalmic : negative  ENT : negative  Respiratory : no cough, shortness of breath, or wheezing  Cardiovascular : See HPI, no chest pain or dyspnea on exertion  Gastrointestinal : no abdominal pain, change in bowel habits, or black or bloody stools  Genito-Urinary :  no dysuria, trouble voiding, or hematuria  Musculoskeletal : negative  Neurological : negative  Dermatological : negative    Allergy: reviewed  Hematological and Lymphatic : negative  Endocrine : See HPI     The following patient's history were reviewed and updated as appropriate:   He  has a past medical history of BCE (basal cell epithelioma) - basal cell skin cancer - [facility] (10/19/2015), Diabetes mellitus (H), Dysplastic nevus - with atypia - 2014 - [facility] (10/19/2015), Onychomycosis, and Splenomegaly - 16 cm, stable versus April and December, 2014 ([facility] imaging for stone for Dr. Ackerman - January, 2017.) (1/10/2017)..      Outpatient Encounter Medications as of 1/16/2019   Medication Sig Dispense Refill     acetaminophen-codeine (TYLENOL #3) 300-30 mg per tablet 1/2 tablet up to every four hours prn cough. 20 tablet 0     amLODIPine-benazepril (LOTREL) 10-20 mg per capsule Take 1 capsule by mouth  "daily. 90 capsule 1     aspirin 81 mg chewable tablet Chew 81 mg daily.       blood glucose test strips Contour Next Test Strips :  Test twice daily as instructed. 300 strip 1     glimepiride (AMARYL) 4 MG tablet TAKE 1 TABLET BY MOUTH  DAILY, WITH THE FIRST MEAL  OF THE DAY FOR DIABETES.. 90 tablet 0     insulin glargine (LANTUS SOLOSTAR U-100 INSULIN) 100 unit/mL (3 mL) pen Inject 40 Units under the skin at bedtime. 5 adj dose pen 2     LANCETS MISC Use As Directed 3 (three) times a day. [email or web address] Microlet Lancets Miscellaneous       linagliptin 5 mg Tab Take 1 tablet (5 mg total) by mouth once daily. With or without food 90 tablet 0     memantine (NAMENDA) 10 MG tablet        metoprolol succinate (TOPROL XL) 50 MG 24 hr tablet Take 1 tablet (50 mg total) by mouth daily. 90 tablet 1     mirabegron (MYRBETRIQ) 25 mg Tb24 ER tablet Take 25 mg by mouth daily.        omeprazole (PRILOSEC) 20 MG capsule TAKE 1 DAILY, 1/2 HOUR  BEFORE BREAKFAST FOR  HEARTBURN.. 90 capsule 3     pen needle, diabetic (BD ULTRA-FINE BRITTANY PEN NEEDLE) 32 gauge x 5/32\" Ndle Inject 1 each under the skin daily. 100 each 3     rivastigmine (EXELON) 13.3 mg/24 hour PT24 patch Apply 1 patch topically daily.   6     simvastatin (ZOCOR) 20 MG tablet TAKE 1 TABLET BY MOUTH AT  BEDTIME FOR CHOLESTEROL. 90 tablet 1     No facility-administered encounter medications on file as of 1/16/2019.          Objective:   /80 (Patient Site: Right Arm, Patient Position: Sitting, Cuff Size: Adult Large)   Pulse 73   Wt (!) 243 lb 1.6 oz (110.3 kg)   SpO2 93%   BMI 36.16 kg/m        Physical Exam  General Appearance:    Alert, well hydrated, no distress   Throat:   mucous membranes moist, pharynx normal without lesions   Neck:   Supple, symmetrical, trachea midline, no adenopathy;     thyroid:  no enlargement/tenderness/nodules;    Lungs:     clear to auscultation, no wheezes, rales or rhonchi, symmetric air entry     Heart:    Regular rate and rhythm, S1 and " S2 normal, no murmur, rub   or gallop, no edema    Skin:   Skin color, texture, turgor normal, no rashes or lesions

## 2021-06-24 NOTE — TELEPHONE ENCOUNTER
Patient needs this medication to be sent to local [facility].  [facility]        Refill Request  Did you contact pharmacy: Yes  Medication name:   Requested Prescriptions     Pending Prescriptions Disp Refills     insulin glargine (LANTUS SOLOSTAR U-100 INSULIN) 100 unit/mL (3 mL) pen 5 adj dose pen 5     Sig: Inject 40 Units under the skin at bedtime.     Who prescribed the medication: Adan Rodriguez MD  Pharmacy Name and Location: [facility] #[id number]  Is patient out of medication: No.  7 days left  Patient notified refills processed in 72 hours:  yes  Okay to leave a detailed message: yes

## 2021-06-24 NOTE — PATIENT INSTRUCTIONS - HE
1. Controlled type 2 diabetes mellitus without complication, with long-term current use of insulin (H)  - Glycosylated Hemoglobin A1c    Up Adjust Lantus by 2 units every 3-4 nights for BG goal of 150 or less.   Continue with all other Diabetic  meds    Follow up in a month to reassess.     2. Essential hypertension, benign  - Basic Metabolic Panel  Normotensive   Continue with the current plan

## 2021-06-24 NOTE — TELEPHONE ENCOUNTER
Medication Question or Clarification  Who is calling: Other: Wife  What medication are you calling about?: see below  acetaminophen-codeine (TYLENOL #3) 300-30 mg per tablet 20 tablet 0 3/27/2017     Si/2 tablet up to every four hours prn cough.      mirabegron (MYRBETRIQ) 25 mg Tb24 ER tablet        Sig - Route: Take 25 mg by mouth daily.  - Oral      What dose do you take?: see above   How often are you taking the medication?: see above   Who prescribed the medication?: Benton Pearson MD  What is your question/concern?: Wife stated she compared the medication list with what patient is taking and it was noted the above medications were not being taken and request these to be removed from the medication list.    Pharmacy: [facility]   Okay to leave a detailed message?: Yes  Site CMT - Please call the pharmacy to obtain any additional needed information.

## 2021-06-24 NOTE — TELEPHONE ENCOUNTER
Pharmacy change.  Rx went to [facility].  Patient wants it at local [facility].      Refill Request  Did you contact pharmacy: Yes  Medication name:   Requested Prescriptions     Pending Prescriptions Disp Refills     blood glucose test strips 300 strip 1     Sig: Contour Next Test Strips :  Test twice daily as instructed.     Who prescribed the medication: Adan Rodriguez MD \  Pharmacy Name and Location: [place] [address]  Is patient out of medication: No.  7 days left  Patient notified refills processed in 72 hours:  yes  Okay to leave a detailed message: yes

## 2021-06-24 NOTE — PROGRESS NOTES
Assessment/Plan:        1. Controlled type 2 diabetes mellitus without complication, with long-term current use of insulin (H)  - Glycosylated Hemoglobin A1c    Discussed adjusting the Lantus by 2 units every 3-4 nights for the BG goal of 150 or less.   Continue with all other Diabetic  meds    Follow up in a month to reassess.     2. Essential hypertension, benign/hypercholesterolemia  - Basic Metabolic Panel  Normotensive   Continue with the current plan              At the conclusion of the encounter, plan of care and the disposition with patient were reviewed, and all questions were answered.  The patient acknowledged understanding and was involved in the decision making regarding the overall care plan.       Subjective:    Patient ID:   Laz Kingston is a 84 y.o. male comes in follow-up for med check review and diabetes.  He reports that his blood glucose has been running close to 200s.    Has been tolerating Amaryl 4 mg daily , Lantus: 40-42 at bedtime, and Linagliptin 5 mg tab daily with no side effects  Is taken to Metroprolol 50 mg daily, amlodipine/benazepril daily for the management and maintenance of hypertension and taking simvastatin 20 mg at bedtime for management of hypercholesterolemia with no additional concerns      Review of Systems  A complete 7 point review of systems was obtained and is negative other than what is stated in the HPI.        The following patient's history were reviewed and updated as appropriate:   He  has a past medical history of BCE (basal cell epithelioma) - basal cell skin cancer - [facility] (10/19/2015), Diabetes mellitus (H), Dysplastic nevus - with atypia - 2014 - [facility] (10/19/2015), Onychomycosis, and Splenomegaly - 16 cm, stable versus April and December, 2014 ([facility] imaging for stone for Dr. Ackerman - January, 2017.) (1/10/2017)..      Outpatient Encounter Medications as of 3/6/2019   Medication Sig Dispense Refill     acetaminophen-codeine  "(TYLENOL #3) 300-30 mg per tablet 1/2 tablet up to every four hours prn cough. 20 tablet 0     amLODIPine-benazepril (LOTREL) 10-20 mg per capsule Take 1 capsule by mouth daily. 90 capsule 1     aspirin 81 mg chewable tablet Chew 81 mg daily.       blood glucose test strips Contour Next Test Strips :  Test twice daily as instructed. 200 strip 3     glimepiride (AMARYL) 4 MG tablet TAKE 1 TABLET BY MOUTH  DAILY, WITH THE FIRST MEAL  OF THE DAY FOR DIABETES. 90 tablet 1     insulin glargine (LANTUS SOLOSTAR U-100 INSULIN) 100 unit/mL (3 mL) pen Inject 40 Units under the skin at bedtime. 5 adj dose pen 5     LANCETS MISC Use As Directed 3 (three) times a day. Tyrel Microlet Lancets Miscellaneous       linagliptin 5 mg Tab Take 1 tablet (5 mg total) by mouth once daily. With or without food 30 tablet 0     memantine (NAMENDA) 10 MG tablet        metoprolol succinate (TOPROL XL) 50 MG 24 hr tablet Take 1 tablet (50 mg total) by mouth daily. 90 tablet 1     mirabegron (MYRBETRIQ) 25 mg Tb24 ER tablet Take 25 mg by mouth daily.        omeprazole (PRILOSEC) 20 MG capsule TAKE 1 DAILY, 1/2 HOUR  BEFORE BREAKFAST FOR  HEARTBURN.. 90 capsule 3     pen needle, diabetic (BD ULTRA-FINE BRITTANY PEN NEEDLE) 32 gauge x 5/32\" Ndle Inject 1 each under the skin daily. 100 each 3     rivastigmine (EXELON) 13.3 mg/24 hour PT24 patch Apply 1 patch topically daily.   6     simvastatin (ZOCOR) 20 MG tablet Take 1 tablet (20 mg total) by mouth at bedtime. . 90 tablet 1     No facility-administered encounter medications on file as of 3/6/2019.          Objective:   /76 (Patient Site: Right Arm, Patient Position: Sitting, Cuff Size: Adult Large)   Pulse 65   Wt (!) 245 lb 8 oz (111.4 kg)   SpO2 95%   BMI 36.52 kg/m        Physical Exam  General Appearance:    Alert, well hydrated, no distress   Throat:   mucous membranes moist, pharynx normal without lesions   Neck:   Supple, symmetrical, trachea midline, no adenopathy;     thyroid:  no " enlargement/tenderness/nodules;    Lungs:     clear to auscultation, no wheezes, rales or rhonchi, symmetric air entry     Heart:    Regular rate and rhythm, S1 and S2 normal, no murmur, rub   or gallop, no edema    Skin:   Skin color, texture, turgor normal, no rashes or lesions

## 2021-06-25 NOTE — PROGRESS NOTES
Progress Notes by Benton Bal at 12/20/2017 10:00 AM     Author: Benton Bal Service: -- Author Type: Nurse Practitioner    Filed: 12/20/2017  1:57 PM Encounter Date: 12/20/2017 Status: Signed    : Benton Bal [facility]    Date of Service: 12/20/2017  Primary Provider: No primary care provider on file.    Patient Care Team:  Amos Ackerman MD as Physician (Urology)  Adair Hernandez MD as Physician (Neurology)     ______________________________________________________________________     Patient's Pharmacy:    [facility] - [address]  [facility] [address]  Phone: [phone] Fax: [phone]    [facility] [address] [place] [address] [facility] [address] [facility]  [address] [place] [id number]  Phone: [phone] Fax: [phone]     Patient's Insurance:    Payor: MEDICARE / Plan: MEDICARE RAILROAD / Product Type: Medicare /     ______________________________________________________________________    Assessment:    1. Follow up    2. Diabetes mellitus type 2    3. Hyperlipidemia       ______________________________________________________________________      PHQ-2 Total Score: 0 (6/19/2017 11:00 AM)     Plan:  Patient Instructions   1. Lab tests ordered today:  - Basic Metabolic Panel  - HM2(CBC w/o Differential)  - Glycosylated Hemoglobin A1c  - Microalbumin, Random Urine  - Lipid Cascade FASTING; Future    2. Referrals placed today:  - Ambulatory referral to Podiatry    3. Annual Diabetic Eye appointment after the 1st of the year.        ______________________________________________________________________     Laz Kingston is 82 y.o. male who comes in today for:    Chief Complaint   Patient presents with   ? Follow-up     DM check, no problems with BS around 150's wife states. Medications working great        Patient Active Problem  List   Diagnosis   ? Hypercholesterolemia   ? Esophageal Reflux   ? Arthralgias In Multiple Sites   ? BP - high blood pressure   ? Nephrolithiasis   ? Chronic Kidney Disease, Stage 3   ? Diabetes mellitus type 2   ? Chronic diarrhea - not an issue as far as he is concerned.  [facility] - 10/19/15   ? HISTORY of prostate cancer - has had a prostatectomy - sees Dr. Ackerman   ? 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 Diastasis recti - large bulge in abdomen - NOT a hernia.   ? Hearing loss - bilateral hearing aids, but does not use them.   ? Skin tag - right face, in advance of and below the ear lobe.   ? **NO** diabetic retinopathy - Felice Brannon MD - [facility] - 1/26/17   ? Cataract - RIGHT eye - Felice Brannon MD - [facility] - 1/25/16   ? Dementia - treated by Dr. Hernandez - [facility]   ? Elevated AST (SGOT) - Dr. Hernandez - 11/29/16 - 46 (0-45)   ? Eye pain, left - injection of the lower half of his left eye sclera, started today.   ? Splenomegaly - 16 cm, stable versus April and December, 2014 ([facility] imaging for stone for Dr. Ackerman - January, 2017.)   ? Low HDL (under 40)   ? Hypertriglyceridemia     Current Outpatient Prescriptions   Medication Sig Note   ? amLODIPine (NORVASC) 10 MG tablet Take 1 tablet by mouth  daily    ? blood glucose test strips Contour Next Test Strips :  Test twice daily as instructed.    ? chlorthalidone (HYGROTEN) 25 MG tablet  5/5/2017: Received from: External Pharmacy   ? glimepiride (AMARYL) 4 MG tablet TAKE 1 DAILY, WITH THE  FIRST MEAL OF THE DAY FOR  DIABETES.    ? insulin glargine (LANTUS SOLOSTAR) 100 unit/mL (3 mL) pen Inject 10 Units under the skin daily. (Patient taking differently: Inject 32 Units under the skin daily. )    ? labetalol (TRANDATE; NORMODYNE) 100 MG tablet Take 1 tablet by mouth two  times daily    ? LANCETS MISC Use As Directed 3 (three) times a day. Tyrel Microlet Lancets Miscellaneous    ? lisinopril (PRINIVIL,ZESTRIL)  "20 MG tablet Take 1 tablet by mouth  daily for blood pressure    ? memantine (NAMENDA) 10 MG tablet  5/5/2017: Received from: External Pharmacy   ? mirabegron (MYRBETRIQ) 25 mg Tb24 ER tablet Take 25 mg by mouth daily.     ? NAMENDA TITRATION NEW tablet pack FPD 12/14/2016: Dr. [facility] - [facility] - 12/14/2016    ? omeprazole (PRILOSEC) 20 MG capsule TAKE 1 DAILY, 1/2 HOUR  BEFORE BREAKFAST FOR  HEARTBURN.    ? pen needle, diabetic (BD ULTRA-FINE BRITTANY PEN NEEDLES) 32 gauge x 5/32\" Ndle Inject 1 each under the skin 4 (four) times a day.    ? pioglitazone (ACTOS) 15 MG tablet Add 1 tablet daily to your current program for diabetes.    ? potassium citrate (UROCIT-K) 10 mEq (1,080 mg) SR tablet Take one tablet by mouth twice daily. 10/19/2015: History of kidney stones.  10/19/2015    ? rivastigmine (EXELON) 13.3 mg/24 hour PT24 patch Apply 1 patch topically daily.  12/14/2016: Dr. Hernandez - [facility].  12/14/2016    ? simvastatin (ZOCOR) 20 MG tablet TAKE 1 TABLET BY MOUTH AT  BEDTIME FOR CHOLESTEROL.    ? acetaminophen-codeine (TYLENOL #3) 300-30 mg per tablet 1/2 tablet up to every four hours prn cough.      Social History     Social History Narrative     ______________________________________________________________________     History of present illness: Laz Kingston is a pleasant 82 y.o. male with a PMH pertinent for HTN, HLD, T2 DM, polyarthralgia, CKD -stage III, dementia, esophageal reflux, hearing loss, history of prostate cancer - s/p prostatectomy, nephrolithiasis, and history of splenomegaly who presents in clinic today, accompanied by his wife, for follow up T2DM.  He has had blood sugars running around 135-150, typically, but has been as low as 127, according to his wife. He has been doing pretty well, otherwise, but does get some FINN after climbing stairs, which is not new. He sees Dr. Hernandez, Neurology for dementia.  His wife will be making an appointment for his annual " diabetic eye exam next month.  Routine lab tests will be also obtained today.  His wife inquires about diabetic nail care and would like a referral to podiatry for this.    Review of systems:   10 point review of systems is negative unless noted in the HPI.     ______________________________________________________________________    Wt Readings from Last 3 Encounters:   12/20/17 (!) 237 lb 14.4 oz (107.9 kg)   08/22/17 (!) 233 lb (105.7 kg)   06/19/17 (!) 230 lb (104.3 kg)     BP Readings from Last 3 Encounters:   12/20/17 110/62   08/22/17 110/72   06/19/17 112/68     /62 (Patient Site: Right Arm, Patient Position: Sitting, Cuff Size: Adult Regular)  Pulse 61  Wt (!) 237 lb 14.4 oz (107.9 kg)  BMI 35.39 kg/m2     Physical Exam:  General Appearance: Alert, cooperative, no distress, appears stated age  Head: Normocephalic, without obvious abnormality, atraumatic  Eyes: PERRL, conjunctiva/corneas clear, wears glasses  Ears: Normal TM's and external ear canals, both ears  Nose: Nares normal, septum midline,mucosa normal, no drainage  Throat: Lips, mucosa, and tongue normal; some teeth missing and gums normal; no erythema, exudate, or thrush  Neck: Supple, symmetrical, trachea midline, no adenopathy;  thyroid: not enlarged, symmetric, no tenderness/mass/nodules  Back: Symmetric, no curvature, ROM normal, no CVA tenderness  Lungs: Clear to auscultation bilaterally, respirations unlabored  Heart: Regular rate and rhythm, S1 and S2 normal, no murmur, rub, or gallop  Abdomen: Mild firmness, non-tender, bowel sounds active all four quadrants,  no masses, no organomegaly  Musculoskeletal: Moves all extremities. No joint swelling or deformity.   Extremities: Extremities normal, atraumatic, no cyanosis or edema  Examination of the feet reveals warm, good capillary refill, nail exam dystrophic nail of the great toes with remainder of toes normal, normal DP and PT pulses and normal sensory exam.  Skin: Skin color,  texture, turgor normal, no rashes or lesions; numerous SK's on back  Lymph nodes: Cervical & supraclavicular nodes normal  Neurologic: He is alert & oriented x 1. He has a normal gait. Notable for dementia, repetition with questions asked to me.  Psychiatric: He has a normal mood and affect.       Benton Bal CNP  Internal Medicine  [facility]     Return in about 3 months (around 3/20/2018).

## 2021-06-25 NOTE — PROGRESS NOTES
Progress Notes by Lou Pichardo RD at 3/10/2017  2:30 PM     Author: Lou Pichardo RD Service: -- Author Type: Registered Dietitian    Filed: 3/10/2017  3:27 PM Encounter Date: 3/10/2017 Status: Attested    : Lou Pichardo RD (Registered Dietitian) Cosigner: Benton Pearson MD at 3/10/2017  4:43 PM    Attestation signed by Benton Pearson MD at 3/10/2017  4:43 PM    I have reviewed this note and spoken with Lou Knapp RD, CDE, personally.  I support the proposed plan.    Benton Pearson MD   3/10/2017   4:42 PM                    Assessment: Laz has had type 2 diabetes for two years.    He is currently taking 4 mg glimepiride and 45 mg Actos (recently started).  His FBS are 280's- 290's mg/dl as reported by his spouse Patricia.  In the office today his BG was 319 mg/dl.  He eats 3 meals and hs snack.  Spouse is very supportive and helpful to Laz with his dementia.  Dr. Pearson is ordering insulin start today.    Plan: To start 10 units Lantus insulin at bedtime (8-9 pm).   Instructed to increase by 2 units every 2 days until  mg/dl.  Patricia is the caregiver, she was able to return demonstration of insulin administration and verbalized her understanding of insulin titration.   Reviewed signs, treatment for hypoglycemia.    Patricia agreed to call me next week to report his BG readings.   Pt to d/c Actos as per Dr. Pearson.    Subjective and Objective:      Laz Kingston is referred by Dr. Pearson for Diabetes Education.     Lab Results   Component Value Date    HGBA1C 7.2 (H) 12/14/2016       Goals     None          Follow up:   Primary care visit  3/27/2016      Education:     Monitoring   Meter (per above goals): Competent  Monitoring: Competent  BG goals: Discussed    Nutrition Management  Nutrition Management: Discussed  Weight: Literature provided  Portions/Balance: Literature provided  Physical Activity: Discussed  - suggested arm chair activities using stretchy  band  Medications: Discussed  Orals: Discussed  Injected Medications: Discussed, Literature provided and Patient returned demonstration   Storage/Exp:Discussed, Literature provided and Patient returned demonstration   Site Rotation: Literature provided and Patient returned demonstration   Sites Assessed: no    Diabetes Disease Process: Discussed    Acute Complications: Prevent, Detect, Treat:  Hypoglycemia: Discussed, Literature provided and Patient returned demonstration  Hyperglycemia: Discussed and Literature provided  Sick Days: Literature provided  Driving: Not addressed    Chronic Complications  Goal Setting and Problem Solving: Discussed  Barriers: Discussed  Psychosocial Adjustments: Discussed      Time spent with the patient:45 for diabetes education and counseling.   Previous Education: yes  Visit Type:MNT  Hours Remaining: DSMT 2 and MNT 2      Lou Knapp  3/10/2017

## 2021-06-25 NOTE — TELEPHONE ENCOUNTER
Spoke with other [facility] med provider Dr. Ann as PCP is on lunch. Can advise WIC or urgent care as there are no openings in clinic today.    Called and spoke with wife. States that he has a PRN pill  That can be taken as needed for his blood pressure.     Wonders if she should give him this? Afraid this is going to make him even more tired.    States that if he is not better by the time son is home they will bring him to our [facility] here in the clinic.    Please advise on PRN medication

## 2021-06-25 NOTE — PROGRESS NOTES
Assessment & Plan     Essential hypertension, benign  Continue current management   - hydrALAZINE (APRESOLINE) 100 MG tablet; Take 1 tablet (100 mg total) by mouth 4 (four) times a day.    May still take clonidine as needed  Follow-up to recheck in 3 months    Type 2 diabetes mellitus without complication, with long-term current use of insulin (H)  Continue current management  Recheck labs in 3 months      Prescription drug management          Return in about 3 months (around 8/26/2021) for Recheck.    Adan Rodriguez MD  [facility]   Laz Kingston is 86 y.o. and presents today for med check and follow up of HTN, reporting to Doing much better on taking the hydralazine 4 times a day, with No significant side effects, and hasn't had the need to take the clonidine as much.       Other meds are well tolerated.     Lab Results   Component Value Date    HGBA1C 7.2 (H) 02/15/2021      Results for orders placed or performed in visit on 05/07/21   Basic Metabolic Panel   Result Value Ref Range    Sodium 140 136 - 145 mmol/L    Potassium 3.9 3.5 - 5.0 mmol/L    Chloride 107 98 - 107 mmol/L    CO2 22 22 - 31 mmol/L    Anion Gap, Calculation 11 5 - 18 mmol/L    Glucose 298 (H) 70 - 125 mg/dL    Calcium 9.2 8.5 - 10.5 mg/dL    BUN 28 8 - 28 mg/dL    Creatinine 2.37 (H) 0.70 - 1.30 mg/dL    GFR MDRD Af Amer 32 (L) >60 mL/min/1.73m2    GFR MDRD Non Af Amer 26 (L) >60 mL/min/1.73m2     Lab Results   Component Value Date    MICROALBUR 30.54 (H) 10/29/2018    RCJN34MPQ 11.44 10/23/2015       - lipids:   The ASCVD Risk score (Henrique ARNIE Jr., et al., 2013) failed to calculate for the following reasons:    The 2013 ASCVD risk score is only valid for ages 40 to 79       Objective    /82   Pulse 69   Temp 97.7  F (36.5  C)   Wt (!) 233 lb (105.7 kg)   SpO2 96%   BMI 35.43 kg/m    Body mass index is 35.43 kg/m .  Physical Exam  General Appearance:    Alert, well hydrated, no  distress   Lungs:     clear to auscultation, no wheezes, rales or rhonchi, symmetric air entry     Heart:    Regular rate and rhythm, S1 and S2 normal, no murmur, rub   or gallop, no edema    Skin:   Skin color, texture, turgor normal, no rashes or lesions

## 2021-06-25 NOTE — TELEPHONE ENCOUNTER
Called and relayed message to wife. She stated understanding and will let us know if any questions or concerns.    Stated patient is in need of refill on Trulicity.     Called and spoke with pharmacy and stated they had refills on file.    Informed patients wife of this

## 2021-06-25 NOTE — TELEPHONE ENCOUNTER
Triage call:   Obtained verbal permission to speak with wife- consent to communicate not filled out correctly  Blood pressure has been elevated   200/100 average  BP today (prior to call) 186/106  Had wife check BP on the phone: 168/112   Has not given the PRN medication today- reports that this medication makes him sedated- has not used PRN in over one week  Denies additional symptoms at this time  Well hydrated    Advised to be seen in the clinic today. Reviewed additional care advice with patient's wife and she verbalizes understanding. Assisted in transferring to scheduling- no available appointments with PCP until next week- wife scheduled. States that she will give him his PRN in the mean time and continue to monitor his blood pressure. Any additional actions to take today for patient?     Rebecca Gamboa RN BSBA Care Connection Triage/Med Refill 6/11/2021 10:47 AM    COVID 19 Nurse Triage Plan/Patient Instructions    Please be aware that novel coronavirus (COVID-19) may be circulating in the community. If you develop symptoms such as fever, cough, or SOB or if you have concerns about the presence of another infection including coronavirus (COVID-19), please contact your health care provider or visit  [email or web address].    Disposition/Instructions    In-Person Visit with provider recommended. Reference Visit Selection Guide.    Thank you for taking steps to prevent the spread of this virus.  o Limit your contact with others.  o Wear a simple mask to cover your cough.  o Wash your hands well and often.    Resources    [facility]: About COVID-19: [email or web address]    CDC: What to Do If You're Sick: www.cdc.gov/coronavirus/2019-ncov/about/steps-when-sick.html    CDC: Ending Home Isolation: www.cdc.gov/coronavirus/2019-ncov/hcp/disposition-in-home-patients.html     CDC: Caring for Someone: www.cdc.gov/coronavirus/2019-ncov/if-you-are-sick/care-for-someone.html     [facility]: Interim  Guidance for Hospital Discharge to Home: www.health.[facility].[place].us/diseases/coronavirus/hcp/hospdischarge.pdf    [facility] clinical trials (COVID-19 research studies): clinicalaffairs.[facility]/umn-clinical-trials     Below are the COVID-19 hotlines at the [place] Department of Health ([facility]). Interpreters are available.   o For health questions: Call 005-596-1786 or 1-111.263.1038 (7 a.m. to 7 p.m.)  o For questions about schools and childcare: Call 956-550-5925 or 1-467.117.8505 (7 a.m. to 7 p.m.)         Reason for Disposition    Systolic BP >= 180 OR Diastolic >= 110    Additional Information    Negative: Sounds like a life-threatening emergency to the triager    Negative: Pregnant > 20 weeks or postpartum (< 6 weeks after delivery) and new hand or face swelling    Negative: Pregnant > 20 weeks and BP > 140/90    Negative: Systolic BP >= 160 OR Diastolic >= 100, and any cardiac or neurologic symptoms (e.g., chest pain, difficulty breathing, unsteady gait, blurred vision)    Negative: Patient sounds very sick or weak to the triager    Negative: BP Systolic BP >= 140 OR Diastolic >= 90 and postpartum (from 0 to 6 weeks after delivery)    Negative: Systolic BP >= 180 OR Diastolic >= 110, and missed most recent dose of blood pressure medication    Protocols used: HIGH BLOOD PRESSURE-A-OH

## 2021-06-25 NOTE — TELEPHONE ENCOUNTER
Triage call:   Obtained permission to speak to Patricia- consent form not filled out correctly- wife states she will fix this at the next appointment   Was seen in the hospital 5/1-5/4  BP was elevated in the hospital  Hydralazine was increased to 4 times per day at appointment on 5/26/21  Feels weak  Not eating well- barely able to walk- unsteady on his feet  Very sleepy- wife reports he has trouble staying awake  Did fall last night- lowers himself to the floor- no injury per wife  176/104- taken at 9 am today  168/97- taken at 1 pm today  Has had 2 doses of medication so far    Per triage protocol - patient should be seen in the ER or the office with PCP approval. Advised that a note will be sent to PCP for Second level triage and they will get a call back with providers advice. Wife verbalizes understanding.     *Ok to leave a detailed message upon call back    Rebecca Gamboa RN BSBA Care Connection Triage/Med Refill 6/2/2021 1:22 PM    COVID 19 Nurse Triage Plan/Patient Instructions    Please be aware that novel coronavirus (COVID-19) may be circulating in the community. If you develop symptoms such as fever, cough, or SOB or if you have concerns about the presence of another infection including coronavirus (COVID-19), please contact your health care provider or visit  [email or web address].    Disposition/Instructions    ED or office with PCP approval Visit recommended. Follow protocol based instructions.      Bring Your Own Device:  Please also bring your smart device(s) (smart phones, tablets, laptops) and their charging cables for your personal use and to communicate with your care team during your visit.      Thank you for taking steps to prevent the spread of this virus.  o Limit your contact with others.  o Wear a simple mask to cover your cough.  o Wash your hands well and often.    Resources    [facility]: About COVID-19: [email or web address]    CDC: What to Do If You're Sick:  www.cdc.gov/coronavirus/2019-ncov/about/steps-when-sick.html    CDC: Ending Home Isolation: www.cdc.gov/coronavirus/2019-ncov/hcp/disposition-in-home-patients.html     CDC: Caring for Someone: www.cdc.gov/coronavirus/2019-ncov/if-you-are-sick/care-for-someone.html     [facility]: Interim Guidance for Hospital Discharge to Home: www.health.[facility].mn./diseases/coronavirus/hcp/hospdischarge.pdf    [facility] clinical trials (COVID-19 research studies): clinicalaffairs.[facility]-clinical-trials     Below are the COVID-19 hotlines at the [place] Department of Health ([facility]). Interpreters are available.   o For health questions: Call 999-533-4589 or 1-744.144.5444 (7 a.m. to 7 p.m.)  o For questions about schools and childcare: Call 124-482-5727 or 1-458.243.6509 (7 a.m. to 7 p.m.)         Reason for Disposition    Systolic BP >= 160 OR Diastolic >= 100, and any cardiac or neurologic symptoms (e.g., chest pain, difficulty breathing, unsteady gait, blurred vision)    Additional Information    Negative: Sounds like a life-threatening emergency to the triager    Negative: Pregnant > 20 weeks or postpartum (< 6 weeks after delivery) and new hand or face swelling    Negative: Pregnant > 20 weeks and BP > 140/90    Protocols used: HIGH BLOOD PRESSURE-A-OH

## 2021-06-25 NOTE — PROGRESS NOTES
Progress Notes by Benton Bal at 5/5/2017 10:40 AM     Author: Benton Bal Service: -- Author Type: Nurse Practitioner    Filed: 5/10/2017 10:58 PM Encounter Date: 5/5/2017 Status: Signed    : [facility]enton Bal [facility]    Date of Service: 5/5/2017  Primary Provider: Benton Pearson MD    Patient Care Team:  Benton Pearson MD as PCP - General (Internal Medicine)     ______________________________________________________________________     Patient's Pharmacy:    [facility] - [address] [address]  Phone: [phone] Fax: [phone]    [facility] [address] [place] [address] [address]  [address] [place] [id number]  Phone: [phone] Fax: [phone]     Patient's Insurance:    Payor: MEDICARE / Plan: MEDICARE RAILROAD / Product Type: Medicare /     ______________________________________________________________________    Assessment:    1. Follow up    2. Diabetes mellitus type 2       ______________________________________________________________________      PHQ-2 Total Score: 0 (6/7/2016 10:00 AM)     Plan:  Patient Instructions   1. Start Actos (pioglitazone) 15 mg by mouth in AM, start tomorrow (as previously ordered)  2. Continue glipizide  3. Check blood sugars in AM and at Bedtime for now  4. No Lantus at bedtime tomorrow night, check his blood sugar before bed and in AM after he starts the Actos, then call me with the blood sugar results for directions for the Lantus dosing.  5. Refilled Test strips      ______________________________________________________________________     Laz ELMER Kingston is 82 y.o. male who comes in today for:    Chief Complaint   Patient presents with   ? Follow-up     Diabetes       Patient Active Problem List   Diagnosis   ? Hypercholesterolemia   ? Esophageal Reflux   ?  Arthralgias In Multiple Sites   ? BP - high blood pressure   ? Nephrolithiasis   ? Chronic Kidney Disease, Stage 3   ? Diabetes mellitus type 2   ? Chronic diarrhea - not an issue as far as he is concerned.  [facility] - 10/19/15   ? HISTORY of prostate cancer - has had a prostatectomy - sees Dr. Ackerman   ? 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 Diastasis recti - large bulge in abdomen - NOT a hernia.   ? Hearing loss - bilateral hearing aids, but does not use them.   ? Skin tag - right face, in advance of and below the ear lobe.   ? **NO** diabetic retinopathy - Felice Brannon MD - [facility] - 1/26/17   ? Cataract - RIGHT eye - Felice Brannon MD - [facility] - 1/25/16   ? Dementia - treated by Dr. Hernandez - [facility]   ? Elevated AST (SGOT) - Dr. Hernandez - 11/29/16 - 46 (0-45)   ? Eye pain, left - injection of the lower half of his left eye sclera, started today.   ? Splenomegaly - 16 cm, stable versus April and December, 2014 ([facility] for stone for Dr. Ackerman - January, 2017.)   ? Low HDL (under 40)   ? Hypertriglyceridemia     Past Medical History:   Diagnosis Date   ? BCE (basal cell epithelioma) - basal cell skin cancer - [facility] 10/19/2015   ? Dysplastic nevus - with atypia - 2014 - [facility] 10/19/2015   ? Splenomegaly - 16 cm, stable versus April and December, 2014 ([facility] for stone for Dr. Ackerman - January, 2017.) 1/10/2017     Past Surgical History:   Procedure Laterality Date   ? JOINT REPLACEMENT Bilateral     Both knees   ? WA ARTHROPLASTY TIBIAL PLATEAU      Description: Knee Replacement;  Recorded: 11/06/2013;   ? PROSTATECTOMY       Current Outpatient Prescriptions   Medication Sig Note   ? amLODIPine (NORVASC) 10 MG tablet Take 1 tablet (10 mg total) by mouth daily.    ? chlorthalidone (HYGROTEN) 25 MG tablet  5/5/2017: Received from: External Pharmacy   ? glimepiride (AMARYL) 4 MG tablet Take 1 daily, with the first meal of the   "day, for diabetes.    ? insulin glargine (LANTUS SOLOSTAR) 100 unit/mL (3 mL) pen Inject 10 Units under the skin daily. (Patient taking differently: Inject 20 Units under the skin daily. )    ? labetalol (TRANDATE; NORMODYNE) 100 MG tablet Take 1 tablet by mouth two  times daily    ? LANCETS MISC Use As Directed 3 (three) times a day. Tyrel Microlet Lancets Miscellaneous    ? lisinopril (PRINIVIL,ZESTRIL) 20 MG tablet Take 1 tablet by mouth  daily for blood pressure    ? memantine (NAMENDA) 10 MG tablet  5/5/2017: Received from: External Pharmacy   ? NAMENDA TITRATION NEW tablet pack FPD 12/14/2016: Dr[facility] - 12/14/2016    ? neomycin-polymyxin-dexamethasone (MAXITROL) 3.5mg/mL-10,000 unit/mL-0.1 % ophthalmic suspension SHAKE LQ AND INT 1 GTT IN OS QID 5/5/2017: Received from: External Pharmacy   ? omeprazole (PRILOSEC) 20 MG capsule Take 1 capsule (20 mg total) by mouth daily.    ? pen needle, diabetic (BD ULTRA-FINE BRITTANY PEN NEEDLES) 32 gauge x 5/32\" Ndle Inject 1 each under the skin 4 (four) times a day.    ? pioglitazone (ACTOS) 15 MG tablet Add 1 tablet daily to your current program for diabetes.    ? potassium citrate (UROCIT-K) 10 mEq (1,080 mg) SR tablet Take one tablet by mouth twice daily. 10/19/2015: History of kidney stones.  10/19/2015    ? rivastigmine (EXELON) 13.3 mg/24 hour PT24 patch Apply 1 patch topically daily.  12/14/2016: Dr. Hernandez - [facility].  12/14/2016    ? simvastatin (ZOCOR) 20 MG tablet Take 1 tablet by mouth at  Bedtime, for cholesterol.    ? acetaminophen-codeine (TYLENOL #3) 300-30 mg per tablet 1/2 tablet up to every four hours prn cough.    ? blood glucose test strips Contour Next Test Strips :  Test twice daily as instructed.      Social History     Social History   ? Marital status:      Spouse name: N/A   ? Number of children: N/A   ? Years of education: N/A     Occupational History   ? Not on file.     Social History Main Topics "   ? Smoking status: Former Smoker   ? Smokeless tobacco: Never Used      Comment: Quit prior to 1966.   ? Alcohol use No      Comment: Never a problem for him, he states.   ? Drug use: No   ? Sexual activity: Not on file     Other Topics Concern   ? Not on file     Social History Narrative     ______________________________________________________________________     History of present illness: Laz Kingston is a pleasant 82 y.o. male who presents in clinic today, accompanied by his wife, for diabetes checkup.  Blood sugars per her records from 3/10-4/10 that she brings in range from 161-206 at morning check.  He is feeling well and has no other complaints.  He denies any problems with his feet today.  He has a very good appetite and eats 3 times daily and occasional ice cream in the evening.  He denies any other changes in his health, no recent surgeries or hospitalizations.  His wife was not aware of Dr. Pearson adding Actos to his diabetes medication regimen at his last visit, but will pick it up today and start tomorrow.    Review of systems:   10 point review of systems is negative unless noted in the HPI.  ______________________________________________________________________    Wt Readings from Last 3 Encounters:   05/05/17 (!) 227 lb 4 oz (103.1 kg)   03/27/17 (!) 228 lb 6.4 oz (103.6 kg)   02/27/17 (!) 228 lb 12.8 oz (103.8 kg)     BP Readings from Last 3 Encounters:   05/05/17 112/64   03/27/17 114/62   02/27/17 120/82     /64  Pulse 62  Wt (!) 227 lb 4 oz (103.1 kg)  SpO2 95%  BMI 33.8 kg/m2     Physical Exam:  General Appearance: Alert, cooperative, no distress, appears stated age  Head: Normocephalic, without obvious abnormality, atraumatic  Eyes: PERRL, conjunctiva/corneas clear, EOM's intact  Ears: Normal TM's and external ear canals, both ears  Nose: Nares normal, septum midline,mucosa normal, no drainage  Throat: Lips, mucosa, and tongue normal; teeth and gums normal, no erythema,  exudate, or thrush  Neck: Supple, symmetrical, trachea midline, no adenopathy;  thyroid: not enlarged, symmetric, no tenderness/mass/nodules  Back: Symmetric, no curvature, ROM normal, no CVA tenderness  Lungs: Clear to auscultation bilaterally, respirations unlabored  Heart: Regular rate and rhythm, S1 and S2 normal, no murmur, rub, or gallop  Abdomen: Soft, non-tender, bowel sounds active all four quadrants,  no masses, no organomegaly  Musculoskeletal: Normal range of motion. No joint swelling or deformity.   Extremities: Extremities normal, atraumatic, no cyanosis or edema  Skin: Skin color, texture, turgor normal, no rashes or lesions  Lymph nodes: Cervical, supraclavicular, and axillary nodes normal  Neurologic: He is alert. He has normal reflexes. Dementia, likely Alzheimer's.  Psychiatric: He has a normal mood and affect.     Diagnostics: (Previous pertinent results)    Lab Results   Component Value Date    HGBA1C 7.2 (H) 12/14/2016    HGBA1C 7.3 (H) 07/20/2016    HGBA1C 7.6 (H) 02/26/2016     Lab Results   Component Value Date    MICROALBUR 13.40 (H) 12/14/2016    LDLCALC 76 10/19/2015    CREATININE 1.61 (H) 12/14/2016     Lab Results   Component Value Date    WBC 4.0 10/19/2015    HGB 14.2 10/19/2015    HCT 41.2 10/19/2015     10/19/2015    CHOL 146 10/19/2015    TRIG 197 (H) 10/19/2015    HDL 31 (L) 10/19/2015    LDLDIRECT 83 04/21/2015    ALT 46 (H) 11/29/2016    AST 28 11/29/2016     12/14/2016    K 4.3 12/14/2016     12/14/2016    CREATININE 1.61 (H) 12/14/2016    BUN 28 12/14/2016    CO2 24 12/14/2016    TSH 1.41 12/14/2016    PSA <0.1 10/19/2015    INR 1.10 03/13/2014    HGBA1C 7.2 (H) 12/14/2016    MICROALBUR 13.40 (H) 12/14/2016       Benton Bal, [facility]  Internal Medicine  [facility]     Return in about 3 months (around 8/5/2017) for or, as needed.

## 2021-06-25 NOTE — PROGRESS NOTES
Progress Notes by Benton Bal at 8/22/2017 10:00 AM     Author: Benton Bal Service: -- Author Type: Nurse Practitioner    Filed: 8/22/2017  1:33 PM Encounter Date: 8/22/2017 Status: Signed    : [facility]    Date of Service: 8/22/2017  Primary Provider: Benton Pearson MD    Patient Care Team:  Benton Pearson MD as PCP - General (Internal Medicine)     ______________________________________________________________________     Patient's Pharmacy:    [facility] - [place] [id number]  Phone: [phone] Fax: [phone]    [facility] [address]  [address] [place] [id number]  Phone: [phone] Fax: [phone]     Patient's Insurance:    Payor: MEDICARE / Plan: MEDICARE RAILROAD / Product Type: Medicare /     ______________________________________________________________________     Assessment:    1. Hearing loss due to cerumen impaction, bilateral       ______________________________________________________________________     PHQ-2 Total Score: 0 (6/19/2017 11:00 AM)      Plan:  Patient Instructions   1. Bilateral Ear Lavage today  2. Resume use of your hearing aids  3. Recommend establishing care with a new PCP as Dr. Pearson is no longer at this clinic.      ______________________________________________________________________     Laz ELMER Kingston is a 82 y.o. male who comes in today for:    Chief Complaint   Patient presents with   ? Ear Fullness     Both ears full of wax possibly.       Current Outpatient Prescriptions   Medication Sig Note   ? acetaminophen-codeine (TYLENOL #3) 300-30 mg per tablet 1/2 tablet up to every four hours prn cough.    ? amLODIPine (NORVASC) 10 MG tablet Take 1 tablet by mouth  daily    ? blood glucose test strips Contour Next Test  "Strips :  Test twice daily as instructed.    ? chlorthalidone (HYGROTEN) 25 MG tablet  5/5/2017: Received from: External Pharmacy   ? glimepiride (AMARYL) 4 MG tablet TAKE 1 DAILY, WITH THE  FIRST MEAL OF THE DAY FOR  DIABETES.    ? insulin glargine (LANTUS SOLOSTAR) 100 unit/mL (3 mL) pen Inject 10 Units under the skin daily. (Patient taking differently: Inject 32 Units under the skin daily. )    ? labetalol (TRANDATE; NORMODYNE) 100 MG tablet Take 1 tablet by mouth two  times daily    ? LANCETS MISC Use As Directed 3 (three) times a day. Tyrel Microlet Lancets Miscellaneous    ? lisinopril (PRINIVIL,ZESTRIL) 20 MG tablet Take 1 tablet by mouth  daily for blood pressure    ? memantine (NAMENDA) 10 MG tablet  5/5/2017: Received from: External Pharmacy   ? NAMENDA TITRATION NEW tablet pack FPD 12/14/2016: Dr. Hernandez [facility] - 12/14/2016    ? omeprazole (PRILOSEC) 20 MG capsule TAKE 1 DAILY, 1/2 HOUR  BEFORE BREAKFAST FOR  HEARTBURN.    ? pen needle, diabetic (BD ULTRA-FINE BRITTANY PEN NEEDLES) 32 gauge x 5/32\" Ndle Inject 1 each under the skin 4 (four) times a day.    ? pioglitazone (ACTOS) 15 MG tablet Add 1 tablet daily to your current program for diabetes.    ? potassium citrate (UROCIT-K) 10 mEq (1,080 mg) SR tablet Take one tablet by mouth twice daily. 10/19/2015: History of kidney stones.  10/19/2015    ? rivastigmine (EXELON) 13.3 mg/24 hour PT24 patch Apply 1 patch topically daily.  12/14/2016: Dr. Hernandez - [facility].  12/14/2016    ? simvastatin (ZOCOR) 20 MG tablet TAKE 1 TABLET BY MOUTH AT  BEDTIME FOR CHOLESTEROL.    ? mirabegron (MYRBETRIQ) 25 mg Tb24 ER tablet Take 25 mg by mouth daily.     ? omeprazole (PRILOSEC) 20 MG capsule Take 1 capsule (20 mg total) by mouth daily.      ______________________________________________________________________     History of present illness: Laz PAYNE Elsiedanybisi is a pleasant 82 y.o. male with a past medical history pertinent for " hypertension, hyperlipidemia, type 2 diabetes mellitus, GERD, polyarthralgias, CKD stage III, history of prostate cancer - s/p prostatectomy, hearing loss, and Alzheimer's dementia who presents in clinic today, accompanied by his wife, for evaluation of hearing loss and possible ear wax impaction.  His wife reports that his hearing has gotten worse gradually over the last few months and she wonders if his ears are plugged up.  He has not been wearing his hearing aids possibly due to the ear wax as well.  They wanted to get this checked out first before going back to have his ears tested.    Review of systems:   On ROS, the patient denies headaches, dizziness, lightheadedness, nasal/sinus congestion, sore throat, chest pain, or shortness of breath.  Positive for symptoms as noted in the HPI.    ______________________________________________________________________      Wt Readings from Last 3 Encounters:   08/22/17 (!) 233 lb (105.7 kg)   06/19/17 (!) 230 lb (104.3 kg)   05/05/17 (!) 227 lb 4 oz (103.1 kg)     BP Readings from Last 3 Encounters:   08/22/17 110/72   06/19/17 112/68   05/05/17 112/64      /72 (Patient Site: Right Arm, Patient Position: Sitting, Cuff Size: Adult Regular)  Pulse 71  Wt (!) 233 lb (105.7 kg)  BMI 34.66 kg/m2     Physical Exam:  General Appearance: Alert, cooperative, no distress, appears stated age  Head: Normocephalic, without obvious abnormality, atraumatic  Eyes: PERRL, conjunctiva/corneas clear, EOM's intact  Ears: Bilateral cerumen impacted ear canals, resolved with bilateral ear lavage.  Normal TM's and external ear canals, both ears; hearing restored.  Nose: Nares normal, septum midline,mucosa normal, no drainage  Throat: Lips, mucosa, and tongue normal; no erythema, exudate, or thrush  Neck: Supple, symmetrical, trachea midline, no adenopathy  Lymph nodes: Cervical & supraclavicular nodes normal  Neurologic: He is alert. He has normal, slow gait.   Psychiatric: He has a  normal mood and affect.       Benton Bal, [facility]  Internal Medicine  [facility]    Return in about 3 months (around 11/22/2017) for or, as needed.

## 2021-06-26 NOTE — PROGRESS NOTES
Progress Notes by Lis Montalvo CMA at 2/1/2018 10:40 AM     Author: Lis Montalvo CMA Service: -- Author Type: Certified Medical Assistant    Filed: 2/1/2018 10:29 AM Encounter Date: 2/1/2018 Status: Attested    : Lis Montalvo CMA (Certified Medical Assistant) Cosigner: Adan Rodriguez MD at 2/1/2018 11:00 AM    Attestation signed by Adan Rodriguez MD at 2/1/2018 11:00 AM    We have discontinued Chlorthalidone and Actos as discussed with the patient and his wife in the last office visit.                 I met with Laz Kingston at the request of Dr. Rodriguez to recheck his blood pressure.  Blood pressure medications on the MAR were reviewed with patient.    Patient has taken all medications as per usual regimen: Yes  Patient reports tolerating them without any issues or concerns: Yes    Vitals:    02/01/18 1027   BP: 94/58   Patient Site: Right Arm   Patient Position: Sitting   Cuff Size: Adult Regular       Blood pressure was taken, previous encounter was reviewed, recorded blood pressure below 140/90.  Patient was discharged and the note will be sent to the provider for final review.

## 2021-06-26 NOTE — PROGRESS NOTES
Assessment & Plan     Essential hypertension, benign  Current therapy, and plan of care reviewed and assurance given.   May check the blood pressure in the evening and if blood pressure too low to skip the 4th hydralazine dose.  Continue with the clonidine prn blood pressure > 180/100.   Follow up prn         20 minutes spent on the date of the encounter doing chart review, patient visit and documentation            No follow-ups on file.    Adan Rodriguez MD  [facility]   Laz Kingston is 86 y.o. and presents today for med check and follow up of HTN. His wife, Patricia just wants to make sure the current plan is right for him.  He is still getting Clonidine prn for higher blood pressure readings.  She states that is reluctant to giving it because makes him too drowsy.  She is wondering if should get a new Blood pressure machine.  He is still getting the hydralazine four times a day.   No other concerns.       Review of Systems        Objective    /78   Pulse 62   Resp 16   SpO2 97%   There is no height or weight on file to calculate BMI.  Physical Exam

## 2021-07-03 NOTE — ADDENDUM NOTE
Addendum Note by Adan Rodriguez MD at 2/21/2018  9:09 AM     Author: Adan Rodriguez MD Service: -- Author Type: Physician    Filed: 2/21/2018  9:09 AM Encounter Date: 2/9/2018 Status: Signed    : Adan Rodriguez MD (Physician)    Addended by: ADAN RODRIGUEZ on: 2/21/2018 09:09 AM        Modules accepted: Orders

## 2021-07-03 NOTE — ADDENDUM NOTE
Addendum Note by Adan Rodriguez MD at 6/18/2018  3:06 PM     Author: Adan Rodriguez MD Service: -- Author Type: Physician    Filed: 6/18/2018  3:06 PM Encounter Date: 6/18/2018 Status: Signed    : Adan Rodriguez MD (Physician)    Addended by: ADAN RODRIGUEZ on: 6/18/2018 03:06 PM        Modules accepted: Orders

## 2021-07-03 NOTE — ADDENDUM NOTE
Addendum Note by Adan Rodriguez MD at 5/16/2019 10:00 AM     Author: Adan Rodriguez MD Service: -- Author Type: Physician    Filed: 5/24/2019  3:04 PM Encounter Date: 5/16/2019 Status: Signed    : Adan Rodriguez MD (Physician)    Addended by: ADAN RODRIGUEZ on: 5/24/2019 03:04 PM        Modules accepted: Orders

## 2021-07-04 NOTE — TELEPHONE ENCOUNTER
Telephone Encounter by Aleisha Caballero at 7/2/2021  9:07 AM     Author: Aleisha Caballero Service: -- Author Type: --    Filed: 7/2/2021  9:11 AM Encounter Date: 7/2/2021 Status: Signed    : Aleisha Caballero       Wife, Patricia is calling.    She has an appt today to see Dr. BALES.    She wants to bring a sample of patient's urine for testing.  He has no pain, no foul smell just seems to be going all the time.    Wants a call back before her appt today at 3.

## 2021-07-04 NOTE — ADDENDUM NOTE
Addendum Note by Kellie Simpson CMA at 7/2/2021 12:23 PM     Author: Kellie Simpson CMA Service: -- Author Type: Certified Medical Assistant    Filed: 7/2/2021 12:23 PM Encounter Date: 7/2/2021 Status: Signed    : Kellie Simpson CMA (Certified Medical Assistant)    Addended by: KELLIE SIMPSON on: 7/2/2021 12:23 PM        Modules accepted: Orders

## 2021-07-04 NOTE — TELEPHONE ENCOUNTER
Telephone Encounter by Adan Rodriguez MD at 7/2/2021  9:59 AM     Author: Adan Rodriguez MD Service: -- Author Type: Physician    Filed: 7/2/2021 10:00 AM Encounter Date: 7/2/2021 Status: Signed    : Adan Rodriguez MD (Physician)       Do not bring sample.  UA will be done here.

## 2021-07-12 NOTE — LETTER
7/12/2021        RE: Laz Kingston  [address] [place] [address]        [facility]  Chief Complaint   Patient presents with     RECHECK     new bruising left groin, inner thigh      [place] Medical Record Number:  [id number]  Place of Service where encounter took place:  [facility] ([facility]) [[id number]]    HISTORY:      HPI:  Laz Kingston  is 86 year old (1935), male undergoing physical and occupational therapy at [facility]. He is with history significant for BCE (basal cell epithelioma), Insulin dependent Diabetes mellitus, GERD, Hyperlipidemia, Hypertension, dementia, and Splenomegaly admitted for confusion and found to be in sepsis due to severe UTI. He underwent a  Right cystoscopy with stent placement on 7/4/21.      Today he was seen at the bedside to review vital signs, labs, follow-up for reports of left groin bruising.   Patient assessed for left groin bruising.  He was noted to have a large bruise  left groin and left thigh radiating  down his posterior thigh.  There was no lumps noted he denied any pain.  He however a few days prior pulled out his catheter.  He reports no bowel movements.  Staff was notified and per  report he had 2 bowel movements yesterday, a medium and a large.  His wife present.  He denied chest pain shortness of breath.   A1c on 7/5/2021 was 6.4.  He is to have no blood thinners x2 weeks his aspirin was stopped today he will resume it on 7/18/2021.  Per his wife he will have surgery on August 5 at [facility] to have the stent removed and the bladder scraped.  His wife reportedly will have a care conference next Tuesday and will see if he can discharge home prior to his surgery.    ALLERGIES:Ibuprofen  PAST MEDICAL HISTORY:   Past Medical History:   Diagnosis Date     Alzheimer's dementia (H)      BCE (basal cell epithelioma) 10/19/2015     Diabetes mellitus (H)      Dysplastic nevus 10/19/2015      GERD (gastroesophageal reflux disease)      Hyperlipidemia      Hypertension      Onychomycosis      Personal history of prostate cancer      Splenomegaly 01/10/2017     PAST SURGICAL HISTORY:   has a past surgical history that includes IR Miscellaneous Procedure (3/13/2014); Pr Arthroplasty Tibial Plateau; joint replacement (Bilateral); Prostatectomy; and CYSTOSCOPY,INSERT URETERAL STENT (Right, 7/4/2021).     FAMILY HISTORY: family history includes Alzheimer Disease in his mother; Coronary Artery Disease in his father; Diabetes in his daughter and daughter; Heart Disease (age of onset: 56.00) in his father; Heart Failure in his mother and sister; Kidney Disease in his sister; No Known Problems in his son and son.     SOCIAL HISTORY:  reports that he has quit smoking. His smoking use included cigarettes. He quit after 4.00 years of use. He has never used smokeless tobacco. He reports that he does not drink alcohol and does not use drugs.    ROS:  Constitutional: Negative for activity change, appetite change, chills, fatigue and fever.   HENT: Negative for congestion and sore throat.    Eyes: Negative for visual disturbance.   Respiratory: Negative for cough, shortness of breath and wheezing.    Cardiovascular: Negative for chest pain and leg swelling.   Gastrointestinal: Negative for abdominal distention, abdominal pain, constipation, diarrhea and nausea.   Genitourinary: Negative for dysuria.   Musculoskeletal: Negative for arthralgias and myalgias.   Skin: Negative for color change, rash and wound.   Neurological: Negative for dizziness, weakness and numbness.   Psychiatric/Behavioral: Positive for confusion. Negative for agitation, behavioral problems and sleep disturbance.     Physical Exam:  Constitutional: Negative for activity change, appetite change, chills, fatigue and fever.   HENT: Negative for congestion and sore throat.    Eyes: Negative for visual disturbance.   Respiratory: Negative for cough,  "shortness of breath and wheezing.    Cardiovascular: Negative for chest pain and leg swelling.   Gastrointestinal: Negative for abdominal distention, abdominal pain, constipation, diarrhea and nausea.   Genitourinary: Negative for dysuria.   Musculoskeletal: Negative for arthralgias and myalgias.   Skin: Negative for color change, rash and wound.Bruising left groin, posterior thigh    Neurological: Negative for dizziness, weakness and numbness.   Psychiatric/Behavioral: Positive for confusion. Negative for agitation, behavioral problems and sleep disturbance.     Vitals:BP (!) 151/86   Pulse 78   Temp 99.1  F (37.3  C)   Resp (!) 96   Ht 1.778 m (5' 10\")   Wt 92.9 kg (204 lb 12.8 oz)   HC 18 cm (7.09\")   BMI 29.39 kg/m   and Body mass index is 29.39 kg/m .    Lab/Diagnostic data:   Recent Results (from the past 240 hour(s))   Glucose by meter POCT    Collection Time: 07/05/21  4:56 PM   Result Value Ref Range    GLUCOSE BY METER POCT 160 (H) 70 - 139 mg/dL   Glucose by meter POCT    Collection Time: 07/05/21  8:34 PM   Result Value Ref Range    GLUCOSE BY METER POCT 135 70 - 139 mg/dL   Glucose by meter POCT    Collection Time: 07/05/21 11:19 PM   Result Value Ref Range    GLUCOSE BY METER POCT 141 (H) 70 - 139 mg/dL   Comprehensive metabolic panel    Collection Time: 07/06/21  6:10 AM   Result Value Ref Range    Sodium 145 136 - 145 mmol/L    Potassium 3.4 (L) 3.5 - 5.0 mmol/L    Chloride 117 (H) 98 - 107 mmol/L    Carbon Dioxide (CO2) 21 (L) 22 - 31 mmol/L    Anion Gap 7 5 - 18 mmol/L    Glucose 150 (H) 70 - 125 mg/dL    Urea Nitrogen 42 (H) 8 - 28 mg/dL    Creatinine 2.60 (H) 0.70 - 1.30 mg/dL    GFR Estimate If Black 29 (L) >60 mL/min/1.73m2    GFR Estimate 24 (L) >60 mL/min/1.73m2    Bilirubin Total 0.8 0.0 - 1.0 mg/dL    Calcium 9.3 8.5 - 10.5 mg/dL    Protein Total 5.9 (L) 6.0 - 8.0 g/dL    Albumin 2.5 (L) 3.5 - 5.0 g/dL    Alkaline Phosphatase 63 45 - 120 U/L    AST 90 (H) 0 - 40 U/L    ALT 58 (H) 0 - " 45 U/L   CBC with platelets    Collection Time: 07/06/21  6:10 AM   Result Value Ref Range    WBC 6.7 4.0 - 11.0 thou/uL    RBC Count 3.79 (L) 4.40 - 6.20 mill/uL    Hemoglobin 11.1 (L) 14.0 - 18.0 g/dL    Hematocrit 34.5 (L) 40.0 - 54.0 %    MCV 91 80 - 100 fL    MCH 29.3 27.0 - 34.0 pg    MCHC 32.2 32.0 - 36.0 g/dL    RDW 14.8 (H) 11.0 - 14.5 %    Platelet Count 189 140 - 440 thou/uL    Mean Platelet Volume 8.9 8.5 - 12.5 fL   Magnesium    Collection Time: 07/06/21  6:10 AM   Result Value Ref Range    Magnesium 2.2 1.8 - 2.6 mg/dL   Glucose by meter POCT    Collection Time: 07/06/21  8:35 AM   Result Value Ref Range    GLUCOSE BY METER POCT 146 (H) 70 - 139 mg/dL   Glucose by meter POCT    Collection Time: 07/06/21 11:54 AM   Result Value Ref Range    GLUCOSE BY METER POCT 201 (H) 70 - 139 mg/dL   Glucose by meter POCT    Collection Time: 07/06/21  1:12 PM   Result Value Ref Range    GLUCOSE BY METER POCT 213 (H) 70 - 139 mg/dL   Glucose by meter POCT    Collection Time: 07/06/21  6:06 PM   Result Value Ref Range    GLUCOSE BY METER POCT 152 (H) 70 - 139 mg/dL   Potassium    Collection Time: 07/06/21  6:54 PM   Result Value Ref Range    Potassium 3.7 3.5 - 5.0 mmol/L   Glucose by meter POCT    Collection Time: 07/06/21  9:16 PM   Result Value Ref Range    GLUCOSE BY METER POCT 253 (H) 70 - 139 mg/dL   Comprehensive metabolic panel    Collection Time: 07/07/21  6:05 AM   Result Value Ref Range    Sodium 147 (H) 136 - 145 mmol/L    Potassium 3.5 3.5 - 5.0 mmol/L    Chloride 117 (H) 98 - 107 mmol/L    Carbon Dioxide (CO2) 24 22 - 31 mmol/L    Anion Gap 6 5 - 18 mmol/L    Glucose 169 (H) 70 - 125 mg/dL    Urea Nitrogen 36 (H) 8 - 28 mg/dL    Creatinine 2.37 (H) 0.70 - 1.30 mg/dL    GFR Estimate If Black 32 (L) >60 mL/min/1.73m2    GFR Estimate 26 (L) >60 mL/min/1.73m2    Bilirubin Total 0.5 0.0 - 1.0 mg/dL    Calcium 9.3 8.5 - 10.5 mg/dL    Protein Total 5.3 (L) 6.0 - 8.0 g/dL    Albumin 2.3 (L) 3.5 - 5.0 g/dL     Alkaline Phosphatase 51 45 - 120 U/L    AST 60 (H) 0 - 40 U/L    ALT 49 (H) 0 - 45 U/L   CBC with platelets    Collection Time: 07/07/21  6:05 AM   Result Value Ref Range    WBC 5.3 4.0 - 11.0 thou/uL    RBC Count 3.58 (L) 4.40 - 6.20 mill/uL    Hemoglobin 10.3 (L) 14.0 - 18.0 g/dL    Hematocrit 32.0 (L) 40.0 - 54.0 %    MCV 89 80 - 100 fL    MCH 28.8 27.0 - 34.0 pg    MCHC 32.2 32.0 - 36.0 g/dL    RDW 14.3 11.0 - 14.5 %    Platelet Count 193 140 - 440 thou/uL    Mean Platelet Volume 9.1 8.5 - 12.5 fL   Magnesium    Collection Time: 07/07/21  6:05 AM   Result Value Ref Range    Magnesium 2.1 1.8 - 2.6 mg/dL   Glucose by meter POCT    Collection Time: 07/07/21  7:22 AM   Result Value Ref Range    GLUCOSE BY METER POCT 156 (H) 70 - 139 mg/dL   Glucose by meter POCT    Collection Time: 07/07/21 12:25 PM   Result Value Ref Range    GLUCOSE BY METER POCT 283 (H) 70 - 139 mg/dL   Glucose by meter POCT    Collection Time: 07/07/21  5:35 PM   Result Value Ref Range    GLUCOSE BY METER POCT 212 (H) 70 - 139 mg/dL       MEDICATIONS:     Review of your medicines          Accurate as of July 12, 2021 11:59 PM. If you have any questions, ask your nurse or doctor.            CONTINUE these medicines which have NOT CHANGED      Dose / Directions   amLODIPine 10 MG tablet  Commonly known as: NORVASC      Refills: 0     aspirin 81 MG chewable tablet  Commonly known as: ASA      Dose: 81 mg  Take 81 mg by mouth  Refills: 0     cephALEXin 500 MG capsule  Commonly known as: KEFLEX      Dose: 500 mg  Take 500 mg by mouth 3 times daily  Refills: 0     cloNIDine 0.1 MG tablet  Commonly known as: CATAPRES      Dose: 0.1 mg  Take 0.1 mg by mouth 2 times daily as needed For SBP >160  Refills: 0     glimepiride 4 MG tablet  Commonly known as: AMARYL      TK 1 T PO QD WITH FIRST MEAL OF THE DAY  Refills: 0     hydrALAZINE 100 MG tablet  Commonly known as: APRESOLINE      Dose: 100 mg  Take 100 mg by mouth 4 times daily  Refills: 0     Lantus  SoloStar 100 UNIT/ML pen  Generic drug: insulin glargine      Dose: 26 Units  26 Units  Refills: 0     memantine 10 MG tablet  Commonly known as: NAMENDA  Used for: Late onset Alzheimer's disease without behavioral disturbance (H)      Dose: 10 mg  Take 1 tablet (10 mg) by mouth 2 times daily  Quantity: 60 tablet  Refills: 11     metoprolol succinate ER 50 MG 24 hr tablet  Commonly known as: TOPROL-XL      Dose: 50 mg  Take 50 mg by mouth  Refills: 0     multivitamin, therapeutic Tabs tablet      Dose: 1 tablet  Take 1 tablet by mouth daily  Refills: 0     omeprazole 20 MG DR capsule  Commonly known as: priLOSEC      TAKE 1 DAILY, 1/2 HOUR  BEFORE BREAKFAST FOR  HEARTBURN.  Refills: 0     rivastigmine 13.3 MG/24HR 24 hr patch  Commonly known as: EXELON  Used for: Late onset Alzheimer's disease without behavioral disturbance (H)      Dose: 1 patch  Apply 1 patch topically daily  Quantity: 30 patch  Refills: 11     simvastatin 20 MG tablet  Commonly known as: ZOCOR      Dose: 20 mg  Take 20 mg by mouth  Refills: 0     Trulicity 1.5 MG/0.5ML pen  Generic drug: dulaglutide      INJECT 1.5 MG UNDER THE SKIN ONCE A WEEK  Refills: 0        STOP taking    lisinopril 20 MG tablet  Commonly known as: ZESTRIL  Stopped by: Janet Bhardwaj CNP                ASSESSMENT/PLAN  Uro sepsis- On Keflex until 7/21/21, Follow up with Urology as ordered. TOV was to be done on 7/10/2021 however patient pulled out his Godoy catheter a couple of days ago.  The Godoy was left out and patient is voiding well     Hypertension - on Norvasc, metoprolol succinate, PRN clonidine      Diabetes- FS as ordered, On Trulicitty, Glimerpiride, Lantus 26 units daily A1C 7/5/21 was 6.4     Alzheimers disease on memantine and Excelon     GERD on omeprazole     HDL continue Simvastain     Electronically signed by: Janet Bhardwaj CNP          Sincerely,        Janet Bhardwaj CNP

## 2021-07-12 NOTE — PROGRESS NOTES
[facility] GERIATRIC SERVICES  Chief Complaint   Patient presents with     RECHECK     new bruising left groin, inner thigh      [facility] Medical Record Number:  [id number]  Place of Service where encounter took place:  [facility] ([facility]) [[id number]]    HISTORY:      HPI:  Laz Kingston  is 86 year old (1935), male undergoing physical and occupational therapy at [facility]. He is with history significant for BCE (basal cell epithelioma), Insulin dependent Diabetes mellitus, GERD, Hyperlipidemia, Hypertension, dementia, and Splenomegaly admitted for confusion and found to be in sepsis due to severe UTI. He underwent a  Right cystoscopy with stent placement on 7/4/21.      Today he was seen at the bedside to review vital signs, labs, follow-up for reports of left groin bruising.   Patient assessed for left groin bruising.  He was noted to have a large bruise  left groin and left thigh radiating  down his posterior thigh.  There was no lumps noted he denied any pain.  He however a few days prior pulled out his catheter.  He reports no bowel movements.  Staff was notified and per  report he had 2 bowel movements yesterday, a medium and a large.  His wife present.  He denied chest pain shortness of breath.   A1c on 7/5/2021 was 6.4.  He is to have no blood thinners x2 weeks his aspirin was stopped today he will resume it on 7/18/2021.  Per his wife he will have surgery on August 5 at [facility] to have the stent removed and the bladder scraped.  His wife reportedly will have a care conference next Tuesday and will see if he can discharge home prior to his surgery.    ALLERGIES:Ibuprofen  PAST MEDICAL HISTORY:   Past Medical History:   Diagnosis Date     Alzheimer's dementia (H)      BCE (basal cell epithelioma) 10/19/2015     Diabetes mellitus (H)      Dysplastic nevus 10/19/2015     GERD (gastroesophageal reflux disease)      Hyperlipidemia      Hypertension       Onychomycosis      Personal history of prostate cancer      Splenomegaly 01/10/2017     PAST SURGICAL HISTORY:   has a past surgical history that includes IR Miscellaneous Procedure (3/13/2014); Pr Arthroplasty Tibial Plateau; joint replacement (Bilateral); Prostatectomy; and CYSTOSCOPY,INSERT URETERAL STENT (Right, 7/4/2021).     FAMILY HISTORY: family history includes Alzheimer Disease in his mother; Coronary Artery Disease in his father; Diabetes in his daughter and daughter; Heart Disease (age of onset: 56.00) in his father; Heart Failure in his mother and sister; Kidney Disease in his sister; No Known Problems in his son and son.     SOCIAL HISTORY:  reports that he has quit smoking. His smoking use included cigarettes. He quit after 4.00 years of use. He has never used smokeless tobacco. He reports that he does not drink alcohol and does not use drugs.    ROS:  Constitutional: Negative for activity change, appetite change, chills, fatigue and fever.   HENT: Negative for congestion and sore throat.    Eyes: Negative for visual disturbance.   Respiratory: Negative for cough, shortness of breath and wheezing.    Cardiovascular: Negative for chest pain and leg swelling.   Gastrointestinal: Negative for abdominal distention, abdominal pain, constipation, diarrhea and nausea.   Genitourinary: Negative for dysuria.   Musculoskeletal: Negative for arthralgias and myalgias.   Skin: Negative for color change, rash and wound.   Neurological: Negative for dizziness, weakness and numbness.   Psychiatric/Behavioral: Positive for confusion. Negative for agitation, behavioral problems and sleep disturbance.     Physical Exam:  Constitutional: Negative for activity change, appetite change, chills, fatigue and fever.   HENT: Negative for congestion and sore throat.    Eyes: Negative for visual disturbance.   Respiratory: Negative for cough, shortness of breath and wheezing.    Cardiovascular: Negative for chest pain and leg  "swelling.   Gastrointestinal: Negative for abdominal distention, abdominal pain, constipation, diarrhea and nausea.   Genitourinary: Negative for dysuria.   Musculoskeletal: Negative for arthralgias and myalgias.   Skin: Negative for color change, rash and wound.Bruising left groin, posterior thigh    Neurological: Negative for dizziness, weakness and numbness.   Psychiatric/Behavioral: Positive for confusion. Negative for agitation, behavioral problems and sleep disturbance.     Vitals:BP (!) 151/86   Pulse 78   Temp 99.1  F (37.3  C)   Resp (!) 96   Ht 1.778 m (5' 10\")   Wt 92.9 kg (204 lb 12.8 oz)   HC 18 cm (7.09\")   BMI 29.39 kg/m   and Body mass index is 29.39 kg/m .    Lab/Diagnostic data:   Recent Results (from the past 240 hour(s))   Glucose by meter POCT    Collection Time: 07/05/21  4:56 PM   Result Value Ref Range    GLUCOSE BY METER POCT 160 (H) 70 - 139 mg/dL   Glucose by meter POCT    Collection Time: 07/05/21  8:34 PM   Result Value Ref Range    GLUCOSE BY METER POCT 135 70 - 139 mg/dL   Glucose by meter POCT    Collection Time: 07/05/21 11:19 PM   Result Value Ref Range    GLUCOSE BY METER POCT 141 (H) 70 - 139 mg/dL   Comprehensive metabolic panel    Collection Time: 07/06/21  6:10 AM   Result Value Ref Range    Sodium 145 136 - 145 mmol/L    Potassium 3.4 (L) 3.5 - 5.0 mmol/L    Chloride 117 (H) 98 - 107 mmol/L    Carbon Dioxide (CO2) 21 (L) 22 - 31 mmol/L    Anion Gap 7 5 - 18 mmol/L    Glucose 150 (H) 70 - 125 mg/dL    Urea Nitrogen 42 (H) 8 - 28 mg/dL    Creatinine 2.60 (H) 0.70 - 1.30 mg/dL    GFR Estimate If Black 29 (L) >60 mL/min/1.73m2    GFR Estimate 24 (L) >60 mL/min/1.73m2    Bilirubin Total 0.8 0.0 - 1.0 mg/dL    Calcium 9.3 8.5 - 10.5 mg/dL    Protein Total 5.9 (L) 6.0 - 8.0 g/dL    Albumin 2.5 (L) 3.5 - 5.0 g/dL    Alkaline Phosphatase 63 45 - 120 U/L    AST 90 (H) 0 - 40 U/L    ALT 58 (H) 0 - 45 U/L   CBC with platelets    Collection Time: 07/06/21  6:10 AM   Result Value Ref " Range    WBC 6.7 4.0 - 11.0 thou/uL    RBC Count 3.79 (L) 4.40 - 6.20 mill/uL    Hemoglobin 11.1 (L) 14.0 - 18.0 g/dL    Hematocrit 34.5 (L) 40.0 - 54.0 %    MCV 91 80 - 100 fL    MCH 29.3 27.0 - 34.0 pg    MCHC 32.2 32.0 - 36.0 g/dL    RDW 14.8 (H) 11.0 - 14.5 %    Platelet Count 189 140 - 440 thou/uL    Mean Platelet Volume 8.9 8.5 - 12.5 fL   Magnesium    Collection Time: 07/06/21  6:10 AM   Result Value Ref Range    Magnesium 2.2 1.8 - 2.6 mg/dL   Glucose by meter POCT    Collection Time: 07/06/21  8:35 AM   Result Value Ref Range    GLUCOSE BY METER POCT 146 (H) 70 - 139 mg/dL   Glucose by meter POCT    Collection Time: 07/06/21 11:54 AM   Result Value Ref Range    GLUCOSE BY METER POCT 201 (H) 70 - 139 mg/dL   Glucose by meter POCT    Collection Time: 07/06/21  1:12 PM   Result Value Ref Range    GLUCOSE BY METER POCT 213 (H) 70 - 139 mg/dL   Glucose by meter POCT    Collection Time: 07/06/21  6:06 PM   Result Value Ref Range    GLUCOSE BY METER POCT 152 (H) 70 - 139 mg/dL   Potassium    Collection Time: 07/06/21  6:54 PM   Result Value Ref Range    Potassium 3.7 3.5 - 5.0 mmol/L   Glucose by meter POCT    Collection Time: 07/06/21  9:16 PM   Result Value Ref Range    GLUCOSE BY METER POCT 253 (H) 70 - 139 mg/dL   Comprehensive metabolic panel    Collection Time: 07/07/21  6:05 AM   Result Value Ref Range    Sodium 147 (H) 136 - 145 mmol/L    Potassium 3.5 3.5 - 5.0 mmol/L    Chloride 117 (H) 98 - 107 mmol/L    Carbon Dioxide (CO2) 24 22 - 31 mmol/L    Anion Gap 6 5 - 18 mmol/L    Glucose 169 (H) 70 - 125 mg/dL    Urea Nitrogen 36 (H) 8 - 28 mg/dL    Creatinine 2.37 (H) 0.70 - 1.30 mg/dL    GFR Estimate If Black 32 (L) >60 mL/min/1.73m2    GFR Estimate 26 (L) >60 mL/min/1.73m2    Bilirubin Total 0.5 0.0 - 1.0 mg/dL    Calcium 9.3 8.5 - 10.5 mg/dL    Protein Total 5.3 (L) 6.0 - 8.0 g/dL    Albumin 2.3 (L) 3.5 - 5.0 g/dL    Alkaline Phosphatase 51 45 - 120 U/L    AST 60 (H) 0 - 40 U/L    ALT 49 (H) 0 - 45 U/L    CBC with platelets    Collection Time: 07/07/21  6:05 AM   Result Value Ref Range    WBC 5.3 4.0 - 11.0 thou/uL    RBC Count 3.58 (L) 4.40 - 6.20 mill/uL    Hemoglobin 10.3 (L) 14.0 - 18.0 g/dL    Hematocrit 32.0 (L) 40.0 - 54.0 %    MCV 89 80 - 100 fL    MCH 28.8 27.0 - 34.0 pg    MCHC 32.2 32.0 - 36.0 g/dL    RDW 14.3 11.0 - 14.5 %    Platelet Count 193 140 - 440 thou/uL    Mean Platelet Volume 9.1 8.5 - 12.5 fL   Magnesium    Collection Time: 07/07/21  6:05 AM   Result Value Ref Range    Magnesium 2.1 1.8 - 2.6 mg/dL   Glucose by meter POCT    Collection Time: 07/07/21  7:22 AM   Result Value Ref Range    GLUCOSE BY METER POCT 156 (H) 70 - 139 mg/dL   Glucose by meter POCT    Collection Time: 07/07/21 12:25 PM   Result Value Ref Range    GLUCOSE BY METER POCT 283 (H) 70 - 139 mg/dL   Glucose by meter POCT    Collection Time: 07/07/21  5:35 PM   Result Value Ref Range    GLUCOSE BY METER POCT 212 (H) 70 - 139 mg/dL       MEDICATIONS:     Review of your medicines          Accurate as of July 12, 2021 11:59 PM. If you have any questions, ask your nurse or doctor.            CONTINUE these medicines which have NOT CHANGED      Dose / Directions   amLODIPine 10 MG tablet  Commonly known as: NORVASC      Refills: 0     aspirin 81 MG chewable tablet  Commonly known as: ASA      Dose: 81 mg  Take 81 mg by mouth  Refills: 0     cephALEXin 500 MG capsule  Commonly known as: KEFLEX      Dose: 500 mg  Take 500 mg by mouth 3 times daily  Refills: 0     cloNIDine 0.1 MG tablet  Commonly known as: CATAPRES      Dose: 0.1 mg  Take 0.1 mg by mouth 2 times daily as needed For SBP >160  Refills: 0     glimepiride 4 MG tablet  Commonly known as: AMARYL      TK 1 T PO QD WITH FIRST MEAL OF THE DAY  Refills: 0     hydrALAZINE 100 MG tablet  Commonly known as: APRESOLINE      Dose: 100 mg  Take 100 mg by mouth 4 times daily  Refills: 0     Lantus SoloStar 100 UNIT/ML pen  Generic drug: insulin glargine      Dose: 26 Units  26  Units  Refills: 0     memantine 10 MG tablet  Commonly known as: NAMENDA  Used for: Late onset Alzheimer's disease without behavioral disturbance (H)      Dose: 10 mg  Take 1 tablet (10 mg) by mouth 2 times daily  Quantity: 60 tablet  Refills: 11     metoprolol succinate ER 50 MG 24 hr tablet  Commonly known as: TOPROL-XL      Dose: 50 mg  Take 50 mg by mouth  Refills: 0     multivitamin, therapeutic Tabs tablet      Dose: 1 tablet  Take 1 tablet by mouth daily  Refills: 0     omeprazole 20 MG DR capsule  Commonly known as: priLOSEC      TAKE 1 DAILY, 1/2 HOUR  BEFORE BREAKFAST FOR  HEARTBURN.  Refills: 0     rivastigmine 13.3 MG/24HR 24 hr patch  Commonly known as: EXELON  Used for: Late onset Alzheimer's disease without behavioral disturbance (H)      Dose: 1 patch  Apply 1 patch topically daily  Quantity: 30 patch  Refills: 11     simvastatin 20 MG tablet  Commonly known as: ZOCOR      Dose: 20 mg  Take 20 mg by mouth  Refills: 0     Trulicity 1.5 MG/0.5ML pen  Generic drug: dulaglutide      INJECT 1.5 MG UNDER THE SKIN ONCE A WEEK  Refills: 0        STOP taking    lisinopril 20 MG tablet  Commonly known as: ZESTRIL  Stopped by: Janet Bhardwaj CNP                ASSESSMENT/PLAN  Uro sepsis- On Keflex until 7/21/21, Follow up with Urology as ordered. TOV was to be done on 7/10/2021 however patient pulled out his Godoy catheter a couple of days ago.  The Godoy was left out and patient is voiding well     Hypertension - on Norvasc, metoprolol succinate, PRN clonidine      Diabetes- FS as ordered, On Trulicitty, Glimerpiride, Lantus 26 units daily A1C 7/5/21 was 6.4     Alzheimers disease on memantine and Excelon     GERD on omeprazole     HDL continue Simvastain     Electronically signed by: Janet Bhardwaj CNP

## 2021-07-13 NOTE — PROGRESS NOTES
[facility] GERIATRIC SERVICES      PRIMARY CARE PROVIDER AND CLINIC:  Bianca Rodriguez MD, [address] / [facility] [address]    Chief Complaint   Patient presents with     Establish Care     MD admit note to TCU for sepsis, UTI, right hydronephrosis and ureteral stone s/p stent.        [facility] Medical Record Number:  [id number]  Place of Service where encounter took place:  [facility] ([facility]) [[id number]]  CODE STATUS/ADVANCE DIRECTIVES DISCUSSION:   DNR only      HPI:    Laz Kingston is a 86 year old male with hx of HTN, IDDM, dementia, admitted to the hospital on 7/4/2021 with confusion. Discharge summary as partially excerpted belwo.     86 y.o. male with history significant for BCE (basal cell epithelioma), Insulin dependent Diabetes mellitus, GERD, Hyperlipidemia, Hypertension, dementia, and Splenomegaly admitted for confusion and found to be in sepsis due to severe UTI.     HEMALATHA on CKD  -creatinine on admission was 2.39  -baseline creatinine is 1.8-2.5     Lactic acidosis due to sepsis/UTI  -lactic acid elevated 5.0 on admission but subsequently trending down with abx and IV fluids  -lactic acid normalized     SIRS  -patient fit SIRS criteria on admission with fever of 101.7 F and leukocytosis on admission  -blood cultures do not show any growth  -culture from 7/4 shows growth of > 100,000 E. Coli that is pansensitive.  -urology following and appreciate recommendations  -14 days of keflex      Right renal hydronephrosis  -kidney US showed severe right hydronephrosis.   -urology was consulted and cystoscopy with right ureteral stent placement was performed  -Several papillary bladder tumors on the right side of the bladder neck were seen during the surgery. Urology plans to take patient back to OR in 2 weeks to resect the bladder tumors and to treat the ureteral stone with laser lithotripsy.      Acute Metabolic encephalopathy   -from underlying infection  -mentation  improved, approaching baseline     Anemia  -Hgb stable      HTN  -BP running high, may be due to saline infusion  -c/w home metoprolol, home hydralazine, home norvasc   -monitor BP and adjust BP regimen PRN     DM  -previous HgbA1c 7.2 2/15/2021, now HgbA1c 7/5/2021 6.4  -on trulicity, amaryl, and Lantus 48 units subcutaneous at bedtime at home  -decrease Lantus to 26 units subcutaneous at bedtime until oral intake is back to baseline      GERD  -c/w home prilosec     Hx of Alzheimer's dementia  -c/w home namenda and home exelon     Hx of HLD  -c/w home zocor     Elevated LFTs/Acute hepatitis  -likely due to SIRS/infection  -trending down     Overall stabilized and discharged to TCU on 7/7/2021 for PT, OT, nursing cares, medical management and monitoring.       Today:  He is a poor historian secondary to dementia. He has no specific complaints upon questioning. He came to TCU with borges but he had pulled it out and since there was recommendation to do a voiding trial, it was left out and he was monitored. Had some bleeding, resolved. No hematuria currently. He will need follow up with urology, they were updated about the borges. He lives at home with wife, care conference today, will likely need more help at home when he discharges from TCU. He has not had a fever, will complete Keflex for UTI/sepsis on 7/21/2021. No diarrhea, abdominal pain, nausea or vomiting. Appetite is good. No constipation. Denies shortness of breath, headaches, chest pain, dizziness. He is hard of hearing, no new concerns with hearing or vision.       PAST MEDICAL HISTORY:  Past Medical History:   Diagnosis Date     Alzheimer's dementia (H)      BCE (basal cell epithelioma) 10/19/2015     Diabetes mellitus (H)      Dysplastic nevus 10/19/2015     GERD (gastroesophageal reflux disease)      Hyperlipidemia      Hypertension      Onychomycosis      Personal history of prostate cancer      Splenomegaly 01/10/2017       PAST SURGICAL HISTORY:  Past  Surgical History:   Procedure Laterality Date     HC CYSTOSCOPY,INSERT URETERAL STENT Right 2021    Procedure: CYSTOSCOPY, WITH URETERAL STENT INSERTION;  Surgeon: Amos Ackerman MD;  Location: [facility] OR;  Service: Urology     IR MISCELLANEOUS PROCEDURE  3/13/2014     IR URETER DILATION BILATERAL  3/13/2014     JOINT REPLACEMENT Bilateral     Both knees     UT ARTHROPLASTY TIBIAL PLATEAU      Description: Knee Replacement;  Recorded: 2013;     PROSTATECTOMY         FAMILY HISTORY:  Family History   Problem Relation Age of Onset     Alzheimer Disease Mother      Coronary Artery Disease Father      Heart Failure Mother      Heart Disease Father 56.00        Two heart attacks     Heart Failure Sister          at 47.     Kidney Disease Sister      Diabetes Daughter      No Known Problems Son      No Known Problems Son      Diabetes Daughter        SOCIAL HISTORY:  Social History     Socioeconomic History     Marital status:      Spouse name: Not on file     Number of children: Not on file     Years of education: Not on file     Highest education level: Not on file   Occupational History     Not on file   Tobacco Use     Smoking status: Former Smoker     Years: 4.00     Types: Cigarettes     Smokeless tobacco: Never Used     Tobacco comment: Quit prior to .   Substance and Sexual Activity     Alcohol use: No     Comment: Alcoholic Drinks/day: Never a problem for him, he states.     Drug use: No     Sexual activity: Not on file   Other Topics Concern     Not on file   Social History Narrative    Should be using walker with ambulation. Patient does not use any home oxygen. Lives with wife.      Social Determinants of Health     Financial Resource Strain:      Difficulty of Paying Living Expenses:    Food Insecurity:      Worried About Running Out of Food in the Last Year:      Ran Out of Food in the Last Year:    Transportation Needs:      Lack of Transportation (Medical):       Lack of Transportation (Non-Medical):    Physical Activity:      Days of Exercise per Week:      Minutes of Exercise per Session:    Stress:      Feeling of Stress :    Social Connections:      Frequency of Communication with Friends and Family:      Frequency of Social Gatherings with Friends and Family:      Attends [employer] Services:      Active Member of Clubs or Organizations:      Attends Club or Organization Meetings:      Marital Status:    Intimate Partner Violence:      Fear of Current or Ex-Partner:      Emotionally Abused:      Physically Abused:      Sexually Abused:        MEDICATIONS:    Current Outpatient Medications:      amLODIPine (NORVASC) 10 MG tablet, , Disp: , Rfl:      aspirin (ASA) 81 MG chewable tablet, Take 81 mg by mouth, Disp: , Rfl:      cloNIDine (CATAPRES) 0.1 MG tablet, Take 0.1 mg by mouth 2 times daily as needed For SBP >160, Disp: , Rfl:      glimepiride (AMARYL) 4 MG tablet, TK 1 T PO QD WITH FIRST MEAL OF THE DAY, Disp: , Rfl:      hydrALAZINE (APRESOLINE) 100 MG tablet, Take 100 mg by mouth 4 times daily, Disp: , Rfl:      LANTUS SOLOSTAR 100 UNIT/ML soln, 26 Units , Disp: , Rfl:      memantine (NAMENDA) 10 MG tablet, Take 1 tablet (10 mg) by mouth 2 times daily, Disp: 60 tablet, Rfl: 11     metoprolol succinate ER (TOPROL-XL) 50 MG 24 hr tablet, Take 50 mg by mouth, Disp: , Rfl:      multivitamin, therapeutic (THERA-VIT) TABS tablet, Take 1 tablet by mouth daily, Disp: , Rfl:      omeprazole (PRILOSEC) 20 MG DR capsule, TAKE 1 DAILY, 1/2 HOUR  BEFORE BREAKFAST FOR  HEARTBURN., Disp: , Rfl:      rivastigmine (EXELON) 13.3 MG/24HR 24 hr patch, Apply 1 patch topically daily, Disp: 30 patch, Rfl: 11     simvastatin (ZOCOR) 20 MG tablet, Take 20 mg by mouth, Disp: , Rfl:      TRULICITY 1.5 MG/0.5ML pen, INJECT 1.5 MG UNDER THE SKIN ONCE A WEEK, Disp: , Rfl:        ALLERGIES:  Allergies   Allergen Reactions     Ibuprofen Rash and GI Disturbance         REVIEW OF SYSTEMS:  Pertinent  "items as noted in HPI.      PHYSICAL EXAM:  General: Patient is alert male, no distress.   Vitals: /81   Pulse 72   Temp 98.4  F (36.9  C)   Resp 18   Ht 1.778 m (5' 10\")   Wt 93.7 kg (206 lb 8 oz)   SpO2 99%   BMI 29.63 kg/m    HEENT: Head is NCAT. Eyes show no injection or icterus. Nares negative. Oropharynx well hydrated.  Neck: Supple. No tenderness or adenopathy. No JVD.  Lungs: Non labored respirations.   Cardiovascular: Regular rate and rhythm, normal S1, S2.  Back: No spinal or CVA tenderness.  Abdomen: Obese, soft, no tenderness on exam. Bowel sounds present. No guarding rebound or rigidity.  : Deferred.  Extremities: LE edema is noted.  Musculoskeletal: Age related degen changes.   Skin: No rashes.   Psych: Mood appears good.      LABS/DIAGNOSTIC DATA:  Component      Latest Ref Rng & Units 7/4/2021 7/5/2021 7/6/2021 7/7/2021   WBC      4.0 - 11.0 thou/uL 14.5 (H) 9.3 6.7 5.3   RBC Count      4.40 - 6.20 mill/uL 4.63 3.69 (L) 3.79 (L) 3.58 (L)   Hemoglobin      13.3 - 17.7 g/dL 13.7 (L) 10.9 (L) 11.1 (L) 10.3 (L)   Hematocrit      40.0 - 54.0 % 39.7 (L) 32.5 (L) 34.5 (L) 32.0 (L)   MCV      80 - 100 fL 86 88 91 89   MCH      27.0 - 34.0 pg 29.6 29.5 29.3 28.8   MCHC      32.0 - 36.0 g/dL 34.5 33.5 32.2 32.2   RDW      11.0 - 14.5 % 14.1 14.4 14.8 (H) 14.3   Platelet Count      140 - 440 thou/uL 283 161 189 193   Mean Platelet Volume      8.5 - 12.5 fL 9.1 8.7 8.9 9.1     Component      Latest Ref Rng & Units 7/4/2021 7/5/2021 7/6/2021 7/6/2021             6:10 AM  6:54 PM   Sodium      136 - 145 mmol/L 144 145 145    Potassium      3.5 - 5.0 mmol/L 3.7 3.3 (L) 3.4 (L) 3.7   Chloride      98 - 107 mmol/L 111 (H) 115 (H) 117 (H)    Carbon Dioxide      22 - 31 mmol/L 18 (L) 24 21 (L)    Anion Gap      5 - 18 mmol/L 15 6 7    Glucose      70 - 125 mg/dL 267 (H) 148 (H) 150 (H)    Calcium      8.5 - 10.5 mg/dL 10.0 9.3 9.3    Urea Nitrogen      8 - 28 mg/dL 41 (H) 38 (H) 42 (H)    Creatinine      " 0.70 - 1.30 mg/dL 2.39 (H) 2.14 (H) 2.60 (H)    GFR Estimate If Black      >60 mL/min/1.73m2 32 (L) 36 (L) 29 (L)    GFR Estimate      >60 mL/min/1.73m2 26 (L) 30 (L) 24 (L)          ASSESSMENT/PLAN:  1. Sepsis. Secondary to UTI. UC with greater than 100K E coli. BC neg. Will complete Keflex on 7/21/2021.  2. Right hydronephrosis. Right ureteral stent placed due to stone. Follow up with urology. Initially came to TCU with boregs but he pulled it out. Monitor his voiding, and for hematuria.  3. Papillary bladder tumors. He will have later procedure, stent removal and tumor removal per urology.  4. HTN. On hydralazine, metoprolol and amlodipine with prn clonidine. Monitor BPs in TCU.  5. Diabetes. On glimepiride, trulicity, lantus insulin. Accuchecks followed.   6. Anemia. Last Hgb at 10.3.  7. Elevated LFTs. Likely sec to SIRS/infection.  8. HEMALATHA. On CKD. Labs as noted. Reported baseline 1.8-2.5.  9. HLD. Cont PTA simvastatin.  10. Dementia. On rivastigmine and memantine at home. Encephalopathy in the hospital sec to medical illness.   11. Code status is DNR.             Electronically signed by: Laura Cisneros MD

## 2021-07-14 NOTE — TELEPHONE ENCOUNTER
Reason for call:  Patient reporting a symptom    Symptom or request: Pre op needed before 8/5 Kidney Stone removed and bladder scraping [facility]    Duration (how long have symptoms been present): n/a    Have you been treated for this before? Yes    Additional comments: Writer is unable to find enough time in scheduling.    Phone Number patient can be reached at:  Home number on file [phone] (home)    Best Time:  any    Can we leave a detailed message on this number:  YES    Call taken on 7/14/2021 at 10:35 AM by Carolina Mabry

## 2021-07-16 NOTE — LETTER
7/16/2021        RE: Laz Kingston  [address] [place] [address]        [facility] GERIATRIC SERVICES  Chief Complaint   Patient presents with     KIRT     [place] Medical Record Number:  [id number]  Place of Service where encounter took place:  [facility] ([facility]) [[id number]]    HISTORY:      HPI:  Laz Kingston  is 86 year old (1935) undergoing physical and occupational therapy.  He is with history significant for BCE (basal cell epithelioma), Insulin dependent Diabetes mellitus, GERD, Hyperlipidemia, Hypertension, dementia, and Splenomegaly admitted for confusion and found to be in sepsis due to severe UTI. He underwent a Right cystoscopy with stent placement on 7/4/21.      Today he was seen at the bedside to review vital signs, labs, follow-up  left groin bruising and new reports of urethral bleeding   Patient assessed for left groin bruising and it is improving.     He did pull out his catheter a few days prior and at that time had no urinary bleeding however today he was noted to have bleeding after voiding.  An abdominal ultrasound was ordered however was unable to get done due to him not being NPO.  So a KUB was ordered.   He denied chest pain shortness of breath.   A1c on 7/5/2021 was 6.4.  He is to have no blood thinners x2 weeks. his aspirin  will resume  on 7/18/2021.  Per his wife he will have surgery on August 5 at [facility] to have the stent removed and the bladder scraped.  It appears he will discharge prior to his surgery    ALLERGIES:Ibuprofen    PAST MEDICAL HISTORY:   Past Medical History:   Diagnosis Date     Alzheimer's dementia (H)      BCE (basal cell epithelioma) 10/19/2015     Diabetes mellitus (H)      Dysplastic nevus 10/19/2015     GERD (gastroesophageal reflux disease)      Hyperlipidemia      Hypertension      Onychomycosis      Personal history of prostate cancer      Splenomegaly 01/10/2017       PAST SURGICAL HISTORY:   has a  past surgical history that includes IR Miscellaneous Procedure (3/13/2014); Pr Arthroplasty Tibial Plateau; joint replacement (Bilateral); Prostatectomy; and CYSTOSCOPY,INSERT URETERAL STENT (Right, 7/4/2021).    FAMILY HISTORY: family history includes Alzheimer Disease in his mother; Coronary Artery Disease in his father; Diabetes in his daughter and daughter; Heart Disease (age of onset: 56.00) in his father; Heart Failure in his mother and sister; Kidney Disease in his sister; No Known Problems in his son and son.    SOCIAL HISTORY:  reports that he has quit smoking. His smoking use included cigarettes. He quit after 4.00 years of use. He has never used smokeless tobacco. He reports that he does not drink alcohol and does not use drugs.    ROS:  Constitutional: Negative for activity change, appetite change, fatigue and fever.   HENT: Negative for congestion.    Respiratory: Negative for cough, shortness of breath and wheezing.    Cardiovascular: Negative for chest pain and leg swelling.   Gastrointestinal: Negative for abdominal distention, abdominal pain, constipation, diarrhea and nausea.   Genitourinary: Negative for dysuria.   Musculoskeletal: Negative for arthralgia. Negative for back pain.   Skin: Negative for color change and wound.   Neurological: Negative for dizziness.   Psychiatric/Behavioral: Negative for agitation, behavioral problems and confusion.     Physical Exam:  Constitutional:       Appearance: Patient is well-developed.   HENT:      Head: Normocephalic.   Eyes:      Conjunctiva/sclera: Conjunctivae normal.   Neck:      Musculoskeletal: Normal range of motion.   Cardiovascular:      Rate and Rhythm: Normal rate and regular rhythm.      Heart sounds: Normal heart sounds. No murmur.   Pulmonary:      Effort: No respiratory distress.      Breath sounds: Normal breath sounds. No wheezing or rales.   Abdominal:      General: Bowel sounds are normal. There is no distension.      Palpations: Abdomen  "is soft.  Patient with urethral bleeding     Tenderness: There is no abdominal tenderness.   Musculoskeletal:       Normal range of motion.     Skin:General:        Skin is warm.   Neurological:         Mental Status: Patient is alert and oriented to person, place, and time.   Psychiatric:         Behavior: Behavior normal.     Vitals:BP (!) 150/98   Pulse 83   Temp 98.3  F (36.8  C)   Resp 20   Ht 1.778 m (5' 10\")   Wt 93.7 kg (206 lb 8 oz)   SpO2 94%   BMI 29.63 kg/m   and Body mass index is 29.63 kg/m .    Lab/Diagnostic data:   Recent Results (from the past 240 hour(s))   Glucose by meter POCT    Collection Time: 07/07/21  5:35 PM   Result Value Ref Range    GLUCOSE BY METER POCT 212 (H) 70 - 139 mg/dL   Hemoglobin    Collection Time: 07/17/21 10:00 AM   Result Value Ref Range    Hemoglobin 10.2 (L) 13.3 - 17.7 g/dL       MEDICATIONS:     Review of your medicines          Accurate as of July 16, 2021 11:19 AM. If you have any questions, ask your nurse or doctor.            CONTINUE these medicines which have NOT CHANGED      Dose / Directions   amLODIPine 10 MG tablet  Commonly known as: NORVASC      Refills: 0     aspirin 81 MG chewable tablet  Commonly known as: ASA      Dose: 81 mg  Take 81 mg by mouth  Refills: 0     cephALEXin 500 MG capsule  Commonly known as: KEFLEX      Dose: 500 mg  Take 500 mg by mouth 3 times daily  Refills: 0     cloNIDine 0.1 MG tablet  Commonly known as: CATAPRES      Dose: 0.1 mg  Take 0.1 mg by mouth 2 times daily as needed For SBP >160  Refills: 0     glimepiride 4 MG tablet  Commonly known as: AMARYL      TK 1 T PO QD WITH FIRST MEAL OF THE DAY  Refills: 0     hydrALAZINE 100 MG tablet  Commonly known as: APRESOLINE      Dose: 100 mg  Take 100 mg by mouth 4 times daily  Refills: 0     Lantus SoloStar 100 UNIT/ML pen  Generic drug: insulin glargine      Dose: 26 Units  26 Units  Refills: 0     memantine 10 MG tablet  Commonly known as: NAMENDA  Used for: Late onset " Alzheimer's disease without behavioral disturbance (H)      Dose: 10 mg  Take 1 tablet (10 mg) by mouth 2 times daily  Quantity: 60 tablet  Refills: 11     metoprolol succinate ER 50 MG 24 hr tablet  Commonly known as: TOPROL-XL      Dose: 50 mg  Take 50 mg by mouth  Refills: 0     multivitamin, therapeutic Tabs tablet      Dose: 1 tablet  Take 1 tablet by mouth daily  Refills: 0     omeprazole 20 MG DR capsule  Commonly known as: priLOSEC      TAKE 1 DAILY, 1/2 HOUR  BEFORE BREAKFAST FOR  HEARTBURN.  Refills: 0     rivastigmine 13.3 MG/24HR 24 hr patch  Commonly known as: EXELON  Used for: Late onset Alzheimer's disease without behavioral disturbance (H)      Dose: 1 patch  Apply 1 patch topically daily  Quantity: 30 patch  Refills: 11     simvastatin 20 MG tablet  Commonly known as: ZOCOR      Dose: 20 mg  Take 20 mg by mouth  Refills: 0     Trulicity 1.5 MG/0.5ML pen  Generic drug: dulaglutide      INJECT 1.5 MG UNDER THE SKIN ONCE A WEEK  Refills: 0            ASSESSMENT/PLAN    Urinary bleeding KUB, unable to do abdominal US- needs to be NPO, follow-up with urology as scheduled    Uro sepsis- On Keflex until 7/21/21, Follow up with Urology as ordered. TOV was to be done on 7/10/2021 however patient pulled out his Godoy catheter a couple of days ago.  The Godoy was left out and patient is voiding well     Hypertension - on Norvasc, metoprolol succinate, PRN clonidine      Diabetes- FS as ordered, On Trulicitty, Glimerpiride, Lantus 26 units daily A1C 7/5/21 was 6.4     Alzheimers disease on memantine and Excelon     GERD on omeprazole     HDL continue Simvastain     Electronically signed by: Sultana Santos          Sincerely,        Janet Bhardwaj, CNP

## 2021-07-16 NOTE — PROGRESS NOTES
[facility] GERIATRIC SERVICES  Chief Complaint   Patient presents with     RECHECK     [place] Medical Record Number:  [id number]  Place of Service where encounter took place:  [facility] ([facility]) [[id number]]    HISTORY:      HPI:  Laz Kingston  is 86 year old (1935) undergoing physical and occupational therapy.  He is with history significant for BCE (basal cell epithelioma), Insulin dependent Diabetes mellitus, GERD, Hyperlipidemia, Hypertension, dementia, and Splenomegaly admitted for confusion and found to be in sepsis due to severe UTI. He underwent a Right cystoscopy with stent placement on 7/4/21.      Today he was seen at the bedside to review vital signs, labs, follow-up  left groin bruising and new reports of urethral bleeding   Patient assessed for left groin bruising and it is improving.     He did pull out his catheter a few days prior and at that time had no urinary bleeding however today he was noted to have bleeding after voiding.  An abdominal ultrasound was ordered however was unable to get done due to him not being NPO.  So a KUB was ordered.   He denied chest pain shortness of breath.   A1c on 7/5/2021 was 6.4.  He is to have no blood thinners x2 weeks. his aspirin  will resume  on 7/18/2021.  Per his wife he will have surgery on August 5 at [facility] to have the stent removed and the bladder scraped.  It appears he will discharge prior to his surgery    ALLERGIES:Ibuprofen    PAST MEDICAL HISTORY:   Past Medical History:   Diagnosis Date     Alzheimer's dementia (H)      BCE (basal cell epithelioma) 10/19/2015     Diabetes mellitus (H)      Dysplastic nevus 10/19/2015     GERD (gastroesophageal reflux disease)      Hyperlipidemia      Hypertension      Onychomycosis      Personal history of prostate cancer      Splenomegaly 01/10/2017       PAST SURGICAL HISTORY:   has a past surgical history that includes IR Miscellaneous Procedure (3/13/2014); Pr Arthroplasty  Tibial Plateau; joint replacement (Bilateral); Prostatectomy; and CYSTOSCOPY,INSERT URETERAL STENT (Right, 7/4/2021).    FAMILY HISTORY: family history includes Alzheimer Disease in his mother; Coronary Artery Disease in his father; Diabetes in his daughter and daughter; Heart Disease (age of onset: 56.00) in his father; Heart Failure in his mother and sister; Kidney Disease in his sister; No Known Problems in his son and son.    SOCIAL HISTORY:  reports that he has quit smoking. His smoking use included cigarettes. He quit after 4.00 years of use. He has never used smokeless tobacco. He reports that he does not drink alcohol and does not use drugs.    ROS:  Constitutional: Negative for activity change, appetite change, fatigue and fever.   HENT: Negative for congestion.    Respiratory: Negative for cough, shortness of breath and wheezing.    Cardiovascular: Negative for chest pain and leg swelling.   Gastrointestinal: Negative for abdominal distention, abdominal pain, constipation, diarrhea and nausea.   Genitourinary: Negative for dysuria.   Musculoskeletal: Negative for arthralgia. Negative for back pain.   Skin: Negative for color change and wound.   Neurological: Negative for dizziness.   Psychiatric/Behavioral: Negative for agitation, behavioral problems and confusion.     Physical Exam:  Constitutional:       Appearance: Patient is well-developed.   HENT:      Head: Normocephalic.   Eyes:      Conjunctiva/sclera: Conjunctivae normal.   Neck:      Musculoskeletal: Normal range of motion.   Cardiovascular:      Rate and Rhythm: Normal rate and regular rhythm.      Heart sounds: Normal heart sounds. No murmur.   Pulmonary:      Effort: No respiratory distress.      Breath sounds: Normal breath sounds. No wheezing or rales.   Abdominal:      General: Bowel sounds are normal. There is no distension.      Palpations: Abdomen is soft.  Patient with urethral bleeding     Tenderness: There is no abdominal tenderness.  "  Musculoskeletal:       Normal range of motion.     Skin:General:        Skin is warm.   Neurological:         Mental Status: Patient is alert and oriented to person, place, and time.   Psychiatric:         Behavior: Behavior normal.     Vitals:BP (!) 150/98   Pulse 83   Temp 98.3  F (36.8  C)   Resp 20   Ht 1.778 m (5' 10\")   Wt 93.7 kg (206 lb 8 oz)   SpO2 94%   BMI 29.63 kg/m   and Body mass index is 29.63 kg/m .    Lab/Diagnostic data:   Recent Results (from the past 240 hour(s))   Glucose by meter POCT    Collection Time: 07/07/21  5:35 PM   Result Value Ref Range    GLUCOSE BY METER POCT 212 (H) 70 - 139 mg/dL   Hemoglobin    Collection Time: 07/17/21 10:00 AM   Result Value Ref Range    Hemoglobin 10.2 (L) 13.3 - 17.7 g/dL       MEDICATIONS:     Review of your medicines          Accurate as of July 16, 2021 11:19 AM. If you have any questions, ask your nurse or doctor.            CONTINUE these medicines which have NOT CHANGED      Dose / Directions   amLODIPine 10 MG tablet  Commonly known as: NORVASC      Refills: 0     aspirin 81 MG chewable tablet  Commonly known as: ASA      Dose: 81 mg  Take 81 mg by mouth  Refills: 0     cephALEXin 500 MG capsule  Commonly known as: KEFLEX      Dose: 500 mg  Take 500 mg by mouth 3 times daily  Refills: 0     cloNIDine 0.1 MG tablet  Commonly known as: CATAPRES      Dose: 0.1 mg  Take 0.1 mg by mouth 2 times daily as needed For SBP >160  Refills: 0     glimepiride 4 MG tablet  Commonly known as: AMARYL      TK 1 T PO QD WITH FIRST MEAL OF THE DAY  Refills: 0     hydrALAZINE 100 MG tablet  Commonly known as: APRESOLINE      Dose: 100 mg  Take 100 mg by mouth 4 times daily  Refills: 0     Lantus SoloStar 100 UNIT/ML pen  Generic drug: insulin glargine      Dose: 26 Units  26 Units  Refills: 0     memantine 10 MG tablet  Commonly known as: NAMENDA  Used for: Late onset Alzheimer's disease without behavioral disturbance (H)      Dose: 10 mg  Take 1 tablet (10 mg) by " mouth 2 times daily  Quantity: 60 tablet  Refills: 11     metoprolol succinate ER 50 MG 24 hr tablet  Commonly known as: TOPROL-XL      Dose: 50 mg  Take 50 mg by mouth  Refills: 0     multivitamin, therapeutic Tabs tablet      Dose: 1 tablet  Take 1 tablet by mouth daily  Refills: 0     omeprazole 20 MG DR capsule  Commonly known as: priLOSEC      TAKE 1 DAILY, 1/2 HOUR  BEFORE BREAKFAST FOR  HEARTBURN.  Refills: 0     rivastigmine 13.3 MG/24HR 24 hr patch  Commonly known as: EXELON  Used for: Late onset Alzheimer's disease without behavioral disturbance (H)      Dose: 1 patch  Apply 1 patch topically daily  Quantity: 30 patch  Refills: 11     simvastatin 20 MG tablet  Commonly known as: ZOCOR      Dose: 20 mg  Take 20 mg by mouth  Refills: 0     Trulicity 1.5 MG/0.5ML pen  Generic drug: dulaglutide      INJECT 1.5 MG UNDER THE SKIN ONCE A WEEK  Refills: 0            ASSESSMENT/PLAN    Urinary bleeding KUB, unable to do abdominal US- needs to be NPO, follow-up with urology as scheduled    Uro sepsis- On Keflex until 7/21/21, Follow up with Urology as ordered. TOV was to be done on 7/10/2021 however patient pulled out his Godoy catheter a couple of days ago.  The Godoy was left out and patient is voiding well     Hypertension - on Norvasc, metoprolol succinate, PRN clonidine      Diabetes- FS as ordered, On Trulicitty, Glimerpiride, Lantus 26 units daily A1C 7/5/21 was 6.4     Alzheimers disease on memantine and Excelon     GERD on omeprazole     HDL continue Simvastain     Electronically signed by: Sultana Santos

## 2021-07-20 PROBLEM — R41.89 COGNITIVE AND BEHAVIORAL CHANGES: Status: ACTIVE | Noted: 2021-01-01

## 2021-07-20 PROBLEM — G47.9 SLEEP DIFFICULTIES: Status: ACTIVE | Noted: 2021-01-01

## 2021-07-20 PROBLEM — N17.0 ACUTE RENAL FAILURE WITH ACUTE TUBULAR NECROSIS SUPERIMPOSED ON STAGE 3 CHRONIC KIDNEY DISEASE (H): Status: ACTIVE | Noted: 2021-05-02

## 2021-07-20 PROBLEM — E87.6 HYPOKALEMIA: Status: ACTIVE | Noted: 2021-05-02

## 2021-07-20 PROBLEM — N12 PYELONEPHRITIS: Status: ACTIVE | Noted: 2021-01-01

## 2021-07-20 PROBLEM — N20.1 URETERAL STONE: Status: ACTIVE | Noted: 2021-01-01

## 2021-07-20 PROBLEM — F05 SUNDOWNING: Status: ACTIVE | Noted: 2021-01-01

## 2021-07-20 PROBLEM — R46.89 COGNITIVE AND BEHAVIORAL CHANGES: Status: ACTIVE | Noted: 2021-01-01

## 2021-07-20 PROBLEM — N18.30 ACUTE RENAL FAILURE WITH ACUTE TUBULAR NECROSIS SUPERIMPOSED ON STAGE 3 CHRONIC KIDNEY DISEASE (H): Status: ACTIVE | Noted: 2021-05-02

## 2021-07-20 PROBLEM — A41.9 SEPSIS (H): Status: ACTIVE | Noted: 2021-05-02

## 2021-07-20 PROBLEM — N20.0 NEPHROLITHIASIS: Status: ACTIVE | Noted: 2021-01-01

## 2021-07-20 PROBLEM — M25.50 PAIN IN JOINT, MULTIPLE SITES: Status: ACTIVE | Noted: 2021-01-01

## 2021-07-20 NOTE — PROGRESS NOTES
[facility] GERIATRIC SERVICES    [place] Medical Record Number: [id number]  Place of Service where encounter took place: [facility] ([facility]) [[id number]]  Chief Complaint   Patient presents with     RECHECK     TCU 7/20/2021. Sepsis, UTI, right ureteral stent placed for stone/hydronephrosis.        TCU HPI:     Laz Kingston is a 86 year old male with hx of HTN, IDDM, dementia, admitted to the hospital on 7/4/2021 with confusion. Discharge summary as partially excerpted below.     86 y.o. male with history significant for BCE (basal cell epithelioma), Insulin dependent Diabetes mellitus, GERD, Hyperlipidemia, Hypertension, dementia, and Splenomegaly admitted for confusion and found to be in sepsis due to severe UTI.     HEMALATHA on CKD  -creatinine on admission was 2.39  -baseline creatinine is 1.8-2.5     Lactic acidosis due to sepsis/UTI  -lactic acid elevated 5.0 on admission but subsequently trending down with abx and IV fluids  -lactic acid normalized     SIRS  -patient fit SIRS criteria on admission with fever of 101.7 F and leukocytosis on admission  -blood cultures do not show any growth  -culture from 7/4 shows growth of > 100,000 E. Coli that is pansensitive.  -urology following and appreciate recommendations  -14 days of keflex      Right renal hydronephrosis  -kidney US showed severe right hydronephrosis.   -urology was consulted and cystoscopy with right ureteral stent placement was performed  -Several papillary bladder tumors on the right side of the bladder neck were seen during the surgery. Urology plans to take patient back to OR in 2 weeks to resect the bladder tumors and to treat the ureteral stone with laser lithotripsy.      Acute Metabolic encephalopathy   -from underlying infection  -mentation improved, approaching baseline     Anemia  -Hgb stable      HTN  -BP running high, may be due to saline infusion  -c/w home metoprolol, home hydralazine, home norvasc   -monitor BP  and adjust BP regimen PRN     DM  -previous HgbA1c 7.2 2/15/2021, now HgbA1c 7/5/2021 6.4  -on trulicity, amaryl, and Lantus 48 units subcutaneous at bedtime at home  -decrease Lantus to 26 units subcutaneous at bedtime until oral intake is back to baseline      GERD  -c/w home prilosec     Hx of Alzheimer's dementia  -c/w home namenda and home exelon     Hx of HLD  -c/w home zocor     Elevated LFTs/Acute hepatitis  -likely due to SIRS/infection  -trending down     Overall stabilized and discharged to TCU on 7/7/2021 for PT, OT, nursing cares, medical management and monitoring.       Today:  He is working with therapy, ambulates with walker. No complaints upon questioning though does have dementia. No nursing concerns. He is confused at baseline. Will complete course of Keflex tomorrow 7/21/2021. Had borges when he came to TCU but pulled it out, hasn't yet had urology follow up. Some hematuria on 7/16/2021, urology updated. He is scheduled to discharge home later this week. Will have later ureteral stent removal and removal of bladder tumors. No diarrhea, abdominal pain, nausea or vomiting.  Denies shortness of breath, headaches, chest pain, dizziness. No fever or cough.       PAST MEDICAL HISTORY:  Past Medical History:   Diagnosis Date     Alzheimer's dementia (H)      BCE (basal cell epithelioma) 10/19/2015     Diabetes mellitus (H)      Dysplastic nevus 10/19/2015     GERD (gastroesophageal reflux disease)      Hyperlipidemia      Hypertension      Onychomycosis      Personal history of prostate cancer      Splenomegaly 01/10/2017       MEDICATIONS:  Current Outpatient Medications   Medication Sig Dispense Refill     amLODIPine (NORVASC) 10 MG tablet        aspirin (ASA) 81 MG chewable tablet Take 81 mg by mouth       cloNIDine (CATAPRES) 0.1 MG tablet Take 0.1 mg by mouth 2 times daily as needed For SBP >160       glimepiride (AMARYL) 4 MG tablet TK 1 T PO QD WITH FIRST MEAL OF THE DAY       hydrALAZINE  (APRESOLINE) 100 MG tablet Take 100 mg by mouth 4 times daily       LANTUS SOLOSTAR 100 UNIT/ML soln 26 Units        memantine (NAMENDA) 10 MG tablet Take 1 tablet (10 mg) by mouth 2 times daily 60 tablet 11     metoprolol succinate ER (TOPROL-XL) 50 MG 24 hr tablet Take 50 mg by mouth       multivitamin, therapeutic (THERA-VIT) TABS tablet Take 1 tablet by mouth daily       omeprazole (PRILOSEC) 20 MG DR capsule TAKE 1 DAILY, 1/2 HOUR  BEFORE BREAKFAST FOR  HEARTBURN.       rivastigmine (EXELON) 13.3 MG/24HR 24 hr patch Apply 1 patch topically daily 30 patch 11     simvastatin (ZOCOR) 20 MG tablet Take 20 mg by mouth       TRULICITY 1.5 MG/0.5ML pen INJECT 1.5 MG UNDER THE SKIN ONCE A WEEK         PHYSICAL EXAM:  General: Patient is alert male, no distress.   Vitals: BP (!) 148/74   Pulse 70   Temp 97.8  F (36.6  C)   Resp 20   Wt 92.5 kg (203 lb 14.4 oz)   SpO2 97%   BMI 29.26 kg/m    HEENT: Head is NCAT. Eyes show no injection or icterus. Nares negative. Oropharynx well hydrated.  Neck: No JVD.  Lungs: Non labored respirations.   Cardiovascular: Regular rate and rhythm, normal S1, S2.  Back: No spinal or CVA tenderness.  Abdomen: Obese, soft, no tenderness on exam. Bowel sounds present. No guarding rebound or rigidity.  : Deferred.  Extremities: LE edema is noted.  Musculoskeletal: Age related degen changes.   Psych: Mood appears good.      LABS/DIAGNOSTIC DATA:  Component      Latest Ref Rng & Units 7/4/2021 7/5/2021 7/6/2021 7/7/2021   WBC      4.0 - 11.0 thou/uL 14.5 (H) 9.3 6.7 5.3   RBC Count      4.40 - 6.20 mill/uL 4.63 3.69 (L) 3.79 (L) 3.58 (L)   Hemoglobin      13.3 - 17.7 g/dL 13.7 (L) 10.9 (L) 11.1 (L) 10.3 (L)   Hematocrit      40.0 - 54.0 % 39.7 (L) 32.5 (L) 34.5 (L) 32.0 (L)   MCV      80 - 100 fL 86 88 91 89   MCH      27.0 - 34.0 pg 29.6 29.5 29.3 28.8   MCHC      32.0 - 36.0 g/dL 34.5 33.5 32.2 32.2   RDW      11.0 - 14.5 % 14.1 14.4 14.8 (H) 14.3   Platelet Count      140 - 440 thou/uL  283 161 189 193   Mean Platelet Volume      8.5 - 12.5 fL 9.1 8.7 8.9 9.1     Component      Latest Ref Rng & Units 7/4/2021 7/5/2021 7/6/2021 7/6/2021             6:10 AM  6:54 PM   Sodium      136 - 145 mmol/L 144 145 145    Potassium      3.5 - 5.0 mmol/L 3.7 3.3 (L) 3.4 (L) 3.7   Chloride      98 - 107 mmol/L 111 (H) 115 (H) 117 (H)    Carbon Dioxide      22 - 31 mmol/L 18 (L) 24 21 (L)    Anion Gap      5 - 18 mmol/L 15 6 7    Glucose      70 - 125 mg/dL 267 (H) 148 (H) 150 (H)    Calcium      8.5 - 10.5 mg/dL 10.0 9.3 9.3    Urea Nitrogen      8 - 28 mg/dL 41 (H) 38 (H) 42 (H)    Creatinine      0.70 - 1.30 mg/dL 2.39 (H) 2.14 (H) 2.60 (H)    GFR Estimate If Black      >60 mL/min/1.73m2 32 (L) 36 (L) 29 (L)    GFR Estimate      >60 mL/min/1.73m2 26 (L) 30 (L) 24 (L)          ASSESSMENT/PLAN:  1. Sepsis. Secondary to UTI. UC with greater than 100K E coli. BC neg. Will complete Keflex on 7/21/2021.  2. Right hydronephrosis. Right ureteral stent placed due to stone. Follow up with urology.   3. Papillary bladder tumors. He will have later procedure, stent removal and tumor removal per urology.  4. HTN. On hydralazine, metoprolol and amlodipine with prn clonidine. Monitor BPs in TCU.  5. Diabetes. On glimepiride, trulicity, lantus insulin. Accuchecks followed.   6. Anemia. Last Hgb at 10.3.  7. CKD.   8. Dementia. On rivastigmine and memantine. Encephalopathy in the hospital sec to medical illness.             Electronically signed by:  Laura Cisneros MD

## 2021-07-22 NOTE — PROGRESS NOTES
[facility] GERIATRIC SERVICES    [place] Medical Record Number: [id number]  Place of Service where encounter took place: [facility] ([facility]) [[id number]]       Chief Complaint   Patient presents with     Discharge Summary     [facility] TCU 7/7/2021-7/23/2021 (Anticipated DC).       TCU HPI:     Laz Kingston is a 86 year old male with hx of HTN, IDDM, dementia, admitted to the hospital on 7/4/2021 with confusion. Discharge summary as partially excerpted below.     86 y.o. male with history significant for BCE (basal cell epithelioma), Insulin dependent Diabetes mellitus, GERD, Hyperlipidemia, Hypertension, dementia, and Splenomegaly admitted for confusion and found to be in sepsis due to severe UTI.     HEMALATHA on CKD  -creatinine on admission was 2.39  -baseline creatinine is 1.8-2.5     Lactic acidosis due to sepsis/UTI  -lactic acid elevated 5.0 on admission but subsequently trending down with abx and IV fluids  -lactic acid normalized     SIRS  -patient fit SIRS criteria on admission with fever of 101.7 F and leukocytosis on admission  -blood cultures do not show any growth  -culture from 7/4 shows growth of > 100,000 E. Coli that is pansensitive.  -urology following and appreciate recommendations  -14 days of keflex      Right renal hydronephrosis  -kidney US showed severe right hydronephrosis.   -urology was consulted and cystoscopy with right ureteral stent placement was performed  -Several papillary bladder tumors on the right side of the bladder neck were seen during the surgery. Urology plans to take patient back to OR in 2 weeks to resect the bladder tumors and to treat the ureteral stone with laser lithotripsy.      Acute Metabolic encephalopathy   -from underlying infection  -mentation improved, approaching baseline     Anemia  -Hgb stable      HTN  -BP running high, may be due to saline infusion  -c/w home metoprolol, home hydralazine, home norvasc   -monitor BP and adjust BP regimen  PRN     DM  -previous HgbA1c 7.2 2/15/2021, now HgbA1c 7/5/2021 6.4  -on trulicity, amaryl, and Lantus 48 units subcutaneous at bedtime at home  -decrease Lantus to 26 units subcutaneous at bedtime until oral intake is back to baseline      GERD  -c/w home prilosec     Hx of Alzheimer's dementia  -c/w home namenda and home exelon     Hx of HLD  -c/w home zocor     Elevated LFTs/Acute hepatitis  -likely due to SIRS/infection  -trending down     Overall stabilized and discharged to TCU on 7/7/2021 for PT, OT, nursing cares, medical management and monitoring.       TCU Course:  No complications during his TCU stay. Medical status and conditions were monitored as warranted while he participated in therapy and received nursing care and assistance. He finished Keflex for UTI/sepsis. Had borges when he came to TCU but pulled it out, left out with urology updated, voiding without retention. Episode hematuria on 7/16/2021 resolved. He will have outpat follow up with urology, scheduled for cystoscopy, bladder tumor removal and right ureteral stent removal on 8/5/2021. No medication changes made in TCU to his usual meds, aspirin was restarted on 7/18/2021 after being held for 2 weeks post stent placement/surgery. He is planning to discharge back home with his wife tomorrow Fri 7/23/2021 and will have home care services.       PAST MEDICAL HISTORY:  Past Medical History:   Diagnosis Date     Alzheimer's dementia (H)      BCE (basal cell epithelioma) 10/19/2015     Diabetes mellitus (H)      Dysplastic nevus 10/19/2015     GERD (gastroesophageal reflux disease)      Hyperlipidemia      Hypertension      Onychomycosis      Personal history of prostate cancer      Splenomegaly 01/10/2017       MEDICATIONS:  Current Outpatient Medications   Medication Sig Dispense Refill     amLODIPine (NORVASC) 10 MG tablet        aspirin (ASA) 81 MG chewable tablet Take 81 mg by mouth       cloNIDine (CATAPRES) 0.1 MG tablet Take 0.1 mg by mouth 2  times daily as needed For SBP >160       glimepiride (AMARYL) 4 MG tablet TK 1 T PO QD WITH FIRST MEAL OF THE DAY       hydrALAZINE (APRESOLINE) 100 MG tablet Take 100 mg by mouth 4 times daily       LANTUS SOLOSTAR 100 UNIT/ML soln 26 Units        memantine (NAMENDA) 10 MG tablet Take 1 tablet (10 mg) by mouth 2 times daily 60 tablet 11     metoprolol succinate ER (TOPROL-XL) 50 MG 24 hr tablet Take 50 mg by mouth       multivitamin, therapeutic (THERA-VIT) TABS tablet Take 1 tablet by mouth daily       omeprazole (PRILOSEC) 20 MG DR capsule TAKE 1 DAILY, 1/2 HOUR  BEFORE BREAKFAST FOR  HEARTBURN.       rivastigmine (EXELON) 13.3 MG/24HR 24 hr patch Apply 1 patch topically daily 30 patch 11     simvastatin (ZOCOR) 20 MG tablet Take 20 mg by mouth       TRULICITY 1.5 MG/0.5ML pen INJECT 1.5 MG UNDER THE SKIN ONCE A WEEK         PHYSICAL EXAM:  General: Patient is alert male, no distress.   /83   Pulse 70   Temp 98.2  F (36.8  C)   Resp 20    HEENT: Head is NCAT. Eyes show no injection or icterus. Nares negative. Oropharynx well hydrated.  Neck: No JVD.  Lungs: Non labored respirations.   Abdomen: Obese, soft, no tenderness on exam. Bowel sounds present. No guarding rebound or rigidity.  : Deferred.  Extremities: LE edema.  Musculoskeletal: Age related degen changes.   Psych: Mood appears good.        ASSESSMENT/PLAN:  1. Sepsis. Secondary to UTI. UC with greater than 100K E coli. BC neg. Completed Keflex on 7/21/2021.  2. Right hydronephrosis. Right ureteral stent placed due to stone. Follow up with urology on 8/5/2021.   3. Papillary bladder tumors. Tumor removal per urology.  4. HTN. On hydralazine, metoprolol and amlodipine.  5. Diabetes. On glimepiride, trulicity, lantus insulin.   6. Anemia. Last Hgb at 10.3.  7. Elevated LFTs. Likely sec to SIRS/infection.  8. HEMALATHA. On CKD.   9. HLD. On simvastatin.  10. Dementia. On rivastigmine and memantine. Encephalopathy in the hospital sec to medical illness,  now at baseline.         Total time greater than 30 minutes discharge coordination.        DISCHARGE PLAN/FACE TO FACE:  I certify that services are/were furnished while this patient was under the care of a physician and that a physician or an allowed non-physician practitioner (NPP), had a face-to-face encounter that meets the physician face-to-face encounter requirements. The encounter was in whole, or in part, related to the primary reason for home health. The patient is confined to his/her home and needs intermittent skilled nursing, physical therapy, speech-language pathology, or the continued need for occupational therapy. A plan of care has been established by a physician and is periodically reviewed by a physician.    Date of Face-to-Face Encounter: 7/22/2021.    I certify that, based on my findings, the following services are medically necessary home health services: PT, OT, HHA, RN.    My clinical findings support the need for the above skilled services because:  Recent hospitalization for sepsis, UTI, stone requiring ureteral stent. Needs follow up with urology. Overall debilitated and deconditioned, hx of dementia, DM, HTN. Needs additional therapy as he returns home for gait, stamina, strengthening. Aide to assist with cares and RN for clinical assessments.     This patient is homebound because: Too strenuous to leave the home.    The patient is, or has been, under my care and I have initiated the establishment of the plan of care. This patient will be followed by a physician who will periodically review the plan of care.        Electronically signed by:  Laura Cisneros MD

## 2021-07-26 NOTE — TELEPHONE ENCOUNTER
Jil Severino calling with update and requesting verbal orders    Skilled nursing  2 visits per week for 2  1 visit per week for 4 weeks    Medication, EDI angela, Patient caregiver assessment and education    PT and OT Evaluation    [phone] is the best call    Spouse has routinely been given Clondine every morning, instead of taking BP first    Patient    Spouse has been instructed take BP first     Lantus 26 units in the evening - pt did not take on Friday evening - low 100's spouse did not give medication Friday evening 7/23 due to low reading of 100 or 101.  Spouse did not record.    Pt has electric cuff.  Spouse has been educated to take BP first, record the reading and providing medication if appropriate.    Saturday 7/24 AM Blood Sugar reading 114    Jil wondering if there are parameters spouse should be following.    Appetite is very poor. Not eating.  Blood sugars rare 100 - 114.      Should the medication be withheld?    Attempting to give patient a nutiritional supplement as well.     Family is looking for a Memory Care Unit.  Son is living in the home with his parents.     Would like to parameters for blood sugars as the patient is running 114 and below if PCP feels appropriate.

## 2021-07-27 NOTE — TELEPHONE ENCOUNTER
Jil from [facility] - needs follow up to message left yesterday regarding parameters for patient's low blood sugar.    Can not accept verbal okay message from writer.

## 2021-07-27 NOTE — TELEPHONE ENCOUNTER
"Jil calling back from [facility] regarding a message that she had left yesterday 7/26.  Gave ok per MD per epic.  \"I also just wanted it noted that regarding pts. RX for Clonidine 0.1 mg. It is supposed to be given if BP systolic is 160 or higher. His wife is automatically giving it every morning without checking. His BP on Sunday 7/25 was 140/64 when I was there. I left her a log and explained how to do it and when.\"   Rosa Elena Roque RN [facility]       "

## 2021-07-27 NOTE — TELEPHONE ENCOUNTER
Left detailed message on identified voicemail of Sakshi.  Verbal authorization for requested PT orders given.

## 2021-07-27 NOTE — TELEPHONE ENCOUNTER
Dr. AMAIRANI Cantor calling from [facility] and she is looking for verbal orders for PT for 2 times a week for 1 week, 1 time a week for 1 week and 2 times a week for 2 weeks.  Please call her back at [phone]

## 2021-07-28 PROBLEM — A41.9 SEPSIS (H): Status: RESOLVED | Noted: 2021-05-02 | Resolved: 2021-01-01

## 2021-07-28 NOTE — PROGRESS NOTES
[address] [address]  Phone: [phone]  Fax: [phone]  Primary Provider: Bianca Rodriguez  Pre-op Performing Provider: BIANCA RODRIGUEZ      PREOPERATIVE EVALUATION:  Today's date: 7/28/2021      Surgical Information:  Surgery/Procedure: Cystoscopy with urethral stent exchange , transurethral resection of bladder tumor   Surgery Location: [facility]  Surgeon: Dr. Ackerman  Surgery Date: 8/5/202  Time of Surgery: 1330  Where patient plans to recover: At home with family  Fax number for surgical facility: Note does not need to be faxed, will be available electronically in Epic.    Type of Anesthesia Anticipated: to be determined    Assessment & Plan     The proposed surgical procedure is considered INTERMEDIATE risk.    Pre-op evaluation  - CBC with platelets; Future  - Basic metabolic panel; Future    Nephrolithiasis  Essential hypertension  Normotensive and at goal     Type 2 diabetes mellitus without complication, with long-term current use of insulin (H)    Stage 3b chronic kidney disease      Risks and Recommendations:  The patient has the following additional risks and recommendations for perioperative complications:  Cardiovascular:    Diabetes:  - Patient is on insulin therapy; diabetic NPO guidelines provided and discussed.    Medication Instructions:  Continue will cardiac meds up to the day of the procedure.   Insulin: give 1/2 dosage the night before   All other Diabetic meds , take up to the day of the procedure.     RECOMMENDATION:  APPROVAL GIVEN to proceed with proposed procedure, without further diagnostic evaluation.    Review of the result(s) of each unique test - as noted in Epic       Diagnosis or treatment significantly limited by social determinants of health - mostly provided by wife    I spent a total of 58 minutes on the day of the visit.   Time spent doing chart review, history and exam, documentation  and further activities per the note        Subjective     Laz Kingston is a 86 year old male with history of  Urolithiasis, s/p uretral stent and bladde tumors presenting for a preoperative evaluation, undergoing aforementioned procedure.       Review of Systems  Constitutional, neuro, ENT, endocrine, pulmonary, cardiac, gastrointestinal, genitourinary, musculoskeletal, integument and psychiatric systems are negative, except as otherwise noted.    Patient Active Problem List    Diagnosis Date Noted     Cognitive and behavioral changes 07/20/2021     Priority: Medium     Nephrolithiasis 07/20/2021     Priority: Medium     Formatting of this note might be different from the original.  Created by Conversion       Pain in joint, multiple sites 07/20/2021     Priority: Medium     Formatting of this note might be different from the original.  Created by Conversion       Sleep difficulties 07/20/2021     Priority: Medium     Sundowning 07/20/2021     Priority: Medium     Pyelonephritis 07/04/2021     Priority: Medium     Ureteral stone 07/04/2021     Priority: Medium     Formatting of this note might be different from the original.  Added automatically from request for surgery [id number]       Acute renal failure with acute tubular necrosis superimposed on stage 3 chronic kidney disease (H) 05/02/2021     Priority: Medium     Encephalopathy, metabolic 05/02/2021     Priority: Medium     Hypokalemia 05/02/2021     Priority: Medium     Sepsis (H) 05/02/2021     Priority: Medium     Hypercholesterolemia 02/05/2021     Priority: Medium     Created by Conversion    Formatting of this note might be different from the original.  Created by Conversion  Formatting of this note might be different from the original.  Formatting of this note might be different from the original.  Created by Conversion       Late onset Alzheimer disease (H) 02/05/2021     Priority: Medium     Chronic kidney disease, stage 3 02/05/2021     Priority:  Medium     Created by Conversion    Formatting of this note might be different from the original.  Created by Conversion  Formatting of this note might be different from the original.  Formatting of this note might be different from the original.  Created by Conversion       Benign essential hypertension 02/05/2021     Priority: Medium     Created by Conversion    Formatting of this note might be different from the original.  Created by Conversion  Formatting of this note might be different from the original.  Formatting of this note might be different from the original.  Created by Conversion       Hearing loss 10/19/2015     Priority: Medium     Type 2 diabetes mellitus without complication, with long-term current use of insulin (H) 06/17/2015     Priority: Medium     Hypertension 10/10/2011     Priority: Medium     Gastroesophageal reflux disease 10/10/2011     Priority: Medium     Created by Conversion    Formatting of this note might be different from the original.  Created by Conversion  Formatting of this note might be different from the original.  Formatting of this note might be different from the original.  Created by Conversion       S/P total knee replacement 10/10/2011     Priority: Medium      Past Medical History:   Diagnosis Date     Alzheimer's dementia (H)      BCE (basal cell epithelioma) 10/19/2015     Diabetes mellitus (H)      Dysplastic nevus 10/19/2015     GERD (gastroesophageal reflux disease)      Hyperlipidemia      Hypertension      Onychomycosis      Personal history of prostate cancer      Splenomegaly 01/10/2017     Past Surgical History:   Procedure Laterality Date     HC CYSTOSCOPY,INSERT URETERAL STENT Right 7/4/2021    Procedure: CYSTOSCOPY, WITH URETERAL STENT INSERTION;  Surgeon: Amos Ackerman MD;  Location: [facility];  Service: Urology     IR MISCELLANEOUS PROCEDURE  3/13/2014     IR URETER DILATION BILATERAL  3/13/2014     JOINT REPLACEMENT Bilateral     Both  knees     OH ARTHROPLASTY TIBIAL PLATEAU      Description: Knee Replacement;  Recorded: 11/06/2013;     PROSTATECTOMY       Current Outpatient Medications   Medication Sig Dispense Refill     amLODIPine (NORVASC) 10 MG tablet        aspirin (ASA) 81 MG chewable tablet Take 81 mg by mouth       cloNIDine (CATAPRES) 0.1 MG tablet Take 0.1 mg by mouth 2 times daily as needed For SBP >160       glimepiride (AMARYL) 4 MG tablet TK 1 T PO QD WITH FIRST MEAL OF THE DAY       hydrALAZINE (APRESOLINE) 100 MG tablet Take 100 mg by mouth 4 times daily       LANTUS SOLOSTAR 100 UNIT/ML soln 26 Units        memantine (NAMENDA) 10 MG tablet Take 1 tablet (10 mg) by mouth 2 times daily 60 tablet 11     metoprolol succinate ER (TOPROL-XL) 50 MG 24 hr tablet Take 50 mg by mouth       multivitamin, therapeutic (THERA-VIT) TABS tablet Take 1 tablet by mouth daily       omeprazole (PRILOSEC) 20 MG DR capsule TAKE 1 DAILY, 1/2 HOUR  BEFORE BREAKFAST FOR  HEARTBURN.       rivastigmine (EXELON) 13.3 MG/24HR 24 hr patch Apply 1 patch topically daily 30 patch 11     simvastatin (ZOCOR) 20 MG tablet Take 20 mg by mouth       TRULICITY 1.5 MG/0.5ML pen INJECT 1.5 MG UNDER THE SKIN ONCE A WEEK         Allergies   Allergen Reactions     Ibuprofen Rash and GI Disturbance        Social History     Tobacco Use     Smoking status: Former Smoker     Years: 4.00     Types: Cigarettes     Smokeless tobacco: Never Used     Tobacco comment: Quit prior to 1966.   Substance Use Topics     Alcohol use: No     Comment: Alcoholic Drinks/day: Never a problem for him, he states.       History   Drug Use No         Objective     /70   Pulse 90   Temp 98.8  F (37.1  C)   Resp 14   SpO2 96%     Physical Exam    GENERAL APPEARANCE: healthy, alert and no distress     EYES: EOMI,  PERRL     HENT: nose and mouth without ulcers or lesions     NECK: no adenopathy, no asymmetry, masses, or scars and thyroid normal to palpation     RESP: lungs clear to  auscultation - no rales, rhonchi or wheezes     CV: regular rates and rhythm, normal S1 S2, no S3 or S4 and no murmur, click or rub     ABDOMEN:  soft, nontender, no HSM or masses and bowel sounds normal     MS: extremities normal- no gross deformities noted, no evidence of inflammation in joints, FROM in all extremities.     SKIN: no suspicious lesions or rashes     NEURO: Grossly intact.      PSYCH: affect flat and inattentive    Recent Labs   Lab Test 07/17/21  1000 07/07/21  0605 07/06/21  1854 07/06/21  0610 07/05/21  0612 05/01/21  2242 05/01/21  2242 02/15/21  1320   HGB 10.2* 10.3*  --  11.1* 10.9*   < >  --   --    PLT  --  193  --  189 161   < >  --   --    INR  --   --   --   --   --   --  1.16*  --    NA  --  147*  --  145 145  --   --  140   POTASSIUM  --  3.5 3.7 3.4* 3.3*  --   --  3.8   CR  --  2.37*  --  2.60* 2.14*  --   --  2.15*   A1C  --   --   --   --  6.4*  --   --  7.2*    < > = values in this interval not displayed.        Diagnostics:  Recent Results (from the past 168 hour(s))   CBC with platelets    Collection Time: 07/28/21  5:09 PM   Result Value Ref Range    WBC Count 5.1 4.0 - 11.0 10e3/uL    RBC Count 3.68 (L) 4.40 - 5.90 10e6/uL    Hemoglobin 10.8 (L) 13.3 - 17.7 g/dL    Hematocrit 31.8 (L) 40.0 - 53.0 %    MCV 86 78 - 100 fL    MCH 29.3 26.5 - 33.0 pg    MCHC 34.0 31.5 - 36.5 g/dL    RDW 15.7 (H) 10.0 - 15.0 %    Platelet Count 184 150 - 450 10e3/uL   Basic metabolic panel    Collection Time: 07/28/21  5:09 PM   Result Value Ref Range    Sodium 140 136 - 145 mmol/L    Potassium 3.3 (L) 3.5 - 5.0 mmol/L    Chloride 108 (H) 98 - 107 mmol/L    Carbon Dioxide (CO2) 20 (L) 22 - 31 mmol/L    Anion Gap 12 5 - 18 mmol/L    Urea Nitrogen 44 (H) 8 - 28 mg/dL    Creatinine 2.46 (H) 0.70 - 1.30 mg/dL    Calcium 9.9 8.5 - 10.5 mg/dL    Glucose 110 70 - 125 mg/dL    GFR Estimate 23 (L) >60 mL/min/1.73m2      No EKG this visit, completed in the last 90 days.    Revised Cardiac Risk Index  (RCRI):  The patient has the following serious cardiovascular risks for perioperative complications:   - Diabetes Mellitus (on Insulin) = 1 point   - Serum Creatinine >2.0 mg/dl = 1 point     RCRI Interpretation: 2 points: Class III (moderate risk - 6.6% complication rate)     Estimated Functional Capacity: can not perform 4 mets due to physical debility                 Signed Electronically by: Bianca Rodriguez MD  Copy of this evaluation report is provided to requesting physician.

## 2021-07-29 NOTE — TELEPHONE ENCOUNTER
Carly calling from [facility] to request verbal orders for the following:    OT: 1x per week for 1 week, 1x per week for 4 weeks.  Addressing cognition, upper body strength, functional mobility, functional activity tolerance and safety awareness.    Carly can be reached at [phone]. Secure VM if needed.

## 2021-08-05 NOTE — ANESTHESIA PREPROCEDURE EVALUATION
Anesthesia Pre-Procedure Evaluation    Patient: Laz Kingston   MRN: [id number] : 1935        Preoperative Diagnosis: Calculus of kidney [N20.0]   Procedure : Procedure(s):  CYSTOSCOPY, WITH TRANSURETHRAL RESECTION BLADDER TUMOR  RIGHT URETEROSCOPY, LASER LITHOTRIPSY AND RIGHT URETERAL STENT EXCHANGE     Past Medical History:   Diagnosis Date     Alzheimer's dementia (H)      BCE (basal cell epithelioma) 10/19/2015     Diabetes mellitus (H)      Dysplastic nevus 10/19/2015     GERD (gastroesophageal reflux disease)      Hyperlipidemia      Hypertension      Onychomycosis      Personal history of prostate cancer      Splenomegaly 01/10/2017      Past Surgical History:   Procedure Laterality Date     HC CYSTOSCOPY,INSERT URETERAL STENT Right 2021    Procedure: CYSTOSCOPY, WITH URETERAL STENT INSERTION;  Surgeon: Amos Ackerman MD;  Location: [facility];  Service: Urology     IR MISCELLANEOUS PROCEDURE  3/13/2014     IR URETER DILATION BILATERAL  3/13/2014     JOINT REPLACEMENT Bilateral     Both knees     OR ARTHROPLASTY TIBIAL PLATEAU      Description: Knee Replacement;  Recorded: 2013;     PROSTATECTOMY        Allergies   Allergen Reactions     Ibuprofen Rash and GI Disturbance      Social History     Tobacco Use     Smoking status: Former Smoker     Years: 4.00     Types: Cigarettes     Smokeless tobacco: Never Used     Tobacco comment: Quit prior to .   Substance Use Topics     Alcohol use: No     Comment: Alcoholic Drinks/day: Never a problem for him, he states.      Wt Readings from Last 1 Encounters:   21 92.5 kg (203 lb 14.4 oz)        Anesthesia Evaluation   Pt has had prior anesthetic.     No history of anesthetic complications       ROS/MED HX  ENT/Pulmonary:  - neg pulmonary ROS     Neurologic:     (+) dementia,     Cardiovascular:  - neg cardiovascular ROS   (+) Dyslipidemia hypertension-----    METS/Exercise Tolerance:     Hematologic:        Musculoskeletal:   (+) arthritis,     GI/Hepatic:     (+) GERD, Asymptomatic on medication,     Renal/Genitourinary:     (+) renal disease (nephrolithiasis), type: CRI,     Endo:     (+) type II DM, Not using insulin, Diabetic complications: nephropathy.     Psychiatric/Substance Use:  - neg psychiatric ROS     Infectious Disease:       Malignancy:   (+) Malignancy, History of Skin and Other.Other CA bladder Active status post.    Other:  - neg other ROS          Physical Exam    Airway        Mallampati: III   TM distance: > 3 FB   Neck ROM: full   Mouth opening: > 3 cm    Respiratory Devices and Support         Dental  no notable dental history         Cardiovascular          Rhythm and rate: regular and normal     Pulmonary           breath sounds clear to auscultation           OUTSIDE LABS:  CBC:   Lab Results   Component Value Date    WBC 5.1 07/28/2021    WBC 5.3 07/07/2021    HGB 10.8 (L) 07/28/2021    HGB 10.2 (L) 07/17/2021    HCT 31.8 (L) 07/28/2021    HCT 32.0 (L) 07/07/2021     07/28/2021     07/07/2021     BMP:   Lab Results   Component Value Date     07/28/2021     (H) 07/07/2021    POTASSIUM 3.3 (L) 07/28/2021    POTASSIUM 3.5 07/07/2021    CHLORIDE 108 (H) 07/28/2021    CHLORIDE 117 (H) 07/07/2021    CO2 20 (L) 07/28/2021    CO2 24 07/07/2021    BUN 44 (H) 07/28/2021    BUN 36 (H) 07/07/2021    CR 2.46 (H) 07/28/2021    CR 2.37 (H) 07/07/2021     07/28/2021     (H) 07/07/2021     COAGS:   Lab Results   Component Value Date    PTT 31 05/01/2021    INR 1.16 (H) 05/01/2021     POC: No results found for: BGM, HCG, HCGS  HEPATIC:   Lab Results   Component Value Date    ALBUMIN 2.3 (L) 07/07/2021    PROTTOTAL 5.3 (L) 07/07/2021    ALT 49 (H) 07/07/2021    AST 60 (H) 07/07/2021    ALKPHOS 51 07/07/2021    BILITOTAL 0.5 07/07/2021     OTHER:   Lab Results   Component Value Date    LACT 0.6 (L) 07/05/2021    A1C 6.4 (H) 07/05/2021    DEJAN 9.9 07/28/2021    PHOS 2.6  05/04/2021    MAG 2.1 07/07/2021    CRP 0.6 05/03/2021       Anesthesia Plan    ASA Status:  3   NPO Status:  NPO Appropriate    Anesthesia Type: General.     - Airway: ETT   Induction: Propofol, Intravenous.           Consents    Anesthesia Plan(s) and associated risks, benefits, and realistic alternatives discussed. Questions answered and patient/representative(s) expressed understanding.     - Discussed with:  Patient, Other (See Comment) (and spouse)      - Patient is DNR/DNI Status: Yes             Suspend during perioperative period? Yes.         Postoperative Care    Pain management: Multi-modal analgesia.   PONV prophylaxis: Ondansetron (or other 5HT-3), Dexamethasone or Solumedrol     Comments:    Reviewed anesthetic options and risks, including risk of dental trauma. Patient agrees to proceed.     No versed due to age.               Danny Hernandez MD

## 2021-08-06 NOTE — PLAN OF CARE
"PRIMARY DIAGNOSIS: \"GENERIC\" NURSING  OUTPATIENT/OBSERVATION GOALS TO BE MET BEFORE DISCHARGE:  ADLs back to baseline: No    Activity and level of assistance: Up with standby assistance.    Pain status: Pain free.    Return to near baseline physical activity: No     Discharge Planner Nurse   Safe discharge environment identified: No  Barriers to discharge: Yes       Entered by: Ana Morse 08/05/2021 10:44 PM     Please review provider order for any additional goals.   Nurse to notify provider when observation goals have been met and patient is ready for discharge.    Pt arrived on unit around 2200 from PACU.  Godoy in place.  Pt alert to self only, restless.  Wants to go home.   "

## 2021-08-06 NOTE — PLAN OF CARE
PRIMARY DIAGNOSIS: Ureteral Stone POD 1 Cystoscopy with TURBT  OUTPATIENT/OBSERVATION GOALS TO BE MET BEFORE DISCHARGE:  1. Stable vital signs Yes  2. Tolerating diet:Yes  3. Pain controlled with oral pain medications:  Yes, pt denies pain. Appears comfortable.   4. Positive bowel sounds:  Yes, bowel sounds hypoactive.   5. Voiding without difficulty:  Godoy catheter in place.   6. Able to ambulate:  pt resting in bed at night.   7. Provider specific discharge goals met:  Yes    Discharge Planner Nurse   Safe discharge environment identified: Yes  Barriers to discharge: No       Entered by: Vicky Scott 08/06/2021 4:43 AM     Please review provider order for any additional goals.   Nurse to notify provider when observation goals have been met and patient is ready for discharge.

## 2021-08-06 NOTE — ANESTHESIA CARE TRANSFER NOTE
Patient: Laz Kingston    Procedure(s):  CYSTOSCOPY, WITH TRANSURETHRAL RESECTION BLADDER TUMOR  RIGHT URETEROSCOPY, LASER LITHOTRIPSY AND RIGHT URETERAL STENT EXCHANGE    Diagnosis: Calculus of kidney [N20.0]  Diagnosis Additional Information: No value filed.    Anesthesia Type:   General     Note:    Oropharynx: oropharynx clear of all foreign objects and spontaneously breathing  Level of Consciousness: drowsy  Oxygen Supplementation: face mask  Level of Supplemental Oxygen (L/min / FiO2): 6  Independent Airway: airway patency satisfactory and stable    Vital Signs Stable: post-procedure vital signs reviewed and stable  Report to RN Given: handoff report given  Patient transferred to: PACU    Handoff Report: Identifed the Patient, Identified the Reponsible Provider, Reviewed the pertinent medical history, Discussed the surgical course, Reviewed Intra-OP anesthesia mangement and issues during anesthesia, Set expectations for post-procedure period and Allowed opportunity for questions and acknowledgement of understanding      Vitals:  Vitals Value Taken Time   /93 08/05/21 2039   Temp 36.8  C (98.3  F) 08/05/21 2033   Pulse 64 08/05/21 2039   Resp 16 08/05/21 2039   SpO2 99 % 08/05/21 2039   Vitals shown include unvalidated device data.    Electronically Signed By: ELAN Swan CRNA  August 5, 2021  8:40 PM

## 2021-08-06 NOTE — PLAN OF CARE
Problem: Adult Inpatient Plan of Care  Goal: Absence of Hospital-Acquired Illness or Injury  Intervention: Identify and Manage Fall Risk  Recent Flowsheet Documentation  Taken 8/6/2021 0012 by Vicky Scott RN  Safety Promotion/Fall Prevention:    bed alarm on    assistive device/personal items within reach    bedside attendant    fall prevention program maintained    increased rounding and observation    lighting adjusted    nonskid shoes/slippers when out of bed    room door open    room near nurse's station    safety round/check completed  Intervention: Prevent Skin Injury  Recent Flowsheet Documentation  Taken 8/6/2021 0355 by Vicky Scott RN  Body Position: position changed independently  Taken 8/6/2021 0020 by Vicky Scott RN  Body Position:    turned    right    side-lying  Intervention: Prevent and Manage VTE (Venous Thromboembolism) Risk  Recent Flowsheet Documentation  Taken 8/6/2021 0012 by Vicky Scott RN  VTE Prevention/Management:    pneumatic compression device    fluids promoted  Goal: Optimal Comfort and Wellbeing  Intervention: Provide Person-Centered Care  Recent Flowsheet Documentation  Taken 8/6/2021 0012 by Vicky Scott RN  Trust Relationship/Rapport: emotional support provided     Prn oxycodone given x1 for nonverbal signs of kermit.  Pt asleep intermittently during the shift. Attempting to get out of bed when awake, pt also pulled I.v. new I.v inserted with the help of SWAT nurse. Pt is on 1:1,Godoy draining red urine, no clots.     Patients daughter Luz called and received update.   On call urologist updated that Pt pulled I.v line twice. Requested for order to stop I.v fluids. Pt is drinking adequately when offered. Dr. Lucero called back and gave a telephone order to hold off I.v fluids. The PA will come to assess patient during rounds.

## 2021-08-06 NOTE — PROGRESS NOTES
[facility] Hospitalist Progress Note  [facility]  Summary:    86M s/p cytoscopy with R ureteral dilation and stent exchange for 11mm ureteral stone, and TURBT.  Severe proximal hydroureteronephrosis.    Medical hx notable for HTN, DM2 on insulin, CKD4, dementia    Assessment/Plan    #nephrolithisiasis s/p stent and TURBT - per urology    #HTN - norvasc, clonidine, hydralazine, toprol-xl    #dm2 -   -lantus unknown when he takes it.  BG acceptable, can clarify and resume tomorrow.    -sliding scale insulin.  -Hold oral agents    #CKD4 - monitor with severe hydroureteronephrosis in OR.    #dementia - exelon and namenda    #HLD - statin    Overnight Events/Subjective/Notable results:    No physical complaints    4 point ROS otherwise negative    Objective      Vital signs in last 24 hours  Temp:  [97.4  F (36.3  C)-98.3  F (36.8  C)] 97.4  F (36.3  C)  Pulse:  [59-69] 66  Resp:  [15-27] 18  BP: (175-214)/(86-97) 175/89  SpO2:  [92 %-100 %] 92 % O2 Device: None (Room air)    Weight:   0 lbs 0 oz    Intake/Output last 3 shifts  No intake/output data recorded.  There is no height or weight on file to calculate BMI.    Physical Exam  General:  Alert, cooperative, no distress,  Hard of hearing  Neurologic: facial symmetry preserved, fluent speech.   Psych: calm, mood and affect appropriate to situation  HEENT:  Anicteric, MMM, unremarkable dentition  CV: RRR no MRG, normal S1 and S2, no edema  Lungs: CTAB.  Easyrespirations  Abd: soft, NT, normoactive BS  Skin: no rashes noted on exposed skin. Color and turgor normal  Central Lines and Tubes: None (no borges, CVC, feeding tubes)      I have reviewed all labs, medications, imaging studies in the last 24 hours.  Pertinent findings&changes discussed above.    Data       Ousmane Philippe MD  Internal Medicine Hospitalist  8/5/2021  10:11 PM

## 2021-08-06 NOTE — PLAN OF CARE
PRIMARY DIAGNOSIS: Ureteral Stone  OUTPATIENT/OBSERVATION GOALS TO BE MET BEFORE DISCHARGE:  1. Stable vital signs Yes  2. Tolerating diet:Yes  3. Pain controlled with oral pain medications:  Yes  4. Positive bowel sounds:  Yes  5. Voiding without difficulty:  No  6. Able to ambulate:  Yes  7. Provider specific discharge goals met:  No    Discharge Planner Nurse   Safe discharge environment identified: Yes  Barriers to discharge: Yes       Entered by: Ethel Willis 08/06/2021 12:03 PM     Please review provider order for any additional goals.   Nurse to notify provider when observation goals have been met and patient is ready for discharge.

## 2021-08-06 NOTE — CONSULTS
Care Management Initial Consult    General Information  Assessment completed with: Spouse or significant other, Patricia  Type of CM/SW Visit: Initial Assessment    Primary Care Provider verified and updated as needed: Yes   Readmission within the last 30 days: planned readmission   Return Category: Planned Surgery  Reason for Consult: discharge planning  Advance Care Planning: Advance Care Planning Reviewed: other (comment) (verified with family)          Communication Assessment  Patient's communication style: spoken language (English or Bilingual)    Hearing Difficulty or Deaf: yes   Wear Glasses or Blind: yes    Cognitive  Cognitive/Neuro/Behavioral: orientation        Orientation: disoriented to, time, place, situation  Mood/Behavior: calm  Best Language: 0 - No aphasia       Living Environment:   People in home: spouse     Current living Arrangements: house      Able to return to prior arrangements: yes       Family/Social Support:  Care provided by: spouse/significant other, child(steph)  Provides care for: no one  Marital Status:   Wife, Children          Description of Support System: Supportive, Involved         Current Resources:   Patient receiving home care services: No     Community Resources: None  Equipment currently used at home: walker, rolling, wheelchair, manual  Supplies currently used at home: Incontinence Supplies    Employment/Financial:  Employment Status: retired        Financial Concerns: No concerns identified   Referral to Financial Counselor: No       Lifestyle & Psychosocial Needs:  Social Determinants of Health     Tobacco Use: Medium Risk     Smoking Tobacco Use: Former Smoker     Smokeless Tobacco Use: Never Used   Alcohol Use:      Frequency of Alcohol Consumption:      Average Number of Drinks:      Frequency of Binge Drinking:    Financial Resource Strain:      Difficulty of Paying Living Expenses:    Food Insecurity:      Worried About Running Out of Food in the Last Year:       Ran Out of Food in the Last Year:    Transportation Needs:      Lack of Transportation (Medical):      Lack of Transportation (Non-Medical):    Physical Activity:      Days of Exercise per Week:      Minutes of Exercise per Session:    Stress:      Feeling of Stress :    Social Connections:      Frequency of Communication with Friends and Family:      Frequency of Social Gatherings with Friends and Family:      Attends Yazidism Services:      Active Member of Clubs or Organizations:      Attends Club or Organization Meetings:      Marital Status:    Intimate Partner Violence:      Fear of Current or Ex-Partner:      Emotionally Abused:      Physically Abused:      Sexually Abused:    Depression: At risk     PHQ-2 Score: 4   Housing Stability:      Unable to Pay for Housing in the Last Year:      Number of Places Lived in the Last Year:      Unstable Housing in the Last Year:        Functional Status:  Prior to admission patient needed assistance:              Mental Health Status:          Chemical Dependency Status:                Values/Beliefs:  Spiritual, Cultural Beliefs, Yazidism Practices, Values that affect care: yes  Description of Beliefs that Will Affect Care: Mormonism            Additional Information:  This assessment was done with the wife, Patricia, because the patient has Alzheimer's. The patient resides at home with his wife and their son. At baseline, because of his Alzheimer's, he needs assist with all cares, and he uses a walker for mobility. The patient was previously with [facility], and Patricia stated that they would be open to the patient returning home with home cares, as long as the patient does not need to discharge with a Godoy. If he will need a Godoy, then Patricia would prefer a TCU. Family can provide transportation at discharge if appropriate.     1:24 PM  The patient has home cares through [facility]. The intake number is the same as what is shown in the chart,  but the fax number is [phone]. They are able to resume cares for this patient. Please coordinate this at discharge.    Horace Gary RN

## 2021-08-06 NOTE — OP NOTE
OPERATIVE REPORT    Pre-operative Diagnosis: right ureteral stone, bladder tumor    Post-operative Diagnosis: Same    Operative Date: 8/5/2021    Place of Service:  [facility]    Procedure:   1.) cystoscopy  2.) right retrograde pyelogram  3.) fluoroscopy for right ureteral dilation  4.) right ureteroscopy with holmium laser lithotripsy  5.) right ureteral stent exchange  6.) TURBT 2cm    Surgeon: Amos Ackerman    Anesthesia: General    Estimated Blood Loss:  <5 ml    Complications: None    Findings:  1.)  11 mm right mid ureteral stone with severe proximal hydroureteronephrosis  2.)  2cm  papillary bladder tumor in between the right ureteral orifice and the bladder neck    Summary of Procedure:    After informed consent was obtained the patient was brought back to the operating room.  They were prepped and draped in standard sterile fashion.  IV antibiotics were administered for prophylaxis.  A time out was performed.    The rigid cystoscope was introduced through the urethral meatus.  It was advanced into the bladder.  The prostate is surgically absent.     The distal end of the right ureteral stent was grasped and through this a Bentson wire was placed into the right renal pelvis.     The rigid ureteroscope was introduced into the ureteral orifice.  The stone was encountered.  It was fragmented using the holmium laser fiber.  Using a 2Fr tipless basket all stone fragments were removed from the ureter.  Stone was sent for analysis.  Given that the stone was so large I used the flexible ureteroscope to inspect the right kidney and proximal ureter.  There were no stones that had pushed proximally in the kidney or ureter.  All visible stone fragments had been removed.      Through the ureteroscope a retrograde pyelogram was then performed to outline the ureter and collecting system.    A 7x26 Fr stent was then placed using a combination of visual and fluoroscopic guidance.  There was a good curl  noted in the kidney and a good curl noted in the bladder.  The pullstring was not left attached to the stent.  The patients bladder was drained.     I then inserted the 26 Faroese resectoscope.  Using the bipolar loop the papillary tumor on the right trigone/bladder neck was resected down to muscle fibers.  The tumor did not appear invasive.  I suspect that this is a low-grade superficial tumor.  The resection site was completely cauterized gaining excellent hemostasis.  The bladder tumor chips were irrigated using the Travis evacuator.     20 Faroese Godoy catheter was placed.  It was placed to gravity drainage.  Laz  tolerated the procedure well.  He was brought to the recovery room in stable condition.    Disposition:  The stent will be removed in the office in 7-10 days.      Amos cAkerman MD

## 2021-08-06 NOTE — DISCHARGE INSTRUCTIONS
Follow up with Dr. Ackerman as previously scheduled.    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       Discharged Condition: good    Hospital Course: Patient was admitted for an outpatient procedure and tolerated the procedure well with no complications.    Final Diagnoses: Same as principal problem.    Disposition: Home or Self Care    Follow up/Patient Instructions:    Medications:  Reconciled Home Medications:   Current Discharge Medication List      START taking these medications    Details   clindamycin (CLEOCIN) 300 MG capsule Take 1 capsule (300 mg total) by mouth 3 (three) times daily.  Qty: 21 capsule, Refills: 0      hydrocodone-acetaminophen 5-325mg (NORCO) 5-325 mg per tablet Take 1 tablet by mouth every 6 (six) hours as needed for Pain.  Qty: 30 tablet, Refills: 0         CONTINUE these medications which have NOT CHANGED    Details   amlodipine (NORVASC) 10 MG tablet TAKE 1 TABLET EVERY DAY  Qty: 90 tablet, Refills: 3    Associated Diagnoses: Essential hypertension, benign      apixaban (ELIQUIS) 2.5 mg Tab Take 1 tablet (2.5 mg total) by mouth 2 (two) times daily.  Qty: 60 tablet, Refills: 11    Associated Diagnoses: Acute deep vein thrombosis (DVT) of lower extremity, unspecified laterality, unspecified vein      atorvastatin (LIPITOR) 20 MG tablet TAKE 1 TABLET (20 MG TOTAL) BY MOUTH EVERY OTHER DAY.  Qty: 45 tablet, Refills: 1      ferrous sulfate 325 mg (65 mg iron) Tab tablet Take 325 mg by mouth once daily.      finasteride (PROSCAR) 5 mg tablet TAKE 1 TABLET EVERY DAY  Qty: 90 tablet, Refills: 3      metoprolol succinate (TOPROL-XL) 100 MG 24 hr tablet TAKE 1 TABLET EVERY DAY  Qty: 90 tablet, Refills: 3       predniSONE (DELTASONE) 5 MG tablet Take 1 tablet (5 mg total) by mouth once daily.  Qty: 90 tablet, Refills: 3    Associated Diagnoses: Renal transplant recipient      sirolimus (RAPAMUNE) 1 MG Tab Take 3 tablets (3 mg total) by mouth once daily.  Qty: 90 tablet, Refills: 3    Associated Diagnoses: Kidney transplant recipient      sodium bicarbonate 650 MG tablet TAKE THREE TABLETS BY MOUTH THREE TIMES DAILY  Qty: 270 tablet, Refills: 11    Comments: Please consider 90 day supplies to promote better adherence      omeprazole (PRILOSEC) 40 MG capsule Take 1 capsule (40 mg total) by mouth every morning.  Qty: 30 capsule, Refills: 11             Discharge Procedure Orders  Diet Adult Regular     Activity as tolerated     Notify your health care provider if you experience any of the following:  temperature >100.4     Notify your health care provider if you experience any of the following:  severe uncontrolled pain     Notify your health care provider if you experience any of the following:  difficulty breathing or increased cough       Follow-up Information     Gamal Smith MD In 3 weeks.    Specialty:  Otolaryngology  Contact information:  [address] [facility] [address]  [phone]                   Discharge Procedure Orders (must include Diet, Follow-up, Activity):    Discharge Procedure Orders (must include Diet, Follow-up, Activity)  Diet Adult Regular     Activity as tolerated     Notify your health care provider if you experience any of the following:  temperature >100.4     Notify your health care provider if you experience any of the following:  severe uncontrolled pain     Notify your health care provider if you experience any of the following:  difficulty breathing or increased cough

## 2021-08-06 NOTE — PLAN OF CARE
PRIMARY DIAGNOSIS: Ureteral Stone   OUTPATIENT/OBSERVATION GOALS TO BE MET BEFORE DISCHARGE:  1. Stable vital signs No  2. Tolerating diet:Yes  3. Pain controlled with oral pain medications:  Yes  4. Positive bowel sounds:  Yes  5. Voiding without difficulty:  No  6. Able to ambulate:  Yes  7. Provider specific discharge goals met:  No    Discharge Planner Nurse   Safe discharge environment identified: Yes  Barriers to discharge: Yes       Entered by: Ethel Willis 08/06/2021 10:47 AM     Please review provider order for any additional goals.   Nurse to notify provider when observation goals have been met and patient is ready for discharge.

## 2021-08-06 NOTE — ANESTHESIA PROCEDURE NOTES
Airway       Patient location during procedure: OR       Procedure Start/Stop Times: 8/5/2021 7:18 PM  Staff -        Anesthesiologist:  Danny Hernandez MD       CRNA: Amaya Mahoney APRN CRNA       Performed By: CRNA  Consent for Airway        Urgency: elective  Indications and Patient Condition       Indications for airway management: jama-procedural       Induction type:intravenous       Mask difficulty assessment: 2 - vent by mask + OA or adjuvant +/- NMBA    Final Airway Details       Final airway type: endotracheal airway       Successful airway: ETT - single  Endotracheal Airway Details        ETT size (mm): 8.0       Cuffed: yes       Successful intubation technique: direct laryngoscopy       DL Blade Type: Hernandez 2       Grade View of Cords: 1       Adjucts: stylet and tooth guard       Position: Right       Measured from: gums/teeth       Secured at (cm): 23    Post intubation assessment        Placement verified by: capnometry, equal breath sounds and chest rise        Number of attempts at approach: 1       Secured with: silk tape       Ease of procedure: easy       Dentition: Intact and Unchanged

## 2021-08-06 NOTE — PROGRESS NOTES
Progress Note  86M admitted by urology s/p cytoscopy with R ureteral dilation and stent exchange for 11mm ureteral stone, and TURBT.   Medicine consulted for medical management of medical comorbidities    Assessment/Plan  Nephrolithiasis status post stent placement and TURBT  Management per urology  Discharge, diet, analgesia per urology    Hypertension poor control  Home medications resumed  Titrate to blood pressure  Add IV hydralazine as needed    Type 2 diabetes on insulin  Begin low-dose Lantus today  Continue sliding scale insulin hold oral meds  Diabetic diet frequent glucose checks    CKD stage III-IV  Repeat BMP today avoid nephrotoxins    Dementia with no agitation  Continue home meds monitor for delirium    Hyperlipidemia stable continue statin therapy    DVT PPX per urology    Barriers to discharge postop state    Anticipated Discharge date per urology        Subjective  Confusion this morning requesting his wife, discharge him home no events overnight per nursing    Objective  Vital signs in last 24 hours  Temp:  [97.4  F (36.3  C)-98.3  F (36.8  C)] 98  F (36.7  C)  Pulse:  [59-70] 70  Resp:  [15-27] 18  BP: (152-214)/(69-97) 178/84  SpO2:  [90 %-100 %] 97 %    Input and Output in 24 hrs     Intake/Output Summary (Last 24 hours) at 8/6/2021 0926  Last data filed at 8/6/2021 0609  Gross per 24 hour   Intake 1114 ml   Output 342 ml   Net 772 ml       GEN: Confused  HEENT: Atraumatic    Pupils- round and reactive to light bilaterally   Neck- supple, no JVP elevation, no lymphadenopathy or thyromegaly   Sclera- anicteric   Mucous membrane- moist and pink  CHEST: Clear to auscultation bilaterally  HEART: S1S2 regular. No murmurs, rubs or gallops  ABDOMEN: Soft. Non-tender, non-distended. No organomegaly. No guarding or rigidity. Bowel sounds- active  Extremities: No pedal oedema  CNS: No focal neurological deficit. No involuntary movements  SKIN: No skin rash, no cyanosis or clubbing      Pertinent Labs      Recent Results (from the past 24 hour(s))   XR Pyelogram Retro Surgery G/E 1 Films    Narrative    EXAM: XR PYELOGRAM RETRO SURGERY G/E 1 FILMS  LOCATION: [facility]  DATE/TIME: 8/5/2021 7:20 PM    INDICATION: Right ureteral calculus. Laser lithotripsy and stone removal. TURBT of 2 cm mass at right UVJ.  COMPARISON: 07/04/2021  TECHNIQUE: The urethra was catheterized with a 6 Lithuanian catheter. Water soluble contrast was injected through the catheter under fluoroscopy and multiple images were obtained.    FLUOROSCOPIC TIME: .6   NUMBER OF IMAGES: 4    FINDINGS: Spot images demonstrate contrast within a dilated right intrarenal collecting system. Subsequent stent placement.       Recent Results (from the past 24 hour(s))   Extra Purple Top Tube    Collection Time: 08/05/21 10:32 AM   Result Value Ref Range    Hold Specimen JIC    Glucose by meter    Collection Time: 08/05/21  4:15 PM   Result Value Ref Range    GLUCOSE BY METER POCT 110 70 - 125 mg/dL   Glucose by meter    Collection Time: 08/05/21  9:22 PM   Result Value Ref Range    GLUCOSE BY METER POCT 104 70 - 125 mg/dL   Basic metabolic panel    Collection Time: 08/05/21 10:32 PM   Result Value Ref Range    Sodium 140 136 - 145 mmol/L    Potassium 3.4 (L) 3.5 - 5.0 mmol/L    Chloride 109 (H) 98 - 107 mmol/L    Carbon Dioxide (CO2) 21 (L) 22 - 31 mmol/L    Anion Gap 10 5 - 18 mmol/L    Urea Nitrogen 37 (H) 8 - 28 mg/dL    Creatinine 2.23 (H) 0.70 - 1.30 mg/dL    Calcium 9.4 8.5 - 10.5 mg/dL    Glucose 127 (H) 70 - 125 mg/dL    GFR Estimate 26 (L) >60 mL/min/1.73m2   Glucose by meter    Collection Time: 08/05/21 10:37 PM   Result Value Ref Range    GLUCOSE BY METER POCT 130 (H) 70 - 125 mg/dL   Potassium    Collection Time: 08/06/21  4:12 AM   Result Value Ref Range    Potassium 3.7 3.5 - 5.0 mmol/L   Extra Purple Top Tube    Collection Time: 08/06/21  4:15 AM   Result Value Ref Range    Hold Specimen JIC    Glucose by meter     Collection Time: 08/06/21  6:08 AM   Result Value Ref Range    GLUCOSE BY METER POCT 165 (H) 70 - 125 mg/dL   Glucose by meter    Collection Time: 08/06/21  8:08 AM   Result Value Ref Range    GLUCOSE BY METER POCT 156 (H) 70 - 125 mg/dL       EKG Results reviewed       Advanced Care Planning      MD VALERIA ValleD

## 2021-08-06 NOTE — ANESTHESIA POSTPROCEDURE EVALUATION
Patient: Laz Kingston    Procedure(s):  CYSTOSCOPY, WITH TRANSURETHRAL RESECTION BLADDER TUMOR  RIGHT URETEROSCOPY, LASER LITHOTRIPSY AND RIGHT URETERAL STENT EXCHANGE    Diagnosis:Calculus of kidney [N20.0]  Diagnosis Additional Information: No value filed.    Anesthesia Type:  General    Note:  Disposition: Admission   Postop Pain Control: Uneventful            Sign Out: Well controlled pain   PONV: No   Neuro/Psych: Uneventful            Sign Out: Acceptable/Baseline neuro status   Airway/Respiratory: Uneventful            Sign Out: Acceptable/Baseline resp. status   CV/Hemodynamics: Uneventful            Sign Out: Acceptable CV status; No obvious hypovolemia; No obvious fluid overload   Other NRE: NONE   DID A NON-ROUTINE EVENT OCCUR? No    Event details/Postop Comments:  BP improved after labetalol in pacu.            Last vitals:  Vitals Value Taken Time   /91 08/05/21 2116   Temp 36.8  C (98.3  F) 08/05/21 2033   Pulse 65 08/05/21 2117   Resp 12 08/05/21 2117   SpO2 92 % 08/05/21 2117   Vitals shown include unvalidated device data.    Electronically Signed By: Danny Hernandez MD  August 5, 2021  9:20 PM

## 2021-08-06 NOTE — DISCHARGE SUMMARY
[facility] DISCHARGE SUMMARY    Name: Laz Kingston  : 1935  MRN: [id number]  PCP: Bianca Rodriguez    Place of service: [facility]    Admission date: 2021   Discharge date: 2021    Surgeon: Dr Amos Ackerman     Surgical Procedure: TURBT and right laser lithotripsy with right ureteral stent placement    Date of surgery: 21    Principal Diagnosis at Discharge:   Calculus of kidney [N20.0]  Ureteral stone [N20.1]     Other diagnosis addressed during hospitalization:  Hypertension  DMII  CKD III-IV    Consults:  Medicine services    Diagnostic Studies :  N/A    Complications while in the Hospital:  None    Physical Exam:  Temp: 98.2  F (36.8  C) Temp src: Oral BP: 114/55 Pulse: 67   Resp: 18 SpO2: 94 % O2 Device: None (Room air)      Gen: nad, alert  Abdomen: non tender, non distended, no rebound or peritoneal signs  : Godoy catheter in place. Urine translucent watermelon in color.     Brief history of hospital course:  This is a 86 year old male admitted for Calculus of kidney [N20.0] Ureteral stone [N20.1]. Patient underwent above procedure. Patient tolerated the procedure well. Post operative course was unremarkable. By the day of discharge, the patient was ambulatory, tolerating diet, pain was controlled with oral analgesics, labs and vitals stable.     Labs:  Hemoglobin   Date Value Ref Range Status   2021 10.8 (L) 13.3 - 17.7 g/dL Final   ]  Platelet Count   Date Value Ref Range Status   2021 184 150 - 450 10e3/uL Final     WBC Count   Date Value Ref Range Status   2021 5.1 4.0 - 11.0 10e3/uL Final     Creatinine   Date Value Ref Range Status   2021 2.28 (H) 0.70 - 1.30 mg/dL Final     Potassium   Date Value Ref Range Status   2021 3.9 3.5 - 5.0 mmol/L Final     Magnesium   Date Value Ref Range Status   2021 2.1 1.8 - 2.6 mg/dL Final     INR   Date Value Ref Range Status   2021 1.16 (H) 0.90 - 1.10 Final        Pending  Labs:  Surgical Pathology pending    DISPOSITION: Home    DISCHARGE CONDITION: Good/Stable    DISCHARGE MEDICATIONS:      Review of your medicines      START taking      Dose / Directions   cephALEXin 500 MG capsule  Commonly known as: KEFLEX  Used for: Ureteral stone      Dose: 500 mg  Take 1 capsule (500 mg) by mouth 3 times daily for 7 days  Quantity: 21 capsule  Refills: 0     HYDROcodone-acetaminophen 5-325 MG tablet  Commonly known as: NORCO  Used for: Ureteral stone      Dose: 1 tablet  Take 1 tablet by mouth every 6 hours as needed for pain  Quantity: 18 tablet  Refills: 0     polyethylene glycol 17 GM/Dose powder  Commonly known as: MIRALAX  Used for: Ureteral stone      Dose: 17 g  Take 17 g by mouth daily  Quantity: 510 g  Refills: 0        CONTINUE these medicines which have NOT CHANGED      Dose / Directions   amLODIPine 10 MG tablet  Commonly known as: NORVASC      Refills: 0     aspirin 81 MG chewable tablet  Commonly known as: ASA      Dose: 81 mg  Take 81 mg by mouth  Refills: 0     cloNIDine 0.1 MG tablet  Commonly known as: CATAPRES      Dose: 0.1 mg  Take 0.1 mg by mouth 2 times daily as needed For SBP >160  Refills: 0     glimepiride 4 MG tablet  Commonly known as: AMARYL      TK 1 T PO QD WITH FIRST MEAL OF THE DAY  Refills: 0     hydrALAZINE 100 MG tablet  Commonly known as: APRESOLINE      Dose: 100 mg  Take 100 mg by mouth 4 times daily  Refills: 0     Lantus SoloStar 100 UNIT/ML pen  Generic drug: insulin glargine      Dose: 26 Units  26 Units  Refills: 0     memantine 10 MG tablet  Commonly known as: NAMENDA  Used for: Late onset Alzheimer's disease without behavioral disturbance (H)      Dose: 10 mg  Take 1 tablet (10 mg) by mouth 2 times daily  Quantity: 60 tablet  Refills: 11     metoprolol succinate ER 50 MG 24 hr tablet  Commonly known as: TOPROL-XL      Dose: 50 mg  Take 50 mg by mouth  Refills: 0     multivitamin, therapeutic Tabs tablet      Dose: 1 tablet  Take 1 tablet by mouth  daily  Refills: 0     omeprazole 20 MG DR capsule  Commonly known as: priLOSEC      TAKE 1 DAILY, 1/2 HOUR  BEFORE BREAKFAST FOR  HEARTBURN.  Refills: 0     rivastigmine 13.3 MG/24HR 24 hr patch  Commonly known as: EXELON  Used for: Late onset Alzheimer's disease without behavioral disturbance (H)      Dose: 1 patch  Apply 1 patch topically daily  Quantity: 30 patch  Refills: 11     simvastatin 20 MG tablet  Commonly known as: ZOCOR      Dose: 20 mg  Take 20 mg by mouth  Refills: 0     Trulicity 1.5 MG/0.5ML pen  Generic drug: dulaglutide      INJECT 1.5 MG UNDER THE SKIN ONCE A WEEK  Refills: 0           Where to get your medicines      These medications were sent to [id number] - [address] AT [address] [address]    Phone: [phone]     cephALEXin 500 MG capsule    HYDROcodone-acetaminophen 5-325 MG tablet    polyethylene glycol 17 GM/Dose powder          DISCHARGE PLAN:   - Follow up with Dr Amos Ackerman as scheduled prior to your procedure   - follow up with Bianca Rodriguez as directed   - Take medication as prescribed, avoid anticoagulants per surgeon and PCP recommendations.   - Wound / drain care: As directed prior to discharge  - Physical activity: As tolerated, no heavy lifting. No driving while taking narcotics.   - Diet: Regular  - Medication: please review discharge Med req/AVS  - Warning signs discussed with patient about when to call the clinic/hospital  - All questions and concerns were answered for the patient prior to discharge  - Patient verbalized understanding.     Discussed patient with Dr Amos Ackerman on day of discharge.     Dieudonne Cleaning PA-C  [facility]   [phone]     I saw the patient on the date of discharge  Total time spent for discharge on date of discharge: 20 minutes    Physician(s) in addition to primary physician who should receive a copy:  CC1: Bianca Rodriguez

## 2021-08-06 NOTE — PLAN OF CARE
Godoy remains in. No order to remove at this time.  Urine color was red at beginning of shift, now clearing to dark celeste.  Will continue to monitor.     Tolerated breakfast.  No complaints of nausea.     Up to chair with gait belt and assist of 2 staff.      Patient alert to self only.

## 2021-08-09 NOTE — PROGRESS NOTES
Clinic Care Coordination Contact  [facility]: Post-Discharge Note  SITUATION                                                      Admission:    Admission Date: 08/06/21   Reason for Admission: Ureteral Stone  Discharge:   Discharge Date: 08/08/21  Discharge Diagnosis: Calculus of kidney  Discharge Service: Home Care    BACKGROUND                                                      This is a 86 year old male admitted for Calculus of kidney and Ureteral stone.    ASSESSMENT      Discharge Assessment  How are you doing now that you are home?: Doing just fine  How are your symptoms? (Red Flag symptoms escalate to triage hotline per guidelines): Improved  Do you feel your condition is stable enough to be safe at home until your provider visit?: Yes  Does the patient have their discharge instructions? : Yes  Does the patient have questions regarding their discharge instructions? : No  Were you started on any new medications or were there changes to any of your previous medications? : Yes - Schedule RNCC appt within 48 business hours    Post-op  Did the patient have surgery or a procedure: Yes  Incision: healing  Drainage: No  Bleeding: none  Fever: No  Chills: No  Redness: No  Warmth: No  Swelling: No  Eating & Drinking: eating and drinking without complaints/concerns  Bowel Function: normal  Urinary Status: voiding without complaint/concerns        PLAN                                                      Outpatient Plan:    - Follow up with Dr Amos Ackerman as scheduled prior to your procedure   - follow up with Bianca Rodriguez as directed   - Take medication as prescribed, avoid anticoagulants per surgeon and PCP recommendations.   - Wound / drain care: As directed prior to discharge  - Physical activity: As tolerated, no heavy lifting. No driving while taking narcotics.   - Diet: Regular  - Medication: please review discharge Med req/AVS  - Warning signs discussed with patient about when to call the  clinic/hospital  - All questions and concerns were answered for the patient prior to discharge  - Patient verbalized understanding.    No future appointments.      For any urgent concerns, please contact our 24 hour nurse triage line: [phone])         KEDAR Alanis  [phone]  [facility]

## 2021-08-31 NOTE — TELEPHONE ENCOUNTER
Reason for call:  Other   Patient called regarding (reason for call): call back  Additional comments: Carly from [facility] is calling to obtain verbal orders from Dr Rodriguez for early discharge due to all goals met. Please call Carly back as soon as able.     Phone number to reach patient:  Other phone number:  [phone]    Best Time:  Any time    Can we leave a detailed message on this number?  YES    Travel screening: Not Applicable

## 2021-09-22 NOTE — PROGRESS NOTES
Laz is a 86 year old who is being evaluated via a billable telephone visit.      What phone number would you like to be contacted at? [phone]  How would you like to obtain your AVS? Mail a copy    Assessment & Plan     Essential hypertension  Refill  - amLODIPine (NORVASC) 10 MG tablet; Take 1 tablet (10 mg) by mouth daily  - hydrALAZINE (APRESOLINE) 100 MG tablet; Take 1 tablet (100 mg) by mouth 4 times daily  - cloNIDine (CATAPRES) 0.1 MG tablet; Take 1 tablet (0.1 mg) by mouth 2 times daily as needed For SBP >160    Hypercholesterolemia  Refill  - simvastatin (ZOCOR) 20 MG tablet; Take 1 tablet (20 mg) by mouth every evening    Gastroesophageal reflux disease without esophagitis  Refill  - omeprazole (PRILOSEC) 20 MG DR capsule; Take 1 capsule (20 mg) by mouth daily    Type 2 diabetes mellitus without complication, with long-term current use of insulin (H)  Refill  - glimepiride (AMARYL) 4 MG tablet; Take 1 tablet (4 mg) by mouth every morning (before breakfast)                  No follow-ups on file.    Bianca Rodriguez MD  [facility]   Laz is a 86 year old who is presenting with his wife for med check and follow-up of hypertension,, diabetes, HLD and GERD requesting refills.  Reporting to have  blood pressure readings ranging between 150 170 systolic.  Clonidine has been used as needed for blood pressures greater than 170 systolic.  Reporting to overall doing well with no additional issues or concerns        Review of Systems         Objective           Vitals:  No vitals were obtained today due to virtual visit.      Phone call duration: 18 minutes

## 2021-12-09 NOTE — PROGRESS NOTES
Assessment & Plan     Essential hypertension  Cardiovascular meds:  Metroprolol ER 50 mg daily  Hydralazine 100 mg 4 times daily  Amlodipine 10 mg daily (requesting refill)  Clonidine 0.1 mg twice daily as needed  Continue current management   > will contineu with the clonidien prn     Refill:  - amLODIPine (NORVASC) 10 MG tablet; Take 1 tablet (10 mg) by mouth daily    Cold feeling  Mainly in the hands   Consider side effects of the blood pressure medication  Keep home warmer.     Check labs  - TSH with free T4 reflex; Future    Weight loss  Due to decrease in appetite, and recently tolerating intake  Advised supplementation  Monitor, recheck in follow-up in 3 months    Somnolence  Daytime somnolence  Frequent daytime napping contributing to nighttime awakening and vice versa  Encourage less daytime napping    Check labs  - CBC with platelets; Future    Gastroesophageal reflux disease without esophagitis  Refill  - omeprazole (PRILOSEC) 20 MG DR capsule; Take 1 capsule (20 mg) by mouth daily    Chronic bilateral low back pain without sciatica  Encourage more activity  Consider physical therapy        I spent a total of 59 minutes on the day of the visit.   Time spent doing chart review, history and exam, documentation and further activities per the note               No follow-ups on file.    Bianca Rodriguez MD  [facility]    Penelope Nesbitt is a 86 year old who presents with his wife for med check and follow-up of     1-hypertension    Noted to have high readings intermittently    Cardiovascular meds:  Metroprolol ER 50 mg daily  Hydralazine 100 mg 4 times daily  Amlodipine 10 mg daily (requesting refill)  Clonidine 0.1 mg twice daily as needed    2-he has been having cold hands for the past 2 months     3-has been having a decrease in the appetite, not having his full meals or snacking in between, and losing weight in the past few weeks.  His wife has started additional  supplements.    4-he appears to be tired during the daytime and sleeping throughout the day, and having frequent awakening during nighttime  Wife is questioning if any sleeping aid medication will be beneficial    5-low back pain as of 2 months ago     patient has no physical activity.       Review of Systems         Objective    BP (!) 140/60 (BP Location: Left arm, Patient Position: Sitting, Cuff Size: Adult Large)   Pulse 68   SpO2 97%   There is no height or weight on file to calculate BMI.     Physical Exam                   Answers for HPI/ROS submitted by the patient on 12/9/2021  Do you check your blood pressure regularly outside of the clinic?: Yes  Are your blood pressures ever more than 140 on the top number (systolic) OR more than 90 on the bottom number (diastolic)? (For example, greater than 140/90): Yes  Are you following a low salt diet?: No  How many servings of fruits and vegetables do you eat daily?: 0-1  On average, how many sweetened beverages do you drink each day (Examples: soda, juice, sweet tea, etc.  Do NOT count diet or artificially sweetened beverages)?: 0  How many minutes a day do you exercise enough to make your heart beat faster?: 9 or less  How many days a week do you exercise enough to make your heart beat faster?: 3 or less  How many days per week do you miss taking your medication?: 0

## 2022-01-01 ENCOUNTER — PATIENT OUTREACH (OUTPATIENT)
Dept: CARE COORDINATION | Facility: CLINIC | Age: 87
End: 2022-01-01
Payer: MEDICARE

## 2022-01-01 ENCOUNTER — APPOINTMENT (OUTPATIENT)
Dept: ULTRASOUND IMAGING | Facility: HOSPITAL | Age: 87
DRG: 177 | End: 2022-01-01
Attending: INTERNAL MEDICINE
Payer: MEDICARE

## 2022-01-01 ENCOUNTER — PATIENT OUTREACH (OUTPATIENT)
Dept: NURSING | Facility: CLINIC | Age: 87
End: 2022-01-01
Payer: MEDICARE

## 2022-01-01 ENCOUNTER — TELEPHONE (OUTPATIENT)
Dept: FAMILY MEDICINE | Facility: CLINIC | Age: 87
End: 2022-01-01

## 2022-01-01 ENCOUNTER — DOCUMENTATION ONLY (OUTPATIENT)
Dept: OTHER | Facility: CLINIC | Age: 87
End: 2022-01-01

## 2022-01-01 ENCOUNTER — HEALTH MAINTENANCE LETTER (OUTPATIENT)
Age: 87
End: 2022-01-01

## 2022-01-01 ENCOUNTER — TELEPHONE (OUTPATIENT)
Dept: FAMILY MEDICINE | Facility: CLINIC | Age: 87
End: 2022-01-01
Payer: MEDICARE

## 2022-01-01 ENCOUNTER — MEDICAL CORRESPONDENCE (OUTPATIENT)
Dept: HEALTH INFORMATION MANAGEMENT | Facility: CLINIC | Age: 87
End: 2022-01-01
Payer: MEDICARE

## 2022-01-01 ENCOUNTER — APPOINTMENT (OUTPATIENT)
Dept: RADIOLOGY | Facility: HOSPITAL | Age: 87
DRG: 177 | End: 2022-01-01
Attending: INTERNAL MEDICINE
Payer: MEDICARE

## 2022-01-01 ENCOUNTER — APPOINTMENT (OUTPATIENT)
Dept: OCCUPATIONAL THERAPY | Facility: HOSPITAL | Age: 87
DRG: 177 | End: 2022-01-01
Attending: STUDENT IN AN ORGANIZED HEALTH CARE EDUCATION/TRAINING PROGRAM
Payer: MEDICARE

## 2022-01-01 ENCOUNTER — APPOINTMENT (OUTPATIENT)
Dept: CARDIOLOGY | Facility: HOSPITAL | Age: 87
DRG: 177 | End: 2022-01-01
Attending: STUDENT IN AN ORGANIZED HEALTH CARE EDUCATION/TRAINING PROGRAM
Payer: MEDICARE

## 2022-01-01 ENCOUNTER — HOSPITAL ENCOUNTER (INPATIENT)
Facility: HOSPITAL | Age: 87
LOS: 3 days | Discharge: HOSPICE | DRG: 177 | End: 2022-06-01
Attending: STUDENT IN AN ORGANIZED HEALTH CARE EDUCATION/TRAINING PROGRAM | Admitting: STUDENT IN AN ORGANIZED HEALTH CARE EDUCATION/TRAINING PROGRAM
Payer: MEDICARE

## 2022-01-01 ENCOUNTER — APPOINTMENT (OUTPATIENT)
Dept: MRI IMAGING | Facility: HOSPITAL | Age: 87
DRG: 177 | End: 2022-01-01
Attending: INTERNAL MEDICINE
Payer: MEDICARE

## 2022-01-01 ENCOUNTER — MEDICAL CORRESPONDENCE (OUTPATIENT)
Dept: HEALTH INFORMATION MANAGEMENT | Facility: CLINIC | Age: 87
End: 2022-01-01

## 2022-01-01 ENCOUNTER — APPOINTMENT (OUTPATIENT)
Dept: SPEECH THERAPY | Facility: HOSPITAL | Age: 87
DRG: 177 | End: 2022-01-01
Payer: MEDICARE

## 2022-01-01 ENCOUNTER — APPOINTMENT (OUTPATIENT)
Dept: SPEECH THERAPY | Facility: HOSPITAL | Age: 87
DRG: 177 | End: 2022-01-01
Attending: INTERNAL MEDICINE
Payer: MEDICARE

## 2022-01-01 ENCOUNTER — APPOINTMENT (OUTPATIENT)
Dept: RADIOLOGY | Facility: HOSPITAL | Age: 87
DRG: 177 | End: 2022-01-01
Attending: EMERGENCY MEDICINE
Payer: MEDICARE

## 2022-01-01 ENCOUNTER — APPOINTMENT (OUTPATIENT)
Dept: CT IMAGING | Facility: HOSPITAL | Age: 87
DRG: 177 | End: 2022-01-01
Attending: EMERGENCY MEDICINE
Payer: MEDICARE

## 2022-01-01 ENCOUNTER — APPOINTMENT (OUTPATIENT)
Dept: CT IMAGING | Facility: HOSPITAL | Age: 87
DRG: 177 | End: 2022-01-01
Attending: STUDENT IN AN ORGANIZED HEALTH CARE EDUCATION/TRAINING PROGRAM
Payer: MEDICARE

## 2022-01-01 ENCOUNTER — PATIENT OUTREACH (OUTPATIENT)
Dept: CARE COORDINATION | Facility: CLINIC | Age: 87
End: 2022-01-01
Payer: OTHER MISCELLANEOUS

## 2022-01-01 ENCOUNTER — HOSPITAL ENCOUNTER (INPATIENT)
Facility: HOSPITAL | Age: 87
LOS: 3 days | End: 2022-06-04
Attending: INTERNAL MEDICINE | Admitting: INTERNAL MEDICINE
Payer: OTHER MISCELLANEOUS

## 2022-01-01 ENCOUNTER — OFFICE VISIT (OUTPATIENT)
Dept: FAMILY MEDICINE | Facility: CLINIC | Age: 87
End: 2022-01-01
Payer: MEDICARE

## 2022-01-01 ENCOUNTER — APPOINTMENT (OUTPATIENT)
Dept: PHYSICAL THERAPY | Facility: HOSPITAL | Age: 87
DRG: 177 | End: 2022-01-01
Attending: STUDENT IN AN ORGANIZED HEALTH CARE EDUCATION/TRAINING PROGRAM
Payer: MEDICARE

## 2022-01-01 ENCOUNTER — OFFICE VISIT (OUTPATIENT)
Dept: NEUROLOGY | Facility: CLINIC | Age: 87
End: 2022-01-01
Payer: MEDICARE

## 2022-01-01 ENCOUNTER — NURSE TRIAGE (OUTPATIENT)
Dept: NURSING | Facility: CLINIC | Age: 87
End: 2022-01-01
Payer: MEDICARE

## 2022-01-01 ENCOUNTER — VIRTUAL VISIT (OUTPATIENT)
Dept: NEUROLOGY | Facility: CLINIC | Age: 87
End: 2022-01-01
Payer: MEDICARE

## 2022-01-01 ENCOUNTER — APPOINTMENT (OUTPATIENT)
Dept: PHYSICAL THERAPY | Facility: HOSPITAL | Age: 87
DRG: 177 | End: 2022-01-01
Payer: MEDICARE

## 2022-01-01 ENCOUNTER — PATIENT OUTREACH (OUTPATIENT)
Dept: NURSING | Facility: CLINIC | Age: 87
End: 2022-01-01
Attending: INTERNAL MEDICINE
Payer: MEDICARE

## 2022-01-01 ENCOUNTER — HOSPITAL ENCOUNTER (INPATIENT)
Facility: HOSPITAL | Age: 87
LOS: 4 days | Discharge: HOME | DRG: 177 | End: 2022-04-17
Attending: EMERGENCY MEDICINE | Admitting: HOSPITALIST
Payer: MEDICARE

## 2022-01-01 ENCOUNTER — APPOINTMENT (OUTPATIENT)
Dept: RADIOLOGY | Facility: HOSPITAL | Age: 87
DRG: 177 | End: 2022-01-01
Attending: STUDENT IN AN ORGANIZED HEALTH CARE EDUCATION/TRAINING PROGRAM
Payer: MEDICARE

## 2022-01-01 VITALS
OXYGEN SATURATION: 90 % | HEART RATE: 112 BPM | DIASTOLIC BLOOD PRESSURE: 96 MMHG | SYSTOLIC BLOOD PRESSURE: 177 MMHG | TEMPERATURE: 98.3 F | RESPIRATION RATE: 26 BRPM

## 2022-01-01 VITALS
OXYGEN SATURATION: 95 % | TEMPERATURE: 98.3 F | BODY MASS INDEX: 34.7 KG/M2 | DIASTOLIC BLOOD PRESSURE: 102 MMHG | HEART RATE: 74 BPM | SYSTOLIC BLOOD PRESSURE: 162 MMHG | WEIGHT: 235 LBS

## 2022-01-01 VITALS
WEIGHT: 231 LBS | BODY MASS INDEX: 34.36 KG/M2 | DIASTOLIC BLOOD PRESSURE: 82 MMHG | OXYGEN SATURATION: 94 % | HEART RATE: 64 BPM | SYSTOLIC BLOOD PRESSURE: 142 MMHG

## 2022-01-01 VITALS — HEIGHT: 72 IN | WEIGHT: 200 LBS | BODY MASS INDEX: 27.09 KG/M2

## 2022-01-01 VITALS
OXYGEN SATURATION: 97 % | HEART RATE: 62 BPM | DIASTOLIC BLOOD PRESSURE: 78 MMHG | SYSTOLIC BLOOD PRESSURE: 128 MMHG | RESPIRATION RATE: 16 BRPM

## 2022-01-01 VITALS
HEART RATE: 69 BPM | SYSTOLIC BLOOD PRESSURE: 146 MMHG | BODY MASS INDEX: 35.43 KG/M2 | TEMPERATURE: 97.7 F | DIASTOLIC BLOOD PRESSURE: 82 MMHG | OXYGEN SATURATION: 96 % | WEIGHT: 233 LBS

## 2022-01-01 VITALS — BODY MASS INDEX: 35.7 KG/M2 | WEIGHT: 241 LBS | HEIGHT: 69 IN

## 2022-01-01 VITALS
TEMPERATURE: 98.2 F | OXYGEN SATURATION: 95 % | BODY MASS INDEX: 34.83 KG/M2 | HEART RATE: 83 BPM | HEIGHT: 68 IN | RESPIRATION RATE: 20 BRPM | WEIGHT: 229.8 LBS | SYSTOLIC BLOOD PRESSURE: 176 MMHG | DIASTOLIC BLOOD PRESSURE: 80 MMHG

## 2022-01-01 VITALS
OXYGEN SATURATION: 97 % | RESPIRATION RATE: 18 BRPM | TEMPERATURE: 97.7 F | HEART RATE: 56 BPM | BODY MASS INDEX: 26.68 KG/M2 | DIASTOLIC BLOOD PRESSURE: 63 MMHG | SYSTOLIC BLOOD PRESSURE: 130 MMHG | WEIGHT: 196.7 LBS

## 2022-01-01 VITALS
HEART RATE: 83 BPM | WEIGHT: 196 LBS | SYSTOLIC BLOOD PRESSURE: 120 MMHG | BODY MASS INDEX: 26.55 KG/M2 | DIASTOLIC BLOOD PRESSURE: 60 MMHG | HEIGHT: 72 IN | RESPIRATION RATE: 16 BRPM | TEMPERATURE: 97.9 F | OXYGEN SATURATION: 97 %

## 2022-01-01 VITALS
OXYGEN SATURATION: 91 % | RESPIRATION RATE: 24 BRPM | DIASTOLIC BLOOD PRESSURE: 75 MMHG | TEMPERATURE: 98.8 F | BODY MASS INDEX: 26.58 KG/M2 | SYSTOLIC BLOOD PRESSURE: 145 MMHG | WEIGHT: 196 LBS | HEART RATE: 85 BPM

## 2022-01-01 VITALS — SYSTOLIC BLOOD PRESSURE: 126 MMHG | OXYGEN SATURATION: 96 % | HEART RATE: 83 BPM | DIASTOLIC BLOOD PRESSURE: 72 MMHG

## 2022-01-01 VITALS
OXYGEN SATURATION: 95 % | TEMPERATURE: 98 F | SYSTOLIC BLOOD PRESSURE: 138 MMHG | BODY MASS INDEX: 28.48 KG/M2 | DIASTOLIC BLOOD PRESSURE: 74 MMHG | HEART RATE: 67 BPM | WEIGHT: 210 LBS

## 2022-01-01 VITALS — WEIGHT: 223.3 LBS | BODY MASS INDEX: 31.97 KG/M2 | HEIGHT: 70 IN

## 2022-01-01 VITALS
WEIGHT: 208 LBS | HEIGHT: 72 IN | BODY MASS INDEX: 28.17 KG/M2 | DIASTOLIC BLOOD PRESSURE: 67 MMHG | HEART RATE: 88 BPM | SYSTOLIC BLOOD PRESSURE: 155 MMHG

## 2022-01-01 DIAGNOSIS — E11.9 DIABETES MELLITUS, TYPE 2 (H): Primary | ICD-10-CM

## 2022-01-01 DIAGNOSIS — N18.4 CKD (CHRONIC KIDNEY DISEASE) STAGE 4, GFR 15-29 ML/MIN (H): ICD-10-CM

## 2022-01-01 DIAGNOSIS — I10 PRIMARY HYPERTENSION: Primary | ICD-10-CM

## 2022-01-01 DIAGNOSIS — Z53.9 DIAGNOSIS NOT YET DEFINED: Primary | ICD-10-CM

## 2022-01-01 DIAGNOSIS — U07.1 INFECTION DUE TO 2019 NOVEL CORONAVIRUS: ICD-10-CM

## 2022-01-01 DIAGNOSIS — F02.818 LATE ONSET ALZHEIMER'S DISEASE WITH BEHAVIORAL DISTURBANCE (H): Primary | ICD-10-CM

## 2022-01-01 DIAGNOSIS — R54 AGE-RELATED PHYSICAL DEBILITY: ICD-10-CM

## 2022-01-01 DIAGNOSIS — E11.9 TYPE 2 DIABETES MELLITUS WITHOUT COMPLICATION, WITH LONG-TERM CURRENT USE OF INSULIN (H): Primary | ICD-10-CM

## 2022-01-01 DIAGNOSIS — I10 ESSENTIAL HYPERTENSION: ICD-10-CM

## 2022-01-01 DIAGNOSIS — F03.90 DEMENTIA WITHOUT BEHAVIORAL DISTURBANCE, UNSPECIFIED DEMENTIA TYPE: ICD-10-CM

## 2022-01-01 DIAGNOSIS — G30.1 LATE ONSET ALZHEIMER'S DISEASE WITH BEHAVIORAL DISTURBANCE (H): Primary | ICD-10-CM

## 2022-01-01 DIAGNOSIS — Z79.4 TYPE 2 DIABETES MELLITUS WITHOUT COMPLICATION, WITH LONG-TERM CURRENT USE OF INSULIN (H): Primary | ICD-10-CM

## 2022-01-01 DIAGNOSIS — E78.00 HYPERCHOLESTEROLEMIA: ICD-10-CM

## 2022-01-01 DIAGNOSIS — R54 AGE-RELATED PHYSICAL DEBILITY: Primary | ICD-10-CM

## 2022-01-01 DIAGNOSIS — N30.00 ACUTE CYSTITIS WITHOUT HEMATURIA: ICD-10-CM

## 2022-01-01 DIAGNOSIS — Z51.5 END OF LIFE CARE: Primary | ICD-10-CM

## 2022-01-01 DIAGNOSIS — I10 PRIMARY HYPERTENSION: ICD-10-CM

## 2022-01-01 DIAGNOSIS — K21.9 GASTROESOPHAGEAL REFLUX DISEASE WITHOUT ESOPHAGITIS: ICD-10-CM

## 2022-01-01 LAB
ALBUMIN SERPL-MCNC: 3 G/DL (ref 3.5–5)
ALBUMIN SERPL-MCNC: 3.1 G/DL (ref 3.5–5)
ALBUMIN SERPL-MCNC: 3.2 G/DL (ref 3.5–5)
ALBUMIN SERPL-MCNC: 3.4 G/DL (ref 3.5–5)
ALBUMIN SERPL-MCNC: 3.7 G/DL (ref 3.5–5)
ALBUMIN UR-MCNC: 300 MG/DL
ALBUMIN UR-MCNC: 70 MG/DL
ALP SERPL-CCNC: 45 U/L (ref 45–120)
ALP SERPL-CCNC: 49 U/L (ref 45–120)
ALP SERPL-CCNC: 51 U/L (ref 45–120)
ALP SERPL-CCNC: 53 U/L (ref 45–120)
ALP SERPL-CCNC: 54 U/L (ref 45–120)
ALT SERPL W P-5'-P-CCNC: 11 U/L (ref 0–45)
ALT SERPL W P-5'-P-CCNC: 12 U/L (ref 0–45)
ALT SERPL W P-5'-P-CCNC: 17 U/L (ref 0–45)
AMMONIA PLAS-SCNC: 18 UMOL/L (ref 11–35)
ANION GAP SERPL CALCULATED.3IONS-SCNC: 10 MMOL/L (ref 5–18)
ANION GAP SERPL CALCULATED.3IONS-SCNC: 11 MMOL/L (ref 5–18)
ANION GAP SERPL CALCULATED.3IONS-SCNC: 13 MMOL/L (ref 5–18)
ANION GAP SERPL CALCULATED.3IONS-SCNC: 14 MMOL/L (ref 5–18)
ANION GAP SERPL CALCULATED.3IONS-SCNC: 7 MMOL/L (ref 5–18)
ANION GAP SERPL CALCULATED.3IONS-SCNC: 8 MMOL/L (ref 5–18)
ANION GAP SERPL CALCULATED.3IONS-SCNC: 9 MMOL/L (ref 5–18)
APPEARANCE UR: ABNORMAL
APPEARANCE UR: CLEAR
AST SERPL W P-5'-P-CCNC: 12 U/L (ref 0–40)
AST SERPL W P-5'-P-CCNC: 13 U/L (ref 0–40)
AST SERPL W P-5'-P-CCNC: 14 U/L (ref 0–40)
AST SERPL W P-5'-P-CCNC: 14 U/L (ref 0–40)
AST SERPL W P-5'-P-CCNC: 24 U/L (ref 0–40)
ATRIAL RATE - MUSE: 94 BPM
BACTERIA #/AREA URNS HPF: ABNORMAL /HPF
BACTERIA #/AREA URNS HPF: ABNORMAL /HPF
BACTERIA BLD CULT: ABNORMAL
BACTERIA BLD CULT: ABNORMAL
BACTERIA BLD CULT: NO GROWTH
BACTERIA UR CULT: ABNORMAL
BACTERIA UR CULT: NORMAL
BASE EXCESS BLDV CALC-SCNC: -3.5 MMOL/L
BASOPHILS # BLD AUTO: 0 10E3/UL (ref 0–0.2)
BASOPHILS # BLD AUTO: 0.1 10E3/UL (ref 0–0.2)
BASOPHILS NFR BLD AUTO: 0 %
BASOPHILS NFR BLD AUTO: 1 %
BILIRUB DIRECT SERPL-MCNC: 0.1 MG/DL
BILIRUB SERPL-MCNC: 0.3 MG/DL (ref 0–1)
BILIRUB SERPL-MCNC: 0.3 MG/DL (ref 0–1)
BILIRUB SERPL-MCNC: 0.4 MG/DL (ref 0–1)
BILIRUB SERPL-MCNC: 0.4 MG/DL (ref 0–1)
BILIRUB SERPL-MCNC: 0.5 MG/DL (ref 0–1)
BILIRUB UR QL STRIP: NEGATIVE
BILIRUB UR QL STRIP: NEGATIVE
BNP SERPL-MCNC: 508 PG/ML (ref 0–93)
BUN SERPL-MCNC: 33 MG/DL (ref 8–28)
BUN SERPL-MCNC: 36 MG/DL (ref 8–28)
BUN SERPL-MCNC: 42 MG/DL (ref 8–28)
BUN SERPL-MCNC: 43 MG/DL (ref 8–28)
BUN SERPL-MCNC: 46 MG/DL (ref 8–28)
BUN SERPL-MCNC: 47 MG/DL (ref 8–28)
BUN SERPL-MCNC: 49 MG/DL (ref 8–28)
BUN SERPL-MCNC: 50 MG/DL (ref 8–28)
BUN SERPL-MCNC: 51 MG/DL (ref 8–28)
BUN SERPL-MCNC: 54 MG/DL (ref 8–28)
BUN SERPL-MCNC: 54 MG/DL (ref 8–28)
BUN SERPL-MCNC: 57 MG/DL (ref 8–28)
C REACTIVE PROTEIN LHE: 1.5 MG/DL (ref 0–0.8)
C REACTIVE PROTEIN LHE: 3.2 MG/DL (ref 0–0.8)
C REACTIVE PROTEIN LHE: 3.6 MG/DL (ref 0–0.8)
C REACTIVE PROTEIN LHE: 4.7 MG/DL (ref 0–0.8)
C REACTIVE PROTEIN LHE: 5.9 MG/DL (ref 0–0.8)
CALCIUM SERPL-MCNC: 10.2 MG/DL (ref 8.5–10.5)
CALCIUM SERPL-MCNC: 10.2 MG/DL (ref 8.5–10.5)
CALCIUM SERPL-MCNC: 8.8 MG/DL (ref 8.5–10.5)
CALCIUM SERPL-MCNC: 8.8 MG/DL (ref 8.5–10.5)
CALCIUM SERPL-MCNC: 8.9 MG/DL (ref 8.5–10.5)
CALCIUM SERPL-MCNC: 8.9 MG/DL (ref 8.5–10.5)
CALCIUM SERPL-MCNC: 9 MG/DL (ref 8.5–10.5)
CALCIUM SERPL-MCNC: 9.3 MG/DL (ref 8.5–10.5)
CALCIUM SERPL-MCNC: 9.5 MG/DL (ref 8.5–10.5)
CALCIUM SERPL-MCNC: 9.6 MG/DL (ref 8.5–10.5)
CALCIUM SERPL-MCNC: 9.7 MG/DL (ref 8.5–10.5)
CALCIUM SERPL-MCNC: 9.7 MG/DL (ref 8.5–10.5)
CHLORIDE BLD-SCNC: 108 MMOL/L (ref 98–107)
CHLORIDE BLD-SCNC: 110 MMOL/L (ref 98–107)
CHLORIDE BLD-SCNC: 111 MMOL/L (ref 98–107)
CHLORIDE BLD-SCNC: 112 MMOL/L (ref 98–107)
CHLORIDE BLD-SCNC: 116 MMOL/L (ref 98–107)
CHLORIDE BLD-SCNC: 117 MMOL/L (ref 98–107)
CHLORIDE BLD-SCNC: 118 MMOL/L (ref 98–107)
CHLORIDE BLD-SCNC: 119 MMOL/L (ref 98–107)
CHLORIDE BLD-SCNC: 119 MMOL/L (ref 98–107)
CHLORIDE BLD-SCNC: 120 MMOL/L (ref 98–107)
CK SERPL-CCNC: 86 U/L (ref 30–190)
CO2 SERPL-SCNC: 17 MMOL/L (ref 22–31)
CO2 SERPL-SCNC: 17 MMOL/L (ref 22–31)
CO2 SERPL-SCNC: 18 MMOL/L (ref 22–31)
CO2 SERPL-SCNC: 19 MMOL/L (ref 22–31)
CO2 SERPL-SCNC: 19 MMOL/L (ref 22–31)
CO2 SERPL-SCNC: 20 MMOL/L (ref 22–31)
CO2 SERPL-SCNC: 20 MMOL/L (ref 22–31)
CO2 SERPL-SCNC: 21 MMOL/L (ref 22–31)
CO2 SERPL-SCNC: 22 MMOL/L (ref 22–31)
CO2 SERPL-SCNC: 23 MMOL/L (ref 22–31)
COLOR UR AUTO: ABNORMAL
COLOR UR AUTO: YELLOW
CREAT SERPL-MCNC: 1.73 MG/DL (ref 0.7–1.3)
CREAT SERPL-MCNC: 1.83 MG/DL (ref 0.7–1.3)
CREAT SERPL-MCNC: 1.95 MG/DL (ref 0.7–1.3)
CREAT SERPL-MCNC: 2.01 MG/DL (ref 0.7–1.3)
CREAT SERPL-MCNC: 2.02 MG/DL (ref 0.7–1.3)
CREAT SERPL-MCNC: 2.07 MG/DL (ref 0.7–1.3)
CREAT SERPL-MCNC: 2.11 MG/DL (ref 0.7–1.3)
CREAT SERPL-MCNC: 2.12 MG/DL (ref 0.7–1.3)
CREAT SERPL-MCNC: 2.14 MG/DL (ref 0.7–1.3)
CREAT SERPL-MCNC: 2.19 MG/DL (ref 0.7–1.3)
CREAT SERPL-MCNC: 2.19 MG/DL (ref 0.7–1.3)
CREAT SERPL-MCNC: 2.26 MG/DL (ref 0.7–1.3)
CREAT SERPL-MCNC: 2.39 MG/DL (ref 0.7–1.3)
D DIMER PPP FEU-MCNC: 0.46 UG/ML FEU (ref 0–0.5)
D DIMER PPP FEU-MCNC: 0.47 UG/ML FEU (ref 0–0.5)
D DIMER PPP FEU-MCNC: 0.47 UG/ML FEU (ref 0–0.5)
D DIMER PPP FEU-MCNC: 0.48 UG/ML FEU (ref 0–0.5)
D DIMER PPP FEU-MCNC: 0.55 UG/ML FEU (ref 0–0.5)
DIASTOLIC BLOOD PRESSURE - MUSE: 94 MMHG
ENTEROCOCCUS FAECALIS: NOT DETECTED
ENTEROCOCCUS FAECIUM: NOT DETECTED
EOSINOPHIL # BLD AUTO: 0 10E3/UL (ref 0–0.7)
EOSINOPHIL # BLD AUTO: 0.1 10E3/UL (ref 0–0.7)
EOSINOPHIL NFR BLD AUTO: 0 %
EOSINOPHIL NFR BLD AUTO: 1 %
EOSINOPHIL NFR BLD AUTO: 1 %
EOSINOPHIL NFR BLD AUTO: 2 %
ERYTHROCYTE [DISTWIDTH] IN BLOOD BY AUTOMATED COUNT: 16 % (ref 10–15)
ERYTHROCYTE [DISTWIDTH] IN BLOOD BY AUTOMATED COUNT: 16 % (ref 10–15)
ERYTHROCYTE [DISTWIDTH] IN BLOOD BY AUTOMATED COUNT: 16.1 % (ref 10–15)
ERYTHROCYTE [DISTWIDTH] IN BLOOD BY AUTOMATED COUNT: 16.1 % (ref 10–15)
ERYTHROCYTE [DISTWIDTH] IN BLOOD BY AUTOMATED COUNT: 16.9 % (ref 10–15)
ERYTHROCYTE [DISTWIDTH] IN BLOOD BY AUTOMATED COUNT: 17 % (ref 10–15)
ERYTHROCYTE [DISTWIDTH] IN BLOOD BY AUTOMATED COUNT: 17.1 % (ref 10–15)
ERYTHROCYTE [DISTWIDTH] IN BLOOD BY AUTOMATED COUNT: 17.2 % (ref 10–15)
FLUAV RNA SPEC QL NAA+PROBE: NEGATIVE
FLUAV RNA SPEC QL NAA+PROBE: NEGATIVE
FLUBV RNA RESP QL NAA+PROBE: NEGATIVE
FLUBV RNA RESP QL NAA+PROBE: NEGATIVE
FOLATE SERPL-MCNC: 16.6 NG/ML
GFR SERPL CREATININE-BSD FRML MDRD: 26 ML/MIN/1.73M2
GFR SERPL CREATININE-BSD FRML MDRD: 28 ML/MIN/1.73M2
GFR SERPL CREATININE-BSD FRML MDRD: 29 ML/MIN/1.73M2
GFR SERPL CREATININE-BSD FRML MDRD: 29 ML/MIN/1.73M2
GFR SERPL CREATININE-BSD FRML MDRD: 30 ML/MIN/1.73M2
GFR SERPL CREATININE-BSD FRML MDRD: 31 ML/MIN/1.73M2
GFR SERPL CREATININE-BSD FRML MDRD: 32 ML/MIN/1.73M2
GFR SERPL CREATININE-BSD FRML MDRD: 32 ML/MIN/1.73M2
GFR SERPL CREATININE-BSD FRML MDRD: 33 ML/MIN/1.73M2
GFR SERPL CREATININE-BSD FRML MDRD: 36 ML/MIN/1.73M2
GFR SERPL CREATININE-BSD FRML MDRD: 38 ML/MIN/1.73M2
GLUCOSE BLD-MCNC: 110 MG/DL (ref 70–125)
GLUCOSE BLD-MCNC: 116 MG/DL (ref 70–125)
GLUCOSE BLD-MCNC: 128 MG/DL (ref 70–125)
GLUCOSE BLD-MCNC: 130 MG/DL (ref 70–125)
GLUCOSE BLD-MCNC: 142 MG/DL (ref 70–125)
GLUCOSE BLD-MCNC: 146 MG/DL (ref 70–125)
GLUCOSE BLD-MCNC: 147 MG/DL (ref 70–125)
GLUCOSE BLD-MCNC: 153 MG/DL (ref 70–125)
GLUCOSE BLD-MCNC: 160 MG/DL (ref 70–125)
GLUCOSE BLD-MCNC: 176 MG/DL (ref 70–125)
GLUCOSE BLD-MCNC: 194 MG/DL (ref 70–125)
GLUCOSE BLD-MCNC: 89 MG/DL (ref 70–125)
GLUCOSE BLDC GLUCOMTR-MCNC: 105 MG/DL (ref 70–99)
GLUCOSE BLDC GLUCOMTR-MCNC: 108 MG/DL (ref 70–99)
GLUCOSE BLDC GLUCOMTR-MCNC: 111 MG/DL (ref 70–99)
GLUCOSE BLDC GLUCOMTR-MCNC: 112 MG/DL (ref 70–99)
GLUCOSE BLDC GLUCOMTR-MCNC: 120 MG/DL (ref 70–99)
GLUCOSE BLDC GLUCOMTR-MCNC: 123 MG/DL (ref 70–99)
GLUCOSE BLDC GLUCOMTR-MCNC: 124 MG/DL (ref 70–99)
GLUCOSE BLDC GLUCOMTR-MCNC: 125 MG/DL (ref 70–99)
GLUCOSE BLDC GLUCOMTR-MCNC: 125 MG/DL (ref 70–99)
GLUCOSE BLDC GLUCOMTR-MCNC: 128 MG/DL (ref 70–99)
GLUCOSE BLDC GLUCOMTR-MCNC: 134 MG/DL (ref 70–99)
GLUCOSE BLDC GLUCOMTR-MCNC: 139 MG/DL (ref 70–99)
GLUCOSE BLDC GLUCOMTR-MCNC: 142 MG/DL (ref 70–99)
GLUCOSE BLDC GLUCOMTR-MCNC: 142 MG/DL (ref 70–99)
GLUCOSE BLDC GLUCOMTR-MCNC: 149 MG/DL (ref 70–99)
GLUCOSE BLDC GLUCOMTR-MCNC: 151 MG/DL (ref 70–99)
GLUCOSE BLDC GLUCOMTR-MCNC: 151 MG/DL (ref 70–99)
GLUCOSE BLDC GLUCOMTR-MCNC: 168 MG/DL (ref 70–99)
GLUCOSE BLDC GLUCOMTR-MCNC: 188 MG/DL (ref 70–99)
GLUCOSE BLDC GLUCOMTR-MCNC: 188 MG/DL (ref 70–99)
GLUCOSE BLDC GLUCOMTR-MCNC: 190 MG/DL (ref 70–99)
GLUCOSE BLDC GLUCOMTR-MCNC: 191 MG/DL (ref 70–99)
GLUCOSE BLDC GLUCOMTR-MCNC: 203 MG/DL (ref 70–99)
GLUCOSE BLDC GLUCOMTR-MCNC: 205 MG/DL (ref 70–99)
GLUCOSE BLDC GLUCOMTR-MCNC: 215 MG/DL (ref 70–99)
GLUCOSE BLDC GLUCOMTR-MCNC: 228 MG/DL (ref 70–99)
GLUCOSE BLDC GLUCOMTR-MCNC: 233 MG/DL (ref 70–99)
GLUCOSE BLDC GLUCOMTR-MCNC: 243 MG/DL (ref 70–99)
GLUCOSE UR STRIP-MCNC: NEGATIVE MG/DL
GLUCOSE UR STRIP-MCNC: NEGATIVE MG/DL
HBA1C MFR BLD: 6.3 %
HCO3 BLDV-SCNC: 21 MMOL/L (ref 24–30)
HCT VFR BLD AUTO: 29 % (ref 40–53)
HCT VFR BLD AUTO: 29.2 % (ref 40–53)
HCT VFR BLD AUTO: 29.8 % (ref 40–53)
HCT VFR BLD AUTO: 29.8 % (ref 40–53)
HCT VFR BLD AUTO: 30.1 % (ref 40–53)
HCT VFR BLD AUTO: 31.6 % (ref 40–53)
HCT VFR BLD AUTO: 31.9 % (ref 40–53)
HCT VFR BLD AUTO: 35.3 % (ref 40–53)
HGB BLD-MCNC: 10.9 G/DL (ref 13.3–17.7)
HGB BLD-MCNC: 8.5 G/DL (ref 13.3–17.7)
HGB BLD-MCNC: 8.5 G/DL (ref 13.3–17.7)
HGB BLD-MCNC: 8.7 G/DL (ref 13.3–17.7)
HGB BLD-MCNC: 9.1 G/DL (ref 13.3–17.7)
HGB BLD-MCNC: 9.2 G/DL (ref 13.3–17.7)
HGB BLD-MCNC: 9.4 G/DL (ref 13.3–17.7)
HGB BLD-MCNC: 9.5 G/DL (ref 13.3–17.7)
HGB UR QL STRIP: ABNORMAL
HGB UR QL STRIP: NEGATIVE
HOLD SPECIMEN: NORMAL
IMM GRANULOCYTES # BLD: 0 10E3/UL
IMM GRANULOCYTES # BLD: 0 10E3/UL
IMM GRANULOCYTES # BLD: 0.1 10E3/UL
IMM GRANULOCYTES # BLD: 0.1 10E3/UL
IMM GRANULOCYTES NFR BLD: 1 %
INTERPRETATION ECG - MUSE: NORMAL
KETONES UR STRIP-MCNC: NEGATIVE MG/DL
KETONES UR STRIP-MCNC: NEGATIVE MG/DL
LACTATE SERPL-SCNC: 0.9 MMOL/L (ref 0.7–2)
LACTATE SERPL-SCNC: 1 MMOL/L (ref 0.7–2)
LACTATE SERPL-SCNC: 1.8 MMOL/L (ref 0.7–2)
LEUKOCYTE ESTERASE UR QL STRIP: ABNORMAL
LEUKOCYTE ESTERASE UR QL STRIP: ABNORMAL
LISTERIA SPECIES (DETECTED/NOT DETECTED): NOT DETECTED
LVEF ECHO: NORMAL
LYMPHOCYTES # BLD AUTO: 0.9 10E3/UL (ref 0.8–5.3)
LYMPHOCYTES # BLD AUTO: 1.3 10E3/UL (ref 0.8–5.3)
LYMPHOCYTES # BLD AUTO: 1.3 10E3/UL (ref 0.8–5.3)
LYMPHOCYTES # BLD AUTO: 1.4 10E3/UL (ref 0.8–5.3)
LYMPHOCYTES NFR BLD AUTO: 16 %
LYMPHOCYTES NFR BLD AUTO: 16 %
LYMPHOCYTES NFR BLD AUTO: 17 %
LYMPHOCYTES NFR BLD AUTO: 21 %
MAGNESIUM SERPL-MCNC: 2.2 MG/DL (ref 1.8–2.6)
MAGNESIUM SERPL-MCNC: 2.3 MG/DL (ref 1.8–2.6)
MCH RBC QN AUTO: 21.1 PG (ref 26.5–33)
MCH RBC QN AUTO: 21.4 PG (ref 26.5–33)
MCH RBC QN AUTO: 22.8 PG (ref 26.5–33)
MCH RBC QN AUTO: 22.9 PG (ref 26.5–33)
MCH RBC QN AUTO: 23.1 PG (ref 26.5–33)
MCH RBC QN AUTO: 23.4 PG (ref 26.5–33)
MCHC RBC AUTO-ENTMCNC: 28.5 G/DL (ref 31.5–36.5)
MCHC RBC AUTO-ENTMCNC: 29.1 G/DL (ref 31.5–36.5)
MCHC RBC AUTO-ENTMCNC: 29.2 G/DL (ref 31.5–36.5)
MCHC RBC AUTO-ENTMCNC: 29.3 G/DL (ref 31.5–36.5)
MCHC RBC AUTO-ENTMCNC: 30.1 G/DL (ref 31.5–36.5)
MCHC RBC AUTO-ENTMCNC: 30.9 G/DL (ref 31.5–36.5)
MCHC RBC AUTO-ENTMCNC: 30.9 G/DL (ref 31.5–36.5)
MCHC RBC AUTO-ENTMCNC: 31.2 G/DL (ref 31.5–36.5)
MCV RBC AUTO: 73 FL (ref 78–100)
MCV RBC AUTO: 74 FL (ref 78–100)
MCV RBC AUTO: 75 FL (ref 78–100)
MCV RBC AUTO: 76 FL (ref 78–100)
MCV RBC AUTO: 76 FL (ref 78–100)
MONOCYTES # BLD AUTO: 0.5 10E3/UL (ref 0–1.3)
MONOCYTES # BLD AUTO: 0.5 10E3/UL (ref 0–1.3)
MONOCYTES # BLD AUTO: 0.6 10E3/UL (ref 0–1.3)
MONOCYTES # BLD AUTO: 0.6 10E3/UL (ref 0–1.3)
MONOCYTES NFR BLD AUTO: 12 %
MONOCYTES NFR BLD AUTO: 7 %
MONOCYTES NFR BLD AUTO: 8 %
MONOCYTES NFR BLD AUTO: 8 %
MUCOUS THREADS #/AREA URNS LPF: PRESENT /LPF
MUCOUS THREADS #/AREA URNS LPF: PRESENT /LPF
NEUTROPHILS # BLD AUTO: 3.8 10E3/UL (ref 1.6–8.3)
NEUTROPHILS # BLD AUTO: 4.4 10E3/UL (ref 1.6–8.3)
NEUTROPHILS # BLD AUTO: 5.6 10E3/UL (ref 1.6–8.3)
NEUTROPHILS # BLD AUTO: 6.1 10E3/UL (ref 1.6–8.3)
NEUTROPHILS NFR BLD AUTO: 67 %
NEUTROPHILS NFR BLD AUTO: 71 %
NEUTROPHILS NFR BLD AUTO: 73 %
NEUTROPHILS NFR BLD AUTO: 73 %
NITRATE UR QL: NEGATIVE
NITRATE UR QL: POSITIVE
NRBC # BLD AUTO: 0 10E3/UL
NRBC BLD AUTO-RTO: 0 /100
OXYHGB MFR BLDV: 53.5 % (ref 70–75)
P AXIS - MUSE: NORMAL DEGREES
PCO2 BLDV: 33 MM HG (ref 35–50)
PH BLDV: 7.41 [PH] (ref 7.35–7.45)
PH UR STRIP: 5 [PH] (ref 5–7)
PH UR STRIP: 6 [PH] (ref 5–7)
PLATELET # BLD AUTO: 149 10E3/UL (ref 150–450)
PLATELET # BLD AUTO: 151 10E3/UL (ref 150–450)
PLATELET # BLD AUTO: 161 10E3/UL (ref 150–450)
PLATELET # BLD AUTO: 163 10E3/UL (ref 150–450)
PLATELET # BLD AUTO: 200 10E3/UL (ref 150–450)
PLATELET # BLD AUTO: 210 10E3/UL (ref 150–450)
PLATELET # BLD AUTO: 215 10E3/UL (ref 150–450)
PLATELET # BLD AUTO: 220 10E3/UL (ref 150–450)
PO2 BLDV: 29 MM HG (ref 25–47)
POTASSIUM BLD-SCNC: 3.2 MMOL/L (ref 3.5–5)
POTASSIUM BLD-SCNC: 3.3 MMOL/L (ref 3.5–5)
POTASSIUM BLD-SCNC: 3.4 MMOL/L (ref 3.5–5)
POTASSIUM BLD-SCNC: 3.4 MMOL/L (ref 3.5–5)
POTASSIUM BLD-SCNC: 3.5 MMOL/L (ref 3.5–5)
POTASSIUM BLD-SCNC: 3.6 MMOL/L (ref 3.5–5)
POTASSIUM BLD-SCNC: 3.7 MMOL/L (ref 3.5–5)
POTASSIUM BLD-SCNC: 3.9 MMOL/L (ref 3.5–5)
PR INTERVAL - MUSE: NORMAL MS
PROCALCITONIN SERPL-MCNC: 0.1 NG/ML (ref 0–0.49)
PROT SERPL-MCNC: 5.6 G/DL (ref 6–8)
PROT SERPL-MCNC: 5.8 G/DL (ref 6–8)
PROT SERPL-MCNC: 6.2 G/DL (ref 6–8)
PROT SERPL-MCNC: 6.3 G/DL (ref 6–8)
PROT SERPL-MCNC: 7.1 G/DL (ref 6–8)
QRS DURATION - MUSE: 112 MS
QT - MUSE: 370 MS
QTC - MUSE: 484 MS
R AXIS - MUSE: 70 DEGREES
RBC # BLD AUTO: 3.94 10E6/UL (ref 4.4–5.9)
RBC # BLD AUTO: 3.98 10E6/UL (ref 4.4–5.9)
RBC # BLD AUTO: 3.98 10E6/UL (ref 4.4–5.9)
RBC # BLD AUTO: 4.06 10E6/UL (ref 4.4–5.9)
RBC # BLD AUTO: 4.1 10E6/UL (ref 4.4–5.9)
RBC # BLD AUTO: 4.16 10E6/UL (ref 4.4–5.9)
RBC # BLD AUTO: 4.31 10E6/UL (ref 4.4–5.9)
RBC # BLD AUTO: 4.72 10E6/UL (ref 4.4–5.9)
RBC URINE: 1 /HPF
RBC URINE: 43 /HPF
SAO2 % BLDV: 54.7 % (ref 70–75)
SARS-COV-2 RNA RESP QL NAA+PROBE: NEGATIVE
SARS-COV-2 RNA RESP QL NAA+PROBE: POSITIVE
SODIUM SERPL-SCNC: 140 MMOL/L (ref 136–145)
SODIUM SERPL-SCNC: 141 MMOL/L (ref 136–145)
SODIUM SERPL-SCNC: 143 MMOL/L (ref 136–145)
SODIUM SERPL-SCNC: 143 MMOL/L (ref 136–145)
SODIUM SERPL-SCNC: 144 MMOL/L (ref 136–145)
SODIUM SERPL-SCNC: 145 MMOL/L (ref 136–145)
SODIUM SERPL-SCNC: 147 MMOL/L (ref 136–145)
SODIUM SERPL-SCNC: 148 MMOL/L (ref 136–145)
SP GR UR STRIP: 1.01 (ref 1–1.03)
SP GR UR STRIP: 1.01 (ref 1–1.03)
SQUAMOUS EPITHELIAL: <1 /HPF
STAPHYLOCOCCUS AUREUS: NOT DETECTED
STAPHYLOCOCCUS EPIDERMIDIS: DETECTED
STAPHYLOCOCCUS LUGDUNENSIS: NOT DETECTED
STREPTOCOCCUS AGALACTIAE: NOT DETECTED
STREPTOCOCCUS ANGINOSUS GROUP: NOT DETECTED
STREPTOCOCCUS PNEUMONIAE: NOT DETECTED
STREPTOCOCCUS PYOGENES: NOT DETECTED
STREPTOCOCCUS SPECIES: NOT DETECTED
SYSTOLIC BLOOD PRESSURE - MUSE: 158 MMHG
T AXIS - MUSE: 17 DEGREES
TROPONIN I SERPL-MCNC: 0.04 NG/ML (ref 0–0.29)
TSH SERPL DL<=0.005 MIU/L-ACNC: 1.37 UIU/ML (ref 0.3–5)
UROBILINOGEN UR STRIP-MCNC: <2 MG/DL
UROBILINOGEN UR STRIP-MCNC: <2 MG/DL
VENTRICULAR RATE- MUSE: 103 BPM
VIT B1 PYROPHOSHATE BLD-SCNC: 138 NMOL/L
VIT B12 SERPL-MCNC: 554 PG/ML (ref 193–986)
WBC # BLD AUTO: 4.4 10E3/UL (ref 4–11)
WBC # BLD AUTO: 4.7 10E3/UL (ref 4–11)
WBC # BLD AUTO: 5.3 10E3/UL (ref 4–11)
WBC # BLD AUTO: 5.4 10E3/UL (ref 4–11)
WBC # BLD AUTO: 6.4 10E3/UL (ref 4–11)
WBC # BLD AUTO: 7.5 10E3/UL (ref 4–11)
WBC # BLD AUTO: 8.1 10E3/UL (ref 4–11)
WBC # BLD AUTO: 8.2 10E3/UL (ref 4–11)
WBC CLUMPS #/AREA URNS HPF: PRESENT /HPF
WBC URINE: 8 /HPF
WBC URINE: >182 /HPF

## 2022-01-01 PROCEDURE — 84295 ASSAY OF SERUM SODIUM: CPT | Performed by: MASSAGE THERAPIST

## 2022-01-01 PROCEDURE — 99222 1ST HOSP IP/OBS MODERATE 55: CPT | Performed by: NURSE PRACTITIONER

## 2022-01-01 PROCEDURE — 258N000003 HC RX IP 258 OP 636: Performed by: STUDENT IN AN ORGANIZED HEALTH CARE EDUCATION/TRAINING PROGRAM

## 2022-01-01 PROCEDURE — 210N000001 HC R&B IMCU HEART CARE

## 2022-01-01 PROCEDURE — 96367 TX/PROPH/DG ADDL SEQ IV INF: CPT

## 2022-01-01 PROCEDURE — 71045 X-RAY EXAM CHEST 1 VIEW: CPT

## 2022-01-01 PROCEDURE — 82805 BLOOD GASES W/O2 SATURATION: CPT | Performed by: INTERNAL MEDICINE

## 2022-01-01 PROCEDURE — 258N000002 HC RX IP 258 OP 250: Performed by: STUDENT IN AN ORGANIZED HEALTH CARE EDUCATION/TRAINING PROGRAM

## 2022-01-01 PROCEDURE — 96366 THER/PROPH/DIAG IV INF ADDON: CPT

## 2022-01-01 PROCEDURE — G0180 MD CERTIFICATION HHA PATIENT: HCPCS | Performed by: FAMILY MEDICINE

## 2022-01-01 PROCEDURE — 36415 COLL VENOUS BLD VENIPUNCTURE: CPT | Performed by: EMERGENCY MEDICINE

## 2022-01-01 PROCEDURE — 36415 COLL VENOUS BLD VENIPUNCTURE: CPT | Performed by: FAMILY MEDICINE

## 2022-01-01 PROCEDURE — 96368 THER/DIAG CONCURRENT INF: CPT

## 2022-01-01 PROCEDURE — 85379 FIBRIN DEGRADATION QUANT: CPT | Performed by: EMERGENCY MEDICINE

## 2022-01-01 PROCEDURE — 250N000013 HC RX MED GY IP 250 OP 250 PS 637: Performed by: INTERNAL MEDICINE

## 2022-01-01 PROCEDURE — 250N000011 HC RX IP 250 OP 636: Performed by: STUDENT IN AN ORGANIZED HEALTH CARE EDUCATION/TRAINING PROGRAM

## 2022-01-01 PROCEDURE — 96361 HYDRATE IV INFUSION ADD-ON: CPT

## 2022-01-01 PROCEDURE — 36415 COLL VENOUS BLD VENIPUNCTURE: CPT | Performed by: STUDENT IN AN ORGANIZED HEALTH CARE EDUCATION/TRAINING PROGRAM

## 2022-01-01 PROCEDURE — 99233 SBSQ HOSP IP/OBS HIGH 50: CPT | Performed by: FAMILY MEDICINE

## 2022-01-01 PROCEDURE — 99232 SBSQ HOSP IP/OBS MODERATE 35: CPT | Performed by: INTERNAL MEDICINE

## 2022-01-01 PROCEDURE — 85027 COMPLETE CBC AUTOMATED: CPT | Performed by: STUDENT IN AN ORGANIZED HEALTH CARE EDUCATION/TRAINING PROGRAM

## 2022-01-01 PROCEDURE — 97530 THERAPEUTIC ACTIVITIES: CPT | Mod: GP

## 2022-01-01 PROCEDURE — 85025 COMPLETE CBC W/AUTO DIFF WBC: CPT | Performed by: STUDENT IN AN ORGANIZED HEALTH CARE EDUCATION/TRAINING PROGRAM

## 2022-01-01 PROCEDURE — 250N000009 HC RX 250: Performed by: INTERNAL MEDICINE

## 2022-01-01 PROCEDURE — 80053 COMPREHEN METABOLIC PANEL: CPT | Performed by: INTERNAL MEDICINE

## 2022-01-01 PROCEDURE — 250N000009 HC RX 250: Performed by: HOSPITALIST

## 2022-01-01 PROCEDURE — C9113 INJ PANTOPRAZOLE SODIUM, VIA: HCPCS | Performed by: INTERNAL MEDICINE

## 2022-01-01 PROCEDURE — 94640 AIRWAY INHALATION TREATMENT: CPT | Mod: 76

## 2022-01-01 PROCEDURE — 85048 AUTOMATED LEUKOCYTE COUNT: CPT | Performed by: INTERNAL MEDICINE

## 2022-01-01 PROCEDURE — 93306 TTE W/DOPPLER COMPLETE: CPT | Mod: 26 | Performed by: INTERNAL MEDICINE

## 2022-01-01 PROCEDURE — 99207 PR NO CHARGE LOS: CPT | Performed by: INTERNAL MEDICINE

## 2022-01-01 PROCEDURE — 82310 ASSAY OF CALCIUM: CPT | Performed by: HOSPITALIST

## 2022-01-01 PROCEDURE — 96375 TX/PRO/DX INJ NEW DRUG ADDON: CPT

## 2022-01-01 PROCEDURE — 250N000011 HC RX IP 250 OP 636: Performed by: INTERNAL MEDICINE

## 2022-01-01 PROCEDURE — 250N000011 HC RX IP 250 OP 636: Performed by: EMERGENCY MEDICINE

## 2022-01-01 PROCEDURE — 85014 HEMATOCRIT: CPT | Performed by: STUDENT IN AN ORGANIZED HEALTH CARE EDUCATION/TRAINING PROGRAM

## 2022-01-01 PROCEDURE — 99239 HOSP IP/OBS DSCHRG MGMT >30: CPT | Performed by: INTERNAL MEDICINE

## 2022-01-01 PROCEDURE — 99233 SBSQ HOSP IP/OBS HIGH 50: CPT | Performed by: STUDENT IN AN ORGANIZED HEALTH CARE EDUCATION/TRAINING PROGRAM

## 2022-01-01 PROCEDURE — 250N000009 HC RX 250: Performed by: MASSAGE THERAPIST

## 2022-01-01 PROCEDURE — 93005 ELECTROCARDIOGRAM TRACING: CPT

## 2022-01-01 PROCEDURE — 85004 AUTOMATED DIFF WBC COUNT: CPT | Performed by: STUDENT IN AN ORGANIZED HEALTH CARE EDUCATION/TRAINING PROGRAM

## 2022-01-01 PROCEDURE — 81001 URINALYSIS AUTO W/SCOPE: CPT | Performed by: EMERGENCY MEDICINE

## 2022-01-01 PROCEDURE — 250N000013 HC RX MED GY IP 250 OP 250 PS 637: Performed by: HOSPITALIST

## 2022-01-01 PROCEDURE — 99232 SBSQ HOSP IP/OBS MODERATE 35: CPT | Mod: GV | Performed by: INTERNAL MEDICINE

## 2022-01-01 PROCEDURE — 84295 ASSAY OF SERUM SODIUM: CPT | Performed by: INTERNAL MEDICINE

## 2022-01-01 PROCEDURE — 84132 ASSAY OF SERUM POTASSIUM: CPT | Performed by: HOSPITALIST

## 2022-01-01 PROCEDURE — 110N000005 HC R&B HOSPICE, ACCENT

## 2022-01-01 PROCEDURE — 85379 FIBRIN DEGRADATION QUANT: CPT | Performed by: HOSPITALIST

## 2022-01-01 PROCEDURE — 83605 ASSAY OF LACTIC ACID: CPT | Performed by: EMERGENCY MEDICINE

## 2022-01-01 PROCEDURE — 85025 COMPLETE CBC W/AUTO DIFF WBC: CPT | Performed by: EMERGENCY MEDICINE

## 2022-01-01 PROCEDURE — 80053 COMPREHEN METABOLIC PANEL: CPT | Performed by: STUDENT IN AN ORGANIZED HEALTH CARE EDUCATION/TRAINING PROGRAM

## 2022-01-01 PROCEDURE — 250N000013 HC RX MED GY IP 250 OP 250 PS 637: Performed by: FAMILY MEDICINE

## 2022-01-01 PROCEDURE — 250N000011 HC RX IP 250 OP 636: Performed by: HOSPITALIST

## 2022-01-01 PROCEDURE — 96360 HYDRATION IV INFUSION INIT: CPT

## 2022-01-01 PROCEDURE — 86140 C-REACTIVE PROTEIN: CPT | Performed by: HOSPITALIST

## 2022-01-01 PROCEDURE — 99285 EMERGENCY DEPT VISIT HI MDM: CPT | Mod: 25

## 2022-01-01 PROCEDURE — 258N000002 HC RX IP 258 OP 250: Performed by: INTERNAL MEDICINE

## 2022-01-01 PROCEDURE — 99222 1ST HOSP IP/OBS MODERATE 55: CPT | Mod: 25 | Performed by: INTERNAL MEDICINE

## 2022-01-01 PROCEDURE — 36415 COLL VENOUS BLD VENIPUNCTURE: CPT | Performed by: INTERNAL MEDICINE

## 2022-01-01 PROCEDURE — 250N000009 HC RX 250: Performed by: STUDENT IN AN ORGANIZED HEALTH CARE EDUCATION/TRAINING PROGRAM

## 2022-01-01 PROCEDURE — 97162 PT EVAL MOD COMPLEX 30 MIN: CPT | Mod: GP

## 2022-01-01 PROCEDURE — 258N000003 HC RX IP 258 OP 636: Performed by: EMERGENCY MEDICINE

## 2022-01-01 PROCEDURE — 92526 ORAL FUNCTION THERAPY: CPT | Mod: GN

## 2022-01-01 PROCEDURE — 36415 COLL VENOUS BLD VENIPUNCTURE: CPT | Performed by: HOSPITALIST

## 2022-01-01 PROCEDURE — 82607 VITAMIN B-12: CPT | Performed by: INTERNAL MEDICINE

## 2022-01-01 PROCEDURE — 120N000001 HC R&B MED SURG/OB

## 2022-01-01 PROCEDURE — 85027 COMPLETE CBC AUTOMATED: CPT | Performed by: HOSPITALIST

## 2022-01-01 PROCEDURE — 97535 SELF CARE MNGMENT TRAINING: CPT | Mod: GO

## 2022-01-01 PROCEDURE — 84484 ASSAY OF TROPONIN QUANT: CPT | Performed by: EMERGENCY MEDICINE

## 2022-01-01 PROCEDURE — 250N000009 HC RX 250: Performed by: FAMILY MEDICINE

## 2022-01-01 PROCEDURE — 83605 ASSAY OF LACTIC ACID: CPT | Performed by: STUDENT IN AN ORGANIZED HEALTH CARE EDUCATION/TRAINING PROGRAM

## 2022-01-01 PROCEDURE — 97116 GAIT TRAINING THERAPY: CPT | Mod: GP

## 2022-01-01 PROCEDURE — 94640 AIRWAY INHALATION TREATMENT: CPT | Performed by: PEDIATRICS

## 2022-01-01 PROCEDURE — 36415 COLL VENOUS BLD VENIPUNCTURE: CPT

## 2022-01-01 PROCEDURE — 255N000002 HC RX 255 OP 636: Performed by: INTERNAL MEDICINE

## 2022-01-01 PROCEDURE — 250N000011 HC RX IP 250 OP 636: Performed by: FAMILY MEDICINE

## 2022-01-01 PROCEDURE — 99214 OFFICE O/P EST MOD 30 MIN: CPT | Performed by: FAMILY MEDICINE

## 2022-01-01 PROCEDURE — 96365 THER/PROPH/DIAG IV INF INIT: CPT

## 2022-01-01 PROCEDURE — 93005 ELECTROCARDIOGRAM TRACING: CPT | Performed by: STUDENT IN AN ORGANIZED HEALTH CARE EDUCATION/TRAINING PROGRAM

## 2022-01-01 PROCEDURE — 80048 BASIC METABOLIC PNL TOTAL CA: CPT | Performed by: INTERNAL MEDICINE

## 2022-01-01 PROCEDURE — 70450 CT HEAD/BRAIN W/O DYE: CPT

## 2022-01-01 PROCEDURE — 87149 DNA/RNA DIRECT PROBE: CPT | Performed by: INTERNAL MEDICINE

## 2022-01-01 PROCEDURE — 250N000011 HC RX IP 250 OP 636: Performed by: MASSAGE THERAPIST

## 2022-01-01 PROCEDURE — 250N000012 HC RX MED GY IP 250 OP 636 PS 637: Performed by: INTERNAL MEDICINE

## 2022-01-01 PROCEDURE — 250N000013 HC RX MED GY IP 250 OP 250 PS 637: Performed by: STUDENT IN AN ORGANIZED HEALTH CARE EDUCATION/TRAINING PROGRAM

## 2022-01-01 PROCEDURE — 94640 AIRWAY INHALATION TREATMENT: CPT

## 2022-01-01 PROCEDURE — 99231 SBSQ HOSP IP/OBS SF/LOW 25: CPT | Performed by: FAMILY MEDICINE

## 2022-01-01 PROCEDURE — 99233 SBSQ HOSP IP/OBS HIGH 50: CPT | Performed by: INTERNAL MEDICINE

## 2022-01-01 PROCEDURE — 83605 ASSAY OF LACTIC ACID: CPT | Performed by: INTERNAL MEDICINE

## 2022-01-01 PROCEDURE — 250N000012 HC RX MED GY IP 250 OP 636 PS 637: Performed by: HOSPITALIST

## 2022-01-01 PROCEDURE — 80048 BASIC METABOLIC PNL TOTAL CA: CPT | Performed by: HOSPITALIST

## 2022-01-01 PROCEDURE — 258N000003 HC RX IP 258 OP 636: Performed by: MASSAGE THERAPIST

## 2022-01-01 PROCEDURE — 87636 SARSCOV2 & INF A&B AMP PRB: CPT | Performed by: EMERGENCY MEDICINE

## 2022-01-01 PROCEDURE — 99443 PR PHYSICIAN TELEPHONE EVALUATION 21-30 MIN: CPT | Mod: 95 | Performed by: PSYCHIATRY & NEUROLOGY

## 2022-01-01 PROCEDURE — 87086 URINE CULTURE/COLONY COUNT: CPT | Performed by: STUDENT IN AN ORGANIZED HEALTH CARE EDUCATION/TRAINING PROGRAM

## 2022-01-01 PROCEDURE — 250N000013 HC RX MED GY IP 250 OP 250 PS 637: Performed by: EMERGENCY MEDICINE

## 2022-01-01 PROCEDURE — 83735 ASSAY OF MAGNESIUM: CPT | Performed by: INTERNAL MEDICINE

## 2022-01-01 PROCEDURE — 82565 ASSAY OF CREATININE: CPT | Performed by: STUDENT IN AN ORGANIZED HEALTH CARE EDUCATION/TRAINING PROGRAM

## 2022-01-01 PROCEDURE — 99222 1ST HOSP IP/OBS MODERATE 55: CPT | Performed by: FAMILY MEDICINE

## 2022-01-01 PROCEDURE — 87186 SC STD MICRODIL/AGAR DIL: CPT | Performed by: EMERGENCY MEDICINE

## 2022-01-01 PROCEDURE — C9113 INJ PANTOPRAZOLE SODIUM, VIA: HCPCS | Performed by: HOSPITALIST

## 2022-01-01 PROCEDURE — 99207 PR NO BILLABLE SERVICE THIS VISIT: CPT | Mod: GV | Performed by: INTERNAL MEDICINE

## 2022-01-01 PROCEDURE — 99223 1ST HOSP IP/OBS HIGH 75: CPT | Mod: AI | Performed by: HOSPITALIST

## 2022-01-01 PROCEDURE — 84132 ASSAY OF SERUM POTASSIUM: CPT | Performed by: INTERNAL MEDICINE

## 2022-01-01 PROCEDURE — 84443 ASSAY THYROID STIM HORMONE: CPT | Performed by: INTERNAL MEDICINE

## 2022-01-01 PROCEDURE — 87636 SARSCOV2 & INF A&B AMP PRB: CPT | Performed by: STUDENT IN AN ORGANIZED HEALTH CARE EDUCATION/TRAINING PROGRAM

## 2022-01-01 PROCEDURE — C9803 HOPD COVID-19 SPEC COLLECT: HCPCS

## 2022-01-01 PROCEDURE — 85027 COMPLETE CBC AUTOMATED: CPT | Performed by: INTERNAL MEDICINE

## 2022-01-01 PROCEDURE — 258N000002 HC RX IP 258 OP 250: Performed by: HOSPITALIST

## 2022-01-01 PROCEDURE — 97166 OT EVAL MOD COMPLEX 45 MIN: CPT | Mod: GO

## 2022-01-01 PROCEDURE — 82140 ASSAY OF AMMONIA: CPT | Performed by: INTERNAL MEDICINE

## 2022-01-01 PROCEDURE — 80076 HEPATIC FUNCTION PANEL: CPT

## 2022-01-01 PROCEDURE — 80053 COMPREHEN METABOLIC PANEL: CPT | Performed by: FAMILY MEDICINE

## 2022-01-01 PROCEDURE — 96376 TX/PRO/DX INJ SAME DRUG ADON: CPT

## 2022-01-01 PROCEDURE — 86140 C-REACTIVE PROTEIN: CPT | Performed by: EMERGENCY MEDICINE

## 2022-01-01 PROCEDURE — 81001 URINALYSIS AUTO W/SCOPE: CPT | Performed by: STUDENT IN AN ORGANIZED HEALTH CARE EDUCATION/TRAINING PROGRAM

## 2022-01-01 PROCEDURE — 99214 OFFICE O/P EST MOD 30 MIN: CPT | Performed by: PSYCHIATRY & NEUROLOGY

## 2022-01-01 PROCEDURE — 82248 BILIRUBIN DIRECT: CPT | Performed by: INTERNAL MEDICINE

## 2022-01-01 PROCEDURE — 80048 BASIC METABOLIC PNL TOTAL CA: CPT | Performed by: FAMILY MEDICINE

## 2022-01-01 PROCEDURE — 250N000012 HC RX MED GY IP 250 OP 636 PS 637: Performed by: STUDENT IN AN ORGANIZED HEALTH CARE EDUCATION/TRAINING PROGRAM

## 2022-01-01 PROCEDURE — 83735 ASSAY OF MAGNESIUM: CPT | Performed by: STUDENT IN AN ORGANIZED HEALTH CARE EDUCATION/TRAINING PROGRAM

## 2022-01-01 PROCEDURE — 84145 PROCALCITONIN (PCT): CPT | Performed by: HOSPITALIST

## 2022-01-01 PROCEDURE — 76770 US EXAM ABDO BACK WALL COMP: CPT

## 2022-01-01 PROCEDURE — 87040 BLOOD CULTURE FOR BACTERIA: CPT | Performed by: EMERGENCY MEDICINE

## 2022-01-01 PROCEDURE — 999N000157 HC STATISTIC RCP TIME EA 10 MIN

## 2022-01-01 PROCEDURE — 87077 CULTURE AEROBIC IDENTIFY: CPT | Performed by: INTERNAL MEDICINE

## 2022-01-01 PROCEDURE — 99232 SBSQ HOSP IP/OBS MODERATE 35: CPT | Performed by: FAMILY MEDICINE

## 2022-01-01 PROCEDURE — 999N000208 ECHOCARDIOGRAM COMPLETE

## 2022-01-01 PROCEDURE — XW033E5 INTRODUCTION OF REMDESIVIR ANTI-INFECTIVE INTO PERIPHERAL VEIN, PERCUTANEOUS APPROACH, NEW TECHNOLOGY GROUP 5: ICD-10-PCS | Performed by: HOSPITALIST

## 2022-01-01 PROCEDURE — 120N000004 HC R&B MS OVERFLOW

## 2022-01-01 PROCEDURE — 80053 COMPREHEN METABOLIC PANEL: CPT | Performed by: EMERGENCY MEDICINE

## 2022-01-01 PROCEDURE — 83880 ASSAY OF NATRIURETIC PEPTIDE: CPT | Performed by: INTERNAL MEDICINE

## 2022-01-01 PROCEDURE — 83036 HEMOGLOBIN GLYCOSYLATED A1C: CPT | Performed by: HOSPITALIST

## 2022-01-01 PROCEDURE — 84425 ASSAY OF VITAMIN B-1: CPT | Performed by: INTERNAL MEDICINE

## 2022-01-01 PROCEDURE — 82550 ASSAY OF CK (CPK): CPT | Performed by: INTERNAL MEDICINE

## 2022-01-01 PROCEDURE — 99207 PR APP CREDIT; MD BILLING SHARED VISIT: CPT | Performed by: STUDENT IN AN ORGANIZED HEALTH CARE EDUCATION/TRAINING PROGRAM

## 2022-01-01 PROCEDURE — 74230 X-RAY XM SWLNG FUNCJ C+: CPT

## 2022-01-01 PROCEDURE — 82746 ASSAY OF FOLIC ACID SERUM: CPT | Performed by: INTERNAL MEDICINE

## 2022-01-01 PROCEDURE — 99223 1ST HOSP IP/OBS HIGH 75: CPT | Mod: AI | Performed by: STUDENT IN AN ORGANIZED HEALTH CARE EDUCATION/TRAINING PROGRAM

## 2022-01-01 PROCEDURE — 92611 MOTION FLUOROSCOPY/SWALLOW: CPT | Mod: GN

## 2022-01-01 PROCEDURE — 70551 MRI BRAIN STEM W/O DYE: CPT

## 2022-01-01 RX ORDER — CLONIDINE HYDROCHLORIDE 0.1 MG/1
0.1 TABLET ORAL 2 TIMES DAILY
Qty: 60 TABLET | Refills: 0 | Status: SHIPPED | OUTPATIENT
Start: 2022-01-01 | End: 2022-01-01

## 2022-01-01 RX ORDER — DEXTROSE MONOHYDRATE 25 G/50ML
25-50 INJECTION, SOLUTION INTRAVENOUS
Status: DISCONTINUED | OUTPATIENT
Start: 2022-01-01 | End: 2022-01-01

## 2022-01-01 RX ORDER — POTASSIUM CHLORIDE 7.45 MG/ML
10 INJECTION INTRAVENOUS
Status: COMPLETED | OUTPATIENT
Start: 2022-01-01 | End: 2022-01-01

## 2022-01-01 RX ORDER — ASPIRIN 81 MG/1
81 TABLET, CHEWABLE ORAL DAILY
Status: DISCONTINUED | OUTPATIENT
Start: 2022-01-01 | End: 2022-01-01

## 2022-01-01 RX ORDER — OLANZAPINE 5 MG/1
10 TABLET, ORALLY DISINTEGRATING ORAL
Status: DISCONTINUED | OUTPATIENT
Start: 2022-01-01 | End: 2022-01-01

## 2022-01-01 RX ORDER — OXYCODONE HCL 20 MG/ML
3 CONCENTRATE, ORAL ORAL EVERY 4 HOURS
Status: CANCELLED | OUTPATIENT
Start: 2022-01-01

## 2022-01-01 RX ORDER — LORAZEPAM 1 MG/1
1 TABLET ORAL
Status: DISCONTINUED | OUTPATIENT
Start: 2022-01-01 | End: 2022-01-01 | Stop reason: HOSPADM

## 2022-01-01 RX ORDER — DILTIAZEM HYDROCHLORIDE 5 MG/ML
0.25 INJECTION INTRAVENOUS ONCE
Status: COMPLETED | OUTPATIENT
Start: 2022-01-01 | End: 2022-01-01

## 2022-01-01 RX ORDER — METOPROLOL TARTRATE 25 MG/1
25 TABLET, FILM COATED ORAL 2 TIMES DAILY
Status: DISCONTINUED | OUTPATIENT
Start: 2022-01-01 | End: 2022-01-01

## 2022-01-01 RX ORDER — DEXAMETHASONE SODIUM PHOSPHATE 10 MG/ML
6 INJECTION, SOLUTION INTRAMUSCULAR; INTRAVENOUS DAILY
Status: DISCONTINUED | OUTPATIENT
Start: 2022-01-01 | End: 2022-01-01

## 2022-01-01 RX ORDER — GLIMEPIRIDE 4 MG/1
4 TABLET ORAL
Qty: 90 TABLET | Refills: 3 | Status: SHIPPED | OUTPATIENT
Start: 2022-01-01 | End: 2022-01-01

## 2022-01-01 RX ORDER — AMLODIPINE BESYLATE 5 MG/1
10 TABLET ORAL DAILY
Status: DISCONTINUED | OUTPATIENT
Start: 2022-01-01 | End: 2022-01-01

## 2022-01-01 RX ORDER — METOPROLOL TARTRATE 1 MG/ML
5 INJECTION, SOLUTION INTRAVENOUS EVERY 5 MIN PRN
Status: COMPLETED | OUTPATIENT
Start: 2022-01-01 | End: 2022-01-01

## 2022-01-01 RX ORDER — AMOXICILLIN 250 MG
1 CAPSULE ORAL 2 TIMES DAILY
Status: DISCONTINUED | OUTPATIENT
Start: 2022-01-01 | End: 2022-01-01

## 2022-01-01 RX ORDER — DOXYCYCLINE 100 MG/1
100 CAPSULE ORAL EVERY 12 HOURS SCHEDULED
Status: DISCONTINUED | OUTPATIENT
Start: 2022-01-01 | End: 2022-01-01

## 2022-01-01 RX ORDER — MORPHINE SULFATE 100 MG/5ML
5 SOLUTION ORAL
Qty: 30 ML | Refills: 0 | Status: SHIPPED | OUTPATIENT
Start: 2022-01-01 | End: 2022-01-01

## 2022-01-01 RX ORDER — QUETIAPINE FUMARATE 25 MG/1
12.5 TABLET, FILM COATED ORAL AT BEDTIME
Qty: 30 TABLET | Refills: 11 | Status: SHIPPED | OUTPATIENT
Start: 2022-01-01 | End: 2022-01-01

## 2022-01-01 RX ORDER — CEFTRIAXONE 1 G/1
1 INJECTION, POWDER, FOR SOLUTION INTRAMUSCULAR; INTRAVENOUS ONCE
Status: COMPLETED | OUTPATIENT
Start: 2022-01-01 | End: 2022-01-01

## 2022-01-01 RX ORDER — GLYCOPYRROLATE 0.2 MG/ML
0.2 INJECTION, SOLUTION INTRAMUSCULAR; INTRAVENOUS EVERY 4 HOURS PRN
Status: DISCONTINUED | OUTPATIENT
Start: 2022-01-01 | End: 2022-01-01

## 2022-01-01 RX ORDER — HYDRALAZINE HYDROCHLORIDE 20 MG/ML
10-20 INJECTION INTRAMUSCULAR; INTRAVENOUS EVERY 6 HOURS PRN
Status: DISCONTINUED | OUTPATIENT
Start: 2022-01-01 | End: 2022-01-01 | Stop reason: HOSPADM

## 2022-01-01 RX ORDER — OLANZAPINE 5 MG/1
10 TABLET, ORALLY DISINTEGRATING ORAL
Status: CANCELLED | OUTPATIENT
Start: 2022-01-01

## 2022-01-01 RX ORDER — OXYCODONE HCL 20 MG/ML
5-10 CONCENTRATE, ORAL ORAL
Status: DISCONTINUED | OUTPATIENT
Start: 2022-01-01 | End: 2022-01-01 | Stop reason: HOSPADM

## 2022-01-01 RX ORDER — GLYCOPYRROLATE 0.2 MG/ML
0.2 INJECTION, SOLUTION INTRAMUSCULAR; INTRAVENOUS EVERY 4 HOURS PRN
Status: DISCONTINUED | OUTPATIENT
Start: 2022-01-01 | End: 2022-01-01 | Stop reason: HOSPADM

## 2022-01-01 RX ORDER — AMOXICILLIN 250 MG
2 CAPSULE ORAL 2 TIMES DAILY PRN
Status: DISCONTINUED | OUTPATIENT
Start: 2022-01-01 | End: 2022-01-01

## 2022-01-01 RX ORDER — CARBOXYMETHYLCELLULOSE SODIUM 5 MG/ML
1-2 SOLUTION/ DROPS OPHTHALMIC
Status: CANCELLED | OUTPATIENT
Start: 2022-01-01

## 2022-01-01 RX ORDER — HALOPERIDOL 5 MG/ML
2 INJECTION INTRAMUSCULAR EVERY 6 HOURS PRN
Status: DISCONTINUED | OUTPATIENT
Start: 2022-01-01 | End: 2022-01-01 | Stop reason: HOSPADM

## 2022-01-01 RX ORDER — OXYCODONE HCL 20 MG/ML
3-5 CONCENTRATE, ORAL ORAL
Status: DISCONTINUED | OUTPATIENT
Start: 2022-01-01 | End: 2022-01-01 | Stop reason: HOSPADM

## 2022-01-01 RX ORDER — OXYCODONE HCL 20 MG/ML
10 CONCENTRATE, ORAL ORAL EVERY 4 HOURS
Status: DISCONTINUED | OUTPATIENT
Start: 2022-01-01 | End: 2022-01-01 | Stop reason: HOSPADM

## 2022-01-01 RX ORDER — AMLODIPINE BESYLATE 10 MG/1
10 TABLET ORAL DAILY
Qty: 90 TABLET | Refills: 3 | Status: SHIPPED | OUTPATIENT
Start: 2022-01-01 | End: 2022-01-01

## 2022-01-01 RX ORDER — PANTOPRAZOLE SODIUM 40 MG/1
40 TABLET, DELAYED RELEASE ORAL
Status: DISCONTINUED | OUTPATIENT
Start: 2022-01-01 | End: 2022-01-01 | Stop reason: HOSPADM

## 2022-01-01 RX ORDER — BARIUM SULFATE 400 MG/ML
SUSPENSION ORAL ONCE
Status: COMPLETED | OUTPATIENT
Start: 2022-01-01 | End: 2022-01-01

## 2022-01-01 RX ORDER — BISACODYL 10 MG
10 SUPPOSITORY, RECTAL RECTAL
Status: DISCONTINUED | OUTPATIENT
Start: 2022-01-01 | End: 2022-01-01 | Stop reason: HOSPADM

## 2022-01-01 RX ORDER — ATROPINE SULFATE 10 MG/ML
2 SOLUTION/ DROPS OPHTHALMIC EVERY 4 HOURS PRN
Qty: 5 ML | Refills: 0 | Status: SHIPPED | OUTPATIENT
Start: 2022-01-01 | End: 2022-01-01

## 2022-01-01 RX ORDER — LABETALOL HYDROCHLORIDE 5 MG/ML
10 INJECTION, SOLUTION INTRAVENOUS EVERY 4 HOURS PRN
Status: DISCONTINUED | OUTPATIENT
Start: 2022-01-01 | End: 2022-01-01 | Stop reason: HOSPADM

## 2022-01-01 RX ORDER — LIDOCAINE 40 MG/G
CREAM TOPICAL
Status: DISCONTINUED | OUTPATIENT
Start: 2022-01-01 | End: 2022-01-01 | Stop reason: HOSPADM

## 2022-01-01 RX ORDER — OXYCODONE HCL 20 MG/ML
3 CONCENTRATE, ORAL ORAL EVERY 6 HOURS
Status: DISCONTINUED | OUTPATIENT
Start: 2022-01-01 | End: 2022-01-01

## 2022-01-01 RX ORDER — POTASSIUM CHLORIDE 7.45 MG/ML
10 INJECTION INTRAVENOUS ONCE
Status: COMPLETED | OUTPATIENT
Start: 2022-01-01 | End: 2022-01-01

## 2022-01-01 RX ORDER — HYDRALAZINE HYDROCHLORIDE 50 MG/1
100 TABLET, FILM COATED ORAL 4 TIMES DAILY
Status: DISCONTINUED | OUTPATIENT
Start: 2022-01-01 | End: 2022-01-01 | Stop reason: HOSPADM

## 2022-01-01 RX ORDER — OLANZAPINE 10 MG/2ML
2.5 INJECTION, POWDER, FOR SOLUTION INTRAMUSCULAR DAILY PRN
Status: DISCONTINUED | OUTPATIENT
Start: 2022-01-01 | End: 2022-01-01

## 2022-01-01 RX ORDER — OLANZAPINE 5 MG/1
5-10 TABLET, ORALLY DISINTEGRATING ORAL EVERY 6 HOURS PRN
Status: DISCONTINUED | OUTPATIENT
Start: 2022-01-01 | End: 2022-01-01 | Stop reason: HOSPADM

## 2022-01-01 RX ORDER — SALIVA STIMULANT COMB. NO.3
2 SPRAY, NON-AEROSOL (ML) MUCOUS MEMBRANE
Qty: 44.3 ML | Refills: 0 | COMMUNITY
Start: 2022-01-01

## 2022-01-01 RX ORDER — LORAZEPAM 2 MG/ML
1 INJECTION INTRAMUSCULAR
Status: CANCELLED | OUTPATIENT
Start: 2022-01-01

## 2022-01-01 RX ORDER — HALOPERIDOL 2 MG/ML
2 SOLUTION ORAL EVERY 4 HOURS PRN
Status: DISCONTINUED | OUTPATIENT
Start: 2022-01-01 | End: 2022-01-01 | Stop reason: HOSPADM

## 2022-01-01 RX ORDER — POTASSIUM CHLORIDE 1500 MG/1
20 TABLET, EXTENDED RELEASE ORAL ONCE
Status: COMPLETED | OUTPATIENT
Start: 2022-01-01 | End: 2022-01-01

## 2022-01-01 RX ORDER — ONDANSETRON 2 MG/ML
4 INJECTION INTRAMUSCULAR; INTRAVENOUS EVERY 6 HOURS PRN
Status: DISCONTINUED | OUTPATIENT
Start: 2022-01-01 | End: 2022-01-01 | Stop reason: HOSPADM

## 2022-01-01 RX ORDER — OLANZAPINE 5 MG/1
10 TABLET, ORALLY DISINTEGRATING ORAL
Status: DISCONTINUED | OUTPATIENT
Start: 2022-01-01 | End: 2022-01-01 | Stop reason: HOSPADM

## 2022-01-01 RX ORDER — ACETAMINOPHEN 650 MG/1
650 SUPPOSITORY RECTAL EVERY 4 HOURS PRN
Status: CANCELLED | OUTPATIENT
Start: 2022-01-01

## 2022-01-01 RX ORDER — SALIVA STIMULANT COMB. NO.3
2 SPRAY, NON-AEROSOL (ML) MUCOUS MEMBRANE
Status: CANCELLED | OUTPATIENT
Start: 2022-01-01

## 2022-01-01 RX ORDER — METOPROLOL TARTRATE 25 MG/1
50 TABLET, FILM COATED ORAL 2 TIMES DAILY
Status: DISCONTINUED | OUTPATIENT
Start: 2022-01-01 | End: 2022-01-01

## 2022-01-01 RX ORDER — OXYCODONE HCL 20 MG/ML
3-5 CONCENTRATE, ORAL ORAL
Status: DISCONTINUED | OUTPATIENT
Start: 2022-01-01 | End: 2022-01-01

## 2022-01-01 RX ORDER — BISACODYL 10 MG
10 SUPPOSITORY, RECTAL RECTAL ONCE
Status: DISCONTINUED | OUTPATIENT
Start: 2022-01-01 | End: 2022-01-01 | Stop reason: HOSPADM

## 2022-01-01 RX ORDER — ACETAMINOPHEN 325 MG/1
650 TABLET ORAL EVERY 4 HOURS PRN
Status: DISCONTINUED | OUTPATIENT
Start: 2022-01-01 | End: 2022-01-01 | Stop reason: HOSPADM

## 2022-01-01 RX ORDER — DEXTROSE MONOHYDRATE 25 G/50ML
25-50 INJECTION, SOLUTION INTRAVENOUS
Status: DISCONTINUED | OUTPATIENT
Start: 2022-01-01 | End: 2022-01-01 | Stop reason: HOSPADM

## 2022-01-01 RX ORDER — AMLODIPINE BESYLATE 5 MG/1
10 TABLET ORAL DAILY
Status: DISCONTINUED | OUTPATIENT
Start: 2022-01-01 | End: 2022-01-01 | Stop reason: HOSPADM

## 2022-01-01 RX ORDER — NICOTINE POLACRILEX 4 MG
15-30 LOZENGE BUCCAL
Status: DISCONTINUED | OUTPATIENT
Start: 2022-01-01 | End: 2022-01-01

## 2022-01-01 RX ORDER — RIVASTIGMINE 13.3 MG/24H
1 PATCH, EXTENDED RELEASE TRANSDERMAL DAILY
Qty: 30 PATCH | Refills: 11 | Status: SHIPPED | OUTPATIENT
Start: 2022-01-01

## 2022-01-01 RX ORDER — SALIVA STIMULANT COMB. NO.3
2 SPRAY, NON-AEROSOL (ML) MUCOUS MEMBRANE
Status: DISCONTINUED | OUTPATIENT
Start: 2022-01-01 | End: 2022-01-01 | Stop reason: HOSPADM

## 2022-01-01 RX ORDER — ALBUTEROL SULFATE 0.83 MG/ML
2.5 SOLUTION RESPIRATORY (INHALATION)
Status: DISCONTINUED | OUTPATIENT
Start: 2022-01-01 | End: 2022-01-01

## 2022-01-01 RX ORDER — OLANZAPINE 5 MG/1
10 TABLET, ORALLY DISINTEGRATING ORAL 2 TIMES DAILY PRN
Status: DISCONTINUED | OUTPATIENT
Start: 2022-01-01 | End: 2022-01-01 | Stop reason: HOSPADM

## 2022-01-01 RX ORDER — HALOPERIDOL 5 MG/ML
5 INJECTION INTRAMUSCULAR ONCE
Status: COMPLETED | OUTPATIENT
Start: 2022-01-01 | End: 2022-01-01

## 2022-01-01 RX ORDER — LORAZEPAM 2 MG/ML
1 INJECTION INTRAMUSCULAR
Status: DISCONTINUED | OUTPATIENT
Start: 2022-01-01 | End: 2022-01-01 | Stop reason: HOSPADM

## 2022-01-01 RX ORDER — NALOXONE HYDROCHLORIDE 0.4 MG/ML
0.2 INJECTION, SOLUTION INTRAMUSCULAR; INTRAVENOUS; SUBCUTANEOUS
Status: DISCONTINUED | OUTPATIENT
Start: 2022-01-01 | End: 2022-01-01

## 2022-01-01 RX ORDER — OLANZAPINE 5 MG/1
5-10 TABLET, ORALLY DISINTEGRATING ORAL EVERY 6 HOURS PRN
Status: CANCELLED | OUTPATIENT
Start: 2022-01-01

## 2022-01-01 RX ORDER — ATROPINE SULFATE 10 MG/ML
2 SOLUTION/ DROPS OPHTHALMIC
Status: DISCONTINUED | OUTPATIENT
Start: 2022-01-01 | End: 2022-01-01 | Stop reason: HOSPADM

## 2022-01-01 RX ORDER — MEMANTINE HYDROCHLORIDE 10 MG/1
10 TABLET ORAL 2 TIMES DAILY
Status: DISCONTINUED | OUTPATIENT
Start: 2022-01-01 | End: 2022-01-01 | Stop reason: HOSPADM

## 2022-01-01 RX ORDER — NALOXONE HYDROCHLORIDE 0.4 MG/ML
0.4 INJECTION, SOLUTION INTRAMUSCULAR; INTRAVENOUS; SUBCUTANEOUS
Status: CANCELLED | OUTPATIENT
Start: 2022-01-01

## 2022-01-01 RX ORDER — NALOXONE HYDROCHLORIDE 0.4 MG/ML
0.2 INJECTION, SOLUTION INTRAMUSCULAR; INTRAVENOUS; SUBCUTANEOUS
Status: DISCONTINUED | OUTPATIENT
Start: 2022-01-01 | End: 2022-01-01 | Stop reason: HOSPADM

## 2022-01-01 RX ORDER — OLANZAPINE 5 MG/1
5 TABLET, ORALLY DISINTEGRATING ORAL AT BEDTIME
Status: DISCONTINUED | OUTPATIENT
Start: 2022-01-01 | End: 2022-01-01

## 2022-01-01 RX ORDER — FUROSEMIDE 10 MG/ML
40 INJECTION INTRAMUSCULAR; INTRAVENOUS ONCE
Status: COMPLETED | OUTPATIENT
Start: 2022-01-01 | End: 2022-01-01

## 2022-01-01 RX ORDER — SCOLOPAMINE TRANSDERMAL SYSTEM 1 MG/1
2 PATCH, EXTENDED RELEASE TRANSDERMAL
Status: DISCONTINUED | OUTPATIENT
Start: 2022-01-01 | End: 2022-01-01

## 2022-01-01 RX ORDER — ACETAMINOPHEN 325 MG/1
650 TABLET ORAL EVERY 4 HOURS PRN
Status: CANCELLED | OUTPATIENT
Start: 2022-01-01

## 2022-01-01 RX ORDER — NALOXONE HYDROCHLORIDE 0.4 MG/ML
0.4 INJECTION, SOLUTION INTRAMUSCULAR; INTRAVENOUS; SUBCUTANEOUS
Status: DISCONTINUED | OUTPATIENT
Start: 2022-01-01 | End: 2022-01-01 | Stop reason: HOSPADM

## 2022-01-01 RX ORDER — CEFDINIR 300 MG/1
300 CAPSULE ORAL DAILY
Status: DISCONTINUED | OUTPATIENT
Start: 2022-01-01 | End: 2022-01-01 | Stop reason: HOSPADM

## 2022-01-01 RX ORDER — ONDANSETRON 4 MG/1
4 TABLET, ORALLY DISINTEGRATING ORAL EVERY 6 HOURS PRN
Status: DISCONTINUED | OUTPATIENT
Start: 2022-01-01 | End: 2022-01-01 | Stop reason: HOSPADM

## 2022-01-01 RX ORDER — BENZTROPINE MESYLATE 0.5 MG/1
1 TABLET ORAL 3 TIMES DAILY PRN
Status: DISCONTINUED | OUTPATIENT
Start: 2022-01-01 | End: 2022-01-01

## 2022-01-01 RX ORDER — HEPARIN SODIUM 5000 [USP'U]/.5ML
5000 INJECTION, SOLUTION INTRAVENOUS; SUBCUTANEOUS EVERY 12 HOURS
Status: DISCONTINUED | OUTPATIENT
Start: 2022-01-01 | End: 2022-01-01

## 2022-01-01 RX ORDER — OLANZAPINE 5 MG/1
5-10 TABLET, ORALLY DISINTEGRATING ORAL EVERY 6 HOURS PRN
Status: DISCONTINUED | OUTPATIENT
Start: 2022-01-01 | End: 2022-01-01

## 2022-01-01 RX ORDER — HALOPERIDOL 2 MG/ML
2 SOLUTION ORAL EVERY 6 HOURS
Status: DISCONTINUED | OUTPATIENT
Start: 2022-01-01 | End: 2022-01-01 | Stop reason: HOSPADM

## 2022-01-01 RX ORDER — HYDROMORPHONE HYDROCHLORIDE 1 MG/ML
.3-.5 INJECTION, SOLUTION INTRAMUSCULAR; INTRAVENOUS; SUBCUTANEOUS
Status: CANCELLED | OUTPATIENT
Start: 2022-01-01

## 2022-01-01 RX ORDER — GLYCOPYRROLATE 0.2 MG/ML
0.2 INJECTION, SOLUTION INTRAMUSCULAR; INTRAVENOUS EVERY 4 HOURS
Status: CANCELLED | OUTPATIENT
Start: 2022-01-01 | End: 2022-01-01

## 2022-01-01 RX ORDER — HYDROMORPHONE HYDROCHLORIDE 1 MG/ML
.3-.5 INJECTION, SOLUTION INTRAMUSCULAR; INTRAVENOUS; SUBCUTANEOUS
Status: DISCONTINUED | OUTPATIENT
Start: 2022-01-01 | End: 2022-01-01 | Stop reason: HOSPADM

## 2022-01-01 RX ORDER — ACETAMINOPHEN 650 MG/1
650 SUPPOSITORY RECTAL EVERY 4 HOURS PRN
Status: DISCONTINUED | OUTPATIENT
Start: 2022-01-01 | End: 2022-01-01 | Stop reason: HOSPADM

## 2022-01-01 RX ORDER — CEFDINIR 300 MG/1
300 CAPSULE ORAL EVERY 12 HOURS SCHEDULED
Status: DISCONTINUED | OUTPATIENT
Start: 2022-01-01 | End: 2022-01-01

## 2022-01-01 RX ORDER — ACETAMINOPHEN 650 MG/1
650 SUPPOSITORY RECTAL EVERY 4 HOURS PRN
Qty: 4 SUPPOSITORY | Refills: 0 | Status: SHIPPED | OUTPATIENT
Start: 2022-01-01

## 2022-01-01 RX ORDER — NICOTINE POLACRILEX 4 MG
15-30 LOZENGE BUCCAL
Status: DISCONTINUED | OUTPATIENT
Start: 2022-01-01 | End: 2022-01-01 | Stop reason: HOSPADM

## 2022-01-01 RX ORDER — PANTOPRAZOLE SODIUM 40 MG/1
40 TABLET, DELAYED RELEASE ORAL
Status: DISCONTINUED | OUTPATIENT
Start: 2022-01-01 | End: 2022-01-01

## 2022-01-01 RX ORDER — VANCOMYCIN HYDROCHLORIDE 1 G/200ML
1000 INJECTION, SOLUTION INTRAVENOUS EVERY 24 HOURS
Status: DISCONTINUED | OUTPATIENT
Start: 2022-01-01 | End: 2022-01-01

## 2022-01-01 RX ORDER — LORAZEPAM 2 MG/ML
0.5 CONCENTRATE ORAL EVERY 4 HOURS PRN
Qty: 30 ML | Refills: 0 | Status: SHIPPED | OUTPATIENT
Start: 2022-01-01 | End: 2022-01-01

## 2022-01-01 RX ORDER — ATROPINE SULFATE 10 MG/ML
2 SOLUTION/ DROPS OPHTHALMIC EVERY 4 HOURS PRN
Qty: 5 ML | Refills: 0 | Status: SHIPPED | OUTPATIENT
Start: 2022-01-01

## 2022-01-01 RX ORDER — INSULIN GLARGINE 100 [IU]/ML
26 INJECTION, SOLUTION SUBCUTANEOUS AT BEDTIME
Qty: 15 ML | Refills: 3 | Status: SHIPPED | OUTPATIENT
Start: 2022-01-01 | End: 2022-01-01

## 2022-01-01 RX ORDER — OXYCODONE HCL 20 MG/ML
10 CONCENTRATE, ORAL ORAL EVERY 4 HOURS
Status: DISCONTINUED | OUTPATIENT
Start: 2022-01-01 | End: 2022-01-01

## 2022-01-01 RX ORDER — HALOPERIDOL 2 MG/ML
2 SOLUTION ORAL EVERY 6 HOURS
Status: DISCONTINUED | OUTPATIENT
Start: 2022-01-01 | End: 2022-01-01

## 2022-01-01 RX ORDER — OXYCODONE HCL 20 MG/ML
5 CONCENTRATE, ORAL ORAL
Qty: 15 ML | Refills: 0 | Status: SHIPPED | OUTPATIENT
Start: 2022-01-01

## 2022-01-01 RX ORDER — METOPROLOL SUCCINATE 25 MG/1
50 TABLET, EXTENDED RELEASE ORAL DAILY
Status: DISCONTINUED | OUTPATIENT
Start: 2022-01-01 | End: 2022-01-01 | Stop reason: HOSPADM

## 2022-01-01 RX ORDER — CEFTRIAXONE 1 G/1
1 INJECTION, POWDER, FOR SOLUTION INTRAMUSCULAR; INTRAVENOUS EVERY 24 HOURS
Status: DISCONTINUED | OUTPATIENT
Start: 2022-01-01 | End: 2022-01-01

## 2022-01-01 RX ORDER — HALOPERIDOL 2 MG/ML
1 SOLUTION ORAL EVERY 6 HOURS PRN
Qty: 10 ML | Refills: 0 | Status: SHIPPED | OUTPATIENT
Start: 2022-01-01 | End: 2022-01-01

## 2022-01-01 RX ORDER — BISACODYL 10 MG
10 SUPPOSITORY, RECTAL RECTAL DAILY PRN
Qty: 2 SUPPOSITORY | Refills: 0 | Status: SHIPPED | OUTPATIENT
Start: 2022-01-01

## 2022-01-01 RX ORDER — HYDRALAZINE HYDROCHLORIDE 100 MG/1
100 TABLET, FILM COATED ORAL 4 TIMES DAILY
Qty: 360 TABLET | Refills: 3 | Status: SHIPPED | OUTPATIENT
Start: 2022-01-01 | End: 2022-01-01

## 2022-01-01 RX ORDER — OLANZAPINE 10 MG/2ML
2.5 INJECTION, POWDER, FOR SOLUTION INTRAMUSCULAR EVERY 6 HOURS PRN
Status: DISCONTINUED | OUTPATIENT
Start: 2022-01-01 | End: 2022-01-01

## 2022-01-01 RX ORDER — CLONIDINE HYDROCHLORIDE 0.1 MG/1
0.1 TABLET ORAL 2 TIMES DAILY
Status: DISCONTINUED | OUTPATIENT
Start: 2022-01-01 | End: 2022-01-01 | Stop reason: HOSPADM

## 2022-01-01 RX ORDER — SODIUM CHLORIDE 450 MG/100ML
INJECTION, SOLUTION INTRAVENOUS CONTINUOUS
Status: DISCONTINUED | OUTPATIENT
Start: 2022-01-01 | End: 2022-01-01

## 2022-01-01 RX ORDER — OXYCODONE HCL 20 MG/ML
3 CONCENTRATE, ORAL ORAL EVERY 4 HOURS
Status: DISCONTINUED | OUTPATIENT
Start: 2022-01-01 | End: 2022-01-01 | Stop reason: HOSPADM

## 2022-01-01 RX ORDER — OLANZAPINE 5 MG/1
5 TABLET, ORALLY DISINTEGRATING ORAL AT BEDTIME
Status: CANCELLED | OUTPATIENT
Start: 2022-01-01

## 2022-01-01 RX ORDER — LIDOCAINE 40 MG/G
CREAM TOPICAL
Status: DISCONTINUED | OUTPATIENT
Start: 2022-01-01 | End: 2022-01-01

## 2022-01-01 RX ORDER — POTASSIUM CHLORIDE 7.45 MG/ML
10 INJECTION INTRAVENOUS
Status: DISPENSED | OUTPATIENT
Start: 2022-01-01 | End: 2022-01-01

## 2022-01-01 RX ORDER — LEVALBUTEROL INHALATION SOLUTION 1.25 MG/3ML
1.25 SOLUTION RESPIRATORY (INHALATION) EVERY 4 HOURS PRN
Status: DISCONTINUED | OUTPATIENT
Start: 2022-01-01 | End: 2022-01-01

## 2022-01-01 RX ORDER — OLANZAPINE 5 MG/1
5 TABLET, ORALLY DISINTEGRATING ORAL EVERY 6 HOURS PRN
Qty: 30 TABLET | Refills: 0 | Status: SHIPPED | OUTPATIENT
Start: 2022-01-01 | End: 2022-07-01

## 2022-01-01 RX ORDER — ACETAMINOPHEN 650 MG/1
650 SUPPOSITORY RECTAL ONCE
Status: COMPLETED | OUTPATIENT
Start: 2022-01-01 | End: 2022-01-01

## 2022-01-01 RX ORDER — NALOXONE HYDROCHLORIDE 0.4 MG/ML
0.2 INJECTION, SOLUTION INTRAMUSCULAR; INTRAVENOUS; SUBCUTANEOUS
Status: CANCELLED | OUTPATIENT
Start: 2022-01-01

## 2022-01-01 RX ORDER — DILTIAZEM HCL IN NACL,ISO-OSM 125 MG/125
5-15 PLASTIC BAG, INJECTION (ML) INTRAVENOUS CONTINUOUS
Status: DISCONTINUED | OUTPATIENT
Start: 2022-01-01 | End: 2022-01-01

## 2022-01-01 RX ORDER — RIVASTIGMINE 13.3 MG/24H
1 PATCH, EXTENDED RELEASE TRANSDERMAL DAILY
Status: DISCONTINUED | OUTPATIENT
Start: 2022-01-01 | End: 2022-01-01

## 2022-01-01 RX ORDER — SODIUM CHLORIDE 9 MG/ML
INJECTION, SOLUTION INTRAVENOUS CONTINUOUS
Status: DISCONTINUED | OUTPATIENT
Start: 2022-01-01 | End: 2022-01-01

## 2022-01-01 RX ORDER — MULTIVITAMIN,THERAPEUTIC
1 TABLET ORAL DAILY
Status: DISCONTINUED | OUTPATIENT
Start: 2022-01-01 | End: 2022-01-01 | Stop reason: HOSPADM

## 2022-01-01 RX ORDER — ASPIRIN 81 MG/1
81 TABLET, CHEWABLE ORAL DAILY
Status: DISCONTINUED | OUTPATIENT
Start: 2022-01-01 | End: 2022-01-01 | Stop reason: HOSPADM

## 2022-01-01 RX ORDER — OXYCODONE HCL 20 MG/ML
3 CONCENTRATE, ORAL ORAL EVERY 4 HOURS
Status: DISCONTINUED | OUTPATIENT
Start: 2022-01-01 | End: 2022-01-01

## 2022-01-01 RX ORDER — QUETIAPINE FUMARATE 25 MG/1
12.5 TABLET, FILM COATED ORAL 2 TIMES DAILY
Qty: 30 TABLET | Refills: 11 | Status: SHIPPED | OUTPATIENT
Start: 2022-01-01 | End: 2022-01-01

## 2022-01-01 RX ORDER — CARBOXYMETHYLCELLULOSE SODIUM 5 MG/ML
1-2 SOLUTION/ DROPS OPHTHALMIC
COMMUNITY
Start: 2022-01-01

## 2022-01-01 RX ORDER — METOPROLOL TARTRATE 1 MG/ML
5 INJECTION, SOLUTION INTRAVENOUS EVERY 6 HOURS
Status: DISCONTINUED | OUTPATIENT
Start: 2022-01-01 | End: 2022-01-01

## 2022-01-01 RX ORDER — NALOXONE HYDROCHLORIDE 0.4 MG/ML
0.4 INJECTION, SOLUTION INTRAMUSCULAR; INTRAVENOUS; SUBCUTANEOUS
Status: DISCONTINUED | OUTPATIENT
Start: 2022-01-01 | End: 2022-01-01

## 2022-01-01 RX ORDER — GLYCOPYRROLATE 0.2 MG/ML
0.2 INJECTION, SOLUTION INTRAMUSCULAR; INTRAVENOUS EVERY 4 HOURS
Status: COMPLETED | OUTPATIENT
Start: 2022-01-01 | End: 2022-01-01

## 2022-01-01 RX ORDER — HALOPERIDOL 2 MG/ML
2 SOLUTION ORAL
Status: DISCONTINUED | OUTPATIENT
Start: 2022-01-01 | End: 2022-01-01 | Stop reason: HOSPADM

## 2022-01-01 RX ORDER — OXYCODONE HCL 20 MG/ML
5 CONCENTRATE, ORAL ORAL EVERY 4 HOURS
Status: DISCONTINUED | OUTPATIENT
Start: 2022-01-01 | End: 2022-01-01

## 2022-01-01 RX ORDER — METOPROLOL SUCCINATE 50 MG/1
50 TABLET, EXTENDED RELEASE ORAL DAILY
Qty: 90 TABLET | Refills: 3 | Status: SHIPPED | OUTPATIENT
Start: 2022-01-01 | End: 2022-01-01

## 2022-01-01 RX ORDER — SIMVASTATIN 20 MG
20 TABLET ORAL EVERY EVENING
Qty: 90 TABLET | Refills: 3 | Status: SHIPPED | OUTPATIENT
Start: 2022-01-01 | End: 2022-01-01

## 2022-01-01 RX ORDER — METOPROLOL TARTRATE 1 MG/ML
5 INJECTION, SOLUTION INTRAVENOUS ONCE
Status: COMPLETED | OUTPATIENT
Start: 2022-01-01 | End: 2022-01-01

## 2022-01-01 RX ORDER — LORAZEPAM 2 MG/ML
0.5 CONCENTRATE ORAL EVERY 4 HOURS PRN
Qty: 30 ML | Refills: 0 | Status: SHIPPED | OUTPATIENT
Start: 2022-01-01

## 2022-01-01 RX ORDER — CARBOXYMETHYLCELLULOSE SODIUM 5 MG/ML
1-2 SOLUTION/ DROPS OPHTHALMIC
Status: DISCONTINUED | OUTPATIENT
Start: 2022-01-01 | End: 2022-01-01 | Stop reason: HOSPADM

## 2022-01-01 RX ORDER — HEPARIN SODIUM 5000 [USP'U]/.5ML
5000 INJECTION, SOLUTION INTRAVENOUS; SUBCUTANEOUS EVERY 12 HOURS
Status: DISCONTINUED | OUTPATIENT
Start: 2022-01-01 | End: 2022-01-01 | Stop reason: ALTCHOICE

## 2022-01-01 RX ORDER — OXYCODONE HCL 20 MG/ML
2.5 CONCENTRATE, ORAL ORAL EVERY 6 HOURS
Status: DISCONTINUED | OUTPATIENT
Start: 2022-01-01 | End: 2022-01-01

## 2022-01-01 RX ORDER — HALOPERIDOL 2 MG/ML
2 SOLUTION ORAL EVERY 4 HOURS PRN
Status: CANCELLED | OUTPATIENT
Start: 2022-01-01

## 2022-01-01 RX ORDER — OXYCODONE HCL 20 MG/ML
3-5 CONCENTRATE, ORAL ORAL
Status: CANCELLED | OUTPATIENT
Start: 2022-01-01

## 2022-01-01 RX ORDER — LEVALBUTEROL INHALATION SOLUTION 1.25 MG/3ML
1.25 SOLUTION RESPIRATORY (INHALATION) EVERY 6 HOURS PRN
Status: DISCONTINUED | OUTPATIENT
Start: 2022-01-01 | End: 2022-01-01

## 2022-01-01 RX ORDER — CEFTRIAXONE 2 G/1
2 INJECTION, POWDER, FOR SOLUTION INTRAMUSCULAR; INTRAVENOUS ONCE
Status: COMPLETED | OUTPATIENT
Start: 2022-01-01 | End: 2022-01-01

## 2022-01-01 RX ORDER — THIAMINE HYDROCHLORIDE 100 MG/ML
100 INJECTION, SOLUTION INTRAMUSCULAR; INTRAVENOUS DAILY
Status: DISCONTINUED | OUTPATIENT
Start: 2022-01-01 | End: 2022-01-01

## 2022-01-01 RX ORDER — GLYCOPYRROLATE 0.2 MG/ML
0.2 INJECTION, SOLUTION INTRAMUSCULAR; INTRAVENOUS EVERY 4 HOURS
Status: DISCONTINUED | OUTPATIENT
Start: 2022-01-01 | End: 2022-01-01 | Stop reason: HOSPADM

## 2022-01-01 RX ORDER — RIVASTIGMINE 13.3 MG/24H
1 PATCH, EXTENDED RELEASE TRANSDERMAL DAILY
Status: DISCONTINUED | OUTPATIENT
Start: 2022-01-01 | End: 2022-01-01 | Stop reason: HOSPADM

## 2022-01-01 RX ORDER — DEXAMETHASONE 2 MG/1
6 TABLET ORAL DAILY
Status: DISCONTINUED | OUTPATIENT
Start: 2022-01-01 | End: 2022-01-01

## 2022-01-01 RX ORDER — BENZTROPINE MESYLATE 0.5 MG/1
1 TABLET ORAL 3 TIMES DAILY PRN
Status: DISCONTINUED | OUTPATIENT
Start: 2022-01-01 | End: 2022-01-01 | Stop reason: HOSPADM

## 2022-01-01 RX ORDER — LORAZEPAM 1 MG/1
1 TABLET ORAL
Status: CANCELLED | OUTPATIENT
Start: 2022-01-01

## 2022-01-01 RX ORDER — ENOXAPARIN SODIUM 100 MG/ML
0.75 INJECTION SUBCUTANEOUS EVERY 12 HOURS
Status: DISCONTINUED | OUTPATIENT
Start: 2022-01-01 | End: 2022-01-01

## 2022-01-01 RX ORDER — BISACODYL 10 MG
10 SUPPOSITORY, RECTAL RECTAL
Status: CANCELLED | OUTPATIENT
Start: 2022-01-01

## 2022-01-01 RX ORDER — CEFDINIR 300 MG/1
300 CAPSULE ORAL DAILY
Qty: 3 CAPSULE | Refills: 0 | Status: SHIPPED | OUTPATIENT
Start: 2022-01-01 | End: 2022-01-01

## 2022-01-01 RX ORDER — ACETAMINOPHEN 325 MG/1
650 TABLET ORAL EVERY 6 HOURS PRN
Status: DISCONTINUED | OUTPATIENT
Start: 2022-01-01 | End: 2022-01-01 | Stop reason: HOSPADM

## 2022-01-01 RX ORDER — MEMANTINE HYDROCHLORIDE 10 MG/1
10 TABLET ORAL 2 TIMES DAILY
Qty: 60 TABLET | Refills: 11 | Status: ON HOLD | OUTPATIENT
Start: 2022-01-01 | End: 2022-01-01

## 2022-01-01 RX ORDER — OLANZAPINE 10 MG/2ML
2.5 INJECTION, POWDER, FOR SOLUTION INTRAMUSCULAR 3 TIMES DAILY PRN
Status: DISCONTINUED | OUTPATIENT
Start: 2022-01-01 | End: 2022-01-01

## 2022-01-01 RX ORDER — DULAGLUTIDE 1.5 MG/.5ML
1.5 INJECTION, SOLUTION SUBCUTANEOUS
Qty: 6 ML | Refills: 0 | Status: SHIPPED | OUTPATIENT
Start: 2022-01-01 | End: 2022-01-01

## 2022-01-01 RX ORDER — DEXAMETHASONE SODIUM PHOSPHATE 10 MG/ML
6 INJECTION, SOLUTION INTRAMUSCULAR; INTRAVENOUS ONCE
Status: COMPLETED | OUTPATIENT
Start: 2022-01-01 | End: 2022-01-01

## 2022-01-01 RX ORDER — LORAZEPAM 2 MG/ML
.5-1 INJECTION INTRAMUSCULAR EVERY 4 HOURS PRN
Status: DISCONTINUED | OUTPATIENT
Start: 2022-01-01 | End: 2022-01-01

## 2022-01-01 RX ORDER — SIMVASTATIN 10 MG
20 TABLET ORAL EVERY EVENING
Status: DISCONTINUED | OUTPATIENT
Start: 2022-01-01 | End: 2022-01-01 | Stop reason: HOSPADM

## 2022-01-01 RX ORDER — HALOPERIDOL 2 MG/ML
1 SOLUTION ORAL EVERY 4 HOURS PRN
Qty: 10 ML | Refills: 0 | Status: SHIPPED | OUTPATIENT
Start: 2022-01-01

## 2022-01-01 RX ORDER — SCOLOPAMINE TRANSDERMAL SYSTEM 1 MG/1
2 PATCH, EXTENDED RELEASE TRANSDERMAL
Qty: 16 PATCH | Refills: 0 | Status: SHIPPED | OUTPATIENT
Start: 2022-01-01 | End: 2022-06-23

## 2022-01-01 RX ORDER — HALOPERIDOL 2 MG/ML
2 SOLUTION ORAL EVERY 4 HOURS PRN
Status: DISCONTINUED | OUTPATIENT
Start: 2022-01-01 | End: 2022-01-01

## 2022-01-01 RX ORDER — HEPARIN SODIUM 5000 [USP'U]/.5ML
5000 INJECTION, SOLUTION INTRAVENOUS; SUBCUTANEOUS EVERY 8 HOURS SCHEDULED
Status: DISCONTINUED | OUTPATIENT
Start: 2022-01-01 | End: 2022-01-01 | Stop reason: HOSPADM

## 2022-01-01 RX ORDER — CEFAZOLIN SODIUM 1 G/50ML
20 SOLUTION INTRAVENOUS ONCE
Status: COMPLETED | OUTPATIENT
Start: 2022-01-01 | End: 2022-01-01

## 2022-01-01 RX ORDER — OLANZAPINE 5 MG/1
5 TABLET, ORALLY DISINTEGRATING ORAL EVERY 6 HOURS PRN
Status: DISCONTINUED | OUTPATIENT
Start: 2022-01-01 | End: 2022-01-01

## 2022-01-01 RX ORDER — HYDRALAZINE HYDROCHLORIDE 100 MG/1
100 TABLET, FILM COATED ORAL 4 TIMES DAILY
Status: DISCONTINUED | OUTPATIENT
Start: 2022-01-01 | End: 2022-01-01

## 2022-01-01 RX ORDER — METRONIDAZOLE 500 MG/100ML
500 INJECTION, SOLUTION INTRAVENOUS EVERY 12 HOURS
Status: DISCONTINUED | OUTPATIENT
Start: 2022-01-01 | End: 2022-01-01

## 2022-01-01 RX ORDER — ACETAMINOPHEN 650 MG/1
650 SUPPOSITORY RECTAL EVERY 6 HOURS PRN
Status: DISCONTINUED | OUTPATIENT
Start: 2022-01-01 | End: 2022-01-01 | Stop reason: HOSPADM

## 2022-01-01 RX ORDER — ONDANSETRON 2 MG/ML
4 INJECTION INTRAMUSCULAR; INTRAVENOUS EVERY 6 HOURS PRN
Status: CANCELLED | OUTPATIENT
Start: 2022-01-01

## 2022-01-01 RX ORDER — OLANZAPINE 5 MG/1
5 TABLET, ORALLY DISINTEGRATING ORAL AT BEDTIME
Status: DISCONTINUED | OUTPATIENT
Start: 2022-01-01 | End: 2022-01-01 | Stop reason: HOSPADM

## 2022-01-01 RX ORDER — OXYCODONE HCL 20 MG/ML
2.5-5 CONCENTRATE, ORAL ORAL
Status: DISCONTINUED | OUTPATIENT
Start: 2022-01-01 | End: 2022-01-01

## 2022-01-01 RX ORDER — MEMANTINE HYDROCHLORIDE 10 MG/1
10 TABLET ORAL 2 TIMES DAILY
Status: DISCONTINUED | OUTPATIENT
Start: 2022-01-01 | End: 2022-01-01

## 2022-01-01 RX ORDER — POTASSIUM CHLORIDE 1500 MG/1
20 TABLET, EXTENDED RELEASE ORAL ONCE
Status: DISCONTINUED | OUTPATIENT
Start: 2022-01-01 | End: 2022-01-01

## 2022-01-01 RX ORDER — SODIUM CHLORIDE AND POTASSIUM CHLORIDE 150; 450 MG/100ML; MG/100ML
INJECTION, SOLUTION INTRAVENOUS CONTINUOUS
Status: DISCONTINUED | OUTPATIENT
Start: 2022-01-01 | End: 2022-01-01

## 2022-01-01 RX ORDER — AMOXICILLIN 250 MG
1 CAPSULE ORAL 2 TIMES DAILY PRN
Status: DISCONTINUED | OUTPATIENT
Start: 2022-01-01 | End: 2022-01-01

## 2022-01-01 RX ADMIN — OXYCODONE HYDROCHLORIDE 10 MG: 100 SOLUTION ORAL at 04:31

## 2022-01-01 RX ADMIN — GLYCOPYRROLATE 0.2 MG: 0.2 INJECTION, SOLUTION INTRAMUSCULAR; INTRAVENOUS at 13:39

## 2022-01-01 RX ADMIN — QUETIAPINE 12.5 MG: 25 TABLET, FILM COATED ORAL at 01:05

## 2022-01-01 RX ADMIN — OXYCODONE HYDROCHLORIDE 10 MG: 100 SOLUTION ORAL at 11:55

## 2022-01-01 RX ADMIN — OLANZAPINE 2.5 MG: 10 INJECTION, POWDER, LYOPHILIZED, FOR SOLUTION INTRAMUSCULAR at 22:01

## 2022-01-01 RX ADMIN — HALOPERIDOL 2 MG: 2 SOLUTION ORAL at 15:46

## 2022-01-01 RX ADMIN — OXYCODONE HYDROCHLORIDE 10 MG: 100 SOLUTION ORAL at 23:48

## 2022-01-01 RX ADMIN — QUETIAPINE 12.5 MG: 25 TABLET, FILM COATED ORAL at 20:08

## 2022-01-01 RX ADMIN — QUETIAPINE 12.5 MG: 25 TABLET, FILM COATED ORAL at 06:11

## 2022-01-01 RX ADMIN — POTASSIUM CHLORIDE 10 MEQ: 7.46 INJECTION, SOLUTION INTRAVENOUS at 06:20

## 2022-01-01 RX ADMIN — HEPARIN SODIUM 5000 UNITS: 10000 INJECTION, SOLUTION INTRAVENOUS; SUBCUTANEOUS at 15:34

## 2022-01-01 RX ADMIN — OXYCODONE HYDROCHLORIDE 10 MG: 100 SOLUTION ORAL at 07:59

## 2022-01-01 RX ADMIN — INSULIN ASPART 1 UNITS: 100 INJECTION, SOLUTION INTRAVENOUS; SUBCUTANEOUS at 16:26

## 2022-01-01 RX ADMIN — SODIUM CHLORIDE: 4.5 INJECTION, SOLUTION INTRAVENOUS at 22:00

## 2022-01-01 RX ADMIN — Medication 1 MG: at 22:00

## 2022-01-01 RX ADMIN — LORAZEPAM 1 MG: 1 TABLET ORAL at 04:55

## 2022-01-01 RX ADMIN — PANTOPRAZOLE SODIUM 40 MG: 40 INJECTION, POWDER, FOR SOLUTION INTRAVENOUS at 07:28

## 2022-01-01 RX ADMIN — ENOXAPARIN SODIUM 70 MG: 80 INJECTION SUBCUTANEOUS at 16:44

## 2022-01-01 RX ADMIN — METOPROLOL TARTRATE 5 MG: 5 INJECTION INTRAVENOUS at 06:17

## 2022-01-01 RX ADMIN — SODIUM CHLORIDE 50 ML: 9 INJECTION, SOLUTION INTRAVENOUS at 19:00

## 2022-01-01 RX ADMIN — HALOPERIDOL 2 MG: 2 SOLUTION ORAL at 09:41

## 2022-01-01 RX ADMIN — AMLODIPINE BESYLATE 10 MG: 5 TABLET ORAL at 12:12

## 2022-01-01 RX ADMIN — MEMANTINE HYDROCHLORIDE 10 MG: 10 TABLET, FILM COATED ORAL at 08:09

## 2022-01-01 RX ADMIN — HYDRALAZINE HYDROCHLORIDE 10 MG: 20 INJECTION INTRAMUSCULAR; INTRAVENOUS at 02:32

## 2022-01-01 RX ADMIN — SODIUM CHLORIDE: 9 INJECTION, SOLUTION INTRAVENOUS at 22:00

## 2022-01-01 RX ADMIN — AMLODIPINE BESYLATE 10 MG: 5 TABLET ORAL at 08:07

## 2022-01-01 RX ADMIN — HALOPERIDOL 2 MG: 2 SOLUTION ORAL at 12:48

## 2022-01-01 RX ADMIN — INSULIN GLARGINE 10 UNITS: 100 INJECTION, SOLUTION SUBCUTANEOUS at 21:58

## 2022-01-01 RX ADMIN — SODIUM CHLORIDE: 4.5 INJECTION, SOLUTION INTRAVENOUS at 18:50

## 2022-01-01 RX ADMIN — METOPROLOL TARTRATE 5 MG: 5 INJECTION INTRAVENOUS at 21:37

## 2022-01-01 RX ADMIN — INSULIN ASPART 1 UNITS: 100 INJECTION, SOLUTION INTRAVENOUS; SUBCUTANEOUS at 12:17

## 2022-01-01 RX ADMIN — SODIUM CHLORIDE 1000 ML: 9 INJECTION, SOLUTION INTRAVENOUS at 23:19

## 2022-01-01 RX ADMIN — OLANZAPINE 2.5 MG: 10 INJECTION, POWDER, LYOPHILIZED, FOR SOLUTION INTRAMUSCULAR at 04:09

## 2022-01-01 RX ADMIN — HYDRALAZINE HYDROCHLORIDE 100 MG: 50 TABLET, FILM COATED ORAL at 07:05

## 2022-01-01 RX ADMIN — INSULIN ASPART 2 UNITS: 100 INJECTION, SOLUTION INTRAVENOUS; SUBCUTANEOUS at 16:47

## 2022-01-01 RX ADMIN — CEFTRIAXONE SODIUM 1 G: 1 INJECTION, POWDER, FOR SOLUTION INTRAMUSCULAR; INTRAVENOUS at 20:03

## 2022-01-01 RX ADMIN — HALOPERIDOL 2 MG: 2 SOLUTION ORAL at 10:35

## 2022-01-01 RX ADMIN — DILTIAZEM HYDROCHLORIDE 25 MG: 5 INJECTION INTRAVENOUS at 22:17

## 2022-01-01 RX ADMIN — SODIUM CHLORIDE: 4.5 INJECTION, SOLUTION INTRAVENOUS at 09:52

## 2022-01-01 RX ADMIN — OXYCODONE HYDROCHLORIDE 3 MG: 100 SOLUTION ORAL at 17:50

## 2022-01-01 RX ADMIN — HALOPERIDOL 2 MG: 2 SOLUTION ORAL at 04:31

## 2022-01-01 RX ADMIN — CEFTRIAXONE SODIUM 1 G: 1 INJECTION, POWDER, FOR SOLUTION INTRAMUSCULAR; INTRAVENOUS at 21:09

## 2022-01-01 RX ADMIN — METOPROLOL TARTRATE 5 MG: 5 INJECTION INTRAVENOUS at 02:06

## 2022-01-01 RX ADMIN — CEFTRIAXONE SODIUM 2 G: 2 INJECTION, POWDER, FOR SOLUTION INTRAMUSCULAR; INTRAVENOUS at 00:21

## 2022-01-01 RX ADMIN — HYDRALAZINE HYDROCHLORIDE 10 MG: 20 INJECTION INTRAMUSCULAR; INTRAVENOUS at 08:00

## 2022-01-01 RX ADMIN — OXYCODONE HYDROCHLORIDE 3 MG: 100 SOLUTION ORAL at 05:24

## 2022-01-01 RX ADMIN — CEFTRIAXONE SODIUM 1 G: 1 INJECTION, POWDER, FOR SOLUTION INTRAMUSCULAR; INTRAVENOUS at 22:00

## 2022-01-01 RX ADMIN — SIMVASTATIN 20 MG: 10 TABLET, FILM COATED ORAL at 20:16

## 2022-01-01 RX ADMIN — HALOPERIDOL LACTATE 5 MG: 5 INJECTION, SOLUTION INTRAMUSCULAR at 06:00

## 2022-01-01 RX ADMIN — METRONIDAZOLE 500 MG: 500 INJECTION, SOLUTION INTRAVENOUS at 12:13

## 2022-01-01 RX ADMIN — HEPARIN SODIUM 5000 UNITS: 10000 INJECTION, SOLUTION INTRAVENOUS; SUBCUTANEOUS at 07:36

## 2022-01-01 RX ADMIN — INSULIN ASPART 3 UNITS: 100 INJECTION, SOLUTION INTRAVENOUS; SUBCUTANEOUS at 08:42

## 2022-01-01 RX ADMIN — MEMANTINE 10 MG: 10 TABLET ORAL at 12:13

## 2022-01-01 RX ADMIN — PANTOPRAZOLE SODIUM 40 MG: 40 TABLET, DELAYED RELEASE ORAL at 08:08

## 2022-01-01 RX ADMIN — METOPROLOL TARTRATE 25 MG: 25 TABLET, FILM COATED ORAL at 07:50

## 2022-01-01 RX ADMIN — OXYCODONE HYDROCHLORIDE 10 MG: 100 SOLUTION ORAL at 15:42

## 2022-01-01 RX ADMIN — PERFLUTREN 2 ML: 6.52 INJECTION, SUSPENSION INTRAVENOUS at 08:59

## 2022-01-01 RX ADMIN — POTASSIUM CHLORIDE 20 MEQ: 1500 TABLET, EXTENDED RELEASE ORAL at 09:36

## 2022-01-01 RX ADMIN — DEXAMETHASONE SODIUM PHOSPHATE 6 MG: 10 INJECTION, SOLUTION INTRAMUSCULAR; INTRAVENOUS at 22:48

## 2022-01-01 RX ADMIN — SODIUM CHLORIDE 50 ML: 9 INJECTION, SOLUTION INTRAVENOUS at 18:30

## 2022-01-01 RX ADMIN — METOPROLOL SUCCINATE 50 MG: 25 TABLET, EXTENDED RELEASE ORAL at 08:28

## 2022-01-01 RX ADMIN — POTASSIUM CHLORIDE 10 MEQ: 7.46 INJECTION, SOLUTION INTRAVENOUS at 07:52

## 2022-01-01 RX ADMIN — LORAZEPAM 1 MG: 2 INJECTION INTRAMUSCULAR; INTRAVENOUS at 10:18

## 2022-01-01 RX ADMIN — ACETAMINOPHEN 650 MG: 325 TABLET ORAL at 01:05

## 2022-01-01 RX ADMIN — OXYCODONE HYDROCHLORIDE 3 MG: 100 SOLUTION ORAL at 22:25

## 2022-01-01 RX ADMIN — HYDRALAZINE HYDROCHLORIDE 100 MG: 50 TABLET, FILM COATED ORAL at 18:59

## 2022-01-01 RX ADMIN — PANTOPRAZOLE SODIUM 40 MG: 40 INJECTION, POWDER, FOR SOLUTION INTRAVENOUS at 07:53

## 2022-01-01 RX ADMIN — BARIUM SULFATE: 400 SUSPENSION ORAL at 09:19

## 2022-01-01 RX ADMIN — ATROPINE SULFATE 2 DROP: 10 SOLUTION/ DROPS OPHTHALMIC at 04:31

## 2022-01-01 RX ADMIN — OXYCODONE HYDROCHLORIDE 10 MG: 100 SOLUTION ORAL at 10:35

## 2022-01-01 RX ADMIN — INSULIN ASPART 1 UNITS: 100 INJECTION, SOLUTION INTRAVENOUS; SUBCUTANEOUS at 13:55

## 2022-01-01 RX ADMIN — OXYCODONE HYDROCHLORIDE 10 MG: 100 SOLUTION ORAL at 12:45

## 2022-01-01 RX ADMIN — THIAMINE HYDROCHLORIDE 100 MG: 100 INJECTION, SOLUTION INTRAMUSCULAR; INTRAVENOUS at 09:18

## 2022-01-01 RX ADMIN — HALOPERIDOL 2 MG: 2 SOLUTION ORAL at 12:58

## 2022-01-01 RX ADMIN — RIVASTIGMINE 1 PATCH: 13.3 PATCH, EXTENDED RELEASE TRANSDERMAL at 12:13

## 2022-01-01 RX ADMIN — OLANZAPINE 5 MG: 5 TABLET, ORALLY DISINTEGRATING ORAL at 21:20

## 2022-01-01 RX ADMIN — HEPARIN SODIUM 5000 UNITS: 10000 INJECTION, SOLUTION INTRAVENOUS; SUBCUTANEOUS at 08:28

## 2022-01-01 RX ADMIN — MEMANTINE HYDROCHLORIDE 10 MG: 10 TABLET, FILM COATED ORAL at 19:58

## 2022-01-01 RX ADMIN — OXYCODONE HYDROCHLORIDE 3 MG: 100 SOLUTION ORAL at 16:15

## 2022-01-01 RX ADMIN — METRONIDAZOLE 500 MG: 500 INJECTION, SOLUTION INTRAVENOUS at 23:34

## 2022-01-01 RX ADMIN — SODIUM CHLORIDE 50 ML: 900 INJECTION INTRAVENOUS at 02:05

## 2022-01-01 RX ADMIN — RIVASTIGMINE 1 PATCH: 13.3 PATCH, EXTENDED RELEASE TRANSDERMAL at 08:36

## 2022-01-01 RX ADMIN — MEMANTINE 10 MG: 10 TABLET ORAL at 19:59

## 2022-01-01 RX ADMIN — RIVASTIGMINE 1 PATCH: 13.3 PATCH, EXTENDED RELEASE TRANSDERMAL at 08:37

## 2022-01-01 RX ADMIN — CEFTRIAXONE SODIUM 1 G: 1 INJECTION, POWDER, FOR SOLUTION INTRAMUSCULAR; INTRAVENOUS at 20:54

## 2022-01-01 RX ADMIN — GLYCOPYRROLATE 0.2 MG: 0.2 INJECTION, SOLUTION INTRAMUSCULAR; INTRAVENOUS at 09:55

## 2022-01-01 RX ADMIN — ALBUTEROL SULFATE 2.5 MG: 2.5 SOLUTION RESPIRATORY (INHALATION) at 07:26

## 2022-01-01 RX ADMIN — AMLODIPINE BESYLATE 10 MG: 5 TABLET ORAL at 08:35

## 2022-01-01 RX ADMIN — METOPROLOL SUCCINATE 50 MG: 25 TABLET, EXTENDED RELEASE ORAL at 07:03

## 2022-01-01 RX ADMIN — LORAZEPAM 1 MG: 1 TABLET ORAL at 14:58

## 2022-01-01 RX ADMIN — ATROPINE SULFATE 2 DROP: 10 SOLUTION/ DROPS OPHTHALMIC at 14:13

## 2022-01-01 RX ADMIN — SODIUM CHLORIDE: 4.5 INJECTION, SOLUTION INTRAVENOUS at 00:26

## 2022-01-01 RX ADMIN — OXYCODONE HYDROCHLORIDE 10 MG: 100 SOLUTION ORAL at 20:30

## 2022-01-01 RX ADMIN — PANTOPRAZOLE SODIUM 40 MG: 40 INJECTION, POWDER, FOR SOLUTION INTRAVENOUS at 07:47

## 2022-01-01 RX ADMIN — CEFTRIAXONE SODIUM 1 G: 1 INJECTION, POWDER, FOR SOLUTION INTRAMUSCULAR; INTRAVENOUS at 22:05

## 2022-01-01 RX ADMIN — ALBUTEROL SULFATE 2.5 MG: 2.5 SOLUTION RESPIRATORY (INHALATION) at 20:24

## 2022-01-01 RX ADMIN — OXYCODONE HYDROCHLORIDE 3 MG: 100 SOLUTION ORAL at 09:26

## 2022-01-01 RX ADMIN — OXYCODONE HYDROCHLORIDE 10 MG: 100 SOLUTION ORAL at 15:46

## 2022-01-01 RX ADMIN — ACETAMINOPHEN 650 MG: 650 SUPPOSITORY RECTAL at 20:39

## 2022-01-01 RX ADMIN — METOPROLOL TARTRATE 5 MG: 5 INJECTION INTRAVENOUS at 20:46

## 2022-01-01 RX ADMIN — VANCOMYCIN HYDROCHLORIDE 2000 MG: 5 INJECTION, POWDER, LYOPHILIZED, FOR SOLUTION INTRAVENOUS at 02:06

## 2022-01-01 RX ADMIN — HYDRALAZINE HYDROCHLORIDE 10 MG: 20 INJECTION INTRAMUSCULAR; INTRAVENOUS at 04:11

## 2022-01-01 RX ADMIN — OXYCODONE HYDROCHLORIDE 10 MG: 100 SOLUTION ORAL at 14:27

## 2022-01-01 RX ADMIN — DEXAMETHASONE SODIUM PHOSPHATE 6 MG: 10 INJECTION, SOLUTION INTRAMUSCULAR; INTRAVENOUS at 14:06

## 2022-01-01 RX ADMIN — POTASSIUM CHLORIDE 20 MEQ: 1500 TABLET, EXTENDED RELEASE ORAL at 14:46

## 2022-01-01 RX ADMIN — RIVASTIGMINE 1 PATCH: 13.3 PATCH, EXTENDED RELEASE TRANSDERMAL at 07:46

## 2022-01-01 RX ADMIN — Medication 12.5 MG: at 04:18

## 2022-01-01 RX ADMIN — OXYCODONE HYDROCHLORIDE 10 MG: 100 SOLUTION ORAL at 20:12

## 2022-01-01 RX ADMIN — OLANZAPINE 10 MG: 5 TABLET, ORALLY DISINTEGRATING ORAL at 12:16

## 2022-01-01 RX ADMIN — HEPARIN SODIUM 5000 UNITS: 10000 INJECTION, SOLUTION INTRAVENOUS; SUBCUTANEOUS at 00:04

## 2022-01-01 RX ADMIN — HEPARIN SODIUM 5000 UNITS: 10000 INJECTION, SOLUTION INTRAVENOUS; SUBCUTANEOUS at 23:40

## 2022-01-01 RX ADMIN — THERA TABS 1 TABLET: TAB at 08:35

## 2022-01-01 RX ADMIN — OLANZAPINE 2.5 MG: 10 INJECTION, POWDER, FOR SOLUTION INTRAMUSCULAR at 05:00

## 2022-01-01 RX ADMIN — MEMANTINE HYDROCHLORIDE 10 MG: 10 TABLET, FILM COATED ORAL at 08:37

## 2022-01-01 RX ADMIN — SODIUM CHLORIDE: 4.5 INJECTION, SOLUTION INTRAVENOUS at 03:18

## 2022-01-01 RX ADMIN — METRONIDAZOLE 500 MG: 500 INJECTION, SOLUTION INTRAVENOUS at 00:05

## 2022-01-01 RX ADMIN — INSULIN ASPART 1 UNITS: 100 INJECTION, SOLUTION INTRAVENOUS; SUBCUTANEOUS at 07:57

## 2022-01-01 RX ADMIN — METOPROLOL TARTRATE 5 MG: 5 INJECTION INTRAVENOUS at 14:23

## 2022-01-01 RX ADMIN — GLYCOPYRROLATE 0.2 MG: 0.2 INJECTION, SOLUTION INTRAMUSCULAR; INTRAVENOUS at 05:23

## 2022-01-01 RX ADMIN — QUETIAPINE 12.5 MG: 25 TABLET, FILM COATED ORAL at 16:02

## 2022-01-01 RX ADMIN — POTASSIUM CHLORIDE 10 MEQ: 7.46 INJECTION, SOLUTION INTRAVENOUS at 13:26

## 2022-01-01 RX ADMIN — HYDRALAZINE HYDROCHLORIDE 100 MG: 50 TABLET, FILM COATED ORAL at 20:16

## 2022-01-01 RX ADMIN — Medication 5 MG/HR: at 22:33

## 2022-01-01 RX ADMIN — DEXAMETHASONE SODIUM PHOSPHATE 6 MG: 10 INJECTION, SOLUTION INTRAMUSCULAR; INTRAVENOUS at 08:03

## 2022-01-01 RX ADMIN — REMDESIVIR 200 MG: 100 INJECTION, POWDER, LYOPHILIZED, FOR SOLUTION INTRAVENOUS at 00:48

## 2022-01-01 RX ADMIN — LORAZEPAM 1 MG: 2 INJECTION INTRAMUSCULAR; INTRAVENOUS at 02:56

## 2022-01-01 RX ADMIN — VANCOMYCIN HYDROCHLORIDE 1000 MG: 1 INJECTION, SOLUTION INTRAVENOUS at 02:53

## 2022-01-01 RX ADMIN — OLANZAPINE 2.5 MG: 10 INJECTION, POWDER, LYOPHILIZED, FOR SOLUTION INTRAMUSCULAR at 18:55

## 2022-01-01 RX ADMIN — AZITHROMYCIN MONOHYDRATE 500 MG: 500 INJECTION, POWDER, LYOPHILIZED, FOR SOLUTION INTRAVENOUS at 00:36

## 2022-01-01 RX ADMIN — LEVALBUTEROL HYDROCHLORIDE 1.25 MG: 1.25 SOLUTION RESPIRATORY (INHALATION) at 16:12

## 2022-01-01 RX ADMIN — METOPROLOL TARTRATE 50 MG: 25 TABLET, FILM COATED ORAL at 19:58

## 2022-01-01 RX ADMIN — LORAZEPAM 1 MG: 2 INJECTION INTRAMUSCULAR; INTRAVENOUS at 16:24

## 2022-01-01 RX ADMIN — POTASSIUM CHLORIDE 20 MEQ: 1500 TABLET, EXTENDED RELEASE ORAL at 07:33

## 2022-01-01 RX ADMIN — METRONIDAZOLE 500 MG: 500 INJECTION, SOLUTION INTRAVENOUS at 12:19

## 2022-01-01 RX ADMIN — SODIUM CHLORIDE 500 ML: 9 INJECTION, SOLUTION INTRAVENOUS at 22:22

## 2022-01-01 RX ADMIN — HEPARIN SODIUM 5000 UNITS: 10000 INJECTION, SOLUTION INTRAVENOUS; SUBCUTANEOUS at 08:39

## 2022-01-01 RX ADMIN — RIVASTIGMINE 1 PATCH: 13.3 PATCH, EXTENDED RELEASE TRANSDERMAL at 08:10

## 2022-01-01 RX ADMIN — ASPIRIN 81 MG CHEWABLE TABLET 81 MG: 81 TABLET CHEWABLE at 08:07

## 2022-01-01 RX ADMIN — CLONIDINE HYDROCHLORIDE 0.1 MG: 0.1 TABLET ORAL at 20:16

## 2022-01-01 RX ADMIN — INSULIN ASPART 2 UNITS: 100 INJECTION, SOLUTION INTRAVENOUS; SUBCUTANEOUS at 16:51

## 2022-01-01 RX ADMIN — LABETALOL HYDROCHLORIDE 10 MG: 5 INJECTION, SOLUTION INTRAVENOUS at 05:56

## 2022-01-01 RX ADMIN — OLANZAPINE 5 MG: 5 TABLET, ORALLY DISINTEGRATING ORAL at 02:11

## 2022-01-01 RX ADMIN — OLANZAPINE 5 MG: 5 TABLET, ORALLY DISINTEGRATING ORAL at 20:10

## 2022-01-01 RX ADMIN — METOPROLOL TARTRATE 5 MG: 5 INJECTION INTRAVENOUS at 08:11

## 2022-01-01 RX ADMIN — INSULIN ASPART 2 UNITS: 100 INJECTION, SOLUTION INTRAVENOUS; SUBCUTANEOUS at 12:15

## 2022-01-01 RX ADMIN — CEFTRIAXONE SODIUM 1 G: 1 INJECTION, POWDER, FOR SOLUTION INTRAMUSCULAR; INTRAVENOUS at 21:10

## 2022-01-01 RX ADMIN — LORAZEPAM 1 MG: 2 INJECTION INTRAMUSCULAR; INTRAVENOUS at 22:51

## 2022-01-01 RX ADMIN — METOPROLOL TARTRATE 5 MG: 5 INJECTION INTRAVENOUS at 09:33

## 2022-01-01 RX ADMIN — HALOPERIDOL LACTATE 2 MG: 5 INJECTION, SOLUTION INTRAMUSCULAR at 01:55

## 2022-01-01 RX ADMIN — OXYCODONE HYDROCHLORIDE 3 MG: 100 SOLUTION ORAL at 04:14

## 2022-01-01 RX ADMIN — OXYCODONE HYDROCHLORIDE 3 MG: 100 SOLUTION ORAL at 01:19

## 2022-01-01 RX ADMIN — METRONIDAZOLE 500 MG: 500 INJECTION, SOLUTION INTRAVENOUS at 00:28

## 2022-01-01 RX ADMIN — ASPIRIN 81 MG CHEWABLE TABLET 81 MG: 81 TABLET CHEWABLE at 08:28

## 2022-01-01 RX ADMIN — PANTOPRAZOLE SODIUM 40 MG: 40 TABLET, DELAYED RELEASE ORAL at 06:43

## 2022-01-01 RX ADMIN — HYDRALAZINE HYDROCHLORIDE 100 MG: 50 TABLET, FILM COATED ORAL at 12:30

## 2022-01-01 RX ADMIN — DOXYCYCLINE HYCLATE 100 MG: 100 CAPSULE ORAL at 07:50

## 2022-01-01 RX ADMIN — HALOPERIDOL 2 MG: 2 SOLUTION ORAL at 10:32

## 2022-01-01 RX ADMIN — POTASSIUM CHLORIDE 20 MEQ: 1500 TABLET, EXTENDED RELEASE ORAL at 08:07

## 2022-01-01 RX ADMIN — Medication 15 MG/HR: at 08:17

## 2022-01-01 RX ADMIN — SIMVASTATIN 20 MG: 10 TABLET, FILM COATED ORAL at 19:00

## 2022-01-01 RX ADMIN — THERA TABS 1 TABLET: TAB at 07:36

## 2022-01-01 RX ADMIN — METRONIDAZOLE 500 MG: 500 INJECTION, SOLUTION INTRAVENOUS at 12:12

## 2022-01-01 RX ADMIN — REMDESIVIR 100 MG: 100 INJECTION, POWDER, LYOPHILIZED, FOR SOLUTION INTRAVENOUS at 18:00

## 2022-01-01 RX ADMIN — OLANZAPINE 10 MG: 5 TABLET, ORALLY DISINTEGRATING ORAL at 06:41

## 2022-01-01 RX ADMIN — METOPROLOL TARTRATE 5 MG: 5 INJECTION INTRAVENOUS at 14:11

## 2022-01-01 RX ADMIN — AMLODIPINE BESYLATE 10 MG: 5 TABLET ORAL at 07:07

## 2022-01-01 RX ADMIN — ACETAMINOPHEN 650 MG: 325 TABLET ORAL at 02:41

## 2022-01-01 RX ADMIN — SCOPALAMINE 2 PATCH: 1 PATCH, EXTENDED RELEASE TRANSDERMAL at 11:07

## 2022-01-01 RX ADMIN — HALOPERIDOL 2 MG: 2 SOLUTION ORAL at 15:45

## 2022-01-01 RX ADMIN — ASPIRIN 81 MG CHEWABLE TABLET 81 MG: 81 TABLET CHEWABLE at 12:12

## 2022-01-01 RX ADMIN — METOPROLOL TARTRATE 5 MG: 5 INJECTION INTRAVENOUS at 21:56

## 2022-01-01 RX ADMIN — QUETIAPINE 12.5 MG: 25 TABLET, FILM COATED ORAL at 01:52

## 2022-01-01 RX ADMIN — HYDRALAZINE HYDROCHLORIDE 10 MG: 20 INJECTION INTRAMUSCULAR; INTRAVENOUS at 13:59

## 2022-01-01 RX ADMIN — HYDRALAZINE HYDROCHLORIDE 100 MG: 50 TABLET, FILM COATED ORAL at 19:00

## 2022-01-01 RX ADMIN — OXYCODONE HYDROCHLORIDE 10 MG: 100 SOLUTION ORAL at 04:17

## 2022-01-01 RX ADMIN — HALOPERIDOL 2 MG: 2 SOLUTION ORAL at 04:18

## 2022-01-01 RX ADMIN — RIVASTIGMINE 1 PATCH: 13.3 PATCH, EXTENDED RELEASE TRANSDERMAL at 07:34

## 2022-01-01 RX ADMIN — APIXABAN 2.5 MG: 2.5 TABLET, FILM COATED ORAL at 21:59

## 2022-01-01 RX ADMIN — GLYCOPYRROLATE 0.2 MG: 0.2 INJECTION, SOLUTION INTRAMUSCULAR; INTRAVENOUS at 17:51

## 2022-01-01 RX ADMIN — THIAMINE HYDROCHLORIDE 100 MG: 100 INJECTION, SOLUTION INTRAMUSCULAR; INTRAVENOUS at 17:02

## 2022-01-01 RX ADMIN — OXYCODONE HYDROCHLORIDE 10 MG: 100 SOLUTION ORAL at 11:05

## 2022-01-01 RX ADMIN — HYDRALAZINE HYDROCHLORIDE 100 MG: 50 TABLET, FILM COATED ORAL at 16:02

## 2022-01-01 RX ADMIN — HYDRALAZINE HYDROCHLORIDE 100 MG: 50 TABLET, FILM COATED ORAL at 08:10

## 2022-01-01 RX ADMIN — GLYCOPYRROLATE 0.2 MG: 0.2 INJECTION, SOLUTION INTRAMUSCULAR; INTRAVENOUS at 21:58

## 2022-01-01 RX ADMIN — GLYCOPYRROLATE 0.2 MG: 0.2 INJECTION, SOLUTION INTRAMUSCULAR; INTRAVENOUS at 01:20

## 2022-01-01 RX ADMIN — CEFDINIR 300 MG: 300 CAPSULE ORAL at 08:35

## 2022-01-01 RX ADMIN — HYDRALAZINE HYDROCHLORIDE 100 MG: 50 TABLET, FILM COATED ORAL at 22:53

## 2022-01-01 RX ADMIN — ATROPINE SULFATE 2 DROP: 10 SOLUTION/ DROPS OPHTHALMIC at 10:30

## 2022-01-01 RX ADMIN — CEFTRIAXONE SODIUM 1 G: 1 INJECTION, POWDER, FOR SOLUTION INTRAMUSCULAR; INTRAVENOUS at 22:14

## 2022-01-01 RX ADMIN — QUETIAPINE 12.5 MG: 25 TABLET, FILM COATED ORAL at 22:00

## 2022-01-01 RX ADMIN — RIVASTIGMINE 1 PATCH: 13.3 PATCH, EXTENDED RELEASE TRANSDERMAL at 07:57

## 2022-01-01 RX ADMIN — OXYCODONE HYDROCHLORIDE 10 MG: 100 SOLUTION ORAL at 00:00

## 2022-01-01 RX ADMIN — HALOPERIDOL 2 MG: 2 SOLUTION ORAL at 22:16

## 2022-01-01 RX ADMIN — MEMANTINE HYDROCHLORIDE 10 MG: 10 TABLET, FILM COATED ORAL at 20:16

## 2022-01-01 RX ADMIN — HEPARIN SODIUM 5000 UNITS: 10000 INJECTION, SOLUTION INTRAVENOUS; SUBCUTANEOUS at 00:17

## 2022-01-01 RX ADMIN — CLONIDINE HYDROCHLORIDE 0.1 MG: 0.1 TABLET ORAL at 09:39

## 2022-01-01 RX ADMIN — QUETIAPINE 12.5 MG: 25 TABLET, FILM COATED ORAL at 08:44

## 2022-01-01 RX ADMIN — HEPARIN SODIUM 5000 UNITS: 10000 INJECTION, SOLUTION INTRAVENOUS; SUBCUTANEOUS at 21:03

## 2022-01-01 RX ADMIN — POTASSIUM CHLORIDE 10 MEQ: 7.46 INJECTION, SOLUTION INTRAVENOUS at 17:10

## 2022-01-01 RX ADMIN — SODIUM CHLORIDE 50 ML: 9 INJECTION, SOLUTION INTRAVENOUS at 19:59

## 2022-01-01 RX ADMIN — HEPARIN SODIUM 5000 UNITS: 10000 INJECTION, SOLUTION INTRAVENOUS; SUBCUTANEOUS at 17:18

## 2022-01-01 RX ADMIN — HYDRALAZINE HYDROCHLORIDE 100 MG: 50 TABLET, FILM COATED ORAL at 08:35

## 2022-01-01 RX ADMIN — Medication 5 MG: at 02:32

## 2022-01-01 RX ADMIN — HALOPERIDOL 2 MG: 2 SOLUTION ORAL at 21:25

## 2022-01-01 RX ADMIN — MEMANTINE HYDROCHLORIDE 10 MG: 10 TABLET, FILM COATED ORAL at 19:00

## 2022-01-01 RX ADMIN — THERA TABS 1 TABLET: TAB at 08:08

## 2022-01-01 RX ADMIN — HEPARIN SODIUM 5000 UNITS: 10000 INJECTION, SOLUTION INTRAVENOUS; SUBCUTANEOUS at 16:02

## 2022-01-01 RX ADMIN — CLONIDINE HYDROCHLORIDE 0.1 MG: 0.1 TABLET ORAL at 08:28

## 2022-01-01 RX ADMIN — INSULIN ASPART 2 UNITS: 100 INJECTION, SOLUTION INTRAVENOUS; SUBCUTANEOUS at 12:25

## 2022-01-01 RX ADMIN — Medication 5 MG: at 22:00

## 2022-01-01 RX ADMIN — QUETIAPINE 12.5 MG: 25 TABLET, FILM COATED ORAL at 06:40

## 2022-01-01 RX ADMIN — SODIUM CHLORIDE: 4.5 INJECTION, SOLUTION INTRAVENOUS at 22:21

## 2022-01-01 RX ADMIN — ASPIRIN 81 MG CHEWABLE TABLET 81 MG: 81 TABLET CHEWABLE at 07:08

## 2022-01-01 RX ADMIN — MEMANTINE HYDROCHLORIDE 10 MG: 10 TABLET, FILM COATED ORAL at 07:35

## 2022-01-01 RX ADMIN — GLYCOPYRROLATE 0.2 MG: 0.2 INJECTION, SOLUTION INTRAMUSCULAR; INTRAVENOUS at 09:30

## 2022-01-01 RX ADMIN — REMDESIVIR 100 MG: 100 INJECTION, POWDER, LYOPHILIZED, FOR SOLUTION INTRAVENOUS at 19:58

## 2022-01-01 RX ADMIN — POTASSIUM CHLORIDE 10 MEQ: 7.46 INJECTION, SOLUTION INTRAVENOUS at 17:55

## 2022-01-01 RX ADMIN — OXYCODONE HYDROCHLORIDE 3 MG: 100 SOLUTION ORAL at 21:59

## 2022-01-01 RX ADMIN — HEPARIN SODIUM 5000 UNITS: 10000 INJECTION, SOLUTION INTRAVENOUS; SUBCUTANEOUS at 08:08

## 2022-01-01 RX ADMIN — HYDROMORPHONE HYDROCHLORIDE 0.5 MG: 1 INJECTION, SOLUTION INTRAMUSCULAR; INTRAVENOUS; SUBCUTANEOUS at 14:08

## 2022-01-01 RX ADMIN — RIVASTIGMINE 1 PATCH: 13.3 PATCH, EXTENDED RELEASE TRANSDERMAL at 08:13

## 2022-01-01 RX ADMIN — METOPROLOL SUCCINATE 50 MG: 25 TABLET, EXTENDED RELEASE ORAL at 08:08

## 2022-01-01 RX ADMIN — ALBUTEROL SULFATE 2.5 MG: 2.5 SOLUTION RESPIRATORY (INHALATION) at 13:02

## 2022-01-01 RX ADMIN — HYDRALAZINE HYDROCHLORIDE 100 MG: 50 TABLET, FILM COATED ORAL at 15:34

## 2022-01-01 RX ADMIN — POTASSIUM CHLORIDE 10 MEQ: 7.46 INJECTION, SOLUTION INTRAVENOUS at 22:00

## 2022-01-01 RX ADMIN — FUROSEMIDE 40 MG: 10 INJECTION, SOLUTION INTRAMUSCULAR; INTRAVENOUS at 04:25

## 2022-01-01 RX ADMIN — OXYCODONE HYDROCHLORIDE 10 MG: 100 SOLUTION ORAL at 08:11

## 2022-01-01 RX ADMIN — SIMVASTATIN 20 MG: 10 TABLET, FILM COATED ORAL at 19:58

## 2022-01-01 RX ADMIN — HEPARIN SODIUM 5000 UNITS: 10000 INJECTION, SOLUTION INTRAVENOUS; SUBCUTANEOUS at 08:10

## 2022-01-01 RX ADMIN — REMDESIVIR 100 MG: 100 INJECTION, POWDER, LYOPHILIZED, FOR SOLUTION INTRAVENOUS at 18:01

## 2022-01-01 RX ADMIN — METOPROLOL TARTRATE 5 MG: 5 INJECTION INTRAVENOUS at 18:42

## 2022-01-01 RX ADMIN — OXYCODONE HYDROCHLORIDE 3 MG: 100 SOLUTION ORAL at 13:38

## 2022-01-01 RX ADMIN — HYDRALAZINE HYDROCHLORIDE 100 MG: 50 TABLET, FILM COATED ORAL at 11:53

## 2022-01-01 SDOH — ECONOMIC STABILITY: FOOD INSECURITY: WITHIN THE PAST 12 MONTHS, YOU WORRIED THAT YOUR FOOD WOULD RUN OUT BEFORE YOU GOT MONEY TO BUY MORE.: NEVER TRUE

## 2022-01-01 SDOH — ECONOMIC STABILITY: INCOME INSECURITY: IN THE LAST 12 MONTHS, WAS THERE A TIME WHEN YOU WERE NOT ABLE TO PAY THE MORTGAGE OR RENT ON TIME?: NO

## 2022-01-01 SDOH — ECONOMIC STABILITY: TRANSPORTATION INSECURITY
IN THE PAST 12 MONTHS, HAS LACK OF TRANSPORTATION KEPT YOU FROM MEETINGS, WORK, OR FROM GETTING THINGS NEEDED FOR DAILY LIVING?: NO

## 2022-01-01 SDOH — ECONOMIC STABILITY: TRANSPORTATION INSECURITY
IN THE PAST 12 MONTHS, HAS THE LACK OF TRANSPORTATION KEPT YOU FROM MEDICAL APPOINTMENTS OR FROM GETTING MEDICATIONS?: NO

## 2022-01-01 SDOH — ECONOMIC STABILITY: FOOD INSECURITY: WITHIN THE PAST 12 MONTHS, THE FOOD YOU BOUGHT JUST DIDN'T LAST AND YOU DIDN'T HAVE MONEY TO GET MORE.: NEVER TRUE

## 2022-01-01 ASSESSMENT — ACTIVITIES OF DAILY LIVING (ADL)
ADLS_ACUITY_SCORE: 28
ADLS_ACUITY_SCORE: 53
SWALLOWING: 2-->DIFFICULTY SWALLOWING LIQUIDS/FOODS
ADLS_ACUITY_SCORE: 26
ADLS_ACUITY_SCORE: 11
TOILETING_ISSUES: YES
ADLS_ACUITY_SCORE: 63
ADLS_ACUITY_SCORE: 59
ADLS_ACUITY_SCORE: 63
ADLS_ACUITY_SCORE: 51
ADLS_ACUITY_SCORE: 21
TRANSFERRING: 2-->COMPLETELY DEPENDENT
ADLS_ACUITY_SCORE: 30
DRESS: 1-->ASSISTANCE (EQUIPMENT/PERSON) NEEDED
TRANSFERRING: 2-->COMPLETELY DEPENDENT
ADLS_ACUITY_SCORE: 11
ADLS_ACUITY_SCORE: 28
ADLS_ACUITY_SCORE: 11
ADLS_ACUITY_SCORE: 63
TOILETING_ISSUES: YES
ADLS_ACUITY_SCORE: 11
CONCENTRATING,_REMEMBERING_OR_MAKING_DECISIONS_DIFFICULTY: YES
TOILETING_ISSUES: YES
ADLS_ACUITY_SCORE: 35
ADLS_ACUITY_SCORE: 37
ADLS_ACUITY_SCORE: 63
TOILETING_ASSISTANCE: TOILETING DIFFICULTY, REQUIRES EQUIPMENT;TOILETING DIFFICULTY, ASSISTANCE 1 PERSON
ADLS_ACUITY_SCORE: 59
ADLS_ACUITY_SCORE: 59
DRESS: 1-->ASSISTANCE (EQUIPMENT/PERSON) NEEDED
EATING: 1-->ASSISTANCE (EQUIPMENT/PERSON) NEEDED
ADLS_ACUITY_SCORE: 63
ADLS_ACUITY_SCORE: 21
ADLS_ACUITY_SCORE: 51
ADLS_ACUITY_SCORE: 57
ADLS_ACUITY_SCORE: 30
ADLS_ACUITY_SCORE: 30
ADLS_ACUITY_SCORE: 28
ADLS_ACUITY_SCORE: 59
ADLS_ACUITY_SCORE: 11
ADLS_ACUITY_SCORE: 53
ADLS_ACUITY_SCORE: 28
WALKING_OR_CLIMBING_STAIRS_DIFFICULTY: YES
ADLS_ACUITY_SCORE: 26
ADLS_ACUITY_SCORE: 21
ADLS_ACUITY_SCORE: 28
ADLS_ACUITY_SCORE: 35
PATIENT'S_PREFERRED_MEANS_OF_COMMUNICATION: ENGLISH SPEAKER WITH HEARING LOSS, NO SPEECH PROBLEMS.
ADLS_ACUITY_SCORE: 61
ADLS_ACUITY_SCORE: 28
ADLS_ACUITY_SCORE: 21
ADLS_ACUITY_SCORE: 26
ADLS_ACUITY_SCORE: 37
ADLS_ACUITY_SCORE: 28
EQUIPMENT_CURRENTLY_USED_AT_HOME: WALKER, ROLLING
SWALLOWING: 2-->DIFFICULTY SWALLOWING LIQUIDS/FOODS
ADLS_ACUITY_SCORE: 37
WALKING_OR_CLIMBING_STAIRS_DIFFICULTY: YES
ADLS_ACUITY_SCORE: 59
ADLS_ACUITY_SCORE: 11
ADLS_ACUITY_SCORE: 28
ADLS_ACUITY_SCORE: 21
CHANGE_IN_FUNCTIONAL_STATUS_SINCE_ONSET_OF_CURRENT_ILLNESS/INJURY: NO
ADLS_ACUITY_SCORE: 26
ADLS_ACUITY_SCORE: 26
ADLS_ACUITY_SCORE: 63
ADLS_ACUITY_SCORE: 26
ADLS_ACUITY_SCORE: 57
TOILETING: 1-->ASSISTANCE (EQUIPMENT/PERSON) NEEDED (NOT DEVELOPMENTALLY APPROPRIATE)
ADLS_ACUITY_SCORE: 51
HEARING_DIFFICULTY_OR_DEAF: YES
ADLS_ACUITY_SCORE: 53
TOILETING_MANAGEMENT: ASSISTANCE
ADLS_ACUITY_SCORE: 61
ADLS_ACUITY_SCORE: 26
ADLS_ACUITY_SCORE: 30
ADLS_ACUITY_SCORE: 57
ADLS_ACUITY_SCORE: 21
DIFFICULTY_EATING/SWALLOWING: YES
ADLS_ACUITY_SCORE: 28
ADLS_ACUITY_SCORE: 51
ADLS_ACUITY_SCORE: 37
ADLS_ACUITY_SCORE: 11
ADLS_ACUITY_SCORE: 11
BATHING: 2-->COMPLETELY DEPENDENT (NOT DEVELOPMENTALLY APPROPRIATE)
ADLS_ACUITY_SCORE: 28
HEARING_DIFFICULTY_OR_DEAF: YES
ADLS_ACUITY_SCORE: 11
ADLS_ACUITY_SCORE: 28
EATING: 1-->ASSISTANCE (EQUIPMENT/PERSON) NEEDED (NOT DEVELOPMENTALLY APPROPRIATE)
ADLS_ACUITY_SCORE: 11
ADLS_ACUITY_SCORE: 21
ADLS_ACUITY_SCORE: 26
ADLS_ACUITY_SCORE: 26
NUMBER_OF_TIMES_PATIENT_HAS_FALLEN_WITHIN_LAST_SIX_MONTHS: 3
ADLS_ACUITY_SCORE: 26
ADLS_ACUITY_SCORE: 26
EATING: 1-->ASSISTANCE (EQUIPMENT/PERSON) NEEDED (NOT DEVELOPMENTALLY APPROPRIATE)
DRESSING/BATHING_DIFFICULTY: YES
TOILETING_ASSISTANCE: TOILETING DIFFICULTY, ASSISTANCE 1 PERSON
ADLS_ACUITY_SCORE: 11
NUMBER_OF_TIMES_PATIENT_HAS_FALLEN_WITHIN_LAST_SIX_MONTHS: 3
ADLS_ACUITY_SCORE: 21
ADLS_ACUITY_SCORE: 28
ADLS_ACUITY_SCORE: 59
ADLS_ACUITY_SCORE: 37
ADLS_ACUITY_SCORE: 21
ADLS_ACUITY_SCORE: 30
WERE_AUXILIARY_AIDS_OFFERED?: YES
ADLS_ACUITY_SCORE: 65
FALL_HISTORY_WITHIN_LAST_SIX_MONTHS: YES
ADLS_ACUITY_SCORE: 21
ADLS_ACUITY_SCORE: 28
COMMUNICATION_MANAGEMENT: VERBAL
ADLS_ACUITY_SCORE: 37
ADLS_ACUITY_SCORE: 53
ADLS_ACUITY_SCORE: 63
ADLS_ACUITY_SCORE: 21
ADLS_ACUITY_SCORE: 63
ADLS_ACUITY_SCORE: 21
ADLS_ACUITY_SCORE: 26
TOILETING: 1-->ASSISTANCE (EQUIPMENT/PERSON) NEEDED
DRESSING/BATHING: BATHING DIFFICULTY, DEPENDENT
ADLS_ACUITY_SCORE: 53
ADLS_ACUITY_SCORE: 37
DRESSING/BATHING_DIFFICULTY: YES
THE_FOLLOWING_AIDS_WERE_PROVIDED;: PATIENT DECLINED OFFER OF AUXILIARY AIDS
ADLS_ACUITY_SCORE: 59
ADLS_ACUITY_SCORE: 37
ADLS_ACUITY_SCORE: 11
ADLS_ACUITY_SCORE: 21
ADLS_ACUITY_SCORE: 28
ADLS_ACUITY_SCORE: 26
ADLS_ACUITY_SCORE: 59
ADLS_ACUITY_SCORE: 21
ADLS_ACUITY_SCORE: 30
DRESS: 1-->ASSISTANCE (EQUIPMENT/PERSON) NEEDED (NOT DEVELOPMENTALLY APPROPRIATE)
ADLS_ACUITY_SCORE: 53
THE_FOLLOWING_AIDS_WERE_PROVIDED;: PATIENT DECLINED OFFER OF AUXILIARY AIDS
DESCRIBE_HEARING_LOSS: BILATERAL HEARING LOSS
ADLS_ACUITY_SCORE: 35
EQUIPMENT_CURRENTLY_USED_AT_HOME: WALKER, ROLLING
CONCENTRATING,_REMEMBERING_OR_MAKING_DECISIONS_DIFFICULTY: YES
DIFFICULTY_COMMUNICATING: YES
ADLS_ACUITY_SCORE: 28
ADLS_ACUITY_SCORE: 28
ADLS_ACUITY_SCORE: 61
ADLS_ACUITY_SCORE: 61
ADLS_ACUITY_SCORE: 28
ADLS_ACUITY_SCORE: 28
ADLS_ACUITY_SCORE: 26
EATING/SWALLOWING: SWALLOWING LIQUIDS;SWALLOWING SOLID FOOD
ADLS_ACUITY_SCORE: 28
DEPENDENT_IADLS:: CLEANING;COOKING;LAUNDRY;SHOPPING;MEAL PREPARATION;MEDICATION MANAGEMENT;MONEY MANAGEMENT;TRANSPORTATION
WEAR_GLASSES_OR_BLIND: YES
ADLS_ACUITY_SCORE: 28
ADLS_ACUITY_SCORE: 35
DOING_ERRANDS_INDEPENDENTLY_DIFFICULTY: YES
ADLS_ACUITY_SCORE: 30
ADLS_ACUITY_SCORE: 28
TOILETING: 1-->ASSISTANCE (EQUIPMENT/PERSON) NEEDED
BATHING: 2-->COMPLETELY DEPENDENT (NOT DEVELOPMENTALLY APPROPRIATE)
WALKING_OR_CLIMBING_STAIRS_DIFFICULTY: YES
ADLS_ACUITY_SCORE: 63
ADLS_ACUITY_SCORE: 57
VISION_MANAGEMENT: GLASSES
SWALLOWING: 2-->DIFFICULTY SWALLOWING LIQUIDS/FOODS
DRESSING/BATHING: BATHING DIFFICULTY, ASSISTANCE 1 PERSON;DRESSING DIFFICULTY, ASSISTANCE 1 PERSON
ADLS_ACUITY_SCORE: 37
CONCENTRATING,_REMEMBERING_OR_MAKING_DECISIONS_DIFFICULTY: YES
DIFFICULTY_EATING/SWALLOWING: NO
ADLS_ACUITY_SCORE: 26
CHANGE_IN_FUNCTIONAL_STATUS_SINCE_ONSET_OF_CURRENT_ILLNESS/INJURY: NO
ADLS_ACUITY_SCORE: 37
DESCRIBE_HEARING_LOSS: BILATERAL HEARING LOSS
ADLS_ACUITY_SCORE: 26
ADLS_ACUITY_SCORE: 59
ADLS_ACUITY_SCORE: 21
ADLS_ACUITY_SCORE: 30
ADLS_ACUITY_SCORE: 61
ADLS_ACUITY_SCORE: 28
ADLS_ACUITY_SCORE: 59
DIFFICULTY_EATING/SWALLOWING: YES
DIFFICULTY_COMMUNICATING: NO
ADLS_ACUITY_SCORE: 59
ADLS_ACUITY_SCORE: 57
ADLS_ACUITY_SCORE: 28
ADLS_ACUITY_SCORE: 57
ADLS_ACUITY_SCORE: 26
COMMUNICATION: OTHER (SEE COMMENTS)
TOILETING_ASSISTANCE: TOILETING DIFFICULTY, REQUIRES EQUIPMENT;TOILETING DIFFICULTY, ASSISTANCE 1 PERSON
ADLS_ACUITY_SCORE: 35
ADLS_ACUITY_SCORE: 55
ADLS_ACUITY_SCORE: 35
ADLS_ACUITY_SCORE: 28
DOING_ERRANDS_INDEPENDENTLY_DIFFICULTY: YES
ADLS_ACUITY_SCORE: 26
DRESSING/BATHING: BATHING DIFFICULTY, ASSISTANCE 1 PERSON;DRESSING DIFFICULTY, ASSISTANCE 1 PERSON
ADLS_ACUITY_SCORE: 65
ADLS_ACUITY_SCORE: 11
PATIENT'S_PREFERRED_MEANS_OF_COMMUNICATION: ENGLISH SPEAKER WITH HEARING LOSS, NO SPEECH PROBLEMS.
TRANSFERRING: 2-->COMPLETELY DEPENDENT (NOT DEVELOPMENTALLY APPROPRIATE)
ADLS_ACUITY_SCORE: 65
ADLS_ACUITY_SCORE: 59
ADLS_ACUITY_SCORE: 28
WEAR_GLASSES_OR_BLIND: YES
DOING_ERRANDS_INDEPENDENTLY_DIFFICULTY: YES
ADLS_ACUITY_SCORE: 28
ADLS_ACUITY_SCORE: 11
SWALLOWING: 2-->DIFFICULTY SWALLOWING LIQUIDS/FOODS
FALL_HISTORY_WITHIN_LAST_SIX_MONTHS: YES
ADLS_ACUITY_SCORE: 59
ADLS_ACUITY_SCORE: 57
TRANSFERRING: 2-->COMPLETELY DEPENDENT (NOT DEVELOPMENTALLY APPROPRIATE)
CHANGE_IN_FUNCTIONAL_STATUS_SINCE_ONSET_OF_CURRENT_ILLNESS/INJURY: YES
ADLS_ACUITY_SCORE: 28
EATING/SWALLOWING: SWALLOWING LIQUIDS;SWALLOWING SOLID FOOD
ADLS_ACUITY_SCORE: 21
ADLS_ACUITY_SCORE: 21
ADLS_ACUITY_SCORE: 35
ADLS_ACUITY_SCORE: 61
ADLS_ACUITY_SCORE: 59
ADLS_ACUITY_SCORE: 21
ADLS_ACUITY_SCORE: 63
ADLS_ACUITY_SCORE: 53
DRESS: 1-->ASSISTANCE (EQUIPMENT/PERSON) NEEDED (NOT DEVELOPMENTALLY APPROPRIATE)
EQUIPMENT_CURRENTLY_USED_AT_HOME: WALKER, ROLLING
ADLS_ACUITY_SCORE: 65
ADLS_ACUITY_SCORE: 11
FALL_HISTORY_WITHIN_LAST_SIX_MONTHS: NO
WERE_AUXILIARY_AIDS_OFFERED?: YES
ADLS_ACUITY_SCORE: 59
DRESSING/BATHING_DIFFICULTY: YES
ADLS_ACUITY_SCORE: 26
ADLS_ACUITY_SCORE: 63
ADLS_ACUITY_SCORE: 37
EATING: 1-->ASSISTANCE (EQUIPMENT/PERSON) NEEDED
WALKING_OR_CLIMBING_STAIRS: AMBULATION DIFFICULTY, REQUIRES EQUIPMENT
TOILETING: 1-->ASSISTANCE (EQUIPMENT/PERSON) NEEDED (NOT DEVELOPMENTALLY APPROPRIATE)
ADLS_ACUITY_SCORE: 53
ADLS_ACUITY_SCORE: 63
ADLS_ACUITY_SCORE: 63
ADLS_ACUITY_SCORE: 57
ADLS_ACUITY_SCORE: 28
ADLS_ACUITY_SCORE: 57
VISION_MANAGEMENT: GLASSES
ADLS_ACUITY_SCORE: 21
ADLS_ACUITY_SCORE: 57
ADLS_ACUITY_SCORE: 63
DEPENDENT_IADLS:: CLEANING;COOKING;LAUNDRY;SHOPPING;MEAL PREPARATION;MEDICATION MANAGEMENT;MONEY MANAGEMENT;TRANSPORTATION
ADLS_ACUITY_SCORE: 21
DEPENDENT_IADLS:: CLEANING;COOKING;LAUNDRY;SHOPPING;MEAL PREPARATION;MEDICATION MANAGEMENT;MONEY MANAGEMENT;TRANSPORTATION
ADLS_ACUITY_SCORE: 28

## 2022-01-01 ASSESSMENT — ENCOUNTER SYMPTOMS
SPEECH DIFFICULTY: 1
FEVER: 1
CONFUSION: 1

## 2022-01-01 ASSESSMENT — SOCIAL DETERMINANTS OF HEALTH (SDOH): HOW HARD IS IT FOR YOU TO PAY FOR THE VERY BASICS LIKE FOOD, HOUSING, MEDICAL CARE, AND HEATING?: NOT HARD AT ALL

## 2022-01-01 ASSESSMENT — PATIENT HEALTH QUESTIONNAIRE - PHQ9: SUM OF ALL RESPONSES TO PHQ QUESTIONS 1-9: 3

## 2022-01-01 ASSESSMENT — MIFFLIN-ST. JEOR: SCORE: 1635.19

## 2022-01-07 PROBLEM — E87.6 HYPOKALEMIA: Status: RESOLVED | Noted: 2021-05-02 | Resolved: 2022-01-01

## 2022-01-07 PROBLEM — G93.41 ENCEPHALOPATHY, METABOLIC: Status: RESOLVED | Noted: 2021-05-02 | Resolved: 2022-01-01

## 2022-01-07 PROBLEM — G30.1 LATE ONSET ALZHEIMER'S DISEASE WITH BEHAVIORAL DISTURBANCE (H): Status: ACTIVE | Noted: 2021-02-05

## 2022-01-07 PROBLEM — F05 SUNDOWNING: Status: RESOLVED | Noted: 2021-01-01 | Resolved: 2022-01-01

## 2022-01-07 PROBLEM — F02.818 LATE ONSET ALZHEIMER'S DISEASE WITH BEHAVIORAL DISTURBANCE (H): Status: ACTIVE | Noted: 2021-02-05

## 2022-01-07 PROBLEM — M25.50 PAIN IN JOINT, MULTIPLE SITES: Status: RESOLVED | Noted: 2021-01-01 | Resolved: 2022-01-01

## 2022-01-07 PROBLEM — N12 PYELONEPHRITIS: Status: RESOLVED | Noted: 2021-01-01 | Resolved: 2022-01-01

## 2022-01-07 NOTE — LETTER
2022         RE: Laz Kingston   [address] [place] [address]        Dear Colleague,    Thank you for referring your patient, Laz Kingston, to the [facility] [place]. Please see a copy of my visit note below.    NEUROLOGY FOLLOW UP VISIT  NOTE       [facility] [place]  [address] [place] [address]  Tel: [phone]  Fax: [phone]  [email or web address]     Laz Kingston,  1935, MRN [id number]  PCP: Bianca Rodriguez  Date: 2022      ASSESSMENT & PLAN     Visit Diagnosis  1. Late onset Alzheimer disease (H)     Late onset Alzheimer's dementia with behavioral disturbance  86-year-old male with late onset Alzheimer's dementia with increased behavioral disturbances.  He is currently on Namenda and Exelon, prescriptions were filled.  He is getting more belligerent at a time threatens to hit his wife.  I have started him on Seroquel 12.5 mg twice daily.  This was a telephone visit second year in a row and I have told wife that to better assess for any progression I need to see him in person.  As I'm starting him on Seroquel I have told him to make a in person follow-up appointment in 3 months.  Additionally I'm ordering a hepatic profile.  Most recent hepatic profile checked on 2021 showed elevated AST, ALT.  Follow-up will be in 3 months    Thank you again for this referral, please feel free to contact me if you have any questions.    Adair Hernandez MD  [facility]  (Former[facility], [facility]     HISTORY OF PRESENT ILLNESS     Patient is an 86-year-old man with advanced Alzheimer's dementia, HTN, HLD, DM who returns for yearly follow-up.  Wife requested a phone consult as he continues to decline.  According to wife he continues to have behavioral issues but does not think it is a major problem he is minimally interactive and watches TV most of the  time.  Currently he is on Namenda and Exelon.  Work-up in the past included up brain PET scan that was consistent with Alzheimer's dementia.  Initially he was started on Aricept but could not tolerate it and was switched to Exelon and afterwards Namenda was added.  According to wife he is getting more belligerent and at times threatens to hit her.  At times he gets up in the middle of the night and runs around.  They have an alarm and if he tries to go out it goes off.  She also feels that at times he is not eating well and that tends to avoid taking a meal.  She tends to substitute it with Ensure or a granola bar    Since his last visit he was admitted to the hospital for calculus of kidney and in July 2021 he was admitted with sepsis and confusion felt to be due to severe UTI.       PROBLEM LIST   Patient Active Problem List   Diagnosis Code     Type 2 diabetes mellitus without complication, with long-term current use of insulin (H) E11.9, Z79.4     Hearing loss H91.90     Hypercholesterolemia E78.00     Late onset Alzheimer disease (H) G30.1, F02.80     Hypertension I10     Chronic kidney disease, stage 3 (H) N18.30     Benign essential hypertension I10     Gastroesophageal reflux disease K21.9     S/P total knee replacement Z96.659     Acute renal failure with acute tubular necrosis superimposed on stage 3 chronic kidney disease (H) N17.0, N18.30     Nephrolithiasis N20.0     Sleep difficulties G47.9     Ureteral stone N20.1         PAST MEDICAL & SURGICAL HISTORY     Past Medical History:   Patient  has a past medical history of Alzheimer's dementia (H), 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    Surgical History:  He  has a past surgical history that includes IR Miscellaneous Procedure (3/13/2014); Pr  Arthroplasty Tibial Plateau; joint replacement (Bilateral); Prostatectomy; CYSTOSCOPY,INSERT URETERAL STENT (Right, 7/4/2021); IR Ureter Dilation Bilateral (3/13/2014); Cystoscopy, transurethral resection (TUR) tumor bladder, combined (N/A, 8/5/2021); and Cystoureteroscopy, With Lithotripsy Using Ba 120P Laser And Ureteral Stent Insertion (Right, 8/5/2021).     SOCIAL HISTORY     Reviewed, and he  reports that he has quit smoking. His smoking use included cigarettes. He quit after 4.00 years of use. He has never used smokeless tobacco. He reports that he does not drink alcohol and does not use drugs.     FAMILY HISTORY     Reviewed, and family history includes Alzheimer Disease in his mother; Coronary Artery Disease in his father; Diabetes in his daughter and daughter; Heart Disease (age of onset: 56.00) in his father; Heart Failure in his mother and sister; Kidney Disease in his sister; No Known Problems in his son and son.     ALLERGIES     Allergies   Allergen Reactions     Ibuprofen Rash and GI Disturbance         REVIEW OF SYSTEMS     A 12 point review of system was performed and was negative except as outlined in the history of present illness.     HOME MEDICATIONS     Current Outpatient Rx   Medication Sig Dispense Refill     amLODIPine (NORVASC) 10 MG tablet Take 1 tablet (10 mg) by mouth daily 90 tablet 1     aspirin (ASA) 81 MG chewable tablet Take 81 mg by mouth       cefdinir (OMNICEF) 300 MG capsule cefdinir 300 mg capsule       cephALEXin (KEFLEX) 500 MG capsule cephalexin 500 mg capsule       cloNIDine (CATAPRES) 0.1 MG tablet Take 1 tablet (0.1 mg) by mouth 2 times daily as needed For SBP >160 60 tablet 5     CONTOUR NEXT TEST test strip 2 times daily       glimepiride (AMARYL) 4 MG tablet Take 1 tablet (4 mg) by mouth every morning (before breakfast) 90 tablet 1     hydrALAZINE (APRESOLINE) 100 MG tablet Take 1 tablet (100 mg) by mouth 4 times daily 360 tablet 1     HYDROcodone-acetaminophen  (NORCO) 5-325 MG tablet hydrocodone 5 mg-acetaminophen 325 mg tablet   TAKE 1 TABLET BY MOUTH EVERY 6 HOURS AS NEEDED FOR PAIN       LANTUS SOLOSTAR 100 UNIT/ML soln 26 Units        memantine (NAMENDA) 10 MG tablet Take 1 tablet (10 mg) by mouth 2 times daily 60 tablet 11     metoprolol succinate ER (TOPROL-XL) 50 MG 24 hr tablet Take 1 tablet (50 mg) by mouth daily 90 tablet 3     multivitamin, therapeutic (THERA-VIT) TABS tablet Take 1 tablet by mouth daily       NOVOLOG FLEXPEN 100 UNIT/ML soln        omeprazole (PRILOSEC) 20 MG DR capsule Take 1 capsule (20 mg) by mouth daily 90 capsule 1     polyethylene glycol (MIRALAX) 17 GM/Dose powder Take 17 g by mouth daily 510 g 0     rivastigmine (EXELON) 13.3 MG/24HR 24 hr patch Apply 1 patch topically daily 30 patch 11     simvastatin (ZOCOR) 20 MG tablet Take 1 tablet (20 mg) by mouth every evening 90 tablet 1     TRULICITY 1.5 MG/0.5ML pen INJECT 1.5 MG UNDER THE SKIN ONCE A WEEK           PHYSICAL EXAM     Vital signs  Ht 1.829 m (6')   Wt 90.7 kg (200 lb)   BMI 27.12 kg/m      Weight:   200 lbs 0 oz    Patient is alert on oriented x1 he mumbles few words hearing appeared normal.  Rest of exam was not possible on the phone     DIAGNOSTIC STUDIES     PERTINENT RADIOLOGY  Following imaging studies were reviewed:     PET scan done in 2014 was consistent with Alzheimer s dementia     MRI BRAIN 2/12/2014  1.  No acute intracranial findings. No evidence for recent ischemia,  intracranial hemorrhage or mass.  2.  Mild generalized atrophy slightly more pronounced within the temporal lobe  structures with more pronounced involvement of the right versus left  hippocampus.  3.  Presumed changes of chronic small vessel ischemic injury within the white  matter. Few small foci of hemosiderin deposition from remote bleeding.  4.  Mild inflammatory changes within the paranasal sinuses.     NEUROPSYCHOLOGICAL EVALUATION 4/17/2014  DIAGNOSTIC IMPRESSIONS:   1.         Dementia,  "Unspecified, without behavioral disturbance.   2.         Rule out depressive disorder, not otherwise specified.  RECOMMENDATIONS:  1.         Mr. Kingston should continue to work with Dr. Hernandez to address concerns related to cognitive changes.  2.         Consideration of cognitive enhancing medications would be appropriate as tolerated.  3.         Mr. Kingston is strongly encouraged to contact the Alzheimer s Association. It is further recommended that they request the booklet \"Living Well\" to provide excellent information regarding how to address early stages of cognitive declines. Additionally, the [place] may wish to consider setting up a consultation with the Alzheimer s Association to explore available resources within the community for individuals and care providers.  4.         Mr. Kingston is strongly encouraged to continue to engage socially as much as tolerated. This may help in minimizing impact of depression and assists with keeping brain functioning engaged.  5.         At this time there does not appear to be significant cognitive deficits in areas typically associated with driving. However, given recent difficulty of becoming confused and losing track where he is going while driving, restricting such to familiar places will minimize these issues. An on-road driving evaluation may be beneficial if concerns of Mr. Kingston or care providers are increased in the future.  6.         Recheck hearing acuity and make hearing aid adjustments as needed.  7.         Retest cognitive functioning in 6 to 12 months depending on observed changes to determine the presence of any changes.  8.         Emphasize that this is not \"the end\" and that there is a lot of evidence of well-preserved   functions. Quality of life can continue to be quite high, particularly with the above recommendations.  9.         Continue to monitor emotional functionings for signs of changes. If depression including increased " irritability continues to worsen, consider a combination of psychotherapy and SSRI medications.  10.       Mr. Kingston and his wife may wish to explore assisted living facilities as a potential long term disposition change if cognitive functioning continues to decline. Discussing this with the Alzheimer s Association to determine available resources within the community is recommended.        PERTINENT LABS  Following labs were reviewed:  Office Visit on 12/09/2021   Component Date Value     TSH 12/09/2021 1.86      WBC Count 12/09/2021 6.3      RBC Count 12/09/2021 4.54      Hemoglobin 12/09/2021 12.0*     Hematocrit 12/09/2021 36.6*     MCV 12/09/2021 81      MCH 12/09/2021 26.4*     MCHC 12/09/2021 32.8      RDW 12/09/2021 15.2*     Platelet Count 12/09/2021 204       Component      Latest Ref Rng & Units 7/5/2021 7/6/2021 7/7/2021            6:10 AM    Bilirubin Total      0.0 - 1.0 mg/dL 1.0 0.8 0.5   Calcium      8.5 - 10.5 mg/dL 9.3 9.3 9.3   Protein Total      6.0 - 8.0 g/dL 5.5 (L) 5.9 (L) 5.3 (L)   Albumin      3.5 - 5.0 g/dL 2.5 (L) 2.5 (L) 2.3 (L)   Alkaline Phosphatase      45 - 120 U/L 55 63 51   AST      0 - 40 U/L 127 (H) 90 (H) 60 (H)   ALT      0 - 45 U/L 62 (H) 58 (H) 49 (H)        TELEPHONE VISIT CONSENT   This telephone visit was done in lieu of a face to face visit due to COVID 19 pandemic.   Total Time: visit 30 minutes    Total time spent for face to face visit, reviewing labs/imaging studies, counseling and coordination of care was: 30 Minutes spent on the date of the encounter doing chart review, review of outside records, review of test results, interpretation of tests, patient visit, documentation and discussion with family       This note was dictated using voice recognition software.  Any grammatical or context distortions are unintentional and inherent to the software.    No orders of the defined types were placed in this encounter.     New Prescriptions    No medications on file      Modified Medications    No medications on file                     Again, thank you for allowing me to participate in the care of your patient.        Sincerely,        Adair Hernandez MD
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
## 2022-01-07 NOTE — PATIENT INSTRUCTIONS
Patient Education     Alzheimer's Dementia and Caregiver Support    Alzheimer's dementia (AD) is a long-term (chronic) condition that affects the brain. It gets worse over time. It causes a gradual loss of memory and thinking functions. A person with AD may have trouble recognizing familiar people and places, or knowing what day it is. The person s memory, judgment, and decision-making may also be affected. In severe cases, the person may not respond when someone talks to him or her.  AD is the most common form of dementia. Doctors don t fully understand what causes AD. It has no cure. But medicines can treat some of the symptoms.  Home care  These tips can help you care for a person with AD at home:    A responsible person must be with someone who has advanced AD at all times. He or she should not be left alone or unsupervised.    In the case of advanced AD, keep all medicines in a secure place. They should be under the caregiver s control. A person with advanced AD should not be allowed to take his or her own medicines. This needs to be supervised by the caregiver.  Here are ways to help a person with dementia:  Activities  Keep to a daily routine. Changes in routine can cause stress for someone with dementia. Make a schedule for common daily tasks. These include bathing, dressing, taking medicines, eating meals, going for walks, and going to bed. Opening curtains will help the person tell if it's day or night and what season it is. It's dangerous for a person with dementia to drive. If you aren't sure, the person can take a special driving skills assessment. Many group programs can help.  Communication  When talking to a person with dementia, talk slowly and clearly. Use a gentle tone of voice. Choose short, simple words and sentences. Ask one question at a time. Don t interrupt, criticize, or argue. Be calm and supportive. Use friendly facial expressions. Use pointing and touching to help communicate. If the  person has a loss of long-term memory, don t ask questions about past events. Instead, talk about what is happening now.  Behavioral tips  Use lists, signs, family photos, clocks, and calendars as memory aids. Label cabinets and drawers. Try to distract, not confront, the person. When he or she becomes frustrated or upset, direct the person s attention to eating or some other interesting activity.  Medical-legal tips  Talk with your doctor or  about getting a power of  for healthcare and for financial decisions. It's best to do this while the person can still sign legal documents and make his or her own legal decisions. Otherwise, you'll need a court order.  Support for the caregiver  As the caregiver, you will need a lot of support for yourself. Caring for a person with dementia is a full-time job. It can drain your emotions and lead to frustration and anger toward the one you love. It's common to have feelings of grief over losing the relationship that you once had. As a caregiver to someone with dementia, you are at higher risk for depression, anxiety, and stress.  Here are some tips to help you cope with being a caregiver:    Learn about dementia and Alzheimer disease so you know what to expect.    Find out about the resources in your community, including adult day-care programs. Ask your healthcare provider for a referral to a , if needed.    Take care of yourself with a healthy diet, exercise, and plenty of rest.    Ask for help. Share some of the caretaking duties with family and friends.    Make personal time for yourself. This is essential! Consider hiring an in-home sitter or home health aide.    Seek counseling or join a caregiver s support group. Don't isolate yourself or try to cope with this alone. In a support group, you can learn from others in a similar situation.    Visit the Alzheimer s Association website (www.alz.org) for more information.  Follow-up care  Follow up  with the person s healthcare provider, or as advised.  When to seek medical advice  Call your loved-one's healthcare provider right away if any of these occur:    Frequent falls    The person refuses to eat or drink    Headache or nausea that gets worse, or repeated vomiting after a fall    Unexplained fever of 100.4  F (38.0  C) or higher, or as advised  Call 911  Call 911 if any of these occur:    Violent behavior (call police) or behavior becomes too difficult to manage at home    Increased drowsiness, or failure to respond normally    Numbness or weakness of the face, an arm, or a leg    Slurred speech, trouble speaking, walking, or seeing    Fainting spell, dizziness, or seizure    Seizure-like activity. This includes twitching, staring episodes, lip smacking, or sudden periods of worsening confusion.  [employer] last reviewed this educational content on 9/1/2019 2000-2021 [employer]. All rights reserved. This information is not intended as a substitute for professional medical care. Always follow your healthcare professional's instructions.           Patient Education     Alzheimer's Dementia and Caregiver Support    Alzheimer's dementia (AD) is a long-term (chronic) condition that affects the brain. It gets worse over time. It causes a gradual loss of memory and thinking functions. A person with AD may have trouble recognizing familiar people and places, or knowing what day it is. The person s memory, judgment, and decision-making may also be affected. In severe cases, the person may not respond when someone talks to him or her.  AD is the most common form of dementia. Doctors don t fully understand what causes AD. It has no cure. But medicines can treat some of the symptoms.  Home care  These tips can help you care for a person with AD at home:    A responsible person must be with someone who has advanced AD at all times. He or she should not be left alone or unsupervised.    In the case of  advanced AD, keep all medicines in a secure place. They should be under the caregiver s control. A person with advanced AD should not be allowed to take his or her own medicines. This needs to be supervised by the caregiver.  Here are ways to help a person with dementia:  Activities  Keep to a daily routine. Changes in routine can cause stress for someone with dementia. Make a schedule for common daily tasks. These include bathing, dressing, taking medicines, eating meals, going for walks, and going to bed. Opening curtains will help the person tell if it's day or night and what season it is. It's dangerous for a person with dementia to drive. If you aren't sure, the person can take a special driving skills assessment. Many group programs can help.  Communication  When talking to a person with dementia, talk slowly and clearly. Use a gentle tone of voice. Choose short, simple words and sentences. Ask one question at a time. Don t interrupt, criticize, or argue. Be calm and supportive. Use friendly facial expressions. Use pointing and touching to help communicate. If the person has a loss of long-term memory, don t ask questions about past events. Instead, talk about what is happening now.  Behavioral tips  Use lists, signs, family photos, clocks, and calendars as memory aids. Label cabinets and drawers. Try to distract, not confront, the person. When he or she becomes frustrated or upset, direct the person s attention to eating or some other interesting activity.  Medical-legal tips  Talk with your doctor or  about getting a power of  for healthcare and for financial decisions. It's best to do this while the person can still sign legal documents and make his or her own legal decisions. Otherwise, you'll need a court order.  Support for the caregiver  As the caregiver, you will need a lot of support for yourself. Caring for a person with dementia is a full-time job. It can drain your emotions and lead  to frustration and anger toward the one you love. It's common to have feelings of grief over losing the relationship that you once had. As a caregiver to someone with dementia, you are at higher risk for depression, anxiety, and stress.  Here are some tips to help you cope with being a caregiver:    Learn about dementia and Alzheimer disease so you know what to expect.    Find out about the resources in your community, including adult day-care programs. Ask your healthcare provider for a referral to a , if needed.    Take care of yourself with a healthy diet, exercise, and plenty of rest.    Ask for help. Share some of the caretaking duties with family and friends.    Make personal time for yourself. This is essential! Consider hiring an in-home sitter or home health aide.    Seek counseling or join a caregiver s support group. Don't isolate yourself or try to cope with this alone. In a support group, you can learn from others in a similar situation.    Visit the Alzheimer s Association website (www.alz.org) for more information.  Follow-up care  Follow up with the person s healthcare provider, or as advised.  When to seek medical advice  Call your loved-one's healthcare provider right away if any of these occur:    Frequent falls    The person refuses to eat or drink    Headache or nausea that gets worse, or repeated vomiting after a fall    Unexplained fever of 100.4  F (38.0  C) or higher, or as advised  Call 911  Call 911 if any of these occur:    Violent behavior (call police) or behavior becomes too difficult to manage at home    Increased drowsiness, or failure to respond normally    Numbness or weakness of the face, an arm, or a leg    Slurred speech, trouble speaking, walking, or seeing    Fainting spell, dizziness, or seizure    Seizure-like activity. This includes twitching, staring episodes, lip smacking, or sudden periods of worsening confusion.  [employer] last reviewed this educational  content on 9/1/2019 2000-2021 [employer]. All rights reserved. This information is not intended as a substitute for professional medical care. Always follow your healthcare professional's instructions.

## 2022-01-07 NOTE — NURSING NOTE
Chief Complaint   Patient presents with     Dementia     Follow up- Refusing to eat (He will drink Ensure), waking up every 2 hours at night, starting to have behavioral disturbances      Phone visit     Call [phone]     Zabrina Dowling CMA on 1/7/2022 at 9:06 AM

## 2022-01-21 NOTE — TELEPHONE ENCOUNTER
Pending Prescriptions:                       Disp   Refills    TRULICITY 1.5 MG/0.5ML pen

## 2022-01-21 NOTE — TELEPHONE ENCOUNTER
Patient is out of Trulicity.  Pt is due for his shot on 01/26/2022.    [facility] on [address] is the preferred pharmacy.

## 2022-02-28 NOTE — TELEPHONE ENCOUNTER
Wife,patricia is calling for refills of the following:    CONTOUR NEXT TEST - test strips  LANTUS SOLOSTAR 100 UNIT pen    Per Patricia patient will need before his appointment on 03/11/22 with PCP.  She requested refills thru [facility] and was advised to contact clinic.  Refill request was made last week.    Last office visit with PCP = 12/9/2021  Next office visit with PCP = 03/11/2022    [facility] on [address] [facility] in [place] is the preferred pharmacy.

## 2022-03-02 NOTE — TELEPHONE ENCOUNTER
Outpatient Medication Detail     Disp Refills Start End SARAH   blood glucose test strips 200 strip 3 10/28/2020  No   Sig: Contour Next Test Strips :  Test twice daily as instructed.   Sent to pharmacy as: blood sugar diagnostic strips   Notes to Pharmacy: Patient is insulin dependent and tests twice daily.   E-Prescribing Status: Receipt confirmed by pharmacy (10/28/2020  6:53 AM CDT)       blood glucose test strips [[id number]]    Electronically signed by: Aleisha Suggs RN on 10/28/20 0653 Status: Active   Ordering user: Aleisha Suggs RN 10/28/20 0653 Ordering provider: Adan Rodriguez MD   Authorized by: Adan Rodriguez MD   Frequency:  10/28/20 - Until Discontinued Released by: Aleisha Suggs RN 10/28/20 0653   Diagnoses  Diabetes mellitus, type 2 (H) [E11.9]   Medication comments: Patient is insulin dependent and tests twice daily.     Outpatient Medication Detail     Disp Refills Start End SARAH   insulin glargine (LANTUS SOLOSTAR U-100 INSULIN) 100 unit/mL (3 mL) pen 15 mL 5 7/7/2021  No   Sig: INJECT 26 UNITS SUBCUTANEOUSLY EVERY NIGHT AT BEDTIME   Sent to pharmacy as: Lantus Solostar U-100 Insulin 100 unit/mL (3 mL) subcutaneous pen (insulin glargine)   E-Prescribing Status: Receipt confirmed by pharmacy (7/7/2021 12:19 PM CDT)       insulin glargine (LANTUS SOLOSTAR U-100 INSULIN) 100 unit/mL (3 mL) pen [[id number]]    Electronically signed by: Tom Lebron MD on 07/07/21 1219 Status: Active   Ordering user: Tom Lebron MD 07/07/21 1219 Ordering provider: Tom Lebron MD   Authorized by: Tom Lebron MD   Frequency:  07/07/21 - Until Discontinued   Diagnoses  Diabetes mellitus, type 2 (H) [E11.9]       Routing refill request to provider for review/approval because:  Labs not current:  A1C  Due to medication information not transferring due to [facility] please review the medication information prior to signing to ensure accuracy - test strips.    Last office visit  "provider:  21     Requested Prescriptions   Pending Prescriptions Disp Refills     CONTOUR NEXT TEST test strip       Si times daily       Diabetic Supplies Protocol Passed - 3/2/2022 11:52 AM        Passed - Medication is active on med list        Passed - Patient is 18 years of age or older        Passed - Recent (6 mo) or future (30 days) visit within the authorizing provider's specialty     Patient had office visit in the last 6 months or has a visit in the next 30 days with authorizing provider.  See \"Patient Info\" tab in inbasket, or \"Choose Columns\" in Meds & Orders section of the refill encounter.               LANTUS SOLOSTAR 100 UNIT/ML soln 15 mL      Si Units       Long Acting Insulin Protocol Failed - 3/2/2022 11:52 AM        Failed - HgbA1C in past 3 or 6 months     If HgbA1C is 8 or greater, it needs to be on file within the past 3 months.  If less than 8, must be on file within the past 6 months.     Recent Labs   Lab Test 21  0612   A1C 6.4*             Passed - Serum creatinine on file in past 12 months     Recent Labs   Lab Test 21  0912   CR 2.28*       Ok to refill medication if creatinine is low          Passed - Medication is active on med list        Passed - Patient is age 18 or older        Passed - Recent (6 mo) or future (30 days) visit within the authorizing provider's specialty     Patient had office visit in the last 6 months or has a visit in the next 30 days with authorizing provider or within the authorizing provider's specialty.  See \"Patient Info\" tab in inbasket, or \"Choose Columns\" in Meds & Orders section of the refill encounter.                 Sascha Wang RN 22 11:52 AM  "

## 2022-03-11 NOTE — PROGRESS NOTES
Assessment & Plan     (E11.9,  Z79.4) Type 2 diabetes mellitus without complication, with long-term current use of insulin (H)  (primary encounter diagnosis)  Comment: Refill  Plan: glimepiride (AMARYL) 4 MG tablet, Hemoglobin         A1c            (I10) Essential hypertension  Comment: Continue current management  Plan: hydrALAZINE (APRESOLINE) 100 MG tablet,         amLODIPine (NORVASC) 10 MG tablet,         Comprehensive metabolic panel, metoprolol         succinate ER (TOPROL-XL) 50 MG 24 hr tablet            (E78.00) Hypercholesterolemia  Comment: Continue current management  Plan: simvastatin (ZOCOR) 20 MG tablet            (K21.9) Gastroesophageal reflux disease without esophagitis  Comment: Refill  Plan: omeprazole (PRILOSEC) 20 MG DR capsule            (R54) Age-related physical debility  Comment:   Plan: Home Care Referral                               No follow-ups on file.    Bianca Rodriguez MD  [facility]itt is a 87 year old who presents with his wife for the following health issues     1. Type 2 diabetes mellitus   He 's been eating less with diminished appetite   Blood glucose is ranging in the lower 100's.   No issues with the medications      2. Essential hypertension   Blood pressure has been stable, and not resorting to clonidine as much.      3. Hypercholesterolemia   On simvastatin without side effects      4. Gastroesophageal reflux  Requesting a refill on omeprazole      5. Increasing weakness. He has no physical activity.   Would welcome Physical Therapy         Review of Systems         Objective    /74 (BP Location: Right arm, Patient Position: Sitting)   Pulse 67   Temp 98  F (36.7  C)   Wt 95.3 kg (210 lb)   SpO2 95%   BMI 28.48 kg/m    Body mass index is 28.48 kg/m .  Physical Exam   GENERAL: alert, no distress and elderly  RESP: lungs clear to auscultation - no rales, rhonchi or wheezes  CV: regular rate and rhythm, normal S1  S2, no S3 or S4, no murmur, click or rub, no peripheral edema and peripheral pulses strong  MS: no gross musculoskeletal defects noted, no edema

## 2022-03-14 NOTE — TELEPHONE ENCOUNTER
Home Care calling.    PT will be out to see patient on the 21st of March.    BRENTON Bonilla, RN   [facility] Nurse Advisor    Additional Information    Information only question and nurse able to answer    Protocols used: INFORMATION ONLY CALL - NO TRIAGE-A-[place]

## 2022-03-22 NOTE — TELEPHONE ENCOUNTER
Reason for Call:  Home Health Care    Cholo with [facility] called regarding (reason for call): verbal    Orders are needed for this patient. Skilled Nursing    PT:     OT:     Skilled Nursing: delayed in homecare, pushed back to 3/23    Pt Provider:     Phone Number Homecare Nurse can be reached at: [phone]    Can we leave a detailed message on this number? YES    Phone number patient can be reached at: Home number on file [phone] (home)    Best Time: Any    Call taken on 3/22/2022 at 2:19 PM by Dana Rosas

## 2022-03-24 NOTE — TELEPHONE ENCOUNTER
Alice, Physical Therapist with [facility] requesting verbal orders    PT   1 visit per week for 6 weeks, followed by 1 visit every other week for 2 weeks.     Call back number is . Okay to leave message, voice mail is secure.

## 2022-03-24 NOTE — TELEPHONE ENCOUNTER
Alice, Physical Therapist with [facility] requesting verbal orders     PT   1 visit per week for 6 weeks, followed by 1 visit every other week for 2 weeks.      Call back number is . Okay to leave message, voice mail is secure.

## 2022-04-13 PROBLEM — F03.90 DEMENTIA WITHOUT BEHAVIORAL DISTURBANCE, UNSPECIFIED DEMENTIA TYPE: Status: ACTIVE | Noted: 2022-01-01

## 2022-04-13 PROBLEM — N17.0 ACUTE RENAL FAILURE WITH ACUTE TUBULAR NECROSIS SUPERIMPOSED ON STAGE 3 CHRONIC KIDNEY DISEASE (H): Status: RESOLVED | Noted: 2021-05-02 | Resolved: 2022-01-01

## 2022-04-13 PROBLEM — N30.00 ACUTE CYSTITIS WITHOUT HEMATURIA: Status: ACTIVE | Noted: 2022-01-01

## 2022-04-13 PROBLEM — N18.30 ACUTE RENAL FAILURE WITH ACUTE TUBULAR NECROSIS SUPERIMPOSED ON STAGE 3 CHRONIC KIDNEY DISEASE (H): Status: RESOLVED | Noted: 2021-05-02 | Resolved: 2022-01-01

## 2022-04-13 NOTE — NURSING NOTE
Chief Complaint   Patient presents with     Dementia     Annual follow up- patients wife reports patient was doing well until lastnight- difficulty sleeping, not wanting to eat     Zabrina Dowling CMA on 4/13/2022 at 11:08 AM

## 2022-04-13 NOTE — PROGRESS NOTES
NEUROLOGY FOLLOW UP VISIT  NOTE       [facility] NEUROLOGY [place]  [address] [place] [address]  Tel: [phone]  Fax: [phone]  [email or web address]     Laz Kingston,  1935, MRN [id number]  PCP: Bianca Rodriguez  Date: 2022      ASSESSMENT & PLAN     Visit Diagnosis  1. 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  During his last virtual visit he was started on Seroquel 12.5 mg twice daily but wife reports increased sedation and currently only gives him half a tablet at bedtime.  His dementia is relentlessly progressive and he tends to sit at home unable to attend to activities of daily living.  I have recommended continuing Namenda and Exelon patch but encouraged wife to look into memory care unit.  She is the sole care provider although she gets help from her son who lives with them, at times it gets overwhelming.  He had hepatic profile checked recently that was normal.  Regular follow-up will be in 1 year    Thank you again for this referral, please feel free to contact me if you have any questions.    Adair Hernandez MD  [facility]  (Former[facility], [facility]     HISTORY OF PRESENT ILLNESS     Patient is 87-year-old male with advanced Alzheimer's dementia, HTN, HLD, DM who was last evaluated on the phone on 2022 when he was continued on Namenda and Exelon.  Wife reported that he was getting more belligerent at times and threatens to hit her.  He was started on Seroquel 12.5 mg twice daily and also ordered some lab work.  His recent comprehensive metabolic panel showed an elevated creatinine and borderline elevated AST and ALT.    According to wife he continues to have behavioral issues but did not think it was major problem as most of the time he is  minimally interactive and watches TV.  Work-up in the past included a brain PET scan that was consistent with Alzheimer's dementia.  Initially was started on Aricept but could not tolerate and was switched to Exelon and afterwards Namenda was added.  Unfortunately he continued to have cognitive decline.  At times he gets belligerent and threatens to hit his wife.  Wife has installed door alarms in the house as at times he wanders off.     PROBLEM LIST   Patient Active Problem List   Diagnosis Code     Type 2 diabetes mellitus without complication, with long-term current use of insulin (H) E11.9, Z79.4     Hearing loss H91.90     Hypercholesterolemia E78.00     Late onset Alzheimer's disease with behavioral disturbance (H) G30.1, F02.81     Hypertension I10     Chronic kidney disease, stage 3 (H) N18.30     Benign essential hypertension I10     Gastroesophageal reflux disease K21.9     S/P total knee replacement Z96.659     Nephrolithiasis N20.0     Sleep difficulties G47.9     Ureteral stone N20.1         PAST MEDICAL & SURGICAL HISTORY     Past Medical History:   Patient  has a past medical history of Acute renal failure with acute tubular necrosis superimposed on stage 3 chronic kidney disease (H) (5/2/2021), Alzheimer's dementia (H), 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    Surgical History:  He  has a past surgical history that includes IR Miscellaneous Procedure (3/13/2014); Pr Arthroplasty Tibial Plateau; joint replacement (Bilateral); Prostatectomy; CYSTOSCOPY,INSERT URETERAL STENT (Right, 7/4/2021); IR Ureter Dilation Bilateral (3/13/2014); Cystoscopy, transurethral resection (TUR) tumor bladder, combined (N/A, 8/5/2021); and Cystoureteroscopy, With Lithotripsy Using Ba 120P Laser And Ureteral  Stent Insertion (Right, 8/5/2021).     SOCIAL HISTORY     Reviewed, and he  reports that he has quit smoking. His smoking use included cigarettes. He quit after 4.00 years of use. He has never used smokeless tobacco. He reports that he does not drink alcohol and does not use drugs.     FAMILY HISTORY     Reviewed, and family history includes Alzheimer Disease in his mother; Coronary Artery Disease in his father; Diabetes in his daughter and daughter; Heart Disease (age of onset: 56.00) in his father; Heart Failure in his mother and sister; Kidney Disease in his sister; No Known Problems in his son and son.     ALLERGIES     Allergies   Allergen Reactions     Ibuprofen Rash and GI Disturbance         REVIEW OF SYSTEMS     A 12 point review of system was performed and was negative except as outlined in the history of present illness.     HOME MEDICATIONS     Current Outpatient Rx   Medication Sig Dispense Refill     amLODIPine (NORVASC) 10 MG tablet Take 1 tablet (10 mg) by mouth daily 90 tablet 3     aspirin (ASA) 81 MG chewable tablet Take 81 mg by mouth       glimepiride (AMARYL) 4 MG tablet Take 1 tablet (4 mg) by mouth every morning (before breakfast) 90 tablet 3     hydrALAZINE (APRESOLINE) 100 MG tablet Take 1 tablet (100 mg) by mouth 4 times daily 360 tablet 3     LANTUS SOLOSTAR 100 UNIT/ML soln Inject 26 Units Subcutaneous At Bedtime Please dispense with applicable needles/ syringes/ alcohol swabs. 15 mL 3     memantine (NAMENDA) 10 MG tablet Take 1 tablet (10 mg) by mouth 2 times daily 60 tablet 11     metoprolol succinate ER (TOPROL-XL) 50 MG 24 hr tablet Take 1 tablet (50 mg) by mouth daily 90 tablet 3     multivitamin, therapeutic (THERA-VIT) TABS tablet Take 1 tablet by mouth daily       omeprazole (PRILOSEC) 20 MG DR capsule Take 1 capsule (20 mg) by mouth daily 90 capsule 3     QUEtiapine (SEROQUEL) 25 MG tablet Take 0.5 tablets (12.5 mg) by mouth At Bedtime 30 tablet 11     rivastigmine (EXELON) 13.3  MG/24HR 24 hr patch Apply 1 patch topically daily 30 patch 11     simvastatin (ZOCOR) 20 MG tablet Take 1 tablet (20 mg) by mouth every evening 90 tablet 3     TRULICITY 1.5 MG/0.5ML pen Inject 1.5 mg Subcutaneous every 7 days 6 mL 1     CONTOUR NEXT TEST test strip 1 strip by In Vitro route 2 times daily 200 strip 0     NOVOLOG FLEXPEN 100 UNIT/ML soln        polyethylene glycol (MIRALAX) 17 GM/Dose powder Take 17 g by mouth daily 510 g 0         PHYSICAL EXAM     Vital signs  BP (!) 155/67 (BP Location: Left arm, Patient Position: Sitting)   Pulse 88   Ht 1.829 m (6')   Wt 94.3 kg (208 lb)   BMI 28.21 kg/m      Weight:   208 lbs 0 oz    Patient is alert and oriented x1 sitting in a wheelchair he is hard of hearing extraocular muscles are intact face symmetrical.  He moves all 4 extremities.  He has tremors.  Reflexes 1+.  Withdraws all 4 to painful stimuli.  He is wheelchair-bound at did not get up.  According to wife at home he is able to walk with a walker     PERTINENT DIAGNOSTIC STUDIES     Following studies were reviewed:     MRI BRAIN 2/12/2014  1.  No acute intracranial findings. No evidence for recent ischemia,  intracranial hemorrhage or mass.  2.  Mild generalized atrophy slightly more pronounced within the temporal lobe  structures with more pronounced involvement of the right versus left  hippocampus.  3.  Presumed changes of chronic small vessel ischemic injury within the white  matter. Few small foci of hemosiderin deposition from remote bleeding.  4.  Mild inflammatory changes within the paranasal sinuses.    PET SCAN 2014  Consistent with Alzheimer s dementia     PERTINENT LABS  Following labs were reviewed:  Office Visit on 03/11/2022   Component Date Value     Sodium 03/11/2022 145      Potassium 03/11/2022 3.5      Chloride 03/11/2022 111 (A)     Carbon Dioxide (CO2) 03/11/2022 23      Anion Gap 03/11/2022 11      Urea Nitrogen 03/11/2022 54 (A)     Creatinine 03/11/2022 2.12 (A)     Calcium  03/11/2022 10.2      Glucose 03/11/2022 176 (A)     Alkaline Phosphatase 03/11/2022 54      AST 03/11/2022 14      ALT 03/11/2022 12      Protein Total 03/11/2022 6.3      Albumin 03/11/2022 3.4 (A)     Bilirubin Total 03/11/2022 0.3      GFR Estimate 03/11/2022 30 (A)         Total time spent for face to face visit, reviewing labs/imaging studies, counseling and coordination of care was: 30 Minutes spent on the date of the encounter doing chart review, review of outside records, review of test results, interpretation of tests, patient visit, documentation and discussion with family       This note was dictated using voice recognition software.  Any grammatical or context distortions are unintentional and inherent to the software.    No orders of the defined types were placed in this encounter.     New Prescriptions    No medications on file     Modified Medications    Modified Medication Previous Medication    QUETIAPINE (SEROQUEL) 25 MG TABLET QUEtiapine (SEROQUEL) 25 MG tablet       Take 0.5 tablets (12.5 mg) by mouth At Bedtime    Take 0.5 tablets (12.5 mg) by mouth 2 times daily

## 2022-04-13 NOTE — LETTER
2022         RE: Laz Kingston   [address] [place] [address]        Dear Colleague,    Thank you for referring your patient, Laz Kingston, to the [facility] NEUROLOG[facility] [place]. Please see a copy of my visit note below.    NEUROLOGY FOLLOW UP VISIT  NOTE       [facility] NEUROLOGY [place]  [address] [place] [address]  Tel: [phone]  Fax: [phone]  [email or web address]     Laz Kingston,  1935, MRN [id number]  PCP: Bianca Rodriguez  Date: 2022      ASSESSMENT & PLAN     Visit Diagnosis  1. 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  During his last virtual visit he was started on Seroquel 12.5 mg twice daily but wife reports increased sedation and currently only gives him half a tablet at bedtime.  His dementia is relentlessly progressive and he tends to sit at home unable to attend to activities of daily living.  I have recommended continuing Namenda and Exelon patch but encouraged wife to look into memory care unit.  She is the sole care provider although she gets help from her son who lives with them, at times it gets overwhelming.  He had hepatic profile checked recently that was normal.  Regular follow-up will be in 1 year    Thank you again for this referral, please feel free to contact me if you have any questions.    Adair Hernandez MD  [facility]  ([facility], [facility]     HISTORY OF PRESENT ILLNESS     Patient is 87-year-old male with advanced Alzheimer's dementia, HTN, HLD, DM who was last evaluated on the phone on 2022 when he was continued on Namenda and Exelon.  Wife reported that he was getting more belligerent at times and threatens to hit her.  He was started on Seroquel 12.5 mg twice daily  and also ordered some lab work.  His recent comprehensive metabolic panel showed an elevated creatinine and borderline elevated AST and ALT.    According to wife he continues to have behavioral issues but did not think it was major problem as most of the time he is minimally interactive and watches TV.  Work-up in the past included a brain PET scan that was consistent with Alzheimer's dementia.  Initially was started on Aricept but could not tolerate and was switched to Exelon and afterwards Namenda was added.  Unfortunately he continued to have cognitive decline.  At times he gets belligerent and threatens to hit his wife.  Wife has installed door alarms in the house as at times he wanders off.     PROBLEM LIST   Patient Active Problem List   Diagnosis Code     Type 2 diabetes mellitus without complication, with long-term current use of insulin (H) E11.9, Z79.4     Hearing loss H91.90     Hypercholesterolemia E78.00     Late onset Alzheimer's disease with behavioral disturbance (H) G30.1, F02.81     Hypertension I10     Chronic kidney disease, stage 3 (H) N18.30     Benign essential hypertension I10     Gastroesophageal reflux disease K21.9     S/P total knee replacement Z96.659     Nephrolithiasis N20.0     Sleep difficulties G47.9     Ureteral stone N20.1         PAST MEDICAL & SURGICAL HISTORY     Past Medical History:   Patient  has a past medical history of Acute renal failure with acute tubular necrosis superimposed on stage 3 chronic kidney disease (H) (5/2/2021), Alzheimer's dementia (H), 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    Surgical History:  He  has a past surgical history that includes IR Miscellaneous Procedure (3/13/2014); Pr Arthroplasty Tibial Plateau; joint replacement (Bilateral);  Prostatectomy; CYSTOSCOPY,INSERT URETERAL STENT (Right, 7/4/2021); IR Ureter Dilation Bilateral (3/13/2014); Cystoscopy, transurethral resection (TUR) tumor bladder, combined (N/A, 8/5/2021); and Cystoureteroscopy, With Lithotripsy Using Ba 120P Laser And Ureteral Stent Insertion (Right, 8/5/2021).     SOCIAL HISTORY     Reviewed, and he  reports that he has quit smoking. His smoking use included cigarettes. He quit after 4.00 years of use. He has never used smokeless tobacco. He reports that he does not drink alcohol and does not use drugs.     FAMILY HISTORY     Reviewed, and family history includes Alzheimer Disease in his mother; Coronary Artery Disease in his father; Diabetes in his daughter and daughter; Heart Disease (age of onset: 56.00) in his father; Heart Failure in his mother and sister; Kidney Disease in his sister; No Known Problems in his son and son.     ALLERGIES     Allergies   Allergen Reactions     Ibuprofen Rash and GI Disturbance         REVIEW OF SYSTEMS     A 12 point review of system was performed and was negative except as outlined in the history of present illness.     HOME MEDICATIONS     Current Outpatient Rx   Medication Sig Dispense Refill     amLODIPine (NORVASC) 10 MG tablet Take 1 tablet (10 mg) by mouth daily 90 tablet 3     aspirin (ASA) 81 MG chewable tablet Take 81 mg by mouth       glimepiride (AMARYL) 4 MG tablet Take 1 tablet (4 mg) by mouth every morning (before breakfast) 90 tablet 3     hydrALAZINE (APRESOLINE) 100 MG tablet Take 1 tablet (100 mg) by mouth 4 times daily 360 tablet 3     LANTUS SOLOSTAR 100 UNIT/ML soln Inject 26 Units Subcutaneous At Bedtime Please dispense with applicable needles/ syringes/ alcohol swabs. 15 mL 3     memantine (NAMENDA) 10 MG tablet Take 1 tablet (10 mg) by mouth 2 times daily 60 tablet 11     metoprolol succinate ER (TOPROL-XL) 50 MG 24 hr tablet Take 1 tablet (50 mg) by mouth daily 90 tablet 3     multivitamin, therapeutic  (THERA-VIT) TABS tablet Take 1 tablet by mouth daily       omeprazole (PRILOSEC) 20 MG DR capsule Take 1 capsule (20 mg) by mouth daily 90 capsule 3     QUEtiapine (SEROQUEL) 25 MG tablet Take 0.5 tablets (12.5 mg) by mouth At Bedtime 30 tablet 11     rivastigmine (EXELON) 13.3 MG/24HR 24 hr patch Apply 1 patch topically daily 30 patch 11     simvastatin (ZOCOR) 20 MG tablet Take 1 tablet (20 mg) by mouth every evening 90 tablet 3     TRULICITY 1.5 MG/0.5ML pen Inject 1.5 mg Subcutaneous every 7 days 6 mL 1     CONTOUR NEXT TEST test strip 1 strip by In Vitro route 2 times daily 200 strip 0     NOVOLOG FLEXPEN 100 UNIT/ML soln        polyethylene glycol (MIRALAX) 17 GM/Dose powder Take 17 g by mouth daily 510 g 0         PHYSICAL EXAM     Vital signs  BP (!) 155/67 (BP Location: Left arm, Patient Position: Sitting)   Pulse 88   Ht 1.829 m (6')   Wt 94.3 kg (208 lb)   BMI 28.21 kg/m      Weight:   208 lbs 0 oz    Patient is alert and oriented x1 sitting in a wheelchair he is hard of hearing extraocular muscles are intact face symmetrical.  He moves all 4 extremities.  He has tremors.  Reflexes 1+.  Withdraws all 4 to painful stimuli.  He is wheelchair-bound at did not get up.  According to wife at home he is able to walk with a walker     PERTINENT DIAGNOSTIC STUDIES     Following studies were reviewed:     MRI BRAIN 2/12/2014  1.  No acute intracranial findings. No evidence for recent ischemia,  intracranial hemorrhage or mass.  2.  Mild generalized atrophy slightly more pronounced within the temporal lobe  structures with more pronounced involvement of the right versus left  hippocampus.  3.  Presumed changes of chronic small vessel ischemic injury within the white  matter. Few small foci of hemosiderin deposition from remote bleeding.  4.  Mild inflammatory changes within the paranasal sinuses.    PET SCAN 2014  Consistent with Alzheimer s dementia     PERTINENT LABS  Following labs were reviewed:  Office  Visit on 03/11/2022   Component Date Value     Sodium 03/11/2022 145      Potassium 03/11/2022 3.5      Chloride 03/11/2022 111 (A)     Carbon Dioxide (CO2) 03/11/2022 23      Anion Gap 03/11/2022 11      Urea Nitrogen 03/11/2022 54 (A)     Creatinine 03/11/2022 2.12 (A)     Calcium 03/11/2022 10.2      Glucose 03/11/2022 176 (A)     Alkaline Phosphatase 03/11/2022 54      AST 03/11/2022 14      ALT 03/11/2022 12      Protein Total 03/11/2022 6.3      Albumin 03/11/2022 3.4 (A)     Bilirubin Total 03/11/2022 0.3      GFR Estimate 03/11/2022 30 (A)         Total time spent for face to face visit, reviewing labs/imaging studies, counseling and coordination of care was: 30 Minutes spent on the date of the encounter doing chart review, review of outside records, review of test results, interpretation of tests, patient visit, documentation and discussion with family       This note was dictated using voice recognition software.  Any grammatical or context distortions are unintentional and inherent to the software.    No orders of the defined types were placed in this encounter.     New Prescriptions    No medications on file     Modified Medications    Modified Medication Previous Medication    QUETIAPINE (SEROQUEL) 25 MG TABLET QUEtiapine (SEROQUEL) 25 MG tablet       Take 0.5 tablets (12.5 mg) by mouth At Bedtime    Take 0.5 tablets (12.5 mg) by mouth 2 times daily                     Again, thank you for allowing me to participate in the care of your patient.        Sincerely,        Adair Hernandez MD

## 2022-04-14 PROBLEM — G93.41 ACUTE METABOLIC ENCEPHALOPATHY: Status: ACTIVE | Noted: 2021-05-02

## 2022-04-14 PROBLEM — U07.1 INFECTION DUE TO 2019 NOVEL CORONAVIRUS: Status: ACTIVE | Noted: 2022-01-01

## 2022-04-14 NOTE — ED NOTES
Paged Dr. Jules on O2 update. Pt is on room air sating 91% and dips to 89%, 1L did not raise sats or make a difference. Pt is back on 2L nc, sating 98%.

## 2022-04-14 NOTE — PLAN OF CARE
Pt is difficult to rouse when first time meeting. Pt pushes staff hands away when repositioning, changing brief, and when administering abdominal injections. Pt refused to take oral medication and refused dinner. Pt did take scheduled Hydralazine PO at 1859 but is still refusing to eat or answer nurse's questions. Pt now drinking fluids when offered but needs to be encouraged to drink, coughs when sitting upright after drinking.

## 2022-04-14 NOTE — ED NOTES
"Pt pulled off oxygen and sats dropped to  86%--replace O2 and sats increase to mid to high 90\"s %  "

## 2022-04-14 NOTE — ED NOTES
[facility] ED Handoff Report    ED Chief Complaint:     ED Diagnosis:  (N30.00) Acute cystitis without hematuria  Comment:   Plan:     (F03.90) Dementia without behavioral disturbance, unspecified dementia type (H)  Comment:   Plan:        PMH:    Past Medical History:   Diagnosis Date     Acute renal failure with acute tubular necrosis superimposed on stage 3 chronic kidney disease (H) 5/2/2021     Alzheimer's dementia (H)      BCE (basal cell epithelioma) 10/19/2015     Diabetes mellitus (H)      Dysplastic nevus 10/19/2015     GERD (gastroesophageal reflux disease)      Hyperlipidemia      Hypertension      Onychomycosis      Personal history of prostate cancer      Splenomegaly 01/10/2017        Code Status:  No CPR- Do NOT Intubate     Falls Risk: Yes Band: Applied    Current Living Situation/Residence: lives with a significant other and lives in a house     Elimination Status: Continent: No     Activity Level: 2 assist    Patients Preferred Language:  English     Needed: No    Vital Signs:  BP (!) 170/75   Pulse 80   Temp 99.1  F (37.3  C) (Oral)   Resp 25   Wt 94.3 kg (208 lb)   SpO2 98%   BMI 28.21 kg/m       Cardiac Rhythm: NSR    Pain Score: Patient is unable to quantify.    Is the Patient Confused:  Yes    Last Food or Drink: 04/14/22 (only had a few bites of breakfast)    Focused Assessment:  Patient presents to the ED from EMS from home. Patient lives at home with wife. Family reported that patient had an increase weakness and refused to get out of bed which is not his usual self. Patients daughter tested postivie for Covid and lives at home with the patient . Patient has a hx of dementia, HTN. When patient arrived they were febrile, temp. Patient refuses to take oral medications even when hidden in food. patient only took a few bites of oatmeal. Patient has gotten aggressive and agitated during changing and rolling. BP cuff causes agitation.      Tests Performed: Done: Labs  and Imaging    Treatments Provided:  Fluids, Subcutaneous meds,    Family Dynamics/Concerns: No    Family Updated On Visitor Policy: Yes    Plan of Care Communicated to Family: Yes    Who Was Updated about Plan of Care: wife, kathrny Javier Checklist Done and Signed by Patient: Yes    Medications sent with patient: yes     Covid: symptomatic, positive    Additional Information:     RN: Chery Pettit   4/14/2022 12:29 PM

## 2022-04-14 NOTE — ED NOTES
"Cleaned and changed patient. patient became agitated and aggressive. Yelled \"stop touching me\" and \"leave me alone\" while patient was getting cleaned up. When rolling the patient their side he attempted to punch staff and yelled \"leave me alone\", patient punched side rail.    "

## 2022-04-14 NOTE — ED NOTES
Paged Dr. Jules update pt is very agitated, trying to get out of bed, grabbing Hospital workers. Pt taking of clothes. Pt is currently on 1 to 1.

## 2022-04-14 NOTE — ED NOTES
Pt now trying to get OOB and pulling on lines and necessary medical equipment. Unable to re-orient pt. ER Charge RN aware. 1:1 status initiated.

## 2022-04-14 NOTE — ED NOTES
Pt awake trying to get OOB. Noted blood pressure still elevated--see MAR. Reorient pt, give pt back rub and assist pt to reposition in bed.

## 2022-04-14 NOTE — H&P
[facility]    History and Physical - Hospitalist Service       Date of Admission:  4/13/2022    Assessment & Plan      Laz Kingston is a 87 year old male admitted on 4/13/2022. He was brought to the emergency department by ambulance for evaluation of increased weakness, refusing to get out of bed, not his usual self.  Daughter has COVID.    # Confirmed COVID-19 infection    # Acute Metabolic Encephalopathy     Symptom Onset 1 to 2 days prior to arrival   Date of 1st Positive Test 4/13/2023   Vaccination Status Please review vaccine status on [employer]       - COVID-19 special precautions, continuous pulse-ox  - Oxygen: continue current support with nasal cannula at 2 L/min; titrate to keep SpO2 between 90-96%  - Labs: Standard COVID admission labs ordered (CBC with diff, CMP, INR, D-dimer, CRP).   - Imaging: no additional imaging needed at this time  - Breathing treatments: no inhalers needed; avoid nebulizers in favor of MDIs   - IV fluids: not indicated at this time  - Antibiotics: Indicated for acute cystitis   - COVID-Focused Medications: Remdesivir x 3 days or until hospital discharge, started on 4/13/2022  - DVT Prophylaxis:         - At high risk of thrombotic complications due to COVID-19 (DDimer = 0.55 ug/mL FEU (Ref range: 0.00 - 0.50 ug/mL FEU) )         - PROPHYLACTIC dosing: heparin 5000 units every 8 hours    #Dementia with behavioral disturbances  Seen by neurology on 4/13/2022, family decreased dose of Seroquel due to sedation and given encephalopathy/lethargy will changed to as needed  Continue Namenda and Exelon patch  Family was recommended to look into memory care unit    #Acute cystitis without hematuria  Continue IV ceftriaxone  Follow-up urine culture    #Hypertensive urgency  Probable noncompliance secondary to acute illness  Resume PTA amlodipine, hydralazine, metoprolol    #Type 2 diabetes mellitus with long-term insulin  Monitor with insulin sliding scale  and hypoglycemia protocol  Will hold PTA medications until patient is more alert and eating well    #Chronic kidney disease stage IV  Stable renal function, will avoid nephrotoxins    #Hypokalemia  Replace per protocol    #Chronic microcytic anemia  Stable hemoglobin without signs of acute blood loss    #Dyslipidemia  Continued PTA simvastatin    #GERD  Continue PTA Protonix    #Incidental radiographic findings  Left retro-orbital soft tissue, nonemergent evaluation with MRI recommended  Right lower lobe nodular consolidation, renal failure precludes use of IV contrast  I discussed these findings with patient's spouse     Diet: Combination Diet Moderate Consistent Carb (60 g CHO per Meal) Diet; Low Saturated Fat Na <2400mg Diet    DVT Prophylaxis: Heparin SQ  Godoy Catheter: Not present  Central Lines: None  Cardiac Monitoring: None  Code Status: No CPR- Do NOT Intubate      Clinically Significant Risk Factors Present on Admission         # Hypernatremia: Na = 145 mmol/L (Ref range: 136 - 145 mmol/L) on admission, will monitor as appropriate  # Hypercalcemia: Ca = 10.2 mg/dL (Ref range: 8.5 - 10.5 mg/dL) and/or iCa = N/A on admission, will monitor as appropriate       # Platelet Defect: home medication list includes an antiplatelet medication   # Overweight: Estimated body mass index is 28.21 kg/m  as calculated from the following:    Height as of an earlier encounter on 4/13/22: 1.829 m (6').    Weight as of this encounter: 94.3 kg (208 lb).      Disposition Plan   Expected Discharge:  after at least 2 midnights  Anticipated discharge location:  Awaiting care coordination huddle  Delays:    Encephalopathy       The patient's care was discussed with the Bedside Nurse and Patient's Family.    Meng Jules MD  Hospitalist Service  [facility]  Securely message with the Vocera Web Console (learn more here)  Text page via [employer] Paging/Directory          ______________________________________________________________________    Chief Complaint   Increased weakness, refusing to get out of bed, not his usual self    History is obtained from the electronic health record, emergency department physician and patient's spouse    History of Present Illness   Laz Kingston is a 87 year old male who was brought to the emergency department for evaluation of above chief complaint.  Patient is lethargic and unable to provide any meaningful history, I spoke with his wife Patricia Kingston at [phone].  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  Patient lives with family and a household member tested positive for COVID.  Patient is followed by neurology, recently started on Seroquel but family noted some sedation so he is only getting 12.5 mg at bedtime.  However, he has been increasingly weak and did not wanted to get out of bed.  Upon ED arrival, temperature was 101.2  F, the lowest oxygen saturation was 90% and he was placed on supplemental oxygen.    Review of Systems    Review of systems not obtained due to patient factors - mental status    Past Medical History    I have reviewed this patient's medical history and updated it with pertinent information if needed.   Past Medical History:   Diagnosis Date     Acute renal failure with acute tubular necrosis superimposed on stage 3 chronic kidney disease (H) 5/2/2021     Alzheimer's dementia (H)      BCE (basal cell epithelioma) 10/19/2015     Diabetes mellitus (H)      Dysplastic nevus 10/19/2015     GERD (gastroesophageal reflux disease)      Hyperlipidemia      Hypertension      Onychomycosis      Personal history of prostate cancer      Splenomegaly 01/10/2017       Past Surgical History   I have reviewed this  patient's surgical history and updated it with pertinent information if needed.  Past Surgical History:   Procedure Laterality Date     CYSTOSCOPY, TRANSURETHRAL RESECTION (TUR) TUMOR BLADDER, COMBINED N/A 2021    Procedure: CYSTOSCOPY, WITH TRANSURETHRAL RESECTION BLADDER TUMOR;  Surgeon: Amos Ackerman MD;  Location: [facility]     CYSTOURETEROSCOPY, WITH LITHOTRIPSY USING ITZEL 120P LASER AND URETERAL STENT INSERTION Right 2021    Procedure: RIGHT URETEROSCOPY, LASER LITHOTRIPSY AND RIGHT URETERAL STENT EXCHANGE;  Surgeon: Amos Ackerman MD;  Location: [facility]     HC CYSTOSCOPY,INSERT URETERAL STENT Right 2021    Procedure: CYSTOSCOPY, WITH URETERAL STENT INSERTION;  Surgeon: Amos Ackerman MD;  Location: [facility];  Service: Urology     IR MISCELLANEOUS PROCEDURE  3/13/2014     IR URETER DILATION BILATERAL  3/13/2014     JOINT REPLACEMENT Bilateral     Both knees     VA ARTHROPLASTY TIBIAL PLATEAU      Description: Knee Replacement;  Recorded: 2013;     PROSTATECTOMY         Social History   I have reviewed this patient's social history and updated it with pertinent information if needed.  Social History     Tobacco Use     Smoking status: Former Smoker     Years: 4.00     Types: Cigarettes     Smokeless tobacco: Never Used     Tobacco comment: Quit prior to .   Substance Use Topics     Alcohol use: No     Comment: Alcoholic Drinks/day: Never a problem for him, he states.     Drug use: No       Family History   I have reviewed this patient's family history and updated it with pertinent information if needed.  Family History   Problem Relation Age of Onset     Alzheimer Disease Mother      Coronary Artery Disease Father      Heart Failure Mother      Heart Disease Father 56.00        Two heart attacks     Heart Failure Sister          at 47.     Kidney Disease Sister      Diabetes Daughter      No Known Problems Son      No Known Problems  Son      Diabetes Daughter        Prior to Admission Medications   Prior to Admission Medications   Prescriptions Last Dose Informant Patient Reported? Taking?   CONTOUR NEXT TEST test strip   No No   Si strip by In Vitro route 2 times daily   LANTUS SOLOSTAR 100 UNIT/ML soln 2022 at hs  No Yes   Sig: Inject 26 Units Subcutaneous At Bedtime Please dispense with applicable needles/ syringes/ alcohol swabs.   QUEtiapine (SEROQUEL) 25 MG tablet 2022 at hs  No Yes   Sig: Take 0.5 tablets (12.5 mg) by mouth At Bedtime   TRULICITY 1.5 MG/0.5ML pen 2022  No Yes   Sig: Inject 1.5 mg Subcutaneous every 7 days   Patient taking differently: Inject 1.5 mg Subcutaneous every 7 days On    amLODIPine (NORVASC) 10 MG tablet 2022 at am  No Yes   Sig: Take 1 tablet (10 mg) by mouth daily   aspirin (ASA) 81 MG chewable tablet 2022 at am  Yes Yes   Sig: Take 81 mg by mouth   glimepiride (AMARYL) 4 MG tablet 2022 at am  No Yes   Sig: Take 1 tablet (4 mg) by mouth every morning (before breakfast)   hydrALAZINE (APRESOLINE) 100 MG tablet 2022 at 1300  No Yes   Sig: Take 1 tablet (100 mg) by mouth 4 times daily   memantine (NAMENDA) 10 MG tablet 2022 at am  No Yes   Sig: Take 1 tablet (10 mg) by mouth 2 times daily   metoprolol succinate ER (TOPROL-XL) 50 MG 24 hr tablet 2022 at am  No Yes   Sig: Take 1 tablet (50 mg) by mouth daily   multivitamin, therapeutic (THERA-VIT) TABS tablet 2022 at am  Yes Yes   Sig: Take 1 tablet by mouth daily   omeprazole (PRILOSEC) 20 MG DR capsule 2022 at am  No Yes   Sig: Take 1 capsule (20 mg) by mouth daily   rivastigmine (EXELON) 13.3 MG/24HR 24 hr patch 2022 at am  No Yes   Sig: Apply 1 patch topically daily   simvastatin (ZOCOR) 20 MG tablet 2022 at hs  No Yes   Sig: Take 1 tablet (20 mg) by mouth every evening      Facility-Administered Medications: None     Allergies   Allergies   Allergen Reactions     Ibuprofen Rash and GI  Disturbance       Physical Exam   Vital Signs: Temp: (!) 101.2  F (38.4  C) Temp src: Oral BP: (!) 196/95 Pulse: 77   Resp: 17 SpO2: 98 % O2 Device: Nasal cannula Oxygen Delivery: 2 LPM  Weight: 208 lbs 0 oz    Constitutional: fatigued and no apparent distress  Eyes: pupils equal, round and reactive to light  Respiratory: no increased work of breathing, good air exchange and clear to auscultation  Cardiovascular: regular rate and rhythm and normal S1 and S2  GI: normal bowel sounds, soft and non-tender  Skin: no bruising or bleeding and no redness, warmth, or swelling  Musculoskeletal: Trace edema of lower extremities,  there is no redness, warmth, or swelling of the joints  Neurologic: Mental Status Exam:  Level of Alertness:   lethargic  Motor Exam: Moves all 4 extremities spontaneously    Data   Data reviewed today: I reviewed all medications, new labs and imaging results over the last 24 hours. I personally reviewed no images or EKG's today.    Recent Labs   Lab 04/13/22 2006   WBC 5.3   HGB 10.9*   MCV 75*         POTASSIUM 3.2*   CHLORIDE 111*   CO2 21*   BUN 47*   CR 2.26*   ANIONGAP 13   DEJAN 10.2   GLC 89   ALBUMIN 3.7   PROTTOTAL 7.1   BILITOTAL 0.4   ALKPHOS 49   ALT 17   AST 24     Recent Results (from the past 24 hour(s))   Head CT w/o contrast    Narrative    EXAM: CT HEAD W/O CONTRAST  LOCATION: [facility]  DATE/TIME: 4/13/2022 9:40 PM    INDICATION: Mental status change, unknown cause  COMPARISON: Brain MRI 02/12/2014  TECHNIQUE: Routine CT Head without IV contrast. Multiplanar reformats. Dose reduction techniques were used.    FINDINGS:  INTRACRANIAL CONTENTS: No intracranial hemorrhage. Mild diffuse cerebral parenchymal volume loss. No midline shift. The basilar cisterns are patent. Mild periventricular and scattered foci of deep white matter hypodensities involving both cerebral   hemispheres. No CT evidence for an acute infarct. Small retrocerebellar  arachnoid cyst.    VISUALIZED ORBITS/SINUSES/MASTOIDS: Prior bilateral cataract surgery. 5.4 x 8.1 mm soft tissue involving the medial left retro-orbital soft tissue. Small mucous retention cysts in the maxillary sinus. No middle ear or mastoid effusion.    BONES/SOFT TISSUES: No acute abnormality.      Impression    IMPRESSION:  1.  No intracranial hemorrhage, mass effect, hydrocephalus or CT evidence for an acute infarct.  2.  Mild diffuse cerebral parenchymal volume loss. Presumed chronic hypertensive/microvascular white matter changes.  3.  5.4 x 8.1 mm soft tissue involving the medial left retro-orbital soft tissue. This has nonaggressive-appearing features. This can be further evaluated with orbits MRI.   XR Chest 1 View    Narrative    EXAM: XR CHEST 1 VIEW  LOCATION: [facility]  DATE/TIME: 4/13/2022 9:40 PM    INDICATION: Fevers, Covid exposure  COMPARISON: None.      Impression    IMPRESSION: Probable small left effusion and associated atelectasis. The right lower lobe there is a dense 3.6 x 3.1 cm nodular consolidation. Recommend further evaluation with chest CT with contrast. No pneumothorax. Heart size at the upper limits of   normal. No pulmonary edema. Degenerative change of the bilateral shoulders.

## 2022-04-14 NOTE — PROGRESS NOTES
[facility]    Medicine Progress Note - Hospitalist Service    Date of Admission:  4/13/2022    Assessment & Plan      Laz Kingston is a 87 year old male admitted on 4/13/2022. He was brought to the emergency department by ambulance for evaluation of increased weakness, refusing to get out of bed, not his usual self.  Daughter has COVID.    Confirmed COVID-19 infection   Admitted with several days of being less active, bedbound and being more confused.  Exposure to daughter who tested positive for Covid.  First positive test; 04/13/2022  - COVID-19 special precautions, continuous pulse-ox  - Oxygen: continue current support with nasal cannula at 2 L/min; titrate to keep SpO2 between 90-96%  - Labs: Standard COVID admission labs ordered (CBC with diff, CMP, INR, D-dimer, CRP).   - Imaging: no additional imaging needed at this time  - Breathing treatments: no inhalers needed; avoid nebulizers in favor of MDIs   - IV fluids: not indicated at this time  - Antibiotics: Indicated for acute cystitis   - COVID-Focused Medications: Remdesivir x 3 days or until hospital discharge, started on 4/13/2022; dexamethasone 6 mg daily-  - DVT Prophylaxis:  At risk of thrombotic complications due to COVID-19 (DDimer = 0.55 ug/mL FEU (Ref range: 0.00 - 0.50 ug/mL FEU) )         - PROPHYLACTIC dosing: heparin 5000 units every 8 hours    Acute metabolic encephalopathy  Present on admission; likely due to underlying Covid infection and dementia  -CT scan with no acute intracranial pathologies   - Delirium precautions  - Labs reviewed, liver panel within normal limits, BNP with stable CKD  - UA concerning for UTI; on antibiotics  - Replete potassium as needed  - Patient was given Haldol 5 mg IV while in the ED, will hold off on additional doses given somnolence  -1:1 sitter for safety in place    Advanced Alzheimer's dementia with behavioral disturbances  Seen by neurology on 4/13/2022, family decreased dose  of Seroquel due to sedation and given encephalopathy/lethargy will changed to as needed  - Continue Namenda and Exelon patch  - Family was recommended to look into memory care unit  - Psychiatry consulted    Acute cystitis without hematuria  -Urinalysis with cloudy urine with significant leukoesterase WBCs many bacteria and WBC clumps   -Urine cultures pending; follow  -Continue IV ceftriaxone    Hypertensive urgency  -Probable noncompliance secondary to acute illness  -Resume PTA amlodipine, hydralazine, metoprolol when able to take p.o.  -IV hydralazine available as needed for SBP >180    Type 2 diabetes mellitus with long-term insulin  -Home regimen with in glimepiride, Trulicity , and Lantus 25 nightly.  -Hold home regimen until patient is more alert and eating well  -Monitor with insulin sliding scale  -Hypoglycemia protocol    Chronic kidney disease stage IV  Stable renal function  -Will avoid nephrotoxins    Hypokalemia  Replace per protocol    Chronic microcytic anemia  Stable hemoglobin without signs of acute blood loss    Dyslipidemia  Continued PTA simvastatin    GERD  Continue PTA Protonix    Incidental radiographic findings  Left retro-orbital soft tissue, nonemergent evaluation with MRI recommended    Right lower lobe nodular consolidation, appears chronic since at least 2008, may have increased in size.  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   -Nonurgent chest CT recommended.         Diet: Combination Diet Moderate Consistent Carb (60 g CHO per Meal) Diet; Low Saturated Fat Na <2400mg Diet    DVT Prophylaxis: Heparin SQ  Godoy Catheter: Not present  Central Lines: None  Cardiac Monitoring: None  Code Status: No CPR- Do NOT Intubate      Clinically Significant Risk Factors Present on Admission         Disposition Plan   Expected Discharge: 04/18/2022 after at least 2 midnights  Anticipated discharge location: home with family;home with help/services    Delays:    Encephalopathy     The patient's care was  discussed with the Bedside Nurse and Patient's Family.    Adriana Nunez MD  Hospitalist Service  [facility]  Securely message with the Vocera Web Console (learn more here)  Text page via [employer]/Directory   ________________________________________________________________The patient's care was discussed with the Bedside Nurse.    Adriana Nunez MD  Hospitalist Service  [facility]  Securely message with the Vocera Web Console (learn more here)  Text page via [employer]         Clinically Significant Risk Factors Present on Admission                # Platelet Defect: home medication list includes an antiplatelet medication   # Overweight: Estimated body mass index is 26.68 kg/m  as calculated from the following:    Height as of an earlier encounter on 4/13/22: 1.829 m (6').    Weight as of this encounter: 89.2 kg (196 lb 11.2 oz).      ______________________________________________________________________    Interval History   Patient admitted overnight with Covid infection and acute metabolic encephalopathy.  Care team notes reviewed  At patient's bedside, patient is somnolent but arousable.  Unable to participate in verbal communication with me as he falls back to sleep fairly easily.    Data reviewed today: I reviewed all medications, new labs and imaging results over the last 24 hours.     Physical Exam   Vital Signs: Temp: 98.9  F (37.2  C) Temp src: Oral BP: (!) 156/74 Pulse: 82   Resp: 16 SpO2: 98 % O2 Device: Nasal cannula Oxygen Delivery: 2 LPM  Weight: 196 lbs 11.2 oz  General Appearance: Somnolent  Respiratory: Breathing comfortably on 2 L nasal cannula, has bilateral diminished breath sounds, no wheezing  Cardiovascular: RRR, no rubs murmurs  GI: Abdomen is soft, nontender  Skin: Has widespread hyperpigmented thickened skin lesions/nodules mostly on his trunk and back  Neuro: Somnolent but arousable, non-conversant, no  overt focal neurological deficits    Data   Recent Labs   Lab 04/14/22  1622 04/14/22  1326 04/14/22  0835 04/14/22  0335 04/13/22 2006   WBC  --   --   --  4.4 5.3   HGB  --   --   --  9.2* 10.9*   MCV  --   --   --  76* 75*   PLT  --   --   --  149* 161   NA  --   --   --  143 145   POTASSIUM  --   --   --  3.6 3.2*   CHLORIDE  --   --   --  112* 111*   CO2  --   --   --  17* 21*   BUN  --   --   --  46* 47*   CR  --   --   --  2.19* 2.26*   ANIONGAP  --   --   --  14 13   DEJAN  --   --   --  9.0 10.2   * 168* 243* 194* 89   ALBUMIN  --   --   --   --  3.7   PROTTOTAL  --   --   --   --  7.1   BILITOTAL  --   --   --   --  0.4   ALKPHOS  --   --   --   --  49   ALT  --   --   --   --  17   AST  --   --   --   --  24

## 2022-04-14 NOTE — ED PROVIDER NOTES
EMERGENCY DEPARTMENT ENCOUNTER      NAME: Laz Kingston  AGE: 87 year old male  YOB: 1935  MRN: [id number]  EVALUATION DATE & TIME: 4/13/2022  7:48 PM    PCP: Bianca Rodriguez    ED PROVIDER: Tom Gant M.D.      Chief Complaint   Patient presents with     Fever     Generalized Weakness         FINAL IMPRESSION:  UTI  SIRS  Covid  ED COURSE & MEDICAL DECISION MAKING:    Pertinent Labs & Imaging studies reviewed. (See chart for details)  87 year old male presents to the Emergency Department for evaluation of decreased activities and increased confusion.  Patient comes from home where family members care from due to his advanced dementia.  Per report he has been much less active and wanted to stay in bed more over the last couple of days.  Also seemed more confused.  Paramedics transferred patient provide his history.  Patient providing no given his marked dementia.  He does follow simple commands occasionally.  On arrival patiently markedly febrile with a temperature 102.2.  Blood pressure also slightly elevated.  Oxygenation adequate at 92% on room air.  No face is slightly flushed.  Conjunctiva injected.  Lungs slightly diminished but clear.  Cardiac exam unremarkable.  Abdomen soft and nontender.  Per report patient with recent exposure to daughter who tested positive for Covid.  We will proceed with Covid swabbing.  Blood culture was ultimately obtained.  Intravenous fluids being initiated.  We will also proceed with blood cultures along with urine analysis.  Rectal Tylenol also ordered.     8:09 PM I met with the patient for the initial interview and physical examination. Discussed plan for treatment and workup in the ED.    9:41 PM.  Urine analysis with suggestion of infection.  Marked pyuria and bacteriuria.  Rocephin will be initiated.  D-dimer minimally elevated but within normal limits when corrected for age.  BC with normal white cell count.  Troponin also normal.  Patient with  slight hypokalemia.  Has had this previously.  Chronic kidney injury also noted with BUN of 47 creatinine 2.26.  Supplemental potassium will be given.  Liver functions unremarkable.  9:45 PM.  CT of head reviewed.  No obvious acute findings.  Ventriculomegaly noted.  10:13 PM.  Patient discussed with Dr. Jules.  He is agreeable plan for admission.  Patient be full admit.    At 10:41 PM.  Covid swab returns positive.  Call placed to admitting physician to update him.   10:45 PM.  Hospitalist updated on positive Covid swab.  Dexamethasone ordered.  Will discuss remdesivir with pharmacy.      At the conclusion of the encounter I discussed the results of all of the tests and the disposition. The questions were answered and return precautions provided. The patient or family acknowledged understanding and was agreeable with the care plan.       PPE: Provider wore gloves, N95 mask, eye protection, gown, surgical cap, and paper mask.     MEDICATIONS GIVEN IN THE EMERGENCY:  Medications - No data to display    NEW PRESCRIPTIONS STARTED AT TODAY'S ER VISIT  New Prescriptions    No medications on file          =================================================================    HPI    HPI LIMITED DUE TO DEMENTIA    Patient information was obtained from: EMS    Use of Intrepreter: N/A       Laz Kingston is a 87 year old male with a pertient medical history of type II diabetes mellitus, hypercholesterolemia, late onset alzheimer's with behavioral disturbance, hypertension, stage 3 CKD, and nephrolithiasis, who presents to the ED for evaluation of fever and generalized weakness.     Per EMS, the patient has had increasing confusion and weakness in the last week. The patient's family reported that the patient has underlying dementia, but over the last few days he has not been wanting to get out of bed or interact with anyone. Patient unwilling to provide any information in the ED. His daughter recently tested positive for  COVID-19, but his COVID-19 status is unknown.       REVIEW OF SYSTEMS   ROS LIMITED DUE TO DEMENTIA  Constitutional:  Denies fever, chills  Respiratory:  Denies productive cough or increased work of breathing  Cardiovascular:  Denies chest pain, palpitations  GI:  Denies abdominal pain, nausea, vomiting, or change in bowel or bladder habits   Musculoskeletal:  Denies any new muscle/joint swelling  Skin:  Denies rash   Neurologic:  Denies focal weakness  All systems negative except as marked.     PAST MEDICAL HISTORY:  Past Medical History:   Diagnosis Date     Acute renal failure with acute tubular necrosis superimposed on stage 3 chronic kidney disease (H) 5/2/2021     Alzheimer's dementia (H)      BCE (basal cell epithelioma) 10/19/2015     Diabetes mellitus (H)      Dysplastic nevus 10/19/2015     GERD (gastroesophageal reflux disease)      Hyperlipidemia      Hypertension      Onychomycosis      Personal history of prostate cancer      Splenomegaly 01/10/2017       PAST SURGICAL HISTORY:  Past Surgical History:   Procedure Laterality Date     CYSTOSCOPY, TRANSURETHRAL RESECTION (TUR) TUMOR BLADDER, COMBINED N/A 8/5/2021    Procedure: CYSTOSCOPY, WITH TRANSURETHRAL RESECTION BLADDER TUMOR;  Surgeon: Amos Ackerman MD;  Location: [facility]     CYSTOURETEROSCOPY, WITH LITHOTRIPSY USING ITZEL 120P LASER AND URETERAL STENT INSERTION Right 8/5/2021    Procedure: RIGHT URETEROSCOPY, LASER LITHOTRIPSY AND RIGHT URETERAL STENT EXCHANGE;  Surgeon: Amos Ackerman MD;  Location: [facility]     HC CYSTOSCOPY,INSERT URETERAL STENT Right 7/4/2021    Procedure: CYSTOSCOPY, WITH URETERAL STENT INSERTION;  Surgeon: Amos Ackerman MD;  Location: [facility];  Service: Urology     IR MISCELLANEOUS PROCEDURE  3/13/2014     IR URETER DILATION BILATERAL  3/13/2014     JOINT REPLACEMENT Bilateral     Both knees     MD ARTHROPLASTY TIBIAL PLATEAU      Description: Knee Replacement;  Recorded:  11/06/2013;     PROSTATECTOMY           CURRENT MEDICATIONS:    No current facility-administered medications for this encounter.    Current Outpatient Medications:      amLODIPine (NORVASC) 10 MG tablet, Take 1 tablet (10 mg) by mouth daily, Disp: 90 tablet, Rfl: 3     aspirin (ASA) 81 MG chewable tablet, Take 81 mg by mouth, Disp: , Rfl:      CONTOUR NEXT TEST test strip, 1 strip by In Vitro route 2 times daily, Disp: 200 strip, Rfl: 0     glimepiride (AMARYL) 4 MG tablet, Take 1 tablet (4 mg) by mouth every morning (before breakfast), Disp: 90 tablet, Rfl: 3     hydrALAZINE (APRESOLINE) 100 MG tablet, Take 1 tablet (100 mg) by mouth 4 times daily, Disp: 360 tablet, Rfl: 3     LANTUS SOLOSTAR 100 UNIT/ML soln, Inject 26 Units Subcutaneous At Bedtime Please dispense with applicable needles/ syringes/ alcohol swabs., Disp: 15 mL, Rfl: 3     memantine (NAMENDA) 10 MG tablet, Take 1 tablet (10 mg) by mouth 2 times daily, Disp: 60 tablet, Rfl: 11     metoprolol succinate ER (TOPROL-XL) 50 MG 24 hr tablet, Take 1 tablet (50 mg) by mouth daily, Disp: 90 tablet, Rfl: 3     multivitamin, therapeutic (THERA-VIT) TABS tablet, Take 1 tablet by mouth daily, Disp: , Rfl:      NOVOLOG FLEXPEN 100 UNIT/ML soln, , Disp: , Rfl:      omeprazole (PRILOSEC) 20 MG DR capsule, Take 1 capsule (20 mg) by mouth daily, Disp: 90 capsule, Rfl: 3     polyethylene glycol (MIRALAX) 17 GM/Dose powder, Take 17 g by mouth daily, Disp: 510 g, Rfl: 0     QUEtiapine (SEROQUEL) 25 MG tablet, Take 0.5 tablets (12.5 mg) by mouth At Bedtime, Disp: 30 tablet, Rfl: 11     rivastigmine (EXELON) 13.3 MG/24HR 24 hr patch, Apply 1 patch topically daily, Disp: 30 patch, Rfl: 11     simvastatin (ZOCOR) 20 MG tablet, Take 1 tablet (20 mg) by mouth every evening, Disp: 90 tablet, Rfl: 3     TRULICITY 1.5 MG/0.5ML pen, Inject 1.5 mg Subcutaneous every 7 days, Disp: 6 mL, Rfl: 1    ALLERGIES:  Allergies   Allergen Reactions     Ibuprofen Rash and GI Disturbance        FAMILY HISTORY:  Family History   Problem Relation Age of Onset     Alzheimer Disease Mother      Coronary Artery Disease Father      Heart Failure Mother      Heart Disease Father 56.00        Two heart attacks     Heart Failure Sister          at 47.     Kidney Disease Sister      Diabetes Daughter      No Known Problems Son      No Known Problems Son      Diabetes Daughter        SOCIAL HISTORY:   Social History     Socioeconomic History     Marital status:    Tobacco Use     Smoking status: Former Smoker     Years: 4.00     Types: Cigarettes     Smokeless tobacco: Never Used     Tobacco comment: Quit prior to .   Substance and Sexual Activity     Alcohol use: No     Comment: Alcoholic Drinks/day: Never a problem for him, he states.     Drug use: No   Social History Narrative    Should be using walker with ambulation. Patient does not use any home oxygen. Lives with wife.        VITALS:  Patient Vitals for the past 24 hrs:   BP Temp Temp src Pulse Resp SpO2 Weight   22 (!) 212/91 -- -- 87 17 93 % --   22 -- (!) 101.2  F (38.4  C) Oral -- -- -- 94.3 kg (208 lb)   22 (!) 216/87 -- -- 88 17 96 % --        PHYSICAL EXAM    Constitutional:  Elderly male, moderate distress  HENT:  Normocephalic, Atraumatic. Bilateral external ears normal. Oropharynx moist. Nose normal. Neck- Normal range of motion with no guarding, No midline cervical tenderness, Supple, No stridor.   Eyes:  PERRL, EOMI with no signs of entrapment, Conjunctiva injected, No discharge.   Respiratory:  Diminished breath sounds, No respiratory distress, No wheezing.    Cardiovascular:  Normal heart rate, Normal rhythm, No appreciable rubs or gallops.   GI:  Soft, No tenderness, No distension, No palpable masses  Musculoskeletal:   No edema. Good range of motion in all major joints. No tenderness to palpation or major deformities noted.  Integument:  Warm, Dry, No erythema, No rash.   Neurologic:   Nonverbal, looks blankly around the room.      LAB:  All pertinent labs reviewed and interpreted.  Results for orders placed or performed during the hospital encounter of 04/13/22   Head CT w/o contrast     Status: None    Narrative    EXAM: CT HEAD W/O CONTRAST  LOCATION: [facility]  DATE/TIME: 4/13/2022 9:40 PM    INDICATION: Mental status change, unknown cause  COMPARISON: Brain MRI 02/12/2014  TECHNIQUE: Routine CT Head without IV contrast. Multiplanar reformats. Dose reduction techniques were used.    FINDINGS:  INTRACRANIAL CONTENTS: No intracranial hemorrhage. Mild diffuse cerebral parenchymal volume loss. No midline shift. The basilar cisterns are patent. Mild periventricular and scattered foci of deep white matter hypodensities involving both cerebral   hemispheres. No CT evidence for an acute infarct. Small retrocerebellar arachnoid cyst.    VISUALIZED ORBITS/SINUSES/MASTOIDS: Prior bilateral cataract surgery. 5.4 x 8.1 mm soft tissue involving the medial left retro-orbital soft tissue. Small mucous retention cysts in the maxillary sinus. No middle ear or mastoid effusion.    BONES/SOFT TISSUES: No acute abnormality.      Impression    IMPRESSION:  1.  No intracranial hemorrhage, mass effect, hydrocephalus or CT evidence for an acute infarct.  2.  Mild diffuse cerebral parenchymal volume loss. Presumed chronic hypertensive/microvascular white matter changes.  3.  5.4 x 8.1 mm soft tissue involving the medial left retro-orbital soft tissue. This has nonaggressive-appearing features. This can be further evaluated with orbits MRI.   XR Chest 1 View     Status: None    Narrative    EXAM: XR CHEST 1 VIEW  LOCATION: [facility]  DATE/TIME: 4/13/2022 9:40 PM    INDICATION: Fevers, Covid exposure  COMPARISON: None.      Impression    IMPRESSION: Probable small left effusion and associated atelectasis. The right lower lobe there is a dense 3.6 x 3.1 cm nodular  consolidation. Recommend further evaluation with chest CT with contrast. No pneumothorax. Heart size at the upper limits of   normal. No pulmonary edema. Degenerative change of the bilateral shoulders.   [place] Draw     Status: None    Narrative    The following orders were created for panel order [place] Draw.  Procedure                               Abnormality         Status                     ---------                               -----------         ------                     Extra Blue Top Tube[[id number]]                              Final result               Extra Red Top Tube[[id number]]                               Final result               Extra Green Top (Lithium...[[id number]]                      Final result               Extra Purple Top Tube[[id number]]                            Final result                 Please view results for these tests on the individual orders.   Lactic acid whole blood     Status: Normal   Result Value Ref Range    Lactic Acid 0.9 0.7 - 2.0 mmol/L   Extra Blue Top Tube     Status: None   Result Value Ref Range    Hold Specimen JIC    Extra Red Top Tube     Status: None   Result Value Ref Range    Hold Specimen JIC    Extra Green Top (Lithium Heparin) Tube     Status: None   Result Value Ref Range    Hold Specimen JIC    Extra Purple Top Tube     Status: None   Result Value Ref Range    Hold Specimen JIC    Comprehensive metabolic panel     Status: Abnormal   Result Value Ref Range    Sodium 145 136 - 145 mmol/L    Potassium 3.2 (L) 3.5 - 5.0 mmol/L    Chloride 111 (H) 98 - 107 mmol/L    Carbon Dioxide (CO2) 21 (L) 22 - 31 mmol/L    Anion Gap 13 5 - 18 mmol/L    Urea Nitrogen 47 (H) 8 - 28 mg/dL    Creatinine 2.26 (H) 0.70 - 1.30 mg/dL    Calcium 10.2 8.5 - 10.5 mg/dL    Glucose 89 70 - 125 mg/dL    Alkaline Phosphatase 49 45 - 120 U/L    AST 24 0 - 40 U/L    ALT 17 0 - 45 U/L    Protein Total 7.1 6.0 - 8.0 g/dL    Albumin 3.7 3.5 - 5.0 g/dL    Bilirubin Total 0.4 0.0 -  1.0 mg/dL    GFR Estimate 28 (L) >60 mL/min/1.73m2   Troponin I     Status: Normal   Result Value Ref Range    Troponin I 0.04 0.00 - 0.29 ng/mL   CRP inflammation     Status: Abnormal   Result Value Ref Range    CRP 3.6 (H) 0.0 - 0.8 mg/dL   UA with Microscopic reflex to Culture     Status: Abnormal    Specimen: Urine, Catheter   Result Value Ref Range    Color Urine Yellow Colorless, Straw, Light Yellow, Yellow    Appearance Urine Cloudy (A) Clear    Glucose Urine Negative Negative mg/dL    Bilirubin Urine Negative Negative    Ketones Urine Negative Negative mg/dL    Specific Gravity Urine 1.015 1.001 - 1.030    Blood Urine 0.03 mg/dL (A) Negative    pH Urine 6.0 5.0 - 7.0    Protein Albumin Urine 300  (A) Negative mg/dL    Urobilinogen Urine <2.0 <2.0 mg/dL    Nitrite Urine Negative Negative    Leukocyte Esterase Urine 500 Alfonso/uL (A) Negative    Bacteria Urine Many (A) None Seen /HPF    WBC Clumps Urine Present (A) None Seen /HPF    Mucus Urine Present (A) None Seen /LPF    RBC Urine 43 (H) <=2 /HPF    WBC Urine >182 (H) <=5 /HPF    Narrative    Urine Culture ordered based on laboratory criteria   D dimer quantitative     Status: Abnormal   Result Value Ref Range    D-Dimer Quantitative 0.55 (H) 0.00 - 0.50 ug/mL FEU    Narrative    This D-dimer assay is intended for use in conjunction with a clinical pretest probability assessment model to exclude pulmonary embolism (PE) and deep venous thrombosis (DVT) in outpatients suspected of PE or DVT. The cut-off value is 0.50 ug/mL FEU.   CBC with platelets and differential     Status: Abnormal   Result Value Ref Range    WBC Count 5.3 4.0 - 11.0 10e3/uL    RBC Count 4.72 4.40 - 5.90 10e6/uL    Hemoglobin 10.9 (L) 13.3 - 17.7 g/dL    Hematocrit 35.3 (L) 40.0 - 53.0 %    MCV 75 (L) 78 - 100 fL    MCH 23.1 (L) 26.5 - 33.0 pg    MCHC 30.9 (L) 31.5 - 36.5 g/dL    RDW 16.0 (H) 10.0 - 15.0 %    Platelet Count 161 150 - 450 10e3/uL    % Neutrophils 71 %    % Lymphocytes 16 %     % Monocytes 12 %    % Eosinophils 0 %    % Basophils 0 %    % Immature Granulocytes 1 %    NRBCs per 100 WBC 0 <1 /100    Absolute Neutrophils 3.8 1.6 - 8.3 10e3/uL    Absolute Lymphocytes 0.9 0.8 - 5.3 10e3/uL    Absolute Monocytes 0.6 0.0 - 1.3 10e3/uL    Absolute Eosinophils 0.0 0.0 - 0.7 10e3/uL    Absolute Basophils 0.0 0.0 - 0.2 10e3/uL    Absolute Immature Granulocytes 0.0 <=0.4 10e3/uL    Absolute NRBCs 0.0 10e3/uL   CBC with platelets differential     Status: Abnormal    Narrative    The following orders were created for panel order CBC with platelets differential.  Procedure                               Abnormality         Status                     ---------                               -----------         ------                     CBC with platelets and d...[[id number]]  Abnormal            Final result                 Please view results for these tests on the individual orders.     RADIOLOGY:  Reviewed all pertinent imaging. Please see official radiology report.  No orders to display         I, Joleen Joy, am serving as a scribe to document services personally performed by Tom Gant MD, based on my observation and the provider's statements to me. I, Tom Gant MD attest that Joleen Joy is acting in a scribe capacity, has observed my performance of the services and has documented them in accordance with my direction.    Tom Gant M.D.  Emergency Medicine  [facility] EMERGENCY DEPARTMENT     Tom Gant MD  04/13/22 [id number]       Tom Gant MD  04/13/22 [id number]

## 2022-04-14 NOTE — ED NOTES
Pt sleeping comfortably, RR 16, sating 91% on room air. Weaned Pt off 2L NC @ 1148, will continue to monitor.

## 2022-04-14 NOTE — ED NOTES
Bed: JNED-06  Expected date: 4/13/22  Expected time: 7:39 PM  Means of arrival: Ambulance  Comments:  [place] - weakness, altered?

## 2022-04-14 NOTE — CONSULTS
INITIAL PSYCHIATRIC CONSULTATION                  REASON FOR REQUEST: Acute delirium, dementia    ASSESSMENT/RECOMMENDATIONS/PLAN :     Metabolic encephalopathy likely in the context of a history of dementia, COVID-19, hospital environment, advanced age  Cognitive and behavior changes with agitation and restlessness.  History of dementia.,  Late onset Alzheimer's disease.  Mood changes likely in the context of COVID-19.    Recommendations:   Exelon patch 13.3 mg  Namenda 10 mg twice daily  Minimize sedating medications  Efforts or sleep regulation  Reassure, redirect and reorient frequently.            MENTAL STATUS EXAMINATION:   General Appearance: Stated age, resting in bed, unaware of his hospital environment, not responding to most questions.  Keeps his eyes closed.  Speech: Slow.  Thought Process: Disoriented.  Thought content: No evidence of acute psychosis, active hallucinations or delusions.  Thought Formation: No loosening of associations  Judgment: Depressed  Insight : Depressed  Attention : Fair, no fidgetiness  Memory: Patient does not respond to any memory related questions  Fund Of Knowledge: Depressed  Affect: Neutral  Mood: Congruent  Alert : Arousable  Orientation: X 1-2  Comprehension: Depressed  Generative thought content: Reduced  Language: Intact  Gait and Ambulation: Not tested, patient is sleeping does not want to sit up nor engage in conversation  Musculoskeletal: No tonal abnormality, no muscle rigidity  Vital Signs: BP (!) 176/78 (BP Location: Right arm, Patient Position: Semi-Chiang's, Cuff Size: Adult Large)   Pulse 92   Temp 99.2  F (37.3  C) (Oral)   Resp 18   Wt 89.2 kg (196 lb 11.2 oz)   SpO2 91%   BMI 26.68 kg/m        HISTORY OF PRESENT ILLNESS:   Presenting history to include: Per [facility]:    Laz Kingston is a 87 year old male who was brought to the emergency department for evaluation of above chief complaint.  Patient is lethargic and unable to provide any  meaningful history, I spoke with his wife Patircia Kingston at [phone].  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  Patient lives with family and a household member tested positive for COVID.  Patient is followed by neurology, recently started on Seroquel but family noted some sedation so he is only getting 12.5 mg at bedtime.  However, he has been increasingly weak and did not wanted to get out of bed.  Upon ED arrival, temperature was 101.2  F, the lowest oxygen saturation was 90% and he was placed on supplemental oxygen.  Upon assessment patient was noted to to be resting in bed with eyes closed.  He was in the ER room 6, unaware of his hospital environment.  He could not tell me how long he had been in that bed nor what facility.  He would barely open his eyes even when asked to, responding to questions minimally to none.  Could not provide any meaningful information.  Collateral information per review of chart.  It would appear that the patient was brought to the ER by ambulance as he was having increased weakness had refused to get out of bed.  He was diagnosed with COVID-19, and placed on 2 L/min oxygen per nasal cannula.  Patient was diagnosed with dementia per chart review, currently on medications managed by neurology.  Family had decreased the dose of Seroquel at home, last seen by neurology on 4/13/2022.  History is also significant for depression and anxiety, and home medications to include but not limited to Namenda 10 mg twice daily, Seroquel 12.5 mg at bedtime, Exelon patch 13.3 mg.  Reviewed outpatient neurology note,.  Patient's diagnosis is listed as late onset Alzheimer's disease with behavioral disturbance.  Seroquel dose was decreased to as a result of patient's sedation.  Family is recommended to  look into a memory care unit.  Adair Hernandez MD 4/13/2022  According to wife he continues to have behavioral issues but did not think it was major problem as most of the time he is minimally interactive and watches TV.  Work-up in the past included a brain PET scan that was consistent with Alzheimer's dementia.  Initially was started on Aricept but could not tolerate and was switched to Exelon and afterwards Namenda was added.  Unfortunately he continued to have cognitive decline.  At times he gets belligerent and threatens to hit his wife.  Wife has installed door alarms in the house as at times he wanders off.  Review of Systems:  As per HPI. Remainders of 12 point review of systems negative.  Psychiatric ROS:  Patient is not a reliable historian at present due to poor cognition.           PFSH reviewed  and not pertinent to chief complaint/reason for visit  BP (!) 211/89   Pulse 68   Temp 99.1  F (37.3  C) (Oral)   Resp 25   Wt 94.3 kg (208 lb)   SpO2 96%   BMI 28.21 kg/m    No results found for: AMPHET, PCP, BARBIT, OXYCODONE, THC, ETOH  @24HOURRESULTS@  Recent Results (from the past 72 hour(s))   Lactic acid whole blood    Collection Time: 04/13/22  8:06 PM   Result Value Ref Range    Lactic Acid 0.9 0.7 - 2.0 mmol/L   Extra Blue Top Tube    Collection Time: 04/13/22  8:06 PM   Result Value Ref Range    Hold Specimen JIC    Extra Red Top Tube    Collection Time: 04/13/22  8:06 PM   Result Value Ref Range    Hold Specimen JIC    Extra Green Top (Lithium Heparin) Tube    Collection Time: 04/13/22  8:06 PM   Result Value Ref Range    Hold Specimen JIC    Extra Purple Top Tube    Collection Time: 04/13/22  8:06 PM   Result Value Ref Range    Hold Specimen JIC    Comprehensive metabolic panel    Collection Time: 04/13/22  8:06 PM   Result Value Ref Range    Sodium 145 136 - 145 mmol/L    Potassium 3.2 (L) 3.5 - 5.0 mmol/L    Chloride 111 (H) 98 - 107 mmol/L    Carbon Dioxide (CO2) 21 (L) 22 - 31 mmol/L     Anion Gap 13 5 - 18 mmol/L    Urea Nitrogen 47 (H) 8 - 28 mg/dL    Creatinine 2.26 (H) 0.70 - 1.30 mg/dL    Calcium 10.2 8.5 - 10.5 mg/dL    Glucose 89 70 - 125 mg/dL    Alkaline Phosphatase 49 45 - 120 U/L    AST 24 0 - 40 U/L    ALT 17 0 - 45 U/L    Protein Total 7.1 6.0 - 8.0 g/dL    Albumin 3.7 3.5 - 5.0 g/dL    Bilirubin Total 0.4 0.0 - 1.0 mg/dL    GFR Estimate 28 (L) >60 mL/min/1.73m2   Troponin I    Collection Time: 04/13/22  8:06 PM   Result Value Ref Range    Troponin I 0.04 0.00 - 0.29 ng/mL   CRP inflammation    Collection Time: 04/13/22  8:06 PM   Result Value Ref Range    CRP 3.6 (H) 0.0 - 0.8 mg/dL   D dimer quantitative    Collection Time: 04/13/22  8:06 PM   Result Value Ref Range    D-Dimer Quantitative 0.55 (H) 0.00 - 0.50 ug/mL FEU   CBC with platelets and differential    Collection Time: 04/13/22  8:06 PM   Result Value Ref Range    WBC Count 5.3 4.0 - 11.0 10e3/uL    RBC Count 4.72 4.40 - 5.90 10e6/uL    Hemoglobin 10.9 (L) 13.3 - 17.7 g/dL    Hematocrit 35.3 (L) 40.0 - 53.0 %    MCV 75 (L) 78 - 100 fL    MCH 23.1 (L) 26.5 - 33.0 pg    MCHC 30.9 (L) 31.5 - 36.5 g/dL    RDW 16.0 (H) 10.0 - 15.0 %    Platelet Count 161 150 - 450 10e3/uL    % Neutrophils 71 %    % Lymphocytes 16 %    % Monocytes 12 %    % Eosinophils 0 %    % Basophils 0 %    % Immature Granulocytes 1 %    NRBCs per 100 WBC 0 <1 /100    Absolute Neutrophils 3.8 1.6 - 8.3 10e3/uL    Absolute Lymphocytes 0.9 0.8 - 5.3 10e3/uL    Absolute Monocytes 0.6 0.0 - 1.3 10e3/uL    Absolute Eosinophils 0.0 0.0 - 0.7 10e3/uL    Absolute Basophils 0.0 0.0 - 0.2 10e3/uL    Absolute Immature Granulocytes 0.0 <=0.4 10e3/uL    Absolute NRBCs 0.0 10e3/uL   Hemoglobin A1c    Collection Time: 04/13/22  8:06 PM   Result Value Ref Range    Hemoglobin A1C 6.3 (H) <=5.6 %   Symptomatic; Unknown Influenza A/B & SARS-CoV2 (COVID-19) Virus PCR Multiplex Nasopharyngeal    Collection Time: 04/13/22  8:45 PM    Specimen: Nasopharyngeal; Swab   Result Value Ref  Range    Influenza A PCR Negative Negative    Influenza B PCR Negative Negative    SARS CoV2 PCR Positive (A) Negative   UA with Microscopic reflex to Culture    Collection Time: 04/13/22  8:47 PM    Specimen: Urine, Catheter   Result Value Ref Range    Color Urine Yellow Colorless, Straw, Light Yellow, Yellow    Appearance Urine Cloudy (A) Clear    Glucose Urine Negative Negative mg/dL    Bilirubin Urine Negative Negative    Ketones Urine Negative Negative mg/dL    Specific Gravity Urine 1.015 1.001 - 1.030    Blood Urine 0.03 mg/dL (A) Negative    pH Urine 6.0 5.0 - 7.0    Protein Albumin Urine 300  (A) Negative mg/dL    Urobilinogen Urine <2.0 <2.0 mg/dL    Nitrite Urine Negative Negative    Leukocyte Esterase Urine 500 Alfonso/uL (A) Negative    Bacteria Urine Many (A) None Seen /HPF    WBC Clumps Urine Present (A) None Seen /HPF    Mucus Urine Present (A) None Seen /LPF    RBC Urine 43 (H) <=2 /HPF    WBC Urine >182 (H) <=5 /HPF   CBC with platelets    Collection Time: 04/14/22  3:35 AM   Result Value Ref Range    WBC Count 4.4 4.0 - 11.0 10e3/uL    RBC Count 3.94 (L) 4.40 - 5.90 10e6/uL    Hemoglobin 9.2 (L) 13.3 - 17.7 g/dL    Hematocrit 29.8 (L) 40.0 - 53.0 %    MCV 76 (L) 78 - 100 fL    MCH 23.4 (L) 26.5 - 33.0 pg    MCHC 30.9 (L) 31.5 - 36.5 g/dL    RDW 16.1 (H) 10.0 - 15.0 %    Platelet Count 149 (L) 150 - 450 10e3/uL   Basic metabolic panel    Collection Time: 04/14/22  3:35 AM   Result Value Ref Range    Sodium 143 136 - 145 mmol/L    Potassium 3.6 3.5 - 5.0 mmol/L    Chloride 112 (H) 98 - 107 mmol/L    Carbon Dioxide (CO2) 17 (L) 22 - 31 mmol/L    Anion Gap 14 5 - 18 mmol/L    Urea Nitrogen 46 (H) 8 - 28 mg/dL    Creatinine 2.19 (H) 0.70 - 1.30 mg/dL    Calcium 9.0 8.5 - 10.5 mg/dL    Glucose 194 (H) 70 - 125 mg/dL    GFR Estimate 29 (L) >60 mL/min/1.73m2   CRP inflammation    Collection Time: 04/14/22  3:35 AM   Result Value Ref Range    CRP 4.7 (H) 0.0 - 0.8 mg/dL   D dimer quantitative    Collection  Time: 04/14/22  3:35 AM   Result Value Ref Range    D-Dimer Quantitative 0.47 0.00 - 0.50 ug/mL FEU   Procalcitonin    Collection Time: 04/14/22  3:35 AM   Result Value Ref Range    Procalcitonin 0.10 0.00 - 0.49 ng/mL       PMH:   Past Medical History:   Diagnosis Date     Acute renal failure with acute tubular necrosis superimposed on stage 3 chronic kidney disease (H) 5/2/2021     Alzheimer's dementia (H)      BCE (basal cell epithelioma) 10/19/2015     Diabetes mellitus (H)      Dysplastic nevus 10/19/2015     GERD (gastroesophageal reflux disease)      Hyperlipidemia      Hypertension      Onychomycosis      Personal history of prostate cancer      Splenomegaly 01/10/2017           Current Medications:Scheduled Meds:    remdesivir  100 mg Intravenous Q24H    And     sodium chloride 0.9%  50 mL Intravenous Q24H     amLODIPine  10 mg Oral Daily     aspirin  81 mg Oral Daily     cefTRIAXone  1 g Intravenous Q24H     dexamethasone  6 mg Oral Daily     heparin ANTICOAGULANT  5,000 Units Subcutaneous Q8H OK     hydrALAZINE  100 mg Oral 4x Daily     insulin aspart  1-7 Units Subcutaneous TID AC     insulin aspart  1-5 Units Subcutaneous At Bedtime     memantine  10 mg Oral BID     metoprolol succinate ER  50 mg Oral Daily     multivitamin, therapeutic  1 tablet Oral Daily     pantoprazole  40 mg Oral QAM AC     rivastigmine  1 patch Topical Daily     simvastatin  20 mg Oral QPM     sodium chloride (PF)  3 mL Intracatheter Q8H     Continuous Infusions:    - MEDICATION INSTRUCTIONS -       PRN Meds:.acetaminophen **OR** acetaminophen, benztropine, glucose **OR** dextrose **OR** glucagon, haloperidol lactate, hydrALAZINE, labetalol, lidocaine 4%, lidocaine (buffered or not buffered), - MEDICATION INSTRUCTIONS -, ondansetron **OR** ondansetron, QUEtiapine, sodium chloride (PF)                Family History: PERSONALLY REVIEWED.  Family History   Problem Relation Age of Onset     Alzheimer Disease Mother      Coronary  Artery Disease Father      Heart Failure Mother      Heart Disease Father 56.00        Two heart attacks     Heart Failure Sister          at 47.     Kidney Disease Sister      Diabetes Daughter      No Known Problems Son      No Known Problems Son      Diabetes Daughter      Pertinent Family hx not pertinent to Chief Complaint or reason for visit.     Social History:  PERSONALLY REVIEWED.  Social History     Socioeconomic History     Marital status:      Spouse name: Not on file     Number of children: Not on file     Years of education: Not on file     Highest education level: Not on file   Occupational History     Not on file   Tobacco Use     Smoking status: Former Smoker     Years: 4.00     Types: Cigarettes     Smokeless tobacco: Never Used     Tobacco comment: Quit prior to .   Substance and Sexual Activity     Alcohol use: No     Comment: Alcoholic Drinks/day: Never a problem for him, he states.     Drug use: No     Sexual activity: Not on file   Other Topics Concern     Not on file   Social History Narrative    Should be using walker with ambulation. Patient does not use any home oxygen. Lives with wife.      Social Determinants of Health     Financial Resource Strain: Not on file   Food Insecurity: Not on file   Transportation Needs: Not on file   Physical Activity: Not on file   Stress: Not on file   Social Connections: Not on file   Intimate Partner Violence: Not on file   Housing Stability: Not on file    not pertinent to Chief Complaint or reason for visit.             Allergies as of 2014 Reviewed     Review of Systems:As per HPI. Remainders of 12 point review of systems negative.    Review of Pertinent Laboratory:      PERSONALLY REVIEWED.    Physical Exam: Temp:  [99.1  F (37.3  C)-101.2  F (38.4  C)] 99.1  F (37.3  C)  Pulse:  [68-97] 68  Resp:  [16-25] 25  BP: (153-216)/(67-98) 211/89  SpO2:  [90 %-99 %] 96 %   Vitals: reviewed in chart     Physical exam as per medical team:  reviewed in chart      diagnoses, risk and benefits of medications discussed with staff. Care coordination with care management team.   Thank you for this consultation.       Elvia Anderson; NP  [facility]        This note was created with the help of Dragon dictation system. Grammatical and typing errors are not intentional.

## 2022-04-14 NOTE — CONSULTS
Care Management Initial Consult       Concerns to be Addressed:   Alteration in status (positive for Covid-19) requiring: IV Dexamethasone, Remdesivir. Rocephin.  AMS requiring 1:1 supervision.   Patient plan of care discussed at interdisciplinary rounds: Yes    Anticipated Discharge Disposition:  Home     Anticipated Discharge Services:  Home therapy (consider adding a  or other services at discharge)  Anticipated Discharge DME:  Uses a walker, has a wheelchair at home.    Patient/family educated on Medicare website which has current facility and service quality ratings:  NA  Education Provided on the Discharge Plan:  Per team  Patient/Family in Agreement with the Plan:  Yes    Referrals Placed by CM/SW:  None  Private pay costs discussed: Not applicable     Additional Information:  See below.    General Information  Assessment completed with: Spouse or significant other, Patricia  Type of CM/SW Visit: Initial Assessment    Primary Care Provider verified and updated as needed: Yes   Readmission within the last 30 days:        Reason for Consult: discharge planning  Advance Care Planning: Advance Care Planning Reviewed: concerns discussed     General Information Comments: Goal is home with resumed home care.    Communication Assessment  Patient's communication style: spoken language (English or Bilingual)    Hearing Difficulty or Deaf: yes   Wear Glasses or Blind: yes    Cognitive  Cognitive/Neuro/Behavioral: .WDL except, mood/behavior  Level of Consciousness: confused (on 1:1)  Arousal Level: arouses to touch/gentle shaking  Orientation: disoriented to, place, time, situation  Mood/Behavior: restless  Best Language: 0 - No aphasia  Speech: fluent    Living Environment:   People in home: spouse, child(steph), adult  Wife Patricia, son Godfrey  Current living Arrangements: house      Able to return to prior arrangements: yes     Family/Social Support:  Care provided by: spouse/significant other, child(steph)  Provides  care for: no one, unable/limited ability to care for self  Marital Status:   Wife, Children  Patricia       Description of Support System: Supportive       Current Resources:   Patient receiving home care services: Yes  Skilled Home Care Services: Physicial Therapy  Community Resources: None  Equipment currently used at home: walker, rolling, wheelchair, manual (wheelchair PRN only)  Supplies currently used at home:      Employment/Financial:  Employment Status: retired        Financial Concerns: No concerns identified   Referral to Financial Worker: No     Lifestyle & Psychosocial Needs:  Social Determinants of Health     Tobacco Use: Medium Risk     Smoking Tobacco Use: Former Smoker     Smokeless Tobacco Use: Never Used   Alcohol Use: Not on file   Financial Resource Strain: Not on file   Food Insecurity: Not on file   Transportation Needs: Not on file   Physical Activity: Not on file   Stress: Not on file   Social Connections: Not on file   Intimate Partner Violence: Not on file   Depression: Not at risk     PHQ-2 Score: 0   Housing Stability: Not on file     Functional Status:  Prior to admission patient needed assistance:   Dependent ADLs:: Bathing, Dressing, Grooming, Wheelchair-with assist  Dependent IADLs:: Cleaning, Cooking, Laundry, Shopping, Meal Preparation, Medication Management, Money Management, Transportation  Assesssment of Functional Status: Not at baseline with ADL Functioning    Mental Health Status:  Mental Health Status: No Current Concerns       Chemical Dependency Status:  Chemical Dependency Status: No Current Concerns           Values/Beliefs:  Spiritual, Cultural Beliefs, Jainism Practices, Values that affect care:            Values/Beliefs Comment: Judaism    Additional Information:  Writer spoke with patient's wife Patricia by telephone (note; her cell phone voicemail is not yet set up). Per Patricia, patient lives with her and their son Godfrey. Godfrey does work driving a bus but is only gone  "early am and late afternoon. Otherwise, he is home and is helpful. Patricia states patient is normally ambulatory with a walker and he can feed himself (although she notes that his appetite has been poor lately). There is a wheelchair also but he does not normally need it. She provides assist with bathing, dressing and grooming and all the support in the home. She states that he is open to [facility] for home PT (confirmed with [facility] liaison). She states she spoke with his neurologist recently who told her that patient is 'approaching end-stage dementia'. She notes that they had recommended placing patient in a nursing home or assisted living but she is hoping to keep patient home a while longer. She would be interested in increasing home services as needed to keep patient at home. She states patient does not have advance directives but should be \"no resuscitation\" (patient is listed as DNR/DNI). CM will continue to monitor progression of care, review team recommendations and provide discharge planning assist as needed.      Kristina Mancera RN      "

## 2022-04-14 NOTE — ED TRIAGE NOTES
Patient arrives via EMS from home. Patient has a history of dementia, today family notes increased weakness today, patient is refusing to get out of bed and not his usual self. Patient's daughter who lives with patient positive for Covid. Patient is febrile upon arrival. Patient's family states to medics that he may possibly need higher level of care than they can provide if weakness continues.

## 2022-04-14 NOTE — PLAN OF CARE
Goal Outcome Evaluation:        Pt arrived to the unit lethargic but arouses to voice or touch. Denied pain and SOB and went back to sleep. Due to lethargy, writer was not able to  give oral medications, MD notified and meds changed to IV. Remains on 1:1 for safety due to advanced dementias with behaviors and disorientation.   Problem: Gas Exchange Impaired  Goal: Optimal Gas Exchange  Outcome: Ongoing, Progressing  Intervention: Optimize Oxygenation and Ventilation  Recent Flowsheet Documentation  Taken 4/14/2022 1316 by Nargis Shetty RN  Head of Bed (HOB) Positioning: HOB at 30 degrees      LS clear/ coarse on expiration in the bases, sats mid- upper 90's on 2L via n/c, not in resp distress    Problem: Plan of Care - These are the overarching goals to be used throughout the patient stay.    Goal: Absence of Hospital-Acquired Illness or Injury  Intervention: Identify and Manage Fall Risk  Recent Flowsheet Documentation  Taken 4/14/2022 1316 by Nargis Shetty RN  Safety Promotion/Fall Prevention:   activity supervised   patient and family education   nonskid shoes/slippers when out of bed   bedside attendant  Intervention: Prevent Skin Injury  Recent Flowsheet Documentation  Taken 4/14/2022 1316 by Nargis Shetty RN  Body Position: supine  Intervention: Prevent and Manage VTE (Venous Thromboembolism) Risk  Recent Flowsheet Documentation  Taken 4/14/2022 1316 by Nargis Shetty RN  Activity Management:   activity adjusted per tolerance   activity encouraged

## 2022-04-14 NOTE — DISCHARGE INSTRUCTIONS
If you would like to arrange private pay assist at home, the [employer] is a good resource. Their telephone number is [phone].      Home care will be resumed with [facility] [phone].  They will call you to schedule home visit.

## 2022-04-14 NOTE — ED NOTES
Due to Pt agitation, BP cuff and tele monitors were taken off to decrease agitation. Pulse ox monitor intact, on 2L nc, will continue to monitor.

## 2022-04-15 NOTE — PROGRESS NOTES
04/15/22 0940   Living Environment   People in Home spouse;child(steph), adult  (wife, son)   Current Living Arrangements house   Home Accessibility   (information not available- pt unable to answer)   Self-Care   Usual Activity Tolerance moderate   Current Activity Tolerance fair   Equipment Currently Used at Home walker, rolling   Activity/Exercise/Self-Care Comment per chart pt is able to indep walk with FWW and eat, wife assists with ADLs and all household   Cognition   Affect/Mental Status (Cognition) low arousal/lethargic  (sleepy, difficult to keep him awake)   Orientation Status (Cognition) oriented to;person   Follows Commands (Cognition) follows one-step commands;50-74% accuracy;delayed response/completion;repetition of directions required;verbal cues/prompting required   Pain Assessment   Patient Currently in Pain No   Range of Motion (ROM)   ROM Comment LE ROM grossly WFL   Strength (Manual Muscle Testing)   Strength Comments general weakness bilat LE   Bed Mobility   Bed Mobility supine-sit   Supine-Sit [place] (Bed Mobility) moderate assist (50% patient effort);verbal cues   Bed Mobility Limitations cognitive deficits;other (see comments)  (very sleepy)   Comment, (Bed Mobility) pt keeps closing his eyes to sleep, needs constant cues to stay awake   Transfers   Transfers sit-stand transfer;bed-chair transfer   Bed-Chair Transfer   Assistive Device (Bed-Chair Transfers) walker, front-wheeled   Bed-Chair [place] (Transfers) moderate assist (50% patient effort);2 person assist;verbal cues   Comment, (Bed-Chair Transfer) several small steps bed to chair   Sit-Stand Transfer   Sit-Stand [place] (Transfers) moderate assist (50% patient effort);2 person assist;verbal cues   Assistive Device (Sit-Stand Transfers) walker, front-wheeled   Comment, (Sit-Stand Transfer) elevated EOB, assist with hand placement, cues to stand tall   Clinical Impression   Criteria for Skilled Therapeutic Intervention  Yes, treatment indicated   PT Diagnosis (PT) decreased functional mobility   Influenced by the following impairments lethargy, fatigue, cognition   Functional limitations due to impairments transfers and gt   Clinical Presentation (PT Evaluation Complexity) Evolving/Changing   Clinical Presentation Rationale presents as diagnosed   Clinical Decision Making (Complexity) moderate complexity   Planned Therapy Interventions (PT) bed mobility training;transfer training;gait training;strengthening;balance training   Anticipated Equipment Needs at Discharge (PT) walker, rolling   Risk & Benefits of therapy have been explained care plan/treatment goals reviewed;patient   PT Discharge Planning   PT Discharge Recommendation (DC Rec) Transitional Care Facility   PT Rationale for DC Rec currently needs mod assist of 2 for OOB mob   PT Brief overview of current status Comfortable in recliner, overall tolerated PT well   Plan of Care Review   Plan of Care Reviewed With patient   Total Evaluation Time   Total Evaluation Time (Minutes) 10   Physical Therapy Goals   PT Frequency Daily   PT Predicted Duration/Target Date for Goal Attainment 04/22/22   PT Goals Bed Mobility;Transfers;Gait   PT: Bed Mobility Minimal assist;Supine to/from sit;Rolling   PT: Transfers Minimal assist;Sit to/from stand;Bed to/from chair;Assistive device   PT: Gait Minimal assist;Rolling walker;25 feet

## 2022-04-15 NOTE — PLAN OF CARE
"  Problem: Gas Exchange Impaired  Goal: Optimal Gas Exchange  Outcome: Ongoing, Progressing    Problem: UTI (Urinary Tract Infection)  Goal: Improved Infection Symptoms  Outcome: Ongoing, Progressing     Problem: Behavioral Health Comorbidity  Goal: Maintenance of Behavioral Health Symptom Control  Outcome: Ongoing, Progressing     Problem: Diabetes Comorbidity  Goal: Blood Glucose Level Within Targeted Range  Outcome: Ongoing, Progressing   Goal Outcome Evaluation:    Continues to be on COVID-19 precautions and 1:1 sitter. SAO2 94% on r/a. Afebrile. Lung sounds clear and diminished. Weak dry cough. IV decadron and remdesivir scheduled. Incontinent of urine. Currently on IV rocephin for UTI. Blood sugar 125 this morning. Consumed 25% of his breakfast and 240cc of fluids. Behavior fluctuates-very appreciative to verbally abusive and will yell out \"just leave me the hell alone\"  Patient threw his LE's over the side of bed this am and stated \"I'm ready to go home\"  Able to redirect behavior by reorienting him to his surroundings. PRN Seroquel administered. K+ 3.3-protocol implemented. Patient took all medications with encouragement form nurse. Blood pressures elevated -IV hydralazine administered. Wife updated this morning on status of patient. PT/OT ordered.  Jil Mata RN              "

## 2022-04-15 NOTE — PROGRESS NOTES
Patient awake for > 2 hours. Consumed grilled cheese sandwich,  applesauce and 420cc of fluids for lunch.  Was fed by staff while up in chair. Extremely [place]. Naps soundly between meals. Calm and cooperative. Taking his medications without difficulty. 1:1 sitter continues as ordered by MD. Jil Mata RN

## 2022-04-15 NOTE — PROGRESS NOTES
"Patient much more alert this evening. Requested to go to bathroom, ambulated with assist of (1) using rolling walker and transfer belt. Fed himself 25% of his dinner while sitting up in the chair.  When asked if he wanted any more to eat replied \" get rid of this shit please\"  1:1 discontinued at 1630. I-pad on at bedside for better visualization of patient. Alarms on, curtain pulled back and is close to the nursing station.  Jil Mata RN   "

## 2022-04-15 NOTE — PROGRESS NOTES
[facility]    Medicine Progress Note - Hospitalist Service    Date of Admission:  4/13/2022    Assessment & Plan      Laz Kingston is a 87 year old male admitted on 4/13/2022. He was brought to the emergency department by ambulance for evaluation of increased weakness, refusing to get out of bed, not his usual self.  Daughter has COVID.    Confirmed COVID-19 infection  Acute respiratory hypoxic respiratory failure, due to above, improved  Probable COVID pneumonia, unspecified  Admitted with several days of lethargy and confusion, and exposure to daughter who have tested positive for Covid.  First positive test 04/13/2022  - On admission was febrile with a temperature of 102.2 with diminished lung sounds and required 2 L/min to keep sats above 90  - CXR 04/13: with probable small left effusion and atelectasis  - Inflammatory markers; CRP 3.6 ->5.9; D-dimer 0.47  - Procal: 0.10,  less likely to have superimposed bacterial pneumonia  - Continue contact and droplet precautions  - Wean off oxygen as able, keep sats over 90%  - No inhalers needed, will avoid nebulizers in favor of MDIs  - No IV fluids indicated at this time  - Covid focused treatments; was started on remdesivir 4/13/2022; given risk of disease progression with underlying cormobidites; will continue remdesivir  for 3 days; while closely monitoring his renal function as GFR is close to 30. LAst dose 04/16.  - Was started on dexamethasone 6 mg on admission; will discontinue today  given that his hypoxia has resolved and patient at high risk for side effects of delirium and agitation  - Continue DVT prophylaxis; heparin 5000 units every 8 hours    Acute metabolic encephalopathy, slowly improving  Present on admission; likely due to underlying Covid infection, urinary tract infection and vulnerable brain / dementia  - CT scan with no acute intracranial pathologies   - Labs reviewed, liver panel within normal limits, BNP with  stable CKD  - UA concerning for UTI; Continue IV antibiotics   - Delirium precautions  - Replete potassium as needed  - Holding Haldol given somnolence  - Keep as needed Seroquel for significant agitation  - Melatonin at night  -1:1 sitter for safety in place; later in the day, patient's mental status improved and nursing staff felt safe to try discontinuing sitter.    Advanced Alzheimer's dementia with behavioral disturbances  Seen by neurology on 4/13/2022, family decreased dose of Seroquel due to sedation and given encephalopathy/lethargy will changed to as needed  - Continue Namenda and Exelon patch  - Family was recommended to look into memory care unit  - Psychiatry consulted and following  - As needed Seroquel agitation.     Acute cystitis without hematuria, with pansensitive E. coli  - Urinalysis with cloudy urine with significant leukoesterase WBCs many bacteria and WBC clumps   - Urine cultures with pansensitive E. coli  - Continue IV ceftriaxone for now    Hypertension   Hypertensive urgency, improved  -Probable noncompliance secondary to acute illness  -Resumed PTA amlodipine, hydralazine, metoprolol now that he is able to take p.o.  -IV hydralazine available as needed for SBP >180    Type 2 diabetes mellitus with long-term insulin  -Home regimen with in glimepiride, Trulicity , and Lantus 25 nightly.  -Held home regimen until patient is more alert and eating well  -Monitor with insulin sliding scale  -Hypoglycemia protocol    Chronic kidney disease stage IV  Stable renal function  -Will avoid nephrotoxins    Hypokalemia, resolved  Replace per protocol    Chronic microcytic anemia  Stable hemoglobin without signs of acute blood loss    Dyslipidemia  Continued PTA simvastatin    GERD  Continue PTA Protonix    Incidental radiographic findings  Left retro-orbital soft tissue, non emergent evaluation with MRI recommended    Right lower lobe nodular consolidation, appears chronic since at least 2008, however  has increased in size.  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   -Non urgent chest CT recommended.  Consider ordering 04/16 once encephalopathy is resolved    Overweight:   Estimated body mass index is 26.68 kg/m  as calculated from the following:    Height as of an earlier encounter on 4/13/22: 1.829 m (6').    Weight as of this encounter: 89.2 kg (196 lb 11.2 oz).         Diet: Combination Diet Moderate Consistent Carb (60 g CHO per Meal) Diet; Low Saturated Fat Na <2400mg Diet    DVT Prophylaxis: Heparin SQ  [place] Catheter: Not present  Central Lines: None  Cardiac Monitoring: None  Code Status: No CPR- Do NOT Intubate      Clinically Significant Risk Factors Present on Admission     Disposition Plan   Expected Discharge: 04/18/2022 after at least 2 midnights  Anticipated discharge location: home with family;home with help/services    Delays:    Encephalopathy     The patient's care was discussed with the Bedside Nurse.    Adriana Nunez MD  Hospitalist Service  [facility]  Securely message with the Vocera Web Console (learn more here)  Text page via [employer] Paging/Directory   ________________________________________________________________      Interval History   Care team notes reviewed  Patient remains on one-to-one, was restless and agitated on and off, at one point attempting to hit staff, so restraint order was placed.  Patient was given IV Haldol and Seroquel to calm down and restraints were removed.  Bedside nurse tells me he was able to have his breakfast and took his morning pills well  At bedside, found the patient very sleepy and somnolent, however awoken and answers questions.  He tells me he feels better, but does not elaborate any further.  Unable to respond to questions when reviewing of systems.    Data reviewed today: I reviewed all medications, new labs and imaging results over the last 24 hours.     Physical Exam   Vital Signs: Temp: 97.8  F (36.6  C) Temp src: Axillary  BP: (!) 170/70 Pulse: 76   Resp: 20 SpO2: 95 % O2 Device: None (Room air) Oxygen Delivery: 2 LPM  Weight: 196 lbs 11.2 oz  General Appearance: Somnolent  Respiratory: Breathing comfortably on 2 L nasal cannula, has bilateral diminished breath sounds, no wheezing  Cardiovascular: RRR, no rubs murmurs  GI: Abdomen is soft, nontender  Skin: Has widespread hyperpigmented thickened skin lesions/nodules mostly on his trunk and back  Neuro: Somnolent but arousable, no overt focal neurological deficits    Data   Recent Labs   Lab 04/15/22  1646 04/15/22  1201 04/15/22  1147 04/15/22  0745 04/15/22  0641 04/15/22  0627 04/14/22  0835 04/14/22  0335 04/13/22 2006   WBC  --   --   --   --   --  4.7  --  4.4 5.3   HGB  --   --   --   --   --  9.4*  --  9.2* 10.9*   MCV  --   --   --   --   --  73*  --  76* 75*   PLT  --   --   --   --   --  151  --  149* 161   NA  --   --   --   --  143  --   --  143 145   POTASSIUM  --   --  3.9  --  3.3*  --   --  3.6 3.2*   CHLORIDE  --   --   --   --  111*  --   --  112* 111*   CO2  --   --   --   --  22  --   --  17* 21*   BUN  --   --   --   --  49*  --   --  46* 47*   CR  --   --   --   --  2.07*  --   --  2.19* 2.26*   ANIONGAP  --   --   --   --  10  --   --  14 13   DEJAN  --   --   --   --  8.8  --   --  9.0 10.2   * 188*  --  125* 128*  --    < > 194* 89   ALBUMIN  --   --   --   --   --   --   --   --  3.7   PROTTOTAL  --   --   --   --   --   --   --   --  7.1   BILITOTAL  --   --   --   --   --   --   --   --  0.4   ALKPHOS  --   --   --   --   --   --   --   --  49   ALT  --   --   --   --   --   --   --   --  17   AST  --   --   --   --   --   --   --   --  24    < > = values in this interval not displayed.

## 2022-04-15 NOTE — PLAN OF CARE
Problem: Plan of Care - These are the overarching goals to be used throughout the patient stay.    Goal: Optimal Comfort and Wellbeing  Outcome: Ongoing, Progressing     Problem: Risk for Delirium  Goal: Improved Behavioral Control  Outcome: Ongoing, Progressing     Problem: Risk for Delirium  Goal: Improved Sleep  Outcome: Ongoing, Progressing     Problem: Gas Exchange Impaired  Goal: Optimal Gas Exchange  Outcome: Ongoing, Progressing  Intervention: Optimize Oxygenation and Ventilation  Recent Flowsheet Documentation  Taken 4/15/2022 0200 by Kymberly Senior RN  Head of Bed (HOB) Positioning: HOB at 20-30 degrees   Goal Outcome Evaluation:     Pt cont to be on a 1:1 for safety. Pt restless on and off throughout the night, pulling at blankets and taking brief off. Urinal offered several times but patient refused to use and was incontinent at least 3 times. Pt became very restless and was attempting to hit staff so house officer was called and a restraint order was placed. Prn seroquel and iv haldol were given and patient calmed down enough to not need the restraints at this time. Will cont to monitor and treat as needed.

## 2022-04-16 NOTE — PROGRESS NOTES
Patient continued to frequently make attempts to get out of bed. PRN melatonin was ordered and given, but did not have effect on patient. Patient fixates on things, like removing socks, or wristband or getting changed and cannot rest until fixation is addressed.   Patient follows direction well, and is redirectable, but impulsive as well. Nursing staff will continue to monitor. Bassam Perkins RN 4/16/2022

## 2022-04-16 NOTE — PROGRESS NOTES
OCCUPATIONAL THERAPY:  OT evaluation deferred. Spoke with RN and reviewed chart. Per chart pt with dementia and  receives assist w/ ADLs from family at baseline.  No OT needs at this time. Will d/c orders. Thank you.  Mary Reyes, OTR/L  4/16/2022

## 2022-04-16 NOTE — PROGRESS NOTES
CLINICAL NUTRITION SERVICES - ASSESSMENT NOTE     Nutrition Prescription    RECOMMENDATIONS FOR MDs/PROVIDERS TO ORDER:    Malnutrition Status:    Severe    Recommendations already ordered by Registered Dietitian (RD):  Glucerna daily with meal    Future/Additional Recommendations:  Follow po/supplement intake and need for adjustments     REASON FOR ASSESSMENT  Laz Kingston is a/an 87 year old male assessed by the dietitian for Admission Nutrition Risk Screen for positive weight loss and poor po intake PTA    Pt admitted for evaluation of increased weakness, refusing to get out of bed, not his usual self. Confirmed COVID-19 infection.  He has advanced alzheimer  s dementia with behavioral disturbances.  Acute cystitis without hematuria with pansensitive E. Coli, hypertension, DM 2, CKD stage IV, chronic microcytic anemia, dyslipidemia, GERD, overweight.    NUTRITION HISTORY  Unable-attempted to call pt's wife-no answer and voicemail not set up yet    CURRENT NUTRITION ORDERS  Diet: Moderate Consistent Carbohydrate, low saturated fat < 2400 mg Na  Intake/Tolerance: Ate 25% breakfast this am, 25% dinner 4/15, 100% lunch 4/15 and 25% breakfast 4/15. Mostly poor po intake    LABS  Labs reviewed: Glu 147, K 3.3, urea nitrogen 51, Cr 2.01, CRP 3.2    MEDICATIONS  Medications reviewed: rocephin, novolog, namenda, multi vitamin, pantoprazole, remdesivir    ANTHROPOMETRICS  Height:  6'  Most Recent Weight: 89.2 kg (196 lb 11.2 oz)    IBW: 80.9 kg (178 lb)  BMI: Overweight BMI 25-29.9  Weight History:   Wt Readings from Last 10 Encounters:   04/14/22 89.2 kg (196 lb 11.2 oz)   04/13/22 94.3 kg (208 lb)   03/11/22 95.3 kg (210 lb)   01/07/22 90.7 kg (200 lb)   08/05/21 85.7 kg (189 lb)   07/22/21 92.5 kg (203 lb 14.4 oz)   07/20/21 92.5 kg (203 lb 14.4 oz)   07/16/21 93.7 kg (206 lb 8 oz)   07/13/21 93.7 kg (206 lb 8 oz)   07/12/21 92.9 kg (204 lb 12.8 oz)   Weight loss of 14 lb in the past month (6.6%) which is  clinically significant    Dosing Weight: 89 kg    ASSESSED NUTRITION NEEDS  Estimated Energy Needs: 2225+ kcals/day 25+ Kcal/Kg  Justification: Maintenance  Estimated Protein Needs:  grams protein/day (1 - 1.2 grams of pro/kg)  Justification: CKD and Increased needs  Estimated Fluid Needs: 1264-5917 mL/day (25 - 30 mL/kg)   Justification: Maintenance    PHYSICAL FINDINGS  See malnutrition section below.  Skin: Redness/blanchable perineum/ scab on back  Damaso score=16, nutrition noted as adequate  GI: WDL per nurse  Last BM 4/13/2022 per nurse    MALNUTRITION:  % Weight Loss:  > 5% in 1 month (severe malnutrition)  % Intake:  </= 75% for >/= 1 month (severe malnutrition)  Subcutaneous Fat Loss:  Unable due to COVID isolation precautions  Muscle Loss:  unable  Fluid Retention:  None noted    Malnutrition Diagnosis: Severe malnutrition  In Context of:  Acute illness or injury    NUTRITION DIAGNOSIS  Malnutrition related to acute illness as evidenced by  6.6% weight loss in the past month and prolonged inadequate oral intake      INTERVENTIONS  Implementation  Nutrition Education: Not appropriate at this time due to patient condition   Medical food supplement therapy -will trial glucerna daily with meal and follow for acceptance and intake    Goals  Patient to consume % of nutritionally adequate meals three times per day, or the equivalent with supplements/snacks.  Maintain weight while hospitalized     Monitoring/Evaluation  Progress toward goals will be monitored and evaluated per protocol.

## 2022-04-16 NOTE — PROGRESS NOTES
[facility]    Medicine Progress Note - Hospitalist Service    Date of Admission:  4/13/2022    Assessment & Plan          Laz Kingston is a 87 year old male with diabetes, CKD and dementia who was brought to the emergency department for evaluation of increased weakness and confusion. Patient was found to have COVID.     Confirmed COVID-19 infection  Acute respiratory hypoxic respiratory failure  Admitted with several days of lethargy and confusion, and exposure to daughter who have tested positive for Covid.  First positive test 04/13/2022. On admission was febrile with a temperature of 102.2.   CXR 4/13: with probable small left effusion and atelectasis. No new focal infiltrate.  - Continue contact and droplet precautions  - Needed 2 LPM supplemental oxygen on admission. Now hypoxia improved and got tapered off supplemental oxygen. Continue to monitor. Oxygen as needed to keep sats over 90%  - Continue to monitor lab tests as indicated. CRP improving.  - No indication for antibiotics. No signs of superimposed bacterial pneumonia  - Inhalers as needed. Avoid nebulizers in favor of MDIs  - No IV fluids indicated at this time  - Covid focused treatments: Completed remdesivir for 4 days (4/13-4/16) and discontinued since hypoxia resolved and borderline kidney function. Completed dexamethasone 6 mg daily for 3 doses and discontinued given that hypoxia has resolved and patient at high risk for delirium and agitation.  - Continue DVT prophylaxis; heparin 5000 units every 8 hours     Acute metabolic encephalopathy: Presented with confusion on admission; likely due to underlying Covid infection, urinary tract infection and vulnerable brain / dementia. CT scan with no acute intracranial pathologies. Mental status now improved.   - Delirium precautions  - Seroquel prn  - Melatonin at night     Advanced Alzheimer's dementia with behavioral disturbances: Seen by neurology on 4/13/2022  - Family  decreased dose of Seroquel due to sedation and given encephalopathy/lethargy will changed to as needed  - Continue Namenda and Exelon patch  - Family was recommended to look into memory care unit  - Psychiatry consulted and following     Acute cystitis without hematuria: with pansensitive E. Coli  - Completed IV ceftriaxone for 4 days. Change to Cefdinir today and continue for 3 days to complete one week antibiotic treatment.      Essential hypertension, Hypertensive urgency: BP remains poorly controlled.   - Start clonidine 0.1 mg bid  - Continue PTA amlodipine, hydralazine, metoprolol   - IV hydralazine available as needed for SBP >180     Type 2 diabetes mellitus with long-term insulin: Home regimen with glimepiride, Trulicity , and Lantus 25 nightly. Hold glimepiride and Trulicity  - Appetite gets better today. Resume Lantus at 10 units at bedtime.   - Continue Novolog sliding scale  - Hypoglycemia protocol     Chronic kidney disease stage IV: Stable renal function. Avoid nephrotoxins     Hypokalemia: Replace per protocol as indicated     Chronic microcytic anemia: Stable hemoglobin without signs of acute blood loss     Dyslipidemia: Continued PTA simvastatin     GERD: Continue PTA Protonix     Incidental radiographic findings  5.4 x 8.1 mm Left retro-orbital soft tissue, nonaggressive-appearing features. Non emergent evaluation with MRI recommended     Right middle/lower lobe nodular consolidation: appears chronic since at least 2008, however has increased in size.  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   - Non urgent chest CT recommended.           Diet: Combination Diet Moderate Consistent Carb (60 g CHO per Meal) Diet; Low Saturated Fat Na <2400mg Diet  Snacks/Supplements Adult: Glucerna; With Meals    DVT Prophylaxis: Heparin SQ  Godoy Catheter: Not present  Central Lines: None  Cardiac Monitoring: None  Code Status: No CPR- Do NOT Intubate      Disposition Plan   Expected Discharge: 04/18/2022     Anticipated  discharge location: home with family;home with help/services  vs TCU         The patient's care was discussed with the Bedside Nurse and Care Coordinator/.    Florina Haywood MD  Hospitalist Service  [facility]  Securely message with the Vocera Web Console (learn more here)  Text page via [employer] Paging/Directory           ______________________________________________________________________    Interval History   Overnight event noted.  When seen and examined today, patient appeared comfortable. He reports no chest pain and no SOB.     Data reviewed today: I reviewed all medications, new labs and imaging results over the last 24 hours.   Physical Exam   Vital Signs: Temp: (!) 96.7  F (35.9  C) Temp src: Axillary BP: (!) 143/69 Pulse: 54   Resp: 16 SpO2: 96 % O2 Device: None (Room air)    Weight: 196 lbs 11.2 oz    General appearance: not in acute distress  HEENT: PERRL, EOMI  Lungs: Clear breath sounds in bilateral lung fields  Cardiovascular: Regular rate and rhythm, normal S1-S2  Abdomen: Soft, non tender, no distension  Musculoskeletal: No joint swelling  Skin: No rash and no edema  Neurology: Awake, alert, but confused.  Cranial nerves II - XII normal.  Normal muscle strength in all four extremities.    Data   Recent Labs   Lab 04/16/22  1641 04/16/22  1636 04/16/22  1214 04/16/22  0813 04/16/22  0627 04/15/22  1201 04/15/22  1147 04/15/22  0745 04/15/22  0641 04/15/22  0627 04/14/22  0835 04/14/22  0335 04/13/22 2006   WBC  --   --   --   --  5.4  --   --   --   --  4.7  --  4.4 5.3   HGB  --   --   --   --  9.5*  --   --   --   --  9.4*  --  9.2* 10.9*   MCV  --   --   --   --  76*  --   --   --   --  73*  --  76* 75*   PLT  --   --   --   --  163  --   --   --   --  151  --  149* 161   NA  --   --   --   --  140  --   --   --  143  --   --  143 145   POTASSIUM  --  3.5  --   --  3.3*  --  3.9  --  3.3*  --   --  3.6 3.2*   CHLORIDE  --   --   --   --  108*  --   --   --   111*  --   --  112* 111*   CO2  --   --   --   --  22  --   --   --  22  --   --  17* 21*   BUN  --   --   --   --  51*  --   --   --  49*  --   --  46* 47*   CR  --   --   --   --  2.01*  --   --   --  2.07*  --   --  2.19* 2.26*   ANIONGAP  --   --   --   --  10  --   --   --  10  --   --  14 13   DEJAN  --   --   --   --  8.9  --   --   --  8.8  --   --  9.0 10.2   *  --  142* 134* 147*   < >  --    < > 128*  --    < > 194* 89   ALBUMIN  --   --   --   --   --   --   --   --   --   --   --   --  3.7   PROTTOTAL  --   --   --   --   --   --   --   --   --   --   --   --  7.1   BILITOTAL  --   --   --   --   --   --   --   --   --   --   --   --  0.4   ALKPHOS  --   --   --   --   --   --   --   --   --   --   --   --  49   ALT  --   --   --   --   --   --   --   --   --   --   --   --  17   AST  --   --   --   --   --   --   --   --   --   --   --   --  24    < > = values in this interval not displayed.

## 2022-04-16 NOTE — PLAN OF CARE
Problem: Plan of Care - These are the overarching goals to be used throughout the patient stay.    Goal: Plan of Care Review/Shift Note  Outcome: Ongoing, Progressing     Problem: Hypertension Acute  Goal: Blood Pressure Within Desired Range  Outcome: Ongoing, Progressing  Intervention: Normalize Blood Pressure     Goal Outcome Evaluation:    Droplet/airborne precautions for COVID-19. (1) more dose of remdesivir to be infused later today. SAO2 96% r/a Afebrile. Lung sounds clear and diminished. No complaints of shortness of breath. Incontinent of bowel and bladder. Being treated for UTI. Appetite fair-prefers milk, ice cream and sandwiches per his spouse. Supplements implemented today. Ambulates with assist of (1) using walker. Alert and awake all day. (1) attempt of getting out of bed this morning, was able to redirect. BP lower this afternoon, MD updated and parameters have been given on his hydralazine dose. Will continue to monitor  Jil Mata RN

## 2022-04-16 NOTE — PROGRESS NOTES
Patient impulsive and trying to get out of bed every 15-20 minutes. Patient redirectable but restless. Unable to sleep. PRN seroquel given with no effect. Hospitalist paged regarding additional PRN medication for patient. Bassam Perkins RN 4/16/2022

## 2022-04-17 PROBLEM — G30.9 ALZHEIMER'S DEMENTIA WITH BEHAVIORAL DISTURBANCE (H): Status: ACTIVE | Noted: 2022-01-01

## 2022-04-17 PROBLEM — N18.4 CKD (CHRONIC KIDNEY DISEASE) STAGE 4, GFR 15-29 ML/MIN (H): Status: ACTIVE | Noted: 2021-02-05

## 2022-04-17 PROBLEM — F02.818 ALZHEIMER'S DEMENTIA WITH BEHAVIORAL DISTURBANCE (H): Status: ACTIVE | Noted: 2022-01-01

## 2022-04-17 PROBLEM — J96.01 ACUTE RESPIRATORY FAILURE WITH HYPOXIA (H): Status: ACTIVE | Noted: 2022-01-01

## 2022-04-17 PROBLEM — I16.0 HYPERTENSIVE URGENCY: Status: ACTIVE | Noted: 2022-01-01

## 2022-04-17 NOTE — PLAN OF CARE
Physical Therapy Discharge Summary    Reason for therapy discharge:    Discharged to home with home therapy.    Progress towards therapy goal(s). See goals on Care Plan in [facility] electronic health record for goal details.  Goals met    Therapy recommendation(s):    Continued therapy is recommended.  Rationale/Recommendations:  to continue progression toward PLOF and decrease fall risk.      Ilana Rodríguez, PT

## 2022-04-17 NOTE — PLAN OF CARE
Problem: Risk for Delirium  Goal: Improved Sleep  Outcome: Ongoing, Progressing     Problem: Plan of Care - These are the overarching goals to be used throughout the patient stay.    Goal: Optimal Comfort and Wellbeing  Outcome: Ongoing, Progressing     Problem: Plan of Care - These are the overarching goals to be used throughout the patient stay.    Goal: Absence of Hospital-Acquired Illness or Injury  Intervention: Identify and Manage Fall Risk  Recent Flowsheet Documentation  Taken 4/17/2022 0004 by Teddy Copeland, RN  Safety Promotion/Fall Prevention:   bed alarm on   patient and family education   nonskid shoes/slippers when out of bed   mobility aid in reach   increase visualization of patient   increased rounding and observation     Patient is pretty lethargic during this shift. VSS. Remains on COVID precautions.   Arouses to sound, but patient is very hard of hearing. Denies pain.     Teddy Copeland, RN

## 2022-04-17 NOTE — PLAN OF CARE
Problem: Plan of Care - These are the overarching goals to be used throughout the patient stay.    Goal: Plan of Care Review/Shift Note    Outcome: Ongoing, Progressing   Goal Outcome Evaluation:      Patient alert sitting up in recliner all day. Oriented to self. Response appropriately when asked questions. No behaviors. Ambulates with assist of (1) to the bathroom. PT scheduled. Incontinent between toileting times. Continent of bowel-large BM this morning. Requires assist with feeding. Appetite fair. 100% of his supplement. SAO2 97% r/a  Oral antibiotics for UTI. No complaints of discomfort. Taking medications without difficulty. Home with family at discharge.  Jil Mata RN

## 2022-04-17 NOTE — PROGRESS NOTES
Care Management Discharge Note    Discharge Date: 04/17/2022       Discharge Disposition: Home    Discharge Services:  (home care)    Discharge DME: None    Discharge Transportation:      Private pay costs discussed: Potential cost for [facility] stretcher ride.      Education Provided on the Discharge Plan:  Yes  Persons Notified of Discharge Plans:  yes    Patient/Family in Agreement with the Plan: yes    Handoff Referral Completed: yes    Additional Information:  Spoke with wife to discuss discharge planning. Patient to return home with support from spouse and son. Resumption of home care PT, adding SW with [facility]. Spouse requesting transportation.  [facility] stretcher ride scheduled at 1630.  PCS completed for stretcher ride due to Covid protocol. [facility] notified via faxed orders.         Corinna Funk RN

## 2022-04-17 NOTE — DISCHARGE SUMMARY
[facility]  Hospitalist Discharge Summary      Date of Admission:  4/13/2022  Date of Discharge:  4/17/2022  Discharging Provider: Florina Haywood MD  Discharge Service: Hospitalist Service    Discharge Diagnoses     Principal Problem:    Infection due to 2019 novel coronavirus  Active Problems:    Type 2 diabetes mellitus without complication, with long-term current use of insulin (H)    CKD (chronic kidney disease) stage 4, GFR 15-29 ml/min (H)    Benign essential hypertension    Acute metabolic encephalopathy    Acute cystitis without hematuria    Alzheimer's dementia with behavioral disturbance (H)    Acute respiratory failure with hypoxia (H)    Hypertensive urgency      Follow-ups Needed After Discharge   Follow-up Appointments     Follow-up and recommended labs and tests       Follow up with primary care provider, Bianca Rodriguez, within 1-2 weeks   for hospital follow- up. Recheck kidney function.           Discharge Disposition   Discharged to home  Condition at discharge: Stable      Hospital Course     Laz Kingston is a 87 year old male with diabetes, CKD and dementia who was brought to the emergency department for evaluation of several days of increased weakness and confusion on 4/13/22. CT head shows no acute intracranial abnormalities. Patient was found to have COVID. He was febrile with a temperature of 102.2. CXR shows probable small left effusion and atelectasis, no pulmonary embolism. Patient needed 2 LPM supplemental oxygen.  Patient was started treatment for COVID with remdesivir and dexamethasone. Patient's hypoxia quickly resolved after admission and remained stable on room air.  Dexamethasone was discontinued after 3 days and  Remdesivir was discontinued after 4 days given his rapid clinical improvement.  Urinalysis reveals UTI.  Urine culture shows E. coli. Patient received ceftriaxone for 4 days.  Antibiotic was then changed to cefdinir.  Patient's blood  pressure was elevated to up to 216/87 on admission. Clonidine was added. His blood pressure was ten controlled at the level of 130s/60s. Patient's mental status improved to his baseline during his hospital stay.  Patient was discharged home in a stable condition on 4/17/22.    Incidental radiographic findings  5.4 x 8.1 mm Left retro-orbital soft tissue, nonaggressive-appearing features. Non emergent evaluation with MRI recommended     Right middle/lower lobe nodular consolidation: It appears chronic since at least 2008, however, it has increased in size.  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 Non urgent chest CT recommended.      Severe protein-calorie malnutrition: Patient was evaluated by dietitian. Based on his weight loss and oral intake, patient has severe malnutrition. Dietary supplement is recommended.      Consultations This Hospital Stay   PSYCHIATRY IP CONSULT  CARE MANAGEMENT / SOCIAL WORK IP CONSULT  PHYSICAL THERAPY ADULT IP CONSULT  OCCUPATIONAL THERAPY ADULT IP CONSULT  PHARMACY IP CONSULT    Code Status   No CPR- Do NOT Intubate    Time Spent on this Encounter   I, Florina Haywood MD, personally saw the patient today and spent greater than 30 minutes discharging this patient.       Florina Haywood MD  [place] [id number]  Phone: [phone]  Fax: [phone]  ______________________________________________________________________    Physical Exam   Vital Signs: Temp: 97.7  F (36.5  C) Temp src: Oral BP: 130/63 Pulse: 56   Resp: 18 SpO2: 97 % O2 Device: None (Room air)    Weight: 196 lbs 11.2 oz    General appearance: not in acute distress  HEENT: PERRL, EOMI  Lungs: Clear breath sounds in bilateral lung fields  Cardiovascular: Regular rate and rhythm, normal S1-S2  Abdomen: Soft, non tender, no distension  Musculoskeletal: No joint swelling  Skin: No rash and no edema  Neurology: Awake, alert, but confused.  Cranial nerves II - XII normal.  Normal  muscle strength in all four extremities.       Primary Care Physician   Bianca Rodriguez    Discharge Orders      Home Care Referral      Primary Care - Care Coordination Referral      Reason for your hospital stay    * Admitted for COVID19 infection.     Follow-up and recommended labs and tests     Follow up with primary care provider, Bianca Rodriguez, within 1-2 weeks for hospital follow- up. Recheck kidney function.     Activity    Your activity upon discharge: activity as tolerated     Diet    Follow this diet upon discharge: Orders Placed This Encounter      Snacks/Supplements Adult: Glucerna; With Meals      Combination Diet Moderate Consistent Carb (60 g CHO per Meal) Diet; Low Saturated Fat Na <2400mg Diet       Significant Results and Procedures   Most Recent 3 CBC's:Recent Labs   Lab Test 04/16/22  0627 04/15/22  0627 04/14/22  0335   WBC 5.4 4.7 4.4   HGB 9.5* 9.4* 9.2*   MCV 76* 73* 76*    151 149*     Most Recent 3 BMP's:Recent Labs   Lab Test 04/17/22  1205 04/17/22  0827 04/17/22  0553 04/16/22  1641 04/16/22  1636 04/16/22  0813 04/16/22  0627 04/15/22  0745 04/15/22  0641   NA  --   --  141  --   --   --  140  --  143   POTASSIUM  --   --  3.5  --  3.5  --  3.3*   < > 3.3*   CHLORIDE  --   --  110*  --   --   --  108*  --  111*   CO2  --   --  22  --   --   --  22  --  22   BUN  --   --  57*  --   --   --  51*  --  49*   CR  --   --  2.39*  --   --   --  2.01*  --  2.07*   ANIONGAP  --   --  9  --   --   --  10  --  10   DEJAN  --   --  8.9  --   --   --  8.9  --  8.8   * 128* 146*   < >  --    < > 147*   < > 128*    < > = values in this interval not displayed.       CT HEAD W/O CONTRAST    FINDINGS:  INTRACRANIAL CONTENTS: No intracranial hemorrhage. Mild diffuse cerebral parenchymal volume loss. No midline shift. The basilar cisterns are patent. Mild periventricular and scattered foci of deep white matter hypodensities involving both cerebral   hemispheres. No CT evidence for an acute  infarct. Small retrocerebellar arachnoid cyst.     VISUALIZED ORBITS/SINUSES/MASTOIDS: Prior bilateral cataract surgery. 5.4 x 8.1 mm soft tissue involving the medial left retro-orbital soft tissue. Small mucous retention cysts in the maxillary sinus. No middle ear or mastoid effusion.     BONES/SOFT TISSUES: No acute abnormality.                                                                    IMPRESSION:  1.  No intracranial hemorrhage, mass effect, hydrocephalus or CT evidence for an acute infarct.  2.  Mild diffuse cerebral parenchymal volume loss. Presumed chronic hypertensive/microvascular white matter changes.  3.  5.4 x 8.1 mm soft tissue involving the medial left retro-orbital soft tissue. This has nonaggressive-appearing features. This can be further evaluated with orbits MRI.    XR CHEST     IMPRESSION: Probable small left effusion and associated atelectasis. The right lower lobe there is a dense 3.6 x 3.1 cm nodular consolidation. Recommend further evaluation with chest CT with contrast. No pneumothorax. Heart size at the upper limits of   normal. No pulmonary edema. Degenerative change of the bilateral shoulders.      Discharge Medications   Discharge Medication List as of 4/17/2022  3:03 PM      CONTINUE these medications which have CHANGED    Details   cefdinir (OMNICEF) 300 MG capsule Take 1 capsule (300 mg) by mouth daily, Disp-3 capsule, R-0, E-Prescribe      cloNIDine (CATAPRES) 0.1 MG tablet Take 1 tablet (0.1 mg) by mouth 2 times daily, Disp-60 tablet, R-0, E-Prescribe         CONTINUE these medications which have NOT CHANGED    Details   amLODIPine (NORVASC) 10 MG tablet Take 1 tablet (10 mg) by mouth daily, Disp-90 tablet, R-3, E-Prescribe      aspirin (ASA) 81 MG chewable tablet Take 81 mg by mouth, Historical      glimepiride (AMARYL) 4 MG tablet Take 1 tablet (4 mg) by mouth every morning (before breakfast), Disp-90 tablet, R-3, E-Prescribe      hydrALAZINE (APRESOLINE) 100 MG tablet Take  1 tablet (100 mg) by mouth 4 times daily, Disp-360 tablet, R-3, E-Prescribe      LANTUS SOLOSTAR 100 UNIT/ML soln Inject 26 Units Subcutaneous At Bedtime Please dispense with applicable needles/ syringes/ alcohol swabs., Disp-15 mL, R-3, SARAH, E-PrescribeIf Lantus is not covered by insurance, may substitute Basaglar or Semglee or other insulin glargine product per i nsurance preference at same dose and frequency.      memantine (NAMENDA) 10 MG tablet Take 1 tablet (10 mg) by mouth 2 times daily, Disp-60 tablet, R-11, E-Prescribe      metoprolol succinate ER (TOPROL-XL) 50 MG 24 hr tablet Take 1 tablet (50 mg) by mouth daily, Disp-90 tablet, R-3, E-Prescribe      multivitamin, therapeutic (THERA-VIT) TABS tablet Take 1 tablet by mouth daily, Historical      omeprazole (PRILOSEC) 20 MG DR capsule Take 1 capsule (20 mg) by mouth daily, Disp-90 capsule, R-3, E-Prescribe      QUEtiapine (SEROQUEL) 25 MG tablet Take 0.5 tablets (12.5 mg) by mouth At Bedtime, Disp-30 tablet, R-11, E-Prescribe      rivastigmine (EXELON) 13.3 MG/24HR 24 hr patch Apply 1 patch topically daily, Disp-30 patch, R-11, E-Prescribe      simvastatin (ZOCOR) 20 MG tablet Take 1 tablet (20 mg) by mouth every evening, Disp-90 tablet, R-3, E-Prescribe      TRULICITY 1.5 MG/0.5ML pen Inject 1.5 mg Subcutaneous every 7 days, Disp-6 mL, R-1, SARAH, E-PrescribeDispense with applicable Pen needles      CONTOUR NEXT TEST test strip 1 strip by In Vitro route 2 times daily, Disp-200 strip, R-0, SARAH, E-Prescribe           Allergies   Allergies   Allergen Reactions     Ibuprofen Rash and GI Disturbance

## 2022-04-17 NOTE — PROGRESS NOTES
"Patient will be discharged to home this evening at 1630 transported by  [employer] transportation. Due to patients Alzheimer's disease, discharge paperwork reviewed with spouse (Patricia) and all questions have been answered. Prescriptions ready at his pharmacy (son will obtain his prescriptions). IV discontinued. Patient stable and stated \"I' ready to go home\"  Jil Mata RN  "

## 2022-04-17 NOTE — PLAN OF CARE
Problem: Plan of Care - These are the overarching goals to be used throughout the patient stay.    Goal: Optimal Comfort and Wellbeing  Outcome: Ongoing, Progressing     Problem: Behavioral Health Comorbidity  Goal: Maintenance of Behavioral Health Symptom Control  Outcome: Ongoing, Progressing  Intervention: Maintain Behavioral Health Symptom Control  Recent Flowsheet Documentation  Taken 4/16/2022 1604 by Godfrey Cruz RN  Medication Review/Management: medications reviewed     Pt calm and cooperative throughout shift. Tripped bed alarm twice and was able to be redirected without issues. Continuing to monitor via IPAD. Consumed 100% of dinner. Completed last dose of Remdesivir. SpO2 > 90% on RA. Vitally stable.

## 2022-04-18 NOTE — PROGRESS NOTES
Clinic Care Coordination Contact  [facility]: Post-Discharge Note  SITUATION                                                      Admission:    Admission Date: 04/13/22   Reason for Admission: covid  Discharge:   Discharge Date: 04/17/22  Discharge Diagnosis: covid    BACKGROUND                                                      Per hospital discharge summary and inpatient provider notes:  Principal Problem:    Infection due to 2019 novel coronavirus  Active Problems:    Type 2 diabetes mellitus without complication, with long-term current use of insulin (H)    CKD (chronic kidney disease) stage 4, GFR 15-29 ml/min (H)    Benign essential hypertension    Acute metabolic encephalopathy    Acute cystitis without hematuria    Alzheimer's dementia with behavioral disturbance (H)    Acute respiratory failure with hypoxia (H)    Hypertensive urgency     Follow-ups Needed After Discharge     Follow-up Appointments     Follow-up and recommended labs and tests       Follow up with primary care provider, Bianca Rodriguez, within 1-2 weeks   for hospital follow- up. Recheck kidney function.         ASSESSMENT      Enrollment  Primary Care Care Coordination Status: Potential    Discharge Assessment  How are you doing now that you are home?: not good  How are your symptoms? (Red Flag symptoms escalate to triage hotline per guidelines): Unchanged  Do you feel your condition is stable enough to be safe at home until your provider visit?: Yes  Does the patient have their discharge instructions? : Yes  Does the patient have questions regarding their discharge instructions? : Yes (see comment)  Were you started on any new medications or were there changes to any of your previous medications? : Yes  Does the patient have all of their medications?: Yes  Do you have questions regarding any of your medications? : No  Do you have all of your needed medical supplies or equipment (DME)?  (i.e. oxygen tank, CPAP, cane, etc.):  Yes  Discharge follow-up appointment scheduled within 14 calendar days? : No  Discharge Follow Up Appointment Date: 05/05/22  Discharge Follow Up Appointment Scheduled with?: Primary Care Provider  Is patient agreeable to assistance with scheduling? : No    Post-op (CHW CTA Only)  If the patient had a surgery or procedure, do they have any questions for a nurse?: No    Post-op (Clinicians Only)  Did the patient have surgery or a procedure: No  Fever: No  Chills: No  Eating & Drinking: eating and drinking without complaints/concerns  PO Intake: regular diet  Bowel Function: normal  Urinary Status: voiding without complaint/concerns    Talked with wife, on consent to communicate.  He is much weaker and is needing to use the wheelchair much more.  He got up four times last night to use bathroom, first two times, he used commode.  Then he insisted on using bathroom.  He also has walker. He did go from bed to his chair using his walker today.  Son lives with them and they all have covid and are isolating at home.  She is waiting for home care from [facility] to call. Her daughter told her that her phone wasn't working this morning so they may have missed the call. Wife thinks he should have stayed in the hospital longer but they said he was ready to come home.  He has dementia.  Discussed support and she thinks that they need to start to look for assisted living as his needs have increased.  Agreed to set up CC assessment for Thursday, 4-21 at 11 AM.       PLAN                                                      Outpatient Plan:  Will complete assessment on 4-21. Will call if home care does not call her within two days.     4-19 update- Call from wife and she has not heard anything from [facility] home care. Her  was coughing last night and is doing better today now that he is up in his chair.  He did get up several times last night.  She asked for help in contacting [facility].  Called home care and he  is open but on hold.  They did not know that he was discharged from hospital.  She will contact TIGRE Jenkins to have her call patient's wife and writer.  Called wife and she has Alice's phone number and will call her.  Message from wife - she talked with Alice who was with a patient and will call her back when free.  Wife is supposed to have INR done due to medication she was taking for Covid.  She is unsure if she should go into clinic for INR since she stopped taking the medication and that she has covid. Encouraged her to call and get a message to her PCP to determine next steps and she agreed.      Plan- complete assessment on 4-21.     Future Appointments   Date Time Provider Department Center   4[facility]18/2022  [facility]HAEL [facility]   4/21/2022 11:00 AM [facility] SJALLISON [facility]   5/5/2022 11:00 AM Bianca Rodriguez MD MDOB [facility]         For any urgent concerns, please contact our 24 hour nurse triage line: [phone] ([phone])         CHANDU Gustafsno

## 2022-04-21 NOTE — PROGRESS NOTES
Clinic Care Coordination Contact    Clinic Care Coordination Contact  OUTREACH    Referral Information:  Referral Source: IP Report    Primary Diagnosis: Cognitive Impairment    Chief Complaint   Patient presents with     Clinic Care Coordination - Initial        Universal Utilization:  appropriate  Clinic Utilization  Difficulty keeping appointments:: No  Compliance Concerns: No  No-Show Concerns: No  No PCP office visit in Past Year: No  Utilization    Hospital Admissions  4             ED Visits  3             No Show Count (past year)  1                Current as of: 4/19/2022  1:46 PM              Clinical Concerns:  Current Medical Concerns:  87 yr old man with dementia.  Talked with wife, on consent to communicate.  He needs constant supervision as he is not able to ambulate on his own.  Patient has first PT appointment this afternoon since leaving hospital.  He is using the wheelchair all day as he is too weak to use his walker.  She hopes he can regain strength to be able to walk with walker again.  He can help with transfers.  He is urinating in frequently during the day and he is drinking enough.  She will discuss with PCP at appt on May 5. His cough is much better and wife is much better too.  Their son, who lives with them, and also has covid, still has his cough.    Behavioral Concerns: No concerns.      Education Provided to patient: Reviewed role of care coordination.  Assisted living details and overview of [facility] assistance.    Pain  Pain (GOAL):: No  Health Maintenance Reviewed: Due/Overdue   Health Maintenance Due   Topic Date Due     PARATHYROID  Never done     DIABETIC FOOT EXAM  Never done     URINE DRUG SCREEN  Never done     ZOSTER IMMUNIZATION (1 of 2) Never done     MEDICARE ANNUAL WELLNESS VISIT  12/14/2017     EYE EXAM  03/28/2018     MICROALBUMIN  01/29/2019     COVID-19 Vaccine (3 - Booster for Pfizer series) 08/05/2021     INFLUENZA VACCINE (1) 09/01/2021     PHQ-9  01/28/2022      LIPID  02/15/2022       Clinical Pathway: None    Medication Management:  Medication review status: Medications reviewed and no changes reported per patient.        Wife administers.      Functional Status:  Dependent ADLs:: Bathing, Dressing, Grooming, Wheelchair-with assist, Transfers  Dependent IADLs:: Cleaning, Cooking, Laundry, Shopping, Meal Preparation, Medication Management, Money Management, Transportation  Mobility Status: Independent w/Device  Fallen 2 or more times in the past year?: No  Any fall with injury in the past year?: No    Living Situation:  Current living arrangement:: I live in a private home with family  Type of residence:: Private home - stairs    Lifestyle & Psychosocial Needs:    Social Determinants of Health     Tobacco Use: Medium Risk     Smoking Tobacco Use: Former Smoker     Smokeless Tobacco Use: Never Used   Alcohol Use: Not on file   Financial Resource Strain: Low Risk      Difficulty of Paying Living Expenses: Not hard at all   Food Insecurity: No Food Insecurity     Worried About Running Out of Food in the Last Year: Never true     Ran Out of Food in the Last Year: Never true   Transportation Needs: No Transportation Needs     Lack of Transportation (Medical): No     Lack of Transportation (Non-Medical): No   Physical Activity: Not on file   Stress: Not on file   Social Connections: Not on file   Intimate Partner Violence: Not on file   Depression: Not at risk     PHQ-2 Score: 0   Housing Stability: Low Risk      Unable to Pay for Housing in the Last Year: No     Number of Places Lived in the Last Year: 1     Unstable Housing in the Last Year: No     Diet:: Regular  Inadequate nutrition (GOAL):: No  Tube Feeding: No  Inadequate activity/exercise (GOAL):: No  Significant changes in sleep pattern (GOAL): No  Transportation means:: Family, Regular car     Gnosticist or spiritual beliefs that impact treatment:: No  Mental health management concern (GOAL):: No  Chemical Dependency  Status: No Current Concerns  Informal Support system:: Spouse, Children     They own their own home.  Patient has income of 3200 per month, wife's income is $1300 and they have savings around $50,000. Looks like they may be eligible for [facility] services and wife would like to pursue.  She will ask her daughter to assist as she is good with these kinds of things.  She would like writer to call [facility] to start the process. Their kids have encouraged her to start looking at assisted living as they think he will need that support in the future.  Reviewed how assisted living programs work and financing of them.  Discussed in home supports as well.  She is able to do errands and doctor appointments during the day when son is home from work.  He is a  and is home during the middle of the day.  He is not a vet and does not have long term care insurance. She would like to enroll in care coordination.      Resources and Interventions:  Current Resources:   Skilled Home Care Services: Physicial Therapy ([facility])  Community Resources: Home Care  Supplies Currently Used at Home: Incontinence Supplies  Equipment Currently Used at Home: walker, rolling, wheelchair, manual (wheelchair PRN only)  Employment Status: retired     Advance Care Plan/Directive  Advanced Care Plans/Directives on file:: No  Advanced Care Plan/Directive Status: Declined Further Information    Referrals Placed: Assisted Living, [facility] ([place]       Goals:    Goals        General     1. Functional (pt-stated)      Notes - Note created  4/21/2022 12:14 PM by Sola Luna LSW     Goal Statement: Wife will know about assisted living option in my area within 6 months.   Date Goal set: 4-  Barriers: None noted.   Strengths: Can set up tours, etc and has help from children.   Date to Achieve By: 10-  Patient expressed understanding of goal: wife does  Action steps to achieve this goal:  1. I will  review options sent by  CC.  2. I will call those I am interested in for more information.  3. I will report progress towards this goal at scheduled outreach telephone calls from the CCC team.              2. Financial Wellbeing (pt-stated)      Notes - Note created  4/21/2022 12:16 PM by Sola Luna LSW     Goal Statement: [place] see if we qualify for any support within 6 months.   Date Goal set: 4-  Barriers: may need support from my daughter to get paper work done.   Strengths: have family support.   Date to Achieve By: 10-  Patient expressed understanding of goal: wife does  Action steps to achieve this goal:  1. I will complete paper work for Alternative Care and return to [facility].  2. I will schedule [place].  3. I will report progress towards this goal at scheduled outreach telephone calls from the CCC team.                  Patient/Caregiver understanding: wife will work on goals.      Call from daughter Ivette who spoke to mom who gave her writer's phone number. No consent to communicate for Ivette.  She says she has completed POA for patient and wants to get a copy into clinic. She monitors his My Chart.  Discussed in general terms how [place] and [place] work needed.  She will assist wife to complete. She works at law firm.     Called [facility] and completed referral. They will call wife to ask about pets, smoking in the home, and if they have any stocks or bonds.     Plan- Delegating to CHW to contact in less than 30 days and SW CC available as needed and in 6 weeks.     Outreach Frequency: monthly  Future Appointments              In 2 weeks Bianca Rodriguez MD [facility]

## 2022-04-21 NOTE — LETTER
[facility]  Patient Centered Plan of Care  About Me:        Patient Name:  Laz Kingston    YOB: 1935  Age:         87 year old   [employer] MRN:    [id number] Telephone Information:  Home Phone [phone]   Mobile [phone]       Address:  [address] [place] [address] Email address:  [email or web address]      Emergency Contact(s)    Name Relationship Lgl Grd Work Phone Home Phone Mobile Phone   1. JENELLEDEVONCATHLEEN PRESCOTT* Spouse No  [phone]   2. GURUPIERO HARRINGTON Other No  [phone]            Primary language:  English     needed? No   [facility]:  [phone] op. 1  Other communication barriers:Caregiver    Preferred Method of Communication:     Current living arrangement: I live in a private home with family    Mobility Status/ Medical Equipment: Independent w/Device        Health Maintenance  Health Maintenance Reviewed: Due/Overdue   Health Maintenance Due   Topic Date Due     PARATHYROID  Never done     DIABETIC FOOT EXAM  Never done     URINE DRUG SCREEN  Never done     ZOSTER IMMUNIZATION (1 of 2) Never done     MEDICARE ANNUAL WELLNESS VISIT  12/14/2017     EYE EXAM  03/28/2018     MICROALBUMIN  01/29/2019     COVID-19 Vaccine (3 - Booster for Pfizer series) 08/05/2021     INFLUENZA VACCINE (1) 09/01/2021     PHQ-9  01/28/2022     LIPID  02/15/2022           My Access Plan  Medical Emergency 911   Primary Clinic Line [phone]   24 Hour Appointment Line [phone] or  [phone]) (toll-free)   24 Hour Nurse Line [phone] (toll-free)   Preferred Urgent Care [facility], [phone]     Preferred Hospital [facility]  [phone]     Preferred Pharmacy [id number] [address] AT [facility] [address]     Behavioral Health Crisis Line The National Suicide Prevention Lifeline at 1-591.798.8389 or 911             My Care  Team Members  Patient Care Team       Relationship Specialty Notifications Start End    Bianca Rodriguez MD PCP - General Family Medicine  12/15/20     Phone: [phone] Fax: [phone]         [address] [facility] [facility] [address]    Adair Hernandez MD MD Neurology  12/15/20     Phone: [phone] Fax: [phone]         [address] [facility] [address]    Adair Hernandez MD Assigned Neuroscience Provider   2/7/21     Phone: [phone] Fax: [phone]         [address] [facility] [address]    Meredith Farooq, PharmD Pharmacist Pharmacist  5/7/21     Phone: [phone] Fax: [phone]         [facility] [address] [place] [address]    Bianca Rodriguez MD Assigned PCP   6/16/21     Phone: [phone] Fax: [phone]         [address] [place] [address] [address] [facility] [address]    Sola Luna LSW Lead Care Coordinator Primary Care - CC Admissions 4/21/22     Phone: [phone]         [facility] Gonazlez Community Health Worker  Admissions 4/21/22             My Care Plans  Self Management and Treatment Plan  Goals and (Comments)   Goals        General     1. Functional (pt-stated)      Notes - Note created  4/21/2022 12:14 PM by Sola Luna LSW     Goal Statement: Wife will know about assisted living option in my area within 6 months.   Date Goal set: 4-  Barriers: None noted.   Strengths: Can set up tours, etc and has help from children.   Date to Achieve By: 10-  Patient expressed understanding of goal: wife does  Action steps to achieve this goal:  1. I will review options sent by [facility].  2. I will call those I am interested in for more information.  3. I will report progress towards this goal at scheduled outreach telephone calls from the CCC team.              2. Financial Wellbeing (pt-stated)      Notes - Note created  4/21/2022 12:16 PM by Sola Luna LSW     Goal Statement: [place]  see if we qualify for any support within 6 months.   Date Goal set: 4-  Barriers: may need support from my daughter to get paper work done.   Strengths: have family support.   Date to Achieve By: 10-  Patient expressed understanding of goal: wife does  Action steps to achieve this goal:  1. I will complete paper work for Alternative Care and return to [facility].  2. I will schedule [place].  3. I will report progress towards this goal at scheduled outreach telephone calls from the CCC team.                   Advance Care Plans/Directives Type:   No data recorded    My Medical and Care Information  Problem List   Patient Active Problem List   Diagnosis     Type 2 diabetes mellitus without complication, with long-term current use of insulin (H)     Hearing loss     Hypercholesterolemia     Late onset Alzheimer's disease with behavioral disturbance (H)     Hypertension     CKD (chronic kidney disease) stage 4, GFR 15-29 ml/min (H)     Benign essential hypertension     Gastroesophageal reflux disease     S/P total knee replacement     Acute metabolic encephalopathy     Nephrolithiasis     Sleep difficulties     Ureteral stone     Acute cystitis without hematuria     Alzheimer's dementia with behavioral disturbance (H)     Infection due to 2019 novel coronavirus     Acute respiratory failure with hypoxia (H)     Hypertensive urgency      Current Medications and Allergies:    Current Outpatient Medications   Medication Instructions     amLODIPine (NORVASC) 10 mg, Oral, DAILY     aspirin (ASA) 81 mg, Oral     cefdinir (OMNICEF) 300 mg, Oral, DAILY     cloNIDine (CATAPRES) 0.1 mg, Oral, 2 TIMES DAILY     CONTOUR NEXT TEST test strip 1 strip, In Vitro, 2 TIMES DAILY     glimepiride (AMARYL) 4 mg, Oral, EVERY MORNING BEFORE BREAKFAST     hydrALAZINE (APRESOLINE) 100 mg, Oral, 4 TIMES DAILY     Lantus SoloStar 26 Units, Subcutaneous, AT BEDTIME, Please dispense with applicable needles/ syringes/  alcohol swabs.     memantine (NAMENDA) 10 mg, Oral, 2 TIMES DAILY     metoprolol succinate ER (TOPROL-XL) 50 mg, Oral, DAILY     multivitamin, therapeutic (THERA-VIT) TABS tablet 1 tablet, Oral, DAILY     omeprazole (PRILOSEC) 20 mg, Oral, DAILY     QUEtiapine (SEROQUEL) 12.5 mg, Oral, AT BEDTIME     rivastigmine (EXELON) 13.3 MG/24HR 24 hr patch 1 patch, Topical, DAILY     simvastatin (ZOCOR) 20 mg, Oral, EVERY EVENING     Trulicity 1.5 mg, Subcutaneous, EVERY 7 DAYS         Care Coordination Start Date: 4/21/2022   Frequency of Care Coordination: monthly     Form Last Updated: 04/21/2022

## 2022-04-21 NOTE — LETTER
[facility] CARE COORDINATION  [facility]    April 21, 2022    Laz Kingston  [address] [place] [address]      Dear Laz,    I am a clinic care coordinator who works with Bianca Rodriguez MD at [facility]. I wanted to thank you for spending the time to talk with me.  Below is a description of clinic care coordination and how I can further assist you.      The clinic care coordination team is made up of a registered nurse,  and community health worker who understand the health care system. The goal of clinic care coordination is to help you manage your health and improve access to the health care system in the most efficient manner. The team can assist you in meeting your health care goals by providing education, coordinating services, strengthening the communication among your providers and supporting you with any resource needs.    Please feel free to contact me at [phone] with any questions or concerns. We are focused on providing you with the highest-quality healthcare experience possible and that all starts with you.     Sincerely,     Sola Luna,   [facility]  [phone]      Enclosed: I have enclosed a copy of the Patient Centered Plan of Care. This has helpful information and goals that we have talked about. Please keep this in an easy to access place to use as needed.  Also included assisted living programs in the area near your home.

## 2022-04-21 NOTE — TELEPHONE ENCOUNTER
Reason for Call:  Home Health Care    Marah with [facility] requesting verbal orders for PT    Orders are needed for this patient.     PT: 1 visit per week for 2 weeks, followed by 1 visit every other week for 2 weeks    Phone Number Homecare Nurse can be reached at: Marah will be out of the office on Friday 04/22/2022.  Requesting her Leader Lois is called at .  Okay to leave message - voice mail is secure.    Call taken on 4/21/2022 at 4:33 PM by Aleisha Caballero

## 2022-04-25 NOTE — TELEPHONE ENCOUNTER
The Home Care/Assisted Living/Nursing Facility is calling regarding an established patient.  Has the patient seen Home Care in the past or is currently residing in Assisted Living or Nursing Facility? Yes.     Marah calling from [facility] requesting the following orders that are within the Home Care, Assisted Living or Nursing Home Eval and Treatment standing order and can be signed as standing order signature required by RN.    Preferred Call Back Number: [phone]    PT/OT/Speech Therapy    Any additional Orders:  Any order requested not stated above are outside of the standing order and must be routed to a licensed practitioner for approval.    No    Writer has verified Requestor will send fax to have orders signed.

## 2022-04-25 NOTE — TELEPHONE ENCOUNTER
Marah calling to check status of the verbal order request below.  Please call Marah with verbal at [phone]. Note this is a new number from last week.  Do not call the number from last week, call the new one listed above.  Secure VM if needed.

## 2022-04-26 NOTE — TELEPHONE ENCOUNTER
Wife Patricia is calling and states that Laz was in the hospital with bladder infection and covid.  Wife has question on how many times a day he should be urinating. FNA relayed information to wife.  Wife denies triage.    COVID 19 Nurse Triage Plan/Patient Instructions    Please be aware that novel coronavirus (COVID-19) may be circulating in the community. If you develop symptoms such as fever, cough, or SOB or if you have concerns about the presence of another infection including coronavirus (COVID-19), please contact your health care provider or visit [email or web address].     Disposition/Instructions    Home care recommended. Follow home care protocol based instructions.    Thank you for taking steps to prevent the spread of this virus.  o Limit your contact with others.  o Wear a simple mask to cover your cough.  o Wash your hands well and often.    Resources    [facility]: About COVID-19: [email or web address]    CDC: What to Do If You're Sick: www.cdc.gov/coronavirus/2019-ncov/about/steps-when-sick.html    CDC: Ending Home Isolation: www.cdc.gov/coronavirus/2019-ncov/hcp/disposition-in-home-patients.html     CDC: Caring for Someone: www.cdc.gov/coronavirus/2019-ncov/if-you-are-sick/care-for-someone.html     [facility]: Interim Guidance for Hospital Discharge to Home: www.health.[facility].[place].us/diseases/coronavirus/hcp/hospdischarge.pdf    [facility] clinical trials (COVID-19 research studies): clinicalaffairs.[facility]/umn-clinical-trials     Below are the COVID-19 hotlines at the [facility] ([facility]). Interpreters are available.   o For health questions: Call 906-710-4335 or 1-779.425.8287 (7 a.m. to 7 p.m.)  o For questions about schools and childcare: Call 857-014-4965 or 1-477.280.4933 (7 a.m. to 7 p.m.)                           COVID 19 Nurse Triage Plan/Patient Instructions    Please be aware that novel coronavirus (COVID-19) may be circulating in the  community. If you develop symptoms such as fever, cough, or SOB or if you have concerns about the presence of another infection including coronavirus (COVID-19), please contact your health care provider or visit [email or web address].     Disposition/Instructions    Home care recommended. Follow home care protocol based instructions.    Thank you for taking steps to prevent the spread of this virus.  o Limit your contact with others.  o Wear a simple mask to cover your cough.  o Wash your hands well and often.    Resources    [facility]: About COVID-19: [email or web address]    CDC: What to Do If You're Sick: www.cdc.gov/coronavirus/2019-ncov/about/steps-when-sick.html    CDC: Ending Home Isolation: www.cdc.gov/coronavirus/2019-ncov/hcp/disposition-in-home-patients.html     CDC: Caring for Someone: www.cdc.gov/coronavirus/2019-ncov/if-you-are-sick/care-for-someone.html     [facility]: Interim Guidance for Hospital Discharge to Home: www.health.[facility].mn.us/diseases/coronavirus/hcp/hospdischarge.pdf    [facility] clinical trials (COVID-19 research studies): clinicalaffairs.[facility]-clinical-trials     Below are the COVID-19 hotlines at the [place] [facility] of [facility] ([facility]). Interpreters are available.   o For health questions: Call 420-236-9657 or 1-782.874.3183 (7 a.m. to 7 p.m.)  o For questions about schools and childcare: Call 518-090-9982 or 1-345.828.9056 (7 a.m. to 7 p.m.)

## 2022-04-29 NOTE — PROGRESS NOTES
Patient and family spoke with CCC SW on 4/21/2022 , patient enrolled in CCC and goals were established     CHW delegations:   Plan- Delegating to CHW to contact in less than 30 days and SW CC available as needed and in 6 weeks.      Next outreach due: 5/19/2022

## 2022-05-05 NOTE — PROGRESS NOTES
Assessment & Plan     (R54) Age-related physical debility  (primary encounter diagnosis)  Comment:   Plan: Primary Care - Care Coordination Referral            (I10) Essential hypertension  Comment: Normotensive,  cardiovascular meds reviewed  Continue current management  Plan: Basic metabolic panel  (Ca, Cl, CO2, Creat,         Gluc, K, Na, BUN)            (N18.4) CKD (chronic kidney disease) stage 4, GFR 15-29 ml/min (H)  Comment:   Plan: Basic metabolic panel  (Ca, Cl, CO2, Creat,         Gluc, K, Na, BUN)                             No follow-ups on file.    Bianca Rodriguez MD  [facility]    Penelope Nesbitt is a 87 year old who presents with his wife and posthospital discharge follow-up on 4/13/2022 for UTI and covid infection.  Family is now planning on having him moved to memory care facility or home care assistance as he has become increasingly more confused requiring assistance which has become more difficult for the family to manage.     History of Present Illness       Reason for visit:  Hospital follow up  Symptom onset:  3-4 weeks ago  Symptoms include:  None remaining  Symptom intensity:  Mild  Symptom progression:  Improving  Had these symptoms before:  No    He eats 2-3 servings of fruits and vegetables daily.He consumes 0 sweetened beverage(s) daily.He exercises with enough effort to increase his heart rate 9 or less minutes per day.  He exercises with enough effort to increase his heart rate 3 or less days per week.   He is taking medications regularly.      Review of Systems         Objective    /60   Pulse 83   Temp 97.9  F (36.6  C)   Resp 16   Ht 1.829 m (6')   Wt 88.9 kg (196 lb)   SpO2 97%   BMI 26.58 kg/m    Body mass index is 26.58 kg/m .     Physical Exam   GENERAL: alert, no distress, elderly  Sitting in the wheelchair, quiet.   RESP: lungs clear to auscultation - no rales, rhonchi or wheezes  CV: regular rate and rhythm, normal S1 S2, no S3 or  S4, no murmur, click or rub, no peripheral edema and peripheral pulses strong  MS: no gross musculoskeletal defects noted, no edema                 80

## 2022-05-09 NOTE — PROGRESS NOTES
Clinic Care Coordination Contact    Follow Up Progress Note      Assessment: Another CC referral was placed by PCP after 5-5 appointment, to help with more care at home or in assisted living.  Called wife and she thinks he is doing a bit better now.  He is sleeping well.  Is not really participating in PT and has one more visit or they will stop as he is not doing the exercises.  Wife will try to encourage him to do the exercises. She would really like to get him to walk a few steps so he could go onto the deck to watch the birds.  There is one step to the deck.  She and her daughter are working on goals and daughter is helping fill out the paper work.  No other needs at this time.     Care Gaps:    Health Maintenance Due   Topic Date Due     PARATHYROID  Never done     DIABETIC FOOT EXAM  Never done     URINE DRUG SCREEN  Never done     ZOSTER IMMUNIZATION (1 of 2) Never done     DTAP/TDAP/TD IMMUNIZATION (1 - Tdap) 07/28/2014     MEDICARE ANNUAL WELLNESS VISIT  12/14/2017     EYE EXAM  03/28/2018     MICROALBUMIN  01/29/2019     Pneumococcal Vaccine: 65+ Years (3 - PPSV23 or PCV20) 07/28/2019     COVID-19 Vaccine (3 - Booster for Pfizer series) 08/05/2021     LIPID  02/15/2022       Postponed to next PCP appt.      Goals addressed this encounter:    Goals Addressed                    This Visit's Progress       Patient Stated       1. Functional (pt-stated)   10%      Goal Statement: Wife will know about assisted living option in my area within 6 months.   Date Goal set: 4-  Barriers: None noted.   Strengths: Can set up tours, etc and has help from children.   Date to Achieve By: 10-  Patient expressed understanding of goal: wife does  Action steps to achieve this goal:  1. I will review options sent by [facility].  2. I will call those I am interested in for more information.  3. I will report progress towards this goal at scheduled outreach telephone calls from the CCC team.                2. Financial  Wellbeing (pt-stated)   10%      Goal Statement: [place] will [place] Cho[place] to see if we qualify for any support within 6 months.   Date Goal set: 4-  Barriers: may need support from my daughter to get paper work done.   Strengths: have family support.   Date to Achieve By: 10-  Patient expressed understanding of goal: wife does  Action steps to achieve this goal:  1. I will complete paper work for [facility] and return to [facility].  2. I will schedule [place].  3. I will report progress towards this goal at scheduled outreach telephone calls from the CCC team.                  Intervention/Education provided during outreach: NA     Outreach Frequency: monthly    Plan:   Delegating to CHW to contact in less than 30 days.   Care Coordinator will follow up in 45 days and as needed.  Sola Luna,   [facility]  [phone]

## 2022-05-18 NOTE — TELEPHONE ENCOUNTER
Reason for Call: Request for an order or referral:    Order or referral being requested: ORDER    Date needed: as soon as possible    Has the patient been seen by the PCP for this problem? Not Applicable    Additional comments: REQUESTING PHYSICAL THERAPY 1 X 1, ONE VISIT EVERY OTHER WEEK X2    Phone number Patient can be reached at:  Other phone number:  [phone]    Best Time:  ANY    Can we leave a detailed message on this number?  YES    Call taken on 5/18/2022 at 8:56 AM by Lee Alcala

## 2022-05-18 NOTE — PROGRESS NOTES
Clinic Care Coordination Contact    Situation: Patient chart reviewed by care coordinator.    Background: Care Coordination initial assessment and enrollment to Care Coordination was on 4/21/22. Patient centered goals were developed with participation from patient. CCC SW handed patient off to CHW for continued outreach every 30 days.      Assessment: Per chart review, patient outreach completed by CCC SW on 5/9/22.      Plan/Recommendations: CCC SW will continue to monitor, support patient with current goals and will be available to assist as needs arise. CHW will outreach in one month on 6/8/22.      Yu Gonzalez  Community Health Worker   [facility] Care Coordination  [facility]   [email or web address]  Office: [phone]

## 2022-05-29 PROBLEM — J18.9 PNEUMONIA: Status: ACTIVE | Noted: 2022-01-01

## 2022-05-29 PROBLEM — R41.82 AMS (ALTERED MENTAL STATUS): Status: ACTIVE | Noted: 2022-01-01

## 2022-05-29 NOTE — SIGNIFICANT EVENT
Within an hour after restraint an in person face to face assessment was completed at 0605, including an evaluation of the patient's immediate reaction to the intervention, behavioral assessment and review/assessment of history, drugs and medications, recent labs, etc., and behavioral condition.  The patient experienced: No adverse physical outcome from seclusion/restraint initiation.  The intervention of restraint or seclusion needs to continue.      Dieudonne Kinsey MD

## 2022-05-29 NOTE — ED NOTES
"Pt is resting in bed, eyes open and asking \"is it time to go?\". Wife returned and is in room. He is talking to her, says \"I am done with my haircut, time to go.\" Redirectable. Has not attempted to get out of bed for this tech.  "

## 2022-05-29 NOTE — ED TRIAGE NOTES
Patient arrives via EMS. Alzhemier patient from home, family reported in creased confusion. Recent UTI. Patient disoriented to time, place, situation and is not able to report history. Minimal speech with nursing staff. Difficulty in get to follow commands     Triage Assessment     Row Name 05/28/22 7108       Triage Assessment (Adult)    Airway WDL WDL       Cognitive/Neuro/Behavioral WDL    Cognitive/Neuro/Behavioral WDL orientation    Orientation disoriented to;place;time;situation

## 2022-05-29 NOTE — ED PROVIDER NOTES
EMERGENCY DEPARTMENT ENCOUNTER      NAME: Laz Kingston  AGE: 87 year old male  YOB: 1935  MRN: [id number]  EVALUATION DATE & TIME: 5/28/2022 10:18 PM    PCP: Bianca Rodriguez    ED PROVIDER: Amos Shankar M.D.      Chief Complaint   Patient presents with     Altered Mental Status         FINAL IMPRESSION:  Pneumonia  Confusion  Atrial fibrillation    ED COURSE & MEDICAL DECISION MAKING:    Pertinent Labs & Imaging studies reviewed. (See chart for details)  87 year old male presents to the Emergency Department for evaluation of confusion.  Sounds as if it started this evening.  Patient does have a fever here in the ER.  Had a strong suspicion for UTI.  Urine is currently pending at the time of this dictation.  Patient does have what appears to be a pneumonia on x-ray blood work is otherwise fairly nonacute.  In light of the confusion and pneumonia and was still possibly a UTI felt that admission to the hospital was warranted as the patient cannot go home in his current state.  He is received ceftriaxone and azithromycin and will be admitted to the hospital for further care pending urinalysis.    10:34 PM I met with the patient for the initial interview and physical examination. Discussed plan for treatment and workup in the ED. PPE: Provider wore gloves, and N95 mask.      At the conclusion of the encounter I discussed the results of all of the tests and the disposition. The questions were answered. The patient or family acknowledged understanding and was agreeable with the care plan.           MEDICATIONS GIVEN IN THE EMERGENCY:  Medications   0.9% sodium chloride BOLUS (1,000 mLs Intravenous New Bag 5/28/22 6234)   cefTRIAXone (ROCEPHIN) 2 g vial to attach to  ml bag for ADULTS or NS 50 ml bag for PEDS (has no administration in time range)   azithromycin (ZITHROMAX) 500 mg in sodium chloride 0.9 % 250 mL intermittent infusion (has no administration in time range)       NEW PRESCRIPTIONS  STARTED AT TODAY'S ER VISIT  New Prescriptions    No medications on file          =================================================================    HPI    Patient information was obtained from: the patient's daughter    Use of : N/A         Laz Kingston is a 87 year old male with a pertinent history of type 2 diabetes mellitus, Alzheimer's, hypertension, stage 4 chronic kidney disease, and hyperlipidemia who presents to this ED via EMS for evaluation of an altered mental status.     Per patient's daughter: This evening when the patient's daughter went to help the patient get ready for bed, the patient was not able to get up out of his chair, his speech was slurred, and he was confused. She states that the patient has Alzheimer's but usually knows where he is and what is going on, but this evening he did not know either of these things. She endorses the patient having a fever. She states that the patient had physical therapy this morning, and was at his baseline throughout the day. She notes that his physical therapist said that the patient's blood pressure was good, but that his heart rate was irregular this morning. The patient's daughter also notes that the patient was hospitalized for a UTI about six weeks ago, and had similar symptoms at that time. The patient's daughter denies the patient having any other symptoms or complaints at this time.     REVIEW OF SYSTEMS   Review of Systems   Constitutional: Positive for fever.   Musculoskeletal: Positive for gait problem (not able to stand).   Neurological: Positive for speech difficulty (slurred).   Psychiatric/Behavioral: Positive for confusion.   All other systems reviewed and are negative.       PAST MEDICAL HISTORY:  Past Medical History:   Diagnosis Date     Acute renal failure with acute tubular necrosis superimposed on stage 3 chronic kidney disease (H) 5/2/2021     Alzheimer's dementia (H)      BCE (basal cell epithelioma) 10/19/2015      Diabetes mellitus (H)      Dysplastic nevus 10/19/2015     GERD (gastroesophageal reflux disease)      Hyperlipidemia      Hypertension      Onychomycosis      Personal history of prostate cancer      Splenomegaly 01/10/2017       PAST SURGICAL HISTORY:  Past Surgical History:   Procedure Laterality Date     CYSTOSCOPY, TRANSURETHRAL RESECTION (TUR) TUMOR BLADDER, COMBINED N/A 8/5/2021    Procedure: CYSTOSCOPY, WITH TRANSURETHRAL RESECTION BLADDER TUMOR;  Surgeon: Amos Ackerman MD;  Location: [facility] [place]     CYSTOURETEROSCOPY, WITH LITHOTRIPSY USING ITZEL 120P LASER AND URETERAL STENT INSERTION Right 8/5/2021    Procedure: RIGHT URETEROSCOPY, LASER LITHOTRIPSY AND RIGHT URETERAL STENT EXCHANGE;  Surgeon: Amos Ackerman MD;  Location: [facility] OR     HC CYSTOSCOPY,INSERT URETERAL STENT Right 7/4/2021    Procedure: CYSTOSCOPY, WITH URETERAL STENT INSERTION;  Surgeon: Amos Ackerman MD;  [place] [facility];  Service: Urology     IR MISCELLANEOUS PROCEDURE  3/13/2014     IR URETER DILATION BILATERAL  3/13/2014     JOINT REPLACEMENT Bilateral     Both knees     MS ARTHROPLASTY TIBIAL PLATEAU      Description: Knee Replacement;  Recorded: 11/06/2013;     PROSTATECTOMY             CURRENT MEDICATIONS:    amLODIPine (NORVASC) 10 MG tablet  aspirin (ASA) 81 MG chewable tablet  cefdinir (OMNICEF) 300 MG capsule  cloNIDine (CATAPRES) 0.1 MG tablet  CONTOUR NEXT TEST test strip  glimepiride (AMARYL) 4 MG tablet  hydrALAZINE (APRESOLINE) 100 MG tablet  LANTUS SOLOSTAR 100 UNIT/ML soln  memantine (NAMENDA) 10 MG tablet  metoprolol succinate ER (TOPROL-XL) 50 MG 24 hr tablet  multivitamin, therapeutic (THERA-VIT) TABS tablet  omeprazole (PRILOSEC) 20 MG DR capsule  QUEtiapine (SEROQUEL) 25 MG tablet  rivastigmine (EXELON) 13.3 MG/24HR 24 hr patch  simvastatin (ZOCOR) 20 MG tablet  TRULICITY 1.5 MG/0.5ML pen        ALLERGIES:  Allergies   Allergen Reactions     Ibuprofen Rash and GI  Disturbance       FAMILY HISTORY:  Family History   Problem Relation Age of Onset     Alzheimer Disease Mother      Coronary Artery Disease Father      Heart Failure Mother      Heart Disease Father 56.00        Two heart attacks     Heart Failure Sister          at 47.     Kidney Disease Sister      Diabetes Daughter      No Known Problems Son      No Known Problems Son      Diabetes Daughter        SOCIAL HISTORY:   Social History     Socioeconomic History     Marital status:    Tobacco Use     Smoking status: Former Smoker     Years: 4.00     Types: Cigarettes     Smokeless tobacco: Never Used     Tobacco comment: Quit prior to .   Substance and Sexual Activity     Alcohol use: No     Comment: Alcoholic Drinks/day: Never a problem for him, he states.     Drug use: No   Social History Narrative    Should be using walker with ambulation. Patient does not use any home oxygen. Lives with wife.      Social Determinants of Health     Financial Resource Strain: Low Risk      Difficulty of Paying Living Expenses: Not hard at all   Food Insecurity: No Food Insecurity     Worried About Running Out of Food in the Last Year: Never true     Ran Out of Food in the Last Year: Never true   Transportation Needs: No Transportation Needs     Lack of Transportation (Medical): No     Lack of Transportation (Non-Medical): No   Housing Stability: Low Risk      Unable to Pay for Housing in the Last Year: No     Number of Places Lived in the Last Year: 1     Unstable Housing in the Last Year: No       VITALS:  BP (!) 161/86   Pulse 117   Temp (!) 100.7  F (38.2  C) (Rectal)   Resp 18   SpO2 95%       PHYSICAL EXAM    Constitutional: Well developed, Well nourished, NAD, GCS 15  HENT: Normocephalic, Atraumatic, Bilateral external ears normal, Oropharynx normal, mucous membranes moist, Nose normal. Neck-  Normal range of motion, No tenderness, Supple, No stridor.  Eyes: PERRL, EOMI, Conjunctiva normal, No discharge.    Respiratory: Normal breath sounds, No respiratory distress, No wheezing, Speaks full sentences easily. No cough.  Cardiovascular: Normal heart rate, Regular rhythm, No murmurs, No rubs, No gallops. Chest wall nontender.  GI:Soft, No tenderness, No masses, No flank tenderness. No rebound or guarding.   Musculoskeletal: 2+ DP pulses. No edema.No cyanosis, No clubbing. Good range of motion in all major joints. No tenderness to palpation or major deformities noted.   Integument: Warm, Dry, No erythema, No rash. No petechiae.   Neurologic: Alert & oriented x 1,  CN 3-12 intact Normal motor function, Normal sensory function, No focal deficits noted. Normal gait. Normal finger to nose bilaterally  Psychiatric: Affect normal, Judgment normal, Mood normal. Cooperative.          LAB:  All pertinent labs reviewed and interpreted.  Labs Ordered and Resulted from Time of ED Arrival to Time of ED Departure   COMPREHENSIVE METABOLIC PANEL - Abnormal       Result Value    Sodium 147 (*)     Potassium 3.4 (*)     Chloride 119 (*)     Carbon Dioxide (CO2) 19 (*)     Anion Gap 9      Urea Nitrogen 54 (*)     Creatinine 2.19 (*)     Calcium 9.7      Glucose 130 (*)     Alkaline Phosphatase 45      AST 12      ALT 12      Protein Total 6.2      Albumin 3.2 (*)     Bilirubin Total 0.3      GFR Estimate 29 (*)    CBC WITH PLATELETS AND DIFFERENTIAL - Abnormal    WBC Count 6.4      RBC Count 3.98 (*)     Hemoglobin 8.5 (*)     Hematocrit 29.0 (*)     MCV 73 (*)     MCH 21.4 (*)     MCHC 29.3 (*)     RDW 16.9 (*)     Platelet Count 200      % Neutrophils 67      % Lymphocytes 21      % Monocytes 8      % Eosinophils 2      % Basophils 1      % Immature Granulocytes 1      NRBCs per 100 WBC 0      Absolute Neutrophils 4.4      Absolute Lymphocytes 1.4      Absolute Monocytes 0.5      Absolute Eosinophils 0.1      Absolute Basophils 0.0      Absolute Immature Granulocytes 0.0      Absolute NRBCs 0.0     LACTIC ACID WHOLE BLOOD - Normal     Lactic Acid 1.0     ROUTINE UA WITH MICROSCOPIC REFLEX TO CULTURE   INFLUENZA A/B & SARS-COV2 PCR MULTIPLEX       RADIOLOGY:  Reviewed all pertinent imaging. Please see official radiology report.  XR Chest Port 1 View   Final Result   IMPRESSION: Right basilar atelectasis or pneumonia. Small right pleural effusion.      Head CT w/o contrast    (Results Pending)       EKG:    Atrial Fibrillation  Rate 103  No acute ST changes  A fib isnew since July 2021    PROCEDURES:   None.       I, Kristina Jairo, am serving as a scribe to document services personally performed by Dr. Amos Shankar based on my observation and the provider's statements to me. I, Amos Shankar MD attest that Kristina Gill is acting in a scribe capacity, has observed my performance of the services and has documented them in accordance with my direction.    Amos Shankar M.D.  Emergency Medicine  [facility] EMERGENCY DEPARTMENT  [facility] [place] [address] [address]  [phone]  Dept: [phone]      Amos Shankar MD  05/29/22 0007       Amos Shankar MD  05/29/22 0020

## 2022-05-29 NOTE — PROGRESS NOTES
05/29/22 0930   Quick Adds   Type of Visit Initial PT Evaluation   Living Environment   People in Home child(steph), adult;spouse   Current Living Arrangements house   Living Environment Comments history taken from chart as pt is poor historian   Self-Care   Equipment Currently Used at Home walker, rolling;wheelchair, manual   Activity/Exercise/Self-Care Comment spouse assist with IADL's   General Information   Onset of Illness/Injury or Date of Surgery 05/28/22   Referring Physician Dr. Pavon   Patient/Family Therapy Goals Statement (PT) none stated   Pertinent History of Current Problem (include personal factors and/or comorbidities that impact the POC) AMS, pneumonia, UTI, afib; PMH of Alzheimer's dementia, DM, CKD, recent COVID in April   Existing Precautions/Restrictions fall;oxygen therapy device and L/min   Cognition   Affect/Mental Status (Cognition) confused;low arousal/lethargic   Orientation Status (Cognition) oriented to;person;disoriented to;place;situation;time   Follows Commands (Cognition) delayed response/completion;increased processing time needed   Range of Motion (ROM)   Range of Motion ROM is WFL   Strength (Manual Muscle Testing)   Strength (Manual Muscle Testing) Deficits observed during functional mobility   Bed Mobility   Bed Mobility supine-sit;sit-supine;scooting/bridging   Scooting/Bridging [place] (Bed Mobility) dependent (less than 25% patient effort)   Supine-Sit [place] (Bed Mobility) moderate assist (50% patient effort);verbal cues   Sit-Supine [place] (Bed Mobility) maximum assist (25% patient effort);2 person assist   Transfers   Transfers sit-stand transfer   Sit-Stand Transfer   Sit-Stand [place] (Transfers) moderate assist (50% patient effort);2 person assist;verbal cues;nonverbal cues (demo/gesture)   Assistive Device (Sit-Stand Transfers) walker, front-wheeled   Clinical Impression   Criteria for Skilled Therapeutic Intervention Yes, treatment indicated    PT Diagnosis (PT) impaired functional mobility   Influenced by the following impairments decreased strength, balance, activity tolerance; impaired cognition   Functional limitations due to impairments bed mobility, transfers, gait, stairs   Clinical Presentation (PT Evaluation Complexity) Stable/Uncomplicated   Clinical Presentation Rationale Pt presents as medically diagnosed   Clinical Decision Making (Complexity) moderate complexity   Planned Therapy Interventions (PT) balance training;bed mobility training;gait training;home exercise program;patient/family education;stair training;strengthening;transfer training   Anticipated Equipment Needs at Discharge (PT) wheelchair;gait belt;walker, rolling   Risk & Benefits of therapy have been explained evaluation/treatment results reviewed;patient   PT Discharge Planning   PT Discharge Recommendation (DC Rec) Transitional Care Facility   PT Rationale for DC Rec pt is below baseline mobility level with assist of 2 for transfers   Total Evaluation Time   Total Evaluation Time (Minutes) 10   Physical Therapy Goals   PT Frequency 3x/week   PT Predicted Duration/Target Date for Goal Attainment 06/05/22   PT Goals Bed Mobility;Transfers;Gait   PT: Bed Mobility Minimal assist;Supine to/from sit   PT: Transfers Minimal assist;Sit to/from stand;Assistive device   PT: Gait Minimal assist;Rolling walker;50 feet

## 2022-05-29 NOTE — PROGRESS NOTES
Speech-Language Pathology: Video Swallow Study     05/29/22 0900   General Information   Onset of Illness/Injury or Date of Surgery 05/28/22   Referring Physician Dr. Pavon   Pertinent History of Current Problem Per MD note: 87-year-old male with past medical history of type II DM, CKD dementia brought for evaluation of confusion.  Admitted with rt lower lobe pneumonia.  Patient was recently admitted to [facility] from 4/13-4/17 for confusion and COVID pneumonia with hypoxia and treated with remdesivir and steroids improved and discharged without any need of home oxygen.  Work-up at ED significant for chest x-ray with right basilar pneumonia versus atelectasis and small right pleural effusion. UA with UTI,   He was started on IV antibiotics and admitted   General Observations Alert but significant confusion and highly internally and externally distracted   Type of Evaluation   Type of Evaluation Swallow Evaluation   Oral Motor   Oral Musculature generally intact;unable to assess due to poor participation/comprehension   Dentition (Oral Motor)   Dentition (Oral Motor) adequate dentition   Facial Symmetry (Oral Motor)   Facial Symmetry (Oral Motor) unable/difficult to assess  (WFL)   Tongue Function (Oral Motor)   Comment, Tongue Function (Oral Motor) Appears WFL; difficulties appear related to cognition   General Swallowing Observations   Comment, General Swallowing Observations Nurse reports patient did okay with pills earlier today   Respiratory Support (General Swallowing Observations) oxygen mask   Current Diet/Method of Nutritional Intake (General Swallowing Observations, NIS) NPO   Swallowing Evaluation Videofluoroscopic swallow study (VFSS)   VFSS Evaluation   Radiologist Dr. Jimenez   Views Taken left lateral   Physical Location of Procedure [facility]   VFSS Textures Trialed thin liquids;mildly thick liquids;pureed   VFSS Eval: Thin Liquid Texture Trial   Mode of Presentation, Thin Liquid  spoon;straw   Preparatory Phase Holding   Oral Phase, Thin Liquid Delayed AP movement;Effortful AP movement;Premature pharyngeal entry   Pharyngeal Phase, Thin Liquid Delayed swallow reflex   Rosenbek's Penetration Aspiration Scale: Thin Liquid Trial Results 3 - contrast remains above the vocal cords, visible residue remains (penetration)   VFSS Eval: Mildly Thick Liquids   Mode of Presentation spoon   Preparatory Phase Holding   Oral Phase Delayed AP movement;Effortful AP movement   Pharyngeal Phase Delayed swallow reflex   Rosenbek's Penetration Aspiration Scale 1 - no aspiration, contrast does not enter airway   VFSS Evaluation: Puree Solid Texture Trial   Mode of Presentation, Puree spoon   Preparatory Phase Holding  (Severe holding; appears predominantly related to attention/cognition)   Oral Phase, Puree Delayed AP movement;Effortful AP movement  (numerous 'trigger' swallows)   Pharyngeal Phase, Puree WFL   Rosenbek's Penetration Aspiration Scale: Puree Food Trial Results 3 - contrast remains above the vocal cords, visible residue remains (penetration)  (eventual penetration of residuals due to severity of oral holding; actual pharyngeal swallow functional once triggered; penetration eventually cleared with throat clear)   Esophageal Phase of Swallow   Patient reports or presents with symptoms of esophageal dysphagia No   Swallowing Recommendations   Diet Consistency Recommendations NPO;ice chips only  (medications crushed in puree;)   Supervision Level for Intake 1:1 supervision needed   Mode of Delivery Recommendations bolus size, small;slow rate of intake   Medication Administration Recommendations, Swallowing (SLP) crushed in puree; cue to swallow as needed   Instrumental Assessment Recommendations VFSS (videofluoroscopic swallowing study)  (Anticipate need for repeat VFSS as mental status improves; SLP to request as appropriate)   Comment, Swallowing Recommendations Videofluoroscopic Swallow Study  completed. Patient had no direct aspiration with any consistency but did have eventual penetration with thin liquids reach the vocal cords with an effective throat clear response. Patient had additional moderately deep penetration with puree texture due to severe oral holding and eventual stasis in the valleculae due to oral holding. Oral motor function grossly intact and oral phase severely impaired but appears largely related to severely decreased attention. Tongue base retraction was mildly reduced intermittently. Swallow response was delayed to the valleculae and epiglottic inversion was complete.  Mild diffuse stasis occurred with all intake intermittently but cleared with time. Hyolaryngeal elevation was complete and hyolaryngeal excursion was mildly reduced. Recommend diet of NPO except ice chips and meds crushed in puree until mental status and attention improve to a safe level. Anticipate need for mildly thick liquids, will repeat VFSS as appropriate.   General Therapy Interventions   Planned Therapy Interventions Dysphagia Treatment   Dysphagia treatment Compensatory strategies for swallowing  (Dx Tx for oral intake readiness)   Clinical Impression   Criteria for Skilled Therapeutic Interventions Met (SLP Eval) Yes, treatment indicated   SLP Diagnosis dysphagia   Clinical Impression Comments Swallow function and safety severely impacted by mental status/poor attention. Patient has potential for safe oral intake when mentation improves   SLP Discharge Planning   SLP Discharge Recommendation Transitional Care Facility   SLP Rationale for DC Rec below baseline cognition and swallow   SLP Brief overview of current status  VFSS showed severe oral holding due to cognition and inconsistent penetration with puree and thin due to holding    Total Evaluation Time   Total Evaluation Time (Minutes) 10   Psychosocial Support   Trust Relationship/Rapport care explained;emotional support provided

## 2022-05-29 NOTE — PHARMACY-ADMISSION MEDICATION HISTORY
Pharmacy Note - Admission Medication History    Pertinent Provider Information: only change in meds since last admission, decrease lantus dose from 26 units to 24 units, because has been eating less lately     ______________________________________________________________________    Prior To Admission (PTA) med list completed and updated in EMR.       PTA Med List   Medication Sig Note Last Dose     amLODIPine (NORVASC) 10 MG tablet Take 1 tablet (10 mg) by mouth daily  5/28/2022 at am     aspirin (ASA) 81 MG chewable tablet Take 81 mg by mouth daily  5/28/2022 at am     cloNIDine (CATAPRES) 0.1 MG tablet Take 1 tablet (0.1 mg) by mouth 2 times daily  5/28/2022 at pm     glimepiride (AMARYL) 4 MG tablet Take 1 tablet (4 mg) by mouth every morning (before breakfast)  5/28/2022 at am     hydrALAZINE (APRESOLINE) 100 MG tablet Take 1 tablet (100 mg) by mouth 4 times daily  5/28/2022 at pm     LANTUS SOLOSTAR 100 UNIT/ML soln Inject 26 Units Subcutaneous At Bedtime Please dispense with applicable needles/ syringes/ alcohol swabs. (Patient taking differently: Inject 24 Units Subcutaneous At Bedtime Please dispense with applicable needles/ syringes/ alcohol swabs.) 5/29/2022: Recently decrease from 26 units to 24 units- not eating as much.     5/28/2022 at pm     memantine (NAMENDA) 10 MG tablet Take 1 tablet (10 mg) by mouth 2 times daily  5/28/2022 at pm     metoprolol succinate ER (TOPROL-XL) 50 MG 24 hr tablet Take 1 tablet (50 mg) by mouth daily  5/28/2022 at am     multivitamin, therapeutic (THERA-VIT) TABS tablet Take 1 tablet by mouth daily  5/28/2022 at am     omeprazole (PRILOSEC) 20 MG DR capsule Take 1 capsule (20 mg) by mouth daily  5/28/2022 at am     QUEtiapine (SEROQUEL) 25 MG tablet Take 0.5 tablets (12.5 mg) by mouth At Bedtime  5/28/2022 at pm     rivastigmine (EXELON) 13.3 MG/24HR 24 hr patch Apply 1 patch topically daily  5/28/2022 at am     simvastatin (ZOCOR) 20 MG tablet Take 1 tablet (20 mg) by  mouth every evening  5/28/2022 at pm     TRULICITY 1.5 MG/0.5ML pen Inject 1.5 mg Subcutaneous every 7 days (Patient taking differently: Inject 1.5 mg Subcutaneous every 7 days On Thursdays)  5/26/2022       Information source(s): Family member, Clinic records, Hospital records and CareEverywhere/SureScripts  Method of interview communication: phone    Summary of Changes to PTA Med List  New: none  Discontinued: none  Changed: lantus for 26 to 24 units    Patient was asked about OTC/herbal products specifically.  PTA med list reflects this.    In the past week, patient estimated taking medication this percent of the time:  greater than 90%.    Allergies were reviewed, assessed, and updated with the patient.      Patient does not use any multi-dose medications prior to admission.    The information provided in this note is only as accurate as the sources available at the time of the update(s).    Thank you for the opportunity to participate in the care of this patient.    Billy Acevedo ELMER  5/29/2022 10:31 AM

## 2022-05-29 NOTE — H&P
[facility]    History and Physical - Hospitalist Service       Date of Admission:  5/28/2022    Assessment & Plan      Laz Kingston is a 87 year old male admitted on 5/28/2022.     87-year-old male with past medical history of type II DM, CKD dementia brought for evaluation of confusion.  Admitted with rt lower lobe pneumonia.  Patient was recently admitted to [facility] from 4/13-4/17 for confusion and COVID pneumonia with hypoxia and treated with remdesivir and steroids improved and discharged without any need of home oxygen.  Work-up at ED significant for chest x-ray with right basilar pneumonia versus atelectasis and small right pleural effusion. UA with UTI,   He was started on IV antibiotics and admitted    Altered mental state  -Likely from acute infection, CT head unremarkable, has baseline dementia labs mild hypernatremia otherwise at baseline, chest x-ray suspicious of pneumonia.  Started on IV antibiotics.  Delirium protocol initiated-  hold Seroquel and Zyprexa due to QT.      Bilateral lower lobe pneumonia  -Chest x-ray with rounded mass at the right lung base similar to previous exam and mild bibasilar infiltrates   -c/w Ceftriaxone,  will change azithromycin to doxycycline due to prolonged QT, incentive spirometry, oxygen as needed    UTI-ceftriaxone     New onset A. fib with RVR  - Could not see prior history of A. fib on chart review.  when patient becomes agitated.  IV metoprolol, start oral metoprolol 25 twice daily.  Echo.  Keep on telemetry. Cardiology Consult      Hypertension  -Resume PTA home meds after verified by pharmacy, hydralazine as needed    Type II DM  -Sliding scale insulin      Advance Alzheimer's dementia with behavioral disturbances  -Continue PTA home meds after med rec.  Seroquel as needed      CKD 4-creatinine at baseline    Hypokalemia-replace per protocol    Hyperlipidemia-continue PTA statin after verified by pharacy     GERD-PPI after  verified by pharmacy     Diet: Advance Diet as Tolerated: Clear Liquid Diet    DVT Prophylaxis: Pneumatic Compression Devices  [place] Catheter: Not present  Central Lines: None  Cardiac Monitoring: ACTIVE order. Indication: Tachyarrhythmias, acute (48 hours)  Code Status: No CPR- Do NOT Intubate      Clinically Significant Risk Factors Present on Admission         # Hypernatremia: Na = 147 mmol/L (Ref range: 136 - 145 mmol/L) on admission, will monitor as appropriate     # Hypoalbuminemia: Albumin = 3.2 g/dL (Ref range: 3.5 - 5.0 g/dL) on admission, will monitor as appropriate    # Platelet Defect: home medication list includes an antiplatelet medication   # Overweight: Estimated body mass index is 26.58 kg/m  as calculated from the following:    Height as of 5/5/22: 1.829 m (6').    Weight as of 5/5/22: 88.9 kg (196 lb).      Disposition Plan   Expected Discharge:        The patient's care was discussed with the Patient.    Dieudonne Kinsey MD  Hospitalist Service  [facility]  Securely message with the Vocera Web Console (learn more here)  Text page via [employer] Paging/Directory         ______________________________________________________________________    Chief Complaint   AMS    History obtained from chart review amd ER signout     History of Present Illness   Laz Kingston is a 87-year-old male with past medical history of type II DM, CKD dementia brought for evaluation of confusion.  Admitted with rt lower lobe pneumonia.  Patient was recently admitted to [facility] from 4/13-4/17 for confusion and COVID pneumonia with hypoxia and treated with remdesivir and steroids improved and discharged without any need of home oxygen.  Work-up at ED significant for chest x-ray with right basilar pneumonia versus atelectasis and small right pleural effusion.  He was admitted and started on IV antibiotics.        Review of Systems    The 10 point Review of Systems is negative other than noted in  the [facility] or here.     Past Medical History    I have reviewed this patient's medical history and updated it with pertinent information if needed.   Past Medical History:   Diagnosis Date     Acute renal failure with acute tubular necrosis superimposed on stage 3 chronic kidney disease (H) 5/2/2021     Alzheimer's dementia (H)      BCE (basal cell epithelioma) 10/19/2015     Diabetes mellitus (H)      Dysplastic nevus 10/19/2015     GERD (gastroesophageal reflux disease)      Hyperlipidemia      Hypertension      Onychomycosis      Personal history of prostate cancer      Splenomegaly 01/10/2017       Past Surgical History   I have reviewed this patient's surgical history and updated it with pertinent information if needed.  Past Surgical History:   Procedure Laterality Date     CYSTOSCOPY, TRANSURETHRAL RESECTION (TUR) TUMOR BLADDER, COMBINED N/A 8/5/2021    Procedure: CYSTOSCOPY, WITH TRANSURETHRAL RESECTION BLADDER TUMOR;  Surgeon: Amos Ackerman MD;  Location: [facility] [place]     CYSTOURETEROSCOPY, WITH LITHOTRIPSY USING ITZEL 120P LASER AND URETERAL STENT INSERTION Right 8/5/2021    Procedure: RIGHT URETEROSCOPY, LASER LITHOTRIPSY AND RIGHT URETERAL STENT EXCHANGE;  Surgeon: Amos Ackerman MD;  Location: [facility] CYSTOSCOPY,INSERT URETERAL STENT Right 7/4/2021    Procedure: CYSTOSCOPY, WITH URETERAL STENT INSERTION;  Surgeon: Amos Ackerman MD;  [place] [facility] [place];  Service: Urology     IR MISCELLANEOUS PROCEDURE  3/13/2014     IR URETER DILATION BILATERAL  3/13/2014     JOINT REPLACEMENT Bilateral     Both knees     RI ARTHROPLASTY TIBIAL PLATEAU      Description: Knee Replacement;  Recorded: 11/06/2013;     PROSTATECTOMY         Social History   I have reviewed this patient's social history and updated it with pertinent information if needed.  Social History     Tobacco Use     Smoking status: Former Smoker     Years: 4.00     Types: Cigarettes     Smokeless  tobacco: Never Used     Tobacco comment: Quit prior to .   Substance Use Topics     Alcohol use: No     Comment: Alcoholic Drinks/day: Never a problem for him, he states.     Drug use: No       Family History   I have reviewed this patient's family history and updated it with pertinent information if needed.  Family History   Problem Relation Age of Onset     Alzheimer Disease Mother      Coronary Artery Disease Father      Heart Failure Mother      Heart Disease Father 56.00        Two heart attacks     Heart Failure Sister          at 47.     Kidney Disease Sister      Diabetes Daughter      No Known Problems Son      No Known Problems Son      Diabetes Daughter        Prior to Admission Medications   Prior to Admission Medications   Prescriptions Last Dose Informant Patient Reported? Taking?   CONTOUR NEXT TEST test strip   No No   Si strip by In Vitro route 2 times daily   LANTUS SOLOSTAR 100 UNIT/ML soln   No No   Sig: Inject 26 Units Subcutaneous At Bedtime Please dispense with applicable needles/ syringes/ alcohol swabs.   QUEtiapine (SEROQUEL) 25 MG tablet   No No   Sig: Take 0.5 tablets (12.5 mg) by mouth At Bedtime   TRULICITY 1.5 MG/0.5ML pen   No No   Sig: Inject 1.5 mg Subcutaneous every 7 days   Patient taking differently: Inject 1.5 mg Subcutaneous every 7 days On    amLODIPine (NORVASC) 10 MG tablet   No No   Sig: Take 1 tablet (10 mg) by mouth daily   aspirin (ASA) 81 MG chewable tablet   Yes No   Sig: Take 81 mg by mouth   cefdinir (OMNICEF) 300 MG capsule   No No   Sig: Take 1 capsule (300 mg) by mouth daily   cloNIDine (CATAPRES) 0.1 MG tablet   No No   Sig: Take 1 tablet (0.1 mg) by mouth 2 times daily   glimepiride (AMARYL) 4 MG tablet   No No   Sig: Take 1 tablet (4 mg) by mouth every morning (before breakfast)   hydrALAZINE (APRESOLINE) 100 MG tablet   No No   Sig: Take 1 tablet (100 mg) by mouth 4 times daily   memantine (NAMENDA) 10 MG tablet   No No   Sig: Take 1  tablet (10 mg) by mouth 2 times daily   metoprolol succinate ER (TOPROL-XL) 50 MG 24 hr tablet   No No   Sig: Take 1 tablet (50 mg) by mouth daily   multivitamin, therapeutic (THERA-VIT) TABS tablet   Yes No   Sig: Take 1 tablet by mouth daily   omeprazole (PRILOSEC) 20 MG DR capsule   No No   Sig: Take 1 capsule (20 mg) by mouth daily   rivastigmine (EXELON) 13.3 MG/24HR 24 hr patch   No No   Sig: Apply 1 patch topically daily   simvastatin (ZOCOR) 20 MG tablet   No No   Sig: Take 1 tablet (20 mg) by mouth every evening      Facility-Administered Medications: None     Allergies   Allergies   Allergen Reactions     Ibuprofen Rash and GI Disturbance       Physical Exam   Vital Signs: Temp: 99.8  F (37.7  C) Temp src: Oral BP: (!) 147/82 Pulse: 88   Resp: 17 SpO2: 94 % O2 Device: None (Room air)    Weight: 0 lbs 0 oz    General Appearance: Resting comfortably, confused  Eyes: Pink conjunctiva  HEENT: PERRLA  Respiratory: Decreased air entry bibasilar areas  Cardiovascular: S1-S2, no murmur heard, tachycardic  GI: Soft, nontender abdomen  Genitourinary: No CVA or SP tenderness noted  Musculoskeletal: No peripheral edema  Neurologic: Awake and responds to commands but confused.  Moving all 4 extremities.  Pupils bilaterally equal and reactive to light.  No neck stiffness noted.      Data   Data reviewed today: I reviewed all medications, new labs and imaging results over the last 24 hours. I personally reviewed  Recent Labs   Lab 05/28/22  2259   WBC 6.4   HGB 8.5*   MCV 73*      *   POTASSIUM 3.4*   CHLORIDE 119*   CO2 19*   BUN 54*   CR 2.19*   ANIONGAP 9   DEJAN 9.7   *   ALBUMIN 3.2*   PROTTOTAL 6.2   BILITOTAL 0.3   ALKPHOS 45   ALT 12   AST 12     Recent Results (from the past 24 hour(s))   XR Chest Port 1 View    Narrative    EXAM: XR CHEST PORT 1 VIEW  LOCATION: [facility]  DATE/TIME: 5/28/2022 10:36 PM    INDICATION: Fever.  COMPARISON: 4/13/2022.    FINDINGS: The  heart is at the upper limits of normal in size. There is no pulmonary edema. There is again a rounded mass at the right lung base similar to the previous exam. There are mild bibasilar infiltrates. Probable right pleural effusion.      Impression    IMPRESSION: Right basilar atelectasis or pneumonia. Small right pleural effusion.   Head CT w/o contrast    Narrative    EXAM: CT HEAD W/O CONTRAST  LOCATION: [facility]  DATE/TIME: 5/28/2022 10:28 PM    INDICATION: Mental status change, unknown cause.  COMPARISON: 4/13/2022  TECHNIQUE: Routine CT Head without IV contrast. Multiplanar reformats. Dose reduction techniques were used.    FINDINGS:  INTRACRANIAL CONTENTS: Mildly limited by motion. No definite intracranial hemorrhage, extraaxial collection, or mass effect. No CT evidence of acute infarct. Moderate volume loss and mild burden presumed chronic small vessel ischemia are stable.    VISUALIZED ORBITS/SINUSES/MASTOIDS: No intraorbital abnormality. Mild scattered paranasal sinus mucosal disease. Soft tissue nodule within the medial left orbit between the medial and inferior rectus muscles again noted. This was present dating back to   2/12/2015. It is nonspecific. It is slightly more prominent than the prior head CT suggesting it may be a venous varix.    BONES/SOFT TISSUES: No acute abnormality.      Impression    IMPRESSION:  1.  No acute intracranial abnormality.    2.  Stable age-related and chronic ischemic changes.    3.  Soft tissue density in the inferior medial left orbit is slightly more prominent than on 4/13/2022 suggesting it could be an orbital varix. Consider CT orbits for further evaluation on a nonemergent basis.

## 2022-05-29 NOTE — PROGRESS NOTES
05/29/22 1431   Quick Adds   Type of Visit Initial Occupational Therapy Evaluation   Living Environment   People in Home spouse;child(steph), adult   Current Living Arrangements house   Living Environment Comments info per PT   Self-Care   Current Activity Tolerance fair   Activity/Exercise/Self-Care Comment see PT note   Instrumental Activities of Daily Living (IADL)   IADL Comments pt poor historian, per chart A w/ all ADLs, feeds self   General Information   Onset of Illness/Injury or Date of Surgery 05/28/22   Patient/Family Therapy Goal Statement (OT) none stated   Existing Precautions/Restrictions fall   Limitations/Impairments safety/cognitive   Cognitive Status Examination   Cognitive Status Comments impaired at baseline   Visual Perception   Visual Impairment/Limitations   (not wearing glasses)   Range of Motion Comprehensive   General Range of Motion   (limited AROM in B shldrs)   Strength Comprehensive (MMT)   Comment, General Manual Muscle Testing (MMT) Assessment NT, seems WFL for ADLs   Coordination   Upper Extremity Coordination No deficits were identified   Bed Mobility   Bed Mobility supine-sit;sit-supine;scooting/bridging   Scooting/Bridging [place] (Bed Mobility) maximum assist (25% patient effort);2 person assist   Supine-Sit [place] (Bed Mobility) maximum assist (25% patient effort);2 person assist   Sit-Supine [place] (Bed Mobility) maximum assist (25% patient effort);2 person assist   Transfers   Transfer Comments sit>stand min Ax2 w/ FWW, 2-3 trials, stood 10-15 seconds,   Lower Body Dressing Assessment/Training   [place] Level (Lower Body Dressing) dependent (less than 25% patient effort)   Comment, (Lower Body Dressing) don shoes   Grooming Assessment/Training   [place] Level (Grooming) minimum assist (75% patient effort)   Position (Grooming) sitting up in bed   Comment, (Grooming) washed face, hand over hand A w/ initiating   Eating/Self Feeding   [place]  Level (Feeding) moderate assist (50% patient effort)   Comment, (Feeding) able to hold cup to drink liquid, hand over hand A to initiate   Clinical Impression   Criteria for Skilled Therapeutic Interventions Met (OT) Yes, treatment indicated   OT Diagnosis decreased ADLs   OT Problem List-Impairments impacting ADL cognition;balance;activity tolerance impaired;mobility;strength   Assessment of Occupational Performance 3-5 Performance Deficits   Identified Performance Deficits g/h, self feeding, trsfs,   Planned Therapy Interventions (OT) ADL retraining;transfer training   Clinical Decision Making Complexity (OT) moderate complexity   Anticipated Equipment Needs Upon Discharge (OT)   (cont. to assess)   Risk & Benefits of therapy have been explained patient;care plan/treatment goals reviewed   OT Discharge Planning   OT Discharge Recommendation (DC Rec) Transitional Care Facility;Long term care facility   OT Rationale for DC Rec Ax2 for mobility, Ax1 for seated ADLs, impaired cognition   Total Evaluation Time (Minutes)   Total Evaluation Time (Minutes) 5   OT Goals   Therapy Frequency (OT) 5 times/wk   OT Predicted Duration/Target Date for Goal Attainment 06/03/22   OT Goals Hygiene/Grooming;Transfers;OT Goal 1   OT: Hygiene/Grooming supervision/stand-by assist  (seated)   OT: Transfer with assistive device;Moderate assist   OT: Goal 1 Self feed after set up w/ minimal assist

## 2022-05-29 NOTE — PROGRESS NOTES
Chart/notes/labs/imaging reviewed.  H&P from this morning by Dr. Kinsey reviewed.  CContinue IV CTX, stop doxy. Start Flagyl for probable aspiration pneumonia. Await UC results. Check renal US. Start metoprolol 50mg BID. Stop PO hydralazine and clonidine. Continue amlodipine.  TTE.  NPO until cleared by speech for diet advancement.  Will hold statin until PO intake starts.

## 2022-05-29 NOTE — PLAN OF CARE
Problem: Plan of Care - These are the overarching goals to be used throughout the patient stay.    Goal: Optimal Comfort and Wellbeing  Outcome: Ongoing, Progressing     Problem: Risk for Delirium  Goal: Improved Behavioral Control  Outcome: Ongoing, Progressing     Problem: Risk for Delirium  Goal: Improved Sleep  Outcome: Ongoing, Progressing   Goal Outcome Evaluation:      Pt remains only oriented to self. Pt was able to consume all of his medications whole mixed with gelatin. Pt heart rhythm remains afib to afib with RVR. Pt denies pain. Pt is tolerating continuous IV infusion well. Pt is also tolerating PO and IV abx well. Cultures sent to lab. Pt was unable to fully participate in swallow study. Speech therapist will try again tomorrow. Pt was able to bear weight for 10-15 seconds with PT and OT therapists.Pt had 600 mL output from external male catheter. Pt had nonviolent restraints removed around 0930. Pt was redirectable for the most part. Potassium replaced via K+ protocol. Recheck will be at 1845 tonight. No other concerns noted.   Billy Hoang RN  5/29/2022  3:20 PM

## 2022-05-29 NOTE — CONSULTS
I was asked to see Laz Kingston in consultation at [facility] emergency room to evaluate confusion, new onset atrial fibrillation.      Assessment:   Atrial fibrillation with controlled ventricular response; duration of atrial fibrillation uncertain but he was in sinus rhythm with frequent PACs July 4, 2021-new diagnosis   No history of coronary artery disease or structural heart disease  Hypertension requiring high-dose multiple medicines such as Catapres 0.1 mg daily, hydralazine 100 mg 4 times daily, metoprolol 50 mg daily and amlodipine 10 mg daily  Chronic renal failure-creatinine 2.19  Chronic anemia-hemoglobin 8.5  Dementia  Presumed pneumonia with possible aspiration pneumonia right basilar pneumonia  Recent COVID infection with hospitalization April 13-April 17; patient is had trouble recovering from that COVID-pneumonia-COVID test today is negative    Active Problems:    Pneumonia    AMS (altered mental status)      Plan:   Atrial fibrillation ventricular rate is controlled on current medications  Not likely to proceed to cardioversion or rhythm control-we will just accept atrial fibrillation at this time  Start Eliquis 2.5 mg twice daily reduced dose because of age and renal insufficiency  Continue current medications watch blood pressure closely  Echocardiogram has been done we will review results  No signs congestive heart failure or acute coronary syndrome at this time     Current History:   This 87-year-old man has been having declining health, progression of his Alzheimer's  Lives alone with his wife in a private house  Therapist comes in and helps him with his ambulation neck  At his best, he can get up and use a walker with his wife's assistance  Feeds himself although quite slowly  Sleeps a lot  Has been offering no complaints just to be became very weak and difficult to arouse due to his hospitalization  No apparent infection not coughing no phlegm production no apparent fevers  No obvious  had dysuria  Does not carry a history of heart disease  EKG showed atrial fibrillation with initially somewhat of an elevated ventricular response  Previous EKGs from July 4, 2021 showed sinus rhythm with PACs  No history of myocardial infarction no angiogram stents etc.  Was hospitalized for several days for COVID-pneumonia was treated aggressively with remdesivir, steroids sent home without oxygen.  Was very weak but seem to be recovering until his recent decline    Past Medical History:     Past Medical History:   Diagnosis Date     Acute renal failure with acute tubular necrosis superimposed on stage 3 chronic kidney disease (H) 5/2/2021     Alzheimer's dementia (H)      BCE (basal cell epithelioma) 10/19/2015     Diabetes mellitus (H)      Dysplastic nevus 10/19/2015     GERD (gastroesophageal reflux disease)      Hyperlipidemia      Hypertension      Onychomycosis      Personal history of prostate cancer      Splenomegaly 01/10/2017       Past Surgical History:     Past Surgical History:   Procedure Laterality Date     CYSTOSCOPY, TRANSURETHRAL RESECTION (TUR) TUMOR BLADDER, COMBINED N/A 8/5/2021    Procedure: CYSTOSCOPY, WITH TRANSURETHRAL RESECTION BLADDER TUMOR;  Surgeon: Amos Ackerman MD;  Location: [facility]     CYSTOURETEROSCOPY, WITH LITHOTRIPSY USING ITZEL 120P LASER AND URETERAL STENT INSERTION Right 8/5/2021    Procedure: RIGHT URETEROSCOPY, LASER LITHOTRIPSY AND RIGHT URETERAL STENT EXCHANGE;  Surgeon: Amos Ackerman MD;  Location: [facility]     HC CYSTOSCOPY,INSERT URETERAL STENT Right 7/4/2021    Procedure: CYSTOSCOPY, WITH URETERAL STENT INSERTION;  Surgeon: Amos Ackerman MD;  Location: [facility];  Service: Urology     IR MISCELLANEOUS PROCEDURE  3/13/2014     IR URETER DILATION BILATERAL  3/13/2014     JOINT REPLACEMENT Bilateral     Both knees     VA ARTHROPLASTY TIBIAL PLATEAU      Description: Knee Replacement;  Recorded: 11/06/2013;      PROSTATECTOMY         Family History:     Family History   Problem Relation Age of Onset     Alzheimer Disease Mother      Coronary Artery Disease Father      Heart Failure Mother      Heart Disease Father 56.00        Two heart attacks     Heart Failure Sister          at 47.     Kidney Disease Sister      Diabetes Daughter      No Known Problems Son      No Known Problems Son      Diabetes Daughter        Social History:    reports that he has quit smoking. His smoking use included cigarettes. He quit after 4.00 years of use. He has never used smokeless tobacco. He reports that he does not drink alcohol and does not use drugs.    Meds:       amLODIPine  10 mg Oral Daily     aspirin  81 mg Oral Daily     cefTRIAXone  1 g Intravenous Q24H     heparin ANTICOAGULANT  5,000 Units Subcutaneous Q12H     insulin aspart  1-7 Units Subcutaneous Q4H     insulin glargine  18 Units Subcutaneous At Bedtime     memantine  10 mg Oral BID     metoprolol tartrate  50 mg Oral BID     metroNIDAZOLE  500 mg Intravenous Q12H     [START ON 2022] pantoprazole  40 mg Oral QAM AC     QUEtiapine  12.5 mg Oral At Bedtime     rivastigmine  1 patch Topical Daily     sodium chloride (PF)  3 mL Intracatheter Q8H       Allergies:   Ibuprofen    Review of Systems:   A 12 point comprehensive review of systems was negative except as noted in the current history.    Objective:      Physical Exam  [unfilled]  [unfilled]  Very lethargic and sleepy difficult to arouse can barely get him to say anything does not follow commands very actively just very lethargic  Moves all extremities with good strength  A few words are spoken seems fluent and words are pretty appropriate although not very rich in content  Body mass index is 26.58 kg/m .  BP (!) 178/84   Pulse 94   Temp 97.8  F (36.6  C) (Oral)   Resp 16   Wt 88.9 kg (196 lb)   SpO2 98%   BMI 26.58 kg/m      General Appearance:   No apparent distress.   HEENT:  No scleral icterus; the  mucous membranes were pink and moist.   Neck: No cervical bruits, jugular venous distention, or thyromegaly.    Chest: The spine was straight. The chest was symmetric.   Lungs:   Respirations unlabored; the lungs are clear to auscultation.   Cardiovascular:   Normal point of maximal impulse. Auscultation reveals normal first and second heart sounds with no murmurs, rubs, or gallops. Carotid, femoral, and posterior tibial pulses are intact.   Abdomen:  No organomegaly, masses, bruits, or tenderness. Bowels sounds are present   Extremities: No cyanosis, clubbing, or edema.   Skin: No xanthelasma.   Neurologic:  Sleepy, lethargic difficult to arouse but is able to arouse to a degree only to fall back asleep             Telemetery  Rhythm strips were personally reviewed A. fib with controlled ventricular response  ECG was personally reviewed atrial fibrillation no acute changes  Imaging    Echocardiogram  CXR    Lab Review   Lab Results   Component Value Date     05/29/2022    CO2 20 05/29/2022    BUN 50 05/29/2022     Lab Results   Component Value Date    WBC 7.5 05/29/2022    HGB 8.7 05/29/2022    HCT 29.8 05/29/2022    MCV 73 05/29/2022     05/29/2022     Lab Results   Component Value Date    CHOL 160 02/15/2021    TRIG 359 02/15/2021    HDL 32 02/15/2021     Lab Results   Component Value Date    TROPONINI 0.04 04/13/2022     Lab Results   Component Value Date    INR 1.16 (H) 05/01/2021       August allen MD  This note has been dictated using voice recognition software. Any grammatical or context distortions are unintentional and inherent to the software.

## 2022-05-29 NOTE — CONSULTS
"Care Management Initial Consult    General Information  Assessment completed with: -chart review (Attempted to call daughter and wife with no answer for either.),    Type of CM/SW Visit: Initial Assessment    Primary Care Provider verified and updated as needed: Yes   Readmission within the last 30 days: no previous admission in last 30 days (\"just over 30 days\")      Reason for Consult: discharge planning  Advance Care Planning: Advance Care Planning Reviewed: present on chart          Communication Assessment  Patient's communication style: spoken language (English or Bilingual)             Cognitive  Cognitive/Neuro/Behavioral: dementia at baseline    Living Environment:   People in home: spouse, child(steph), adult (wife Patricia, son Godfrey)     Current living Arrangements: house      Able to return to prior arrangements: other (see comments) (unknown at this time if home or will need TCU)       Family/Social Support:  Care provided by: spouse/significant other, child(steph)  Provides care for: no one, unable/limited ability to care for self  Marital Status:   Wife, Children  Patricia       Description of Support System: Supportive, Involved    Support Assessment: Adequate family and caregiver support, Adequate social supports    Current Resources:   Patient receiving home care services: Yes  Skilled Home Care Services: Physicial Therapy ([facility])  Community Resources: Home Care  Equipment currently used at home: walker, rolling, wheelchair, manual, glucometer (\"uses wheelchair only PRN\")  Supplies currently used at home: Incontinence Supplies, Diabetic Supplies    Employment/Financial:  Employment Status: retired        Financial Concerns:     Referral to Financial Worker: No       Lifestyle & Psychosocial Needs:  Social Determinants of Health     Tobacco Use: Medium Risk     Smoking Tobacco Use: Former Smoker     Smokeless Tobacco Use: Never Used   Alcohol Use: Not on file   Financial Resource Strain: " Low Risk      Difficulty of Paying Living Expenses: Not hard at all   Food Insecurity: No Food Insecurity     Worried About Running Out of Food in the Last Year: Never true     Ran Out of Food in the Last Year: Never true   Transportation Needs: No Transportation Needs     Lack of Transportation (Medical): No     Lack of Transportation (Non-Medical): No   Physical Activity: Not on file   Stress: Not on file   Social Connections: Not on file   Intimate Partner Violence: Not on file   Depression: Not at risk     PHQ-2 Score: 0   Housing Stability: Low Risk      Unable to Pay for Housing in the Last Year: No     Number of Places Lived in the Last Year: 1     Unstable Housing in the Last Year: No       Functional Status:  Prior to admission patient needed assistance:   Dependent ADLs:: Bathing, Dressing, Grooming, Wheelchair-with assist, Transfers  Dependent IADLs:: Cleaning, Cooking, Laundry, Shopping, Meal Preparation, Medication Management, Money Management, Transportation  Assesssment of Functional Status: Not at baseline with ADL Functioning, Not at baseline with mobility, Not at  functional baseline    Mental Health Status:          Chemical Dependency Status:                Values/Beliefs:  Spiritual, Cultural Beliefs, Cheondoism Practices, Values that affect care:                 Additional Information:  Laz lives in a house with his wife and son Godfrey. I attempted to call the wife and daughter with no answer from either at this time.    Patricia is Laz's main caregiver and their children help also. He needs help with all ADLs. He is able to feed himself.    He uses a walker for mobility, but also has a wheelchair.    He has [facility] PT services.    Unknown discharge needs at this time. Unknown if able to return home or will need TCU. Depending on hospital course.    Family to transport at discharge.    Winnie Orozco RN       Unna Boot Text: An Unna boot was placed to help immobilize the limb and facilitate more rapid healing.

## 2022-05-30 NOTE — PLAN OF CARE
Problem: Risk for Delirium  Goal: Optimal Coping  Outcome: Ongoing, Progressing   Goal Outcome Evaluation:        Patient lethargic and sleepy and awoke when called his name yet went back to sleep. He got Seroquel during night shift. Patient given metoprolol for Afib with RVR and heart rate slowed down to the low 100's. BP improved too.Hospitalist updated. Turn patient q 2 hours with 2 assist. SCD's on legs. Oral care down. Speech Therapy unable to assess swallowing since patient so sleepy from Seroquel. Aspiration precautions. 1 to 1 sitter for safety. Normal saline at 125 ml/hr. Call light in reach.

## 2022-05-30 NOTE — PROVIDER NOTIFICATION
The patient was given meds on night shift to calm him and too sleepy to take am medications. /115, afib 117 to 130's. No IV meds ordered. Unable to give oral meds. Texted MD to update on patient.

## 2022-05-30 NOTE — PROVIDER NOTIFICATION
Pt had a 7 beat run of Vtach, asymptomatic. Paged house officer who ordered mg level which was 2.2. No new orders.    1 or 2

## 2022-05-30 NOTE — PROGRESS NOTES
Assessment;  Active Problems:    Pneumonia    AMS (altered mental status)         Assessment:   Atrial fibrillation with elevated  ventricular response; duration of atrial fibrillation uncertain but he was in sinus rhythm with frequent PACs July 4, 2021-new diagnosis   More elevated ventricular response probably because unable to take oral medications now using IV metoprolol  No history of coronary artery disease or structural heart disease  Echocardiogram showed preserved left ventricular function with LVH consistent with hypertensive heart disease  Hypertension requiring high-dose multiple medicines such as Catapres 0.1 mg daily, hydralazine 100 mg 4 times daily, metoprolol 50 mg daily and amlodipine 10 mg daily  Chronic renal failure-creatinine 2.19  Renal ultrasound shows trace right hydronephrosis  Creatinine better today, 1.83  Chronic anemia-hemoglobin 8.5  Dementia  Presumed pneumonia with possible aspiration pneumonia right basilar pneumonia  Recent COVID infection with hospitalization April 13-April 17; patient is had trouble recovering from that COVID-pneumonia  -COVID test is negative  Confusion with lethargy and agitation  Likely UTI     Active Problems:    Pneumonia    AMS (altered mental status)      Plan:   Atrial fibrillation ventricular rate is more elevated today probably because of his agitation and inability to take oral medications   We will switch to parenteral medications at this time, can use IV metoprolol to control heart rate  Not likely to proceed to cardioversion or rhythm control-we will just accept atrial fibrillation at this time  Start Eliquis 2.5 mg twice daily reduced dose because of age and renal insufficiency  Continue current medications watch blood pressure closely  No signs congestive heart failure or acute coronary syndrome at this time  Need to define.   Goals/limits  ofTherapy, CODE STATUS etc.  Consider Haldol for agitation    Subjective objective   has had poor mental  status, times of agitation, times of lethargy  Currently quite lethargic not able to arouse so that he can speak but does move all extremities  Did have a  head CT scan no acute changes  Renal ultrasound showed mild right hydronephrosis  Tried a swallow study but that was not very helpful since his mental status was quite off but there was suggestion of aspiration and inappropriate swallowing mechanism      No current outpatient medications on file.       Objective:   Vital signs in last 24 hours:  Temp:  [98.3  F (36.8  C)-99.4  F (37.4  C)] 99.4  F (37.4  C)  Pulse:  [] 103  Resp:  [13-27] 20  BP: (117-220)/() 168/97  SpO2:  [89 %-100 %] 95 %  Weight:     Weight change:     Physical Exam:  General: Patient agitated moves all extremities pretty vigorously unable to get him to speak or follow commands-has been like this pretty much all day  Neck: no jugular venous distention  Chest: clear to auscultation  Cor: no murmurs, rubs or gallops  Ext: no edema    Cardiographics:   Echocardiogram  Interpretation Summary     1. The left ventricle is normal in size.  Left ventricular function is normal.The ejection fraction is 60-65%.No  regional wall motion abnormalities noted.  2. Normal right ventricle size and systolic function.  3. The left atrium is moderately dilated.    EKG was done.-Atrial fibrillation with no acute changes        August Magana MD

## 2022-05-30 NOTE — PROGRESS NOTES
A/P    87-year-old male with past medical history of type II DM, CKD dementia brought for evaluation of confusion in the setting of underlying alzheimer's dementia.  Patient was recently admitted to [facility] from 4/13-4/17 for confusion and COVID pneumonia with hypoxia and treated with remdesivir and steroids improved and discharged without any need of home oxygen.  Work-up at ED significant for chest x-ray with right basilar pneumonia versus atelectasis and small right pleural effusion. UA with UTI,   He was started on IV antibiotics and admitted     Acute toxic/metabolic encephalopathy with superimposed alzheimer's dementia and associated agitated behaviors:  Agitated overnight so got seroquel and zyprexa-->somnolence this morning.  Likely from acute infection.  CT head unremarkable. LFT normal. TSH normal.   -IV antibiotics.    -Delirium protocol  -resume memantine and seroquel (on when safe to swallow)  -cont exalon patch  -Psychiatry consult  -check VBG, B12, folate, rbc thiamine, NH3  -MRI brain  -IM Zyprexa 2.5mg TID prn agitation     Right lower lobe pneumonia: given AMS and dementia as well as speech therapy assessment on admission concern for aspiration pneumonia.  COVID in April 2022.  COVID PCR/influenza A & B negative this admission. BC's x2 NGTD.   -IV Ceftriaxone  -IV flagyl  -HOB 30 degrees  -NPO until safe to swallow.  Refer to speech therapy notes. They are following.    UTI:  Renal US showed trace right hydro with renal cortical scarring, normal left kideny, no stones  -await UC  -IV ceftriaxone      A. fib with RVR:  TTE (5/29/22) EF 60-65%, normal rv size/fxn, no WMA's, no hemodynamically significant valve dz.  -Metoprolol 5mg IV q6h until safe to swallow then resume metoprolol  -eliquis started by cardiology. Resume when safe to swallow.  -tele  -cardiology following     Hypertension  -IV Metoprolol 5mg q6h until safe to swallow  -Hydralazine IV prn  -holding norvasc, clonidine, hydralazine,  toprol xl     Type II DM  -Sliding scale insulin and accuchecks q4.  -hold Lantus given NPO  -would not resume glimepiride or trulicity on discharge     HEMALATHA on CKD 4-creatinine down to 1.83 from 2.19 on admission w/ IVF  -IVF  -a.m. labs  -check CK    Acute hypernatremia: 147->144 (w/ IVF at 75ml/h)->148  -1/2 NS 125ml/hr (increased from 75ml/hr this morning)  -Na at 3pm today  -CMP a.m.     Hypokalemia/hypomagnesemia-monitor and replace per protocol     Hyperlipidemia-resume statin when safe to swallow     GERD-IV PPI until safe to swallow    Goals of care:  -palliative care consultation      Diet: Advance Diet as Tolerated: Clear Liquid Diet    DVT Prophylaxis: Pneumatic Compression Devices, heparin subcu q12 until safe to swallow then restart eliquis and stop heparin  [place] Catheter: Not present  Central Lines: None  Cardiac Monitoring: ACTIVE order. Indication: Tachyarrhythmias, acute (48 hours)  Code Status: No CPR- Do NOT Intubate      discharge > 2d    Patricia (spouse) updated at 5:56PM            S:  Afebrile. Events overnight noted    O:  BP (!) 140/86 (BP Location: Right arm)   Pulse 113   Temp 99.4  F (37.4  C) (Axillary)   Resp 22   Wt 88.9 kg (196 lb)   SpO2 90%   BMI 26.58 kg/m    gen somnolent, snoring (received zyprexa and seroquel)  cv irreg, irreg, mild tachy, no edema  Lungs decreased bases, nbon labored, no wheezing  abd bs+, nd  Neuro somnolent from sedation therefore unable to fully assess. Perrla.     Lab/imaging reviewed.  Tele reviewed a. Fib rvr 110

## 2022-05-30 NOTE — PROGRESS NOTES
Respiratory Therapy Note    Patient is receiving Q6H Duoneb. Patient is on 2 L oxymask, SpO2 96%. Pre-treatment , RR 20, Breath sounds diminished. Post-treatment , RR 20 and breath sounds unchanged. Cough is strong and NP. Continue to encourage deep breathing and coughing techniques.     Luci Loberg, RT

## 2022-05-30 NOTE — PLAN OF CARE
Goal Outcome Evaluation:    Plan of Care Reviewed With: patient     Overall Patient Progress: improving    Outcome Evaluation: Pt with severe confusion, 1:1 sitter for safety and impulsiveness. VSS on 2 L O2 via oxymask. tele is Afib.      Problem: Plan of Care - These are the overarching goals to be used throughout the patient stay.    Goal: Plan of Care Review/Shift Note  Description: The Plan of Care Review/Shift note should be completed every shift.  The Outcome Evaluation is a brief statement about your assessment that the patient is improving, declining, or no change.  This information will be displayed automatically on your shift note.  Outcome: Ongoing, Progressing  Flowsheets (Taken 5/30/2022 0426)  Plan of Care Reviewed With: patient  Outcome Evaluation: Pt with severe confusion, 1:1 sitter for safety and impulsiveness. VSS on 2 L O2 via oxymask. tele is Afib.  Overall Patient Progress: improving     Problem: Risk for Delirium  Goal: Improved Sleep  Outcome: Ongoing, Progressing     Problem: Suicide Risk  Goal: Absence of Self-Harm  Outcome: Ongoing, Progressing

## 2022-05-30 NOTE — ED NOTES
[facility] ED Handoff Report    ED Chief Complaint: mental status change    ED Diagnosis: Altered mental status      PMH:    Past Medical History:   Diagnosis Date     Acute renal failure with acute tubular necrosis superimposed on stage 3 chronic kidney disease (H) 5/2/2021     Alzheimer's dementia (H)      BCE (basal cell epithelioma) 10/19/2015     Diabetes mellitus (H)      Dysplastic nevus 10/19/2015     GERD (gastroesophageal reflux disease)      Hyperlipidemia      Hypertension      Onychomycosis      Personal history of prostate cancer      Splenomegaly 01/10/2017        Code Status:  No CPR- Do NOT Intubate     Falls Risk: Yes Band: Applied    Current Living Situation/Residence: lives with a significant other     Elimination Status: Continent: wears briefs     Activity Level: 2 assist from weakness.      Patients Preferred Language:  English     Needed: No    Vital Signs:  BP (!) 186/150   Pulse (!) 144   Temp 98.3  F (36.8  C) (Oral)   Resp 25   Wt 88.9 kg (196 lb)   SpO2 92%   BMI 26.58 kg/m       Cardiac Rhythm: atrial fibrillation with RVR    Pain Score: Patient is unable to quantify.    Is the Patient Confused:  Yes    Last Food or Drink: 05/29/22 at 2015    Focused Assessment:  Patient is alert to person, but not place, time, or event.  History of dementia     Tests Performed: Done: Labs and Imaging    Treatments Provided:  medications    Family Dynamics/Concerns: No    Family Updated On Visitor Policy: Yes    Plan of Care Communicated to Family: Yes    Who Was Updated about Plan of Care: wife    Belongings Checklist Done and Signed by Patient: Yes    Medications sent with patient: yes    Covid: symptomatic, negative    Additional Information: Patient has been taking oral medications with jello down in the ED    RN: Nalini Velásquez RN  5/29/2022 8:25 PM

## 2022-05-30 NOTE — PROVIDER NOTIFICATION
Patient came up to the unit at 2100 and fluids were not running but were ordered. paged cross cover hospitalist Dr Mills to see if we should restart or discontinue. NaCl 0.45% at 75 mL/hr was reordered so restarted fluids. Then paged cross cover about lantus and NPO status. lantus was held by provider. Still checking BG q4hrs with novolog coverage.

## 2022-05-31 NOTE — SIGNIFICANT EVENT
Blood culture with gram-positive cocci in clusters 1/2 bottles.  Would add vancomycin    Discussed with: bedside nurse    Dieudonne Kinsey MD

## 2022-05-31 NOTE — PLAN OF CARE
Problem: Plan of Care - These are the overarching goals to be used throughout the patient stay.    Goal: Absence of Hospital-Acquired Illness or Injury  Outcome: Ongoing, Progressing  Intervention: Identify and Manage Fall Risk  Intervention: Prevent Skin Injury  Intervention: Prevent and Manage VTE (Venous Thromboembolism) Risk    Problem: Risk for Delirium  Goal: Improved Behavioral Control  Outcome: Ongoing, Progressing     Problem: Respiratory Compromise (Pneumonia)  Goal: Effective Oxygenation and Ventilation  Outcome: Ongoing, Progressing     Goal Outcome Evaluation:    Alert, Awake, Disoriented x3 and restless.  PRN IV Zyprexa given twice for agitation. A Fib, Heart Rate elevated >120's. House Officer updated with patient status. PRN IV Metoprolol given x3, on top of scheduled dose. NS Bolus and started on Diltiazem drip. NPO. BG- 112 and 125. On Continuous IVF. Checklist faxed to MRI. Family updated.

## 2022-05-31 NOTE — PROGRESS NOTES
Speech Pathology Consult     05/31/22 0800   Signing Clinician's Name / Credentials   Signing clinician's name / credentials Rose Marie Green [facility]-SLP   Quick Adds   Rehab Discipline SLP   Quick Adds Interventions Speech Language Pathology   SLP - Dysphagia/Swallow   Minutes of Treatment 15 minutes   Treatment Detail SLP: Trialed puree, moderately thick, mildly thick and ice chips to assess for oral intake readiness. Patient had delayed throat clear with ice chips and mildly thick liquids, c/w presentation on VFSS from 5/29. No s/s aspiration with puree or moderately thick liquids. Patient did best with spoon feeding of moderately thick liquids and patient reported this as a preference. Patient continues to be easily distracted but improved from previous session and redirectable. 1:1 with oral intake, slow rate of intake, alternate food and liquid, cue for attention to swallow as needed, remain upright 20 minutes after meals. Monitor close given variable mental status and contact SLP if any concerns for aspiration arise.   SLP Discharge Planning   SLP Brief overview of current status  Tolerating puree and moderatly thick trials on 5/31, diet initiated with recs to monitor close given cognition.   Additional Documentation   SLP Plan diet f/u; advance as appropriate; repeat VFSS if attention/mental status improve   Total Session Time   Total Session Time (minutes) 15 minutes

## 2022-05-31 NOTE — CONSULTS
INITIAL PSYCHIATRIC CONSULTATION                  REASON FOR REQUEST:  acute encephalopathy, agitated delirium, alzheimer's dementia    ASSESSMENT/RECOMMENDATIONS/PLAN :     Acute encephalopathy, metabolic likely multifactorial.   Cognitive and behavioral changes due to above.  Restlessness and agitation intermittent.  History of dementia.,  Late onset Alzheimer's disease.    Recommendations:  Efforts at sleep regulation.  Reassure and reorient..  Exelon patch 13.3 mg  Namenda 10 mg twice daily: Resume when medically able to take it  Resume quetiapine 12.5 mg at bedtime when medically able to take it       MENTAL STATUS EXAMINATION:   Appearance: Stated age, Awake, no agitation  Speech: Slow  Thought Process: Disorganized, Slow.   Thought content: Does not appear to respond to internally generated stimuli, no acute visual or auditory hallucination;     No manic symptoms, no paranoia no delusional thoughts.  Thought Formation:  loosening of association   Judgment: Impaired  Insight : Impaired  Attention : Fair  Memory: Depressed  Fund Of Knowledge: Depressed  Affect: Neutral  Mood: Anxious  Alert and Oriented: Fluctuating. O x 2  Comprehension: Depressed   Generative thought content: Reduced  Language: Intact  Gait and Ambulation: With assist of one.  Problem solving: Impaired.   Cognitive set Shifting: Impaired  BP (!) 168/87   Pulse 94   Temp 99.5  F (37.5  C) (Axillary)   Resp 30   Wt 88.9 kg (196 lb)   SpO2 97%   BMI 26.58 kg/m        HISTORY OF PRESENT ILLNESS:   Presenting history to include: Per [facility]/Specialists:   Laz Kingston is a 87-year-old male with past medical history of type II DM, CKD dementia brought for evaluation of confusion.  Admitted with rt lower lobe pneumonia.  Patient was recently admitted to [facility] from 4/13-4/17 for confusion and COVID pneumonia with hypoxia and treated with remdesivir and steroids improved and discharged without any need of home oxygen.  Work-up at ED  significant for chest x-ray with right basilar pneumonia versus atelectasis and small right pleural effusion.  He was admitted and started on IV antibiotics.     Upon assessment patient was noted to be seated in bed, staff during cares for him.  He was not agitated nor refusing anything.  Unable to provide any meaningful information regarding his hospitalization and current clinical condition.  He is not responding remote to most questions.  Occasionally he nods.  Patient is known to me from a previous assessment in April 2022 at which time he was admitted to this facility secondary to COVID-19.  He had acute delirium and cognitive changes.  Please refer to my notes for details.  Reviewed nursing notes.  He is responding to short directions intermittently.  He did not do an MRI of the brain due to agitation.  O2 sats 90% on 10 L of oxygen mask  He gets easily agitated and frustrated when he is asked to do any tasks that he does not want to do.  History of acute encephalopathy in April with COVID-19.    I reviewed his old medications.  It appears that his Namenda and quetiapine are being held.  He is on Exelon patch 13.3 mg.  Review of Systems:As per HPI. Remainders of 12 point review of systems negative.  Psychiatric ROS:  Patient is not a reliable historian at present due to poor cognition.             PFSH reviewed  and not pertinent to chief complaint/reason for visit  BP (!) 156/85 (BP Location: Right arm)   Pulse 93   Temp 99.5  F (37.5  C) (Axillary)   Resp 20   Wt 88.9 kg (196 lb)   SpO2 92%   BMI 26.58 kg/m    No results found for: AMPHET, PCP, BARBIT, OXYCODONE, THC, ETOH  @24HOURRESULTS@  Recent Results (from the past 72 hour(s))   Comprehensive metabolic panel    Collection Time: 05/28/22 10:59 PM   Result Value Ref Range    Sodium 147 (H) 136 - 145 mmol/L    Potassium 3.4 (L) 3.5 - 5.0 mmol/L    Chloride 119 (H) 98 - 107 mmol/L    Carbon Dioxide (CO2) 19 (L) 22 - 31 mmol/L    Anion Gap 9 5 - 18  mmol/L    Urea Nitrogen 54 (H) 8 - 28 mg/dL    Creatinine 2.19 (H) 0.70 - 1.30 mg/dL    Calcium 9.7 8.5 - 10.5 mg/dL    Glucose 130 (H) 70 - 125 mg/dL    Alkaline Phosphatase 45 45 - 120 U/L    AST 12 0 - 40 U/L    ALT 12 0 - 45 U/L    Protein Total 6.2 6.0 - 8.0 g/dL    Albumin 3.2 (L) 3.5 - 5.0 g/dL    Bilirubin Total 0.3 0.0 - 1.0 mg/dL    GFR Estimate 29 (L) >60 mL/min/1.73m2   Lactic acid whole blood    Collection Time: 05/28/22 10:59 PM   Result Value Ref Range    Lactic Acid 1.0 0.7 - 2.0 mmol/L   Symptomatic; Unknown Influenza A/B & SARS-CoV2 (COVID-19) Virus PCR Multiplex Nasopharyngeal    Collection Time: 05/28/22 10:59 PM    Specimen: Nasopharyngeal; Swab   Result Value Ref Range    Influenza A PCR Negative Negative    Influenza B PCR Negative Negative    SARS CoV2 PCR Negative Negative   CBC with platelets and differential    Collection Time: 05/28/22 10:59 PM   Result Value Ref Range    WBC Count 6.4 4.0 - 11.0 10e3/uL    RBC Count 3.98 (L) 4.40 - 5.90 10e6/uL    Hemoglobin 8.5 (L) 13.3 - 17.7 g/dL    Hematocrit 29.0 (L) 40.0 - 53.0 %    MCV 73 (L) 78 - 100 fL    MCH 21.4 (L) 26.5 - 33.0 pg    MCHC 29.3 (L) 31.5 - 36.5 g/dL    RDW 16.9 (H) 10.0 - 15.0 %    Platelet Count 200 150 - 450 10e3/uL    % Neutrophils 67 %    % Lymphocytes 21 %    % Monocytes 8 %    % Eosinophils 2 %    % Basophils 1 %    % Immature Granulocytes 1 %    NRBCs per 100 WBC 0 <1 /100    Absolute Neutrophils 4.4 1.6 - 8.3 10e3/uL    Absolute Lymphocytes 1.4 0.8 - 5.3 10e3/uL    Absolute Monocytes 0.5 0.0 - 1.3 10e3/uL    Absolute Eosinophils 0.1 0.0 - 0.7 10e3/uL    Absolute Basophils 0.0 0.0 - 0.2 10e3/uL    Absolute Immature Granulocytes 0.0 <=0.4 10e3/uL    Absolute NRBCs 0.0 10e3/uL   Magnesium    Collection Time: 05/28/22 10:59 PM   Result Value Ref Range    Magnesium 2.3 1.8 - 2.6 mg/dL   ECG 12-LEAD WITH MUSE (LHE)    Collection Time: 05/28/22 11:36 PM   Result Value Ref Range    Systolic Blood Pressure 158 mmHg    Diastolic  Blood Pressure 94 mmHg    Ventricular Rate 103 BPM    Atrial Rate 94 BPM    SC Interval  ms    QRS Duration 112 ms     ms    QTc 484 ms    P Axis  degrees    R AXIS 70 degrees    T Axis 17 degrees    Interpretation ECG       Atrial fibrillation with rapid ventricular response  Cannot rule out Anterior infarct (cited on or before 04-JUL-2021)  Abnormal ECG  When compared with ECG of 04-JUL-2021 09:44,  Atrial fibrillation has replaced Sinus rhythm  Criteria for Inferior infarct are no longer Present  Confirmed by SEE ED PROVIDER NOTE FOR, ECG INTERPRETATION ([id number]),  FILIPE CRUZ ([id number]) on 5/29/2022 10:02:46 AM     UA with Microscopic reflex to Culture    Collection Time: 05/29/22  4:09 AM    Specimen: Urine, Midstream   Result Value Ref Range    Color Urine Light Yellow Colorless, Straw, Light Yellow, Yellow    Appearance Urine Clear Clear    Glucose Urine Negative Negative mg/dL    Bilirubin Urine Negative Negative    Ketones Urine Negative Negative mg/dL    Specific Gravity Urine 1.015 1.001 - 1.030    Blood Urine Negative Negative    pH Urine 5.0 5.0 - 7.0    Protein Albumin Urine 70  (A) Negative mg/dL    Urobilinogen Urine <2.0 <2.0 mg/dL    Nitrite Urine Positive (A) Negative    Leukocyte Esterase Urine 250 Alfonso/uL (A) Negative    Bacteria Urine Few (A) None Seen /HPF    Mucus Urine Present (A) None Seen /LPF    RBC Urine 1 <=2 /HPF    WBC Urine 8 (H) <=5 /HPF    Squamous Epithelials Urine <1 <=1 /HPF   Urine Culture    Collection Time: 05/29/22  4:09 AM    Specimen: Urine, Midstream   Result Value Ref Range    Culture 10,000-50,000 CFU/mL Mixture of urogenital lizzie    Glucose by meter    Collection Time: 05/29/22  7:56 AM   Result Value Ref Range    GLUCOSE BY METER POCT 142 (H) 70 - 99 mg/dL   Potassium    Collection Time: 05/29/22  8:30 AM   Result Value Ref Range    Potassium 3.3 (L) 3.5 - 5.0 mmol/L   Basic metabolic panel    Collection Time: 05/29/22  8:30 AM   Result Value Ref Range     Sodium 144 136 - 145 mmol/L    Potassium 3.2 (L) 3.5 - 5.0 mmol/L    Chloride 117 (H) 98 - 107 mmol/L    Carbon Dioxide (CO2) 20 (L) 22 - 31 mmol/L    Anion Gap 7 5 - 18 mmol/L    Urea Nitrogen 50 (H) 8 - 28 mg/dL    Creatinine 2.11 (H) 0.70 - 1.30 mg/dL    Calcium 9.3 8.5 - 10.5 mg/dL    Glucose 142 (H) 70 - 125 mg/dL    GFR Estimate 30 (L) >60 mL/min/1.73m2   TSH with free T4 reflex    Collection Time: 05/29/22  8:30 AM   Result Value Ref Range    TSH 1.37 0.30 - 5.00 uIU/mL   Echocardiogram Complete    Collection Time: 05/29/22  8:36 AM   Result Value Ref Range    LVEF  60-65%    B-Type Natriuretic Peptide ([facility] Only)    Collection Time: 05/29/22 12:08 PM   Result Value Ref Range     (H) 0 - 93 pg/mL   Blood Culture Line, venous    Collection Time: 05/29/22 12:08 PM    Specimen: Line, venous; Blood   Result Value Ref Range    Culture Positive on the 2nd day of incubation (A)     Culture Gram positive cocci in clusters (AA)    CBC with platelets and differential    Collection Time: 05/29/22 12:08 PM   Result Value Ref Range    WBC Count 7.5 4.0 - 11.0 10e3/uL    RBC Count 4.06 (L) 4.40 - 5.90 10e6/uL    Hemoglobin 8.7 (L) 13.3 - 17.7 g/dL    Hematocrit 29.8 (L) 40.0 - 53.0 %    MCV 73 (L) 78 - 100 fL    MCH 21.4 (L) 26.5 - 33.0 pg    MCHC 29.2 (L) 31.5 - 36.5 g/dL    RDW 17.1 (H) 10.0 - 15.0 %    Platelet Count 210 150 - 450 10e3/uL    % Neutrophils 73 %    % Lymphocytes 17 %    % Monocytes 7 %    % Eosinophils 1 %    % Basophils 1 %    % Immature Granulocytes 1 %    NRBCs per 100 WBC 0 <1 /100    Absolute Neutrophils 5.6 1.6 - 8.3 10e3/uL    Absolute Lymphocytes 1.3 0.8 - 5.3 10e3/uL    Absolute Monocytes 0.5 0.0 - 1.3 10e3/uL    Absolute Eosinophils 0.1 0.0 - 0.7 10e3/uL    Absolute Basophils 0.0 0.0 - 0.2 10e3/uL    Absolute Immature Granulocytes 0.1 <=0.4 10e3/uL    Absolute NRBCs 0.0 10e3/uL   Verigene GP Panel    Collection Time: 05/29/22 12:08 PM    Specimen: Line, venous; Blood   Result Value Ref  Range    Staphylococcus aureus Not Detected Not Detected    Staphylococcus epidermidis Detected (A) Not Detected    Staphylococcus lugdunensis Not Detected Not Detected    Enterococcus faecalis Not Detected Not Detected    Enterococcus faecium Not Detected Not Detected    Streptococcus species Not Detected Not Detected    Streptococcus agalactiae Not Detected Not Detected    Streptococcus anginosus group Not Detected Not Detected    Streptococcus pneumoniae Not Detected Not Detected    Streptococcus pyogenes Not Detected Not Detected    Listeria species Not Detected Not Detected   Glucose by meter    Collection Time: 05/29/22 12:24 PM   Result Value Ref Range    GLUCOSE BY METER POCT 124 (H) 70 - 99 mg/dL   Blood Culture Arm, Right    Collection Time: 05/29/22 12:36 PM    Specimen: Arm, Right; Blood   Result Value Ref Range    Culture No growth after 1 day    Potassium    Collection Time: 05/29/22  1:57 PM   Result Value Ref Range    Potassium 3.2 (L) 3.5 - 5.0 mmol/L   Glucose by meter    Collection Time: 05/29/22  5:04 PM   Result Value Ref Range    GLUCOSE BY METER POCT 111 (H) 70 - 99 mg/dL   Potassium    Collection Time: 05/29/22  7:11 PM   Result Value Ref Range    Potassium 3.4 (L) 3.5 - 5.0 mmol/L   Glucose by meter    Collection Time: 05/29/22  7:21 PM   Result Value Ref Range    GLUCOSE BY METER POCT 105 (H) 70 - 99 mg/dL   Glucose by meter    Collection Time: 05/29/22  9:56 PM   Result Value Ref Range    GLUCOSE BY METER POCT 112 (H) 70 - 99 mg/dL   Glucose by meter    Collection Time: 05/29/22 11:40 PM   Result Value Ref Range    GLUCOSE BY METER POCT 108 (H) 70 - 99 mg/dL   Glucose by meter    Collection Time: 05/30/22  4:07 AM   Result Value Ref Range    GLUCOSE BY METER POCT 112 (H) 70 - 99 mg/dL   Basic metabolic panel    Collection Time: 05/30/22  4:46 AM   Result Value Ref Range    Sodium 148 (H) 136 - 145 mmol/L    Potassium 3.5 3.5 - 5.0 mmol/L    Chloride 120 (H) 98 - 107 mmol/L    Carbon  Dioxide (CO2) 20 (L) 22 - 31 mmol/L    Anion Gap 8 5 - 18 mmol/L    Urea Nitrogen 42 (H) 8 - 28 mg/dL    Creatinine 1.83 (H) 0.70 - 1.30 mg/dL    Calcium 9.6 8.5 - 10.5 mg/dL    Glucose 110 70 - 125 mg/dL    GFR Estimate 36 (L) >60 mL/min/1.73m2   Magnesium    Collection Time: 05/30/22  4:46 AM   Result Value Ref Range    Magnesium 2.2 1.8 - 2.6 mg/dL   Hepatic panel    Collection Time: 05/30/22  4:46 AM   Result Value Ref Range    Bilirubin Total 0.4 0.0 - 1.0 mg/dL    Bilirubin Direct 0.1 <=0.5 mg/dL    Protein Total 5.8 (L) 6.0 - 8.0 g/dL    Albumin 3.1 (L) 3.5 - 5.0 g/dL    Alkaline Phosphatase 51 45 - 120 U/L    AST 13 0 - 40 U/L    ALT 11 0 - 45 U/L   Extra Purple Top Tube    Collection Time: 05/30/22  4:46 AM   Result Value Ref Range    Hold Specimen JIC    Glucose by meter    Collection Time: 05/30/22  8:13 AM   Result Value Ref Range    GLUCOSE BY METER POCT 125 (H) 70 - 99 mg/dL   Extra Purple Top Tube    Collection Time: 05/30/22 10:12 AM   Result Value Ref Range    Hold Specimen JIC    Glucose by meter    Collection Time: 05/30/22 11:40 AM   Result Value Ref Range    GLUCOSE BY METER POCT 120 (H) 70 - 99 mg/dL   Extra Purple Top Tube    Collection Time: 05/30/22  3:09 PM   Result Value Ref Range    Hold Specimen JIC    Sodium    Collection Time: 05/30/22  3:10 PM   Result Value Ref Range    Sodium 145 136 - 145 mmol/L   CK total    Collection Time: 05/30/22  3:10 PM   Result Value Ref Range    CK 86 30 - 190 U/L   Basic metabolic panel    Collection Time: 05/30/22  3:10 PM   Result Value Ref Range    Sodium 145 136 - 145 mmol/L    Potassium 3.5 3.5 - 5.0 mmol/L    Chloride 118 (H) 98 - 107 mmol/L    Carbon Dioxide (CO2) 18 (L) 22 - 31 mmol/L    Anion Gap 9 5 - 18 mmol/L    Urea Nitrogen 36 (H) 8 - 28 mg/dL    Creatinine 1.73 (H) 0.70 - 1.30 mg/dL    Calcium 9.5 8.5 - 10.5 mg/dL    Glucose 116 70 - 125 mg/dL    GFR Estimate 38 (L) >60 mL/min/1.73m2   Folate    Collection Time: 05/30/22  4:49 PM   Result  Value Ref Range    Folic Acid 16.6 >=3.5 ng/mL   Ammonia    Collection Time: 05/30/22  4:49 PM   Result Value Ref Range    Ammonia 18 11 - 35 umol/L   Blood gas venous    Collection Time: 05/30/22  4:49 PM   Result Value Ref Range    pH Venous 7.41 7.35 - 7.45    pCO2 Venous 33 (L) 35 - 50 mm Hg    pO2 Venous 29 25 - 47 mm Hg    Bicarbonate Venous 21 (L) 24 - 30 mmol/L    Base Excess/Deficit (+/-) -3.5   mmol/L    Oxyhemoglobin Venous 53.5 (L) 70.0 - 75.0 %    O2 Sat, Venous 54.7 (L) 70.0 - 75.0 %   CBC with platelets and differential    Collection Time: 05/30/22  4:49 PM   Result Value Ref Range    WBC Count 8.2 4.0 - 11.0 10e3/uL    RBC Count 4.31 (L) 4.40 - 5.90 10e6/uL    Hemoglobin 9.1 (L) 13.3 - 17.7 g/dL    Hematocrit 31.9 (L) 40.0 - 53.0 %    MCV 74 (L) 78 - 100 fL    MCH 21.1 (L) 26.5 - 33.0 pg    MCHC 28.5 (L) 31.5 - 36.5 g/dL    RDW 17.0 (H) 10.0 - 15.0 %    Platelet Count 215 150 - 450 10e3/uL    % Neutrophils 73 %    % Lymphocytes 16 %    % Monocytes 8 %    % Eosinophils 1 %    % Basophils 1 %    % Immature Granulocytes 1 %    NRBCs per 100 WBC 0 <1 /100    Absolute Neutrophils 6.1 1.6 - 8.3 10e3/uL    Absolute Lymphocytes 1.3 0.8 - 5.3 10e3/uL    Absolute Monocytes 0.6 0.0 - 1.3 10e3/uL    Absolute Eosinophils 0.1 0.0 - 0.7 10e3/uL    Absolute Basophils 0.1 0.0 - 0.2 10e3/uL    Absolute Immature Granulocytes 0.1 <=0.4 10e3/uL    Absolute NRBCs 0.0 10e3/uL   Glucose by meter    Collection Time: 05/30/22  4:54 PM   Result Value Ref Range    GLUCOSE BY METER POCT 123 (H) 70 - 99 mg/dL   Glucose by meter    Collection Time: 05/30/22  8:52 PM   Result Value Ref Range    GLUCOSE BY METER POCT 112 (H) 70 - 99 mg/dL   Lactic Acid STAT    Collection Time: 05/30/22 10:12 PM   Result Value Ref Range    Lactic Acid 1.8 0.7 - 2.0 mmol/L   CBC with platelets    Collection Time: 05/31/22  4:39 AM   Result Value Ref Range    WBC Count 8.1 4.0 - 11.0 10e3/uL    RBC Count 3.98 (L) 4.40 - 5.90 10e6/uL    Hemoglobin 8.5  (L) 13.3 - 17.7 g/dL    Hematocrit 29.2 (L) 40.0 - 53.0 %    MCV 73 (L) 78 - 100 fL    MCH 21.4 (L) 26.5 - 33.0 pg    MCHC 29.1 (L) 31.5 - 36.5 g/dL    RDW 17.2 (H) 10.0 - 15.0 %    Platelet Count 220 150 - 450 10e3/uL   Comprehensive metabolic panel    Collection Time: 05/31/22  4:39 AM   Result Value Ref Range    Sodium 145 136 - 145 mmol/L    Potassium 3.3 (L) 3.5 - 5.0 mmol/L    Chloride 119 (H) 98 - 107 mmol/L    Carbon Dioxide (CO2) 17 (L) 22 - 31 mmol/L    Anion Gap 9 5 - 18 mmol/L    Urea Nitrogen 33 (H) 8 - 28 mg/dL    Creatinine 1.95 (H) 0.70 - 1.30 mg/dL    Calcium 8.8 8.5 - 10.5 mg/dL    Glucose 153 (H) 70 - 125 mg/dL    Alkaline Phosphatase 53 45 - 120 U/L    AST 14 0 - 40 U/L    ALT 12 0 - 45 U/L    Protein Total 5.6 (L) 6.0 - 8.0 g/dL    Albumin 3.0 (L) 3.5 - 5.0 g/dL    Bilirubin Total 0.5 0.0 - 1.0 mg/dL    GFR Estimate 33 (L) >60 mL/min/1.73m2       PMH:   Past Medical History:   Diagnosis Date     Acute renal failure with acute tubular necrosis superimposed on stage 3 chronic kidney disease (H) 5/2/2021     Alzheimer's dementia (H)      BCE (basal cell epithelioma) 10/19/2015     Diabetes mellitus (H)      Dysplastic nevus 10/19/2015     GERD (gastroesophageal reflux disease)      Hyperlipidemia      Hypertension      Onychomycosis      Personal history of prostate cancer      Splenomegaly 01/10/2017           Current Medications:Scheduled Meds:    [Held by provider] amLODIPine  10 mg Oral Daily     [Held by provider] apixaban ANTICOAGULANT  2.5 mg Oral BID     cefTRIAXone  1 g Intravenous Q24H     heparin ANTICOAGULANT  5,000 Units Subcutaneous Q12H     insulin aspart  1-7 Units Subcutaneous Q4H     [Held by provider] insulin glargine  18 Units Subcutaneous At Bedtime     [Held by provider] memantine  10 mg Oral BID     metoprolol  5 mg Intravenous Q6H     [Held by provider] metoprolol tartrate  50 mg Oral BID     metroNIDAZOLE  500 mg Intravenous Q12H     pantoprazole (PROTONIX) IV  40 mg  Intravenous Daily with breakfast     potassium chloride  10 mEq Intravenous Q1H     [Held by provider] QUEtiapine  12.5 mg Oral At Bedtime     rivastigmine  1 patch Topical Daily     sodium chloride (PF)  3 mL Intracatheter Q8H     thiamine  100 mg Intravenous Daily     [START ON 2022] vancomycin  1,000 mg Intravenous Q24H     Continuous Infusions:    dilTIAZem HCl-Sodium Chloride 15 mg/hr (22 0400)     [Held by provider] NaCl Stopped (22 0403)     PRN Meds:.acetaminophen, [Held by provider] acetaminophen, [Held by provider] benztropine, glucose **OR** dextrose **OR** glucagon, levalbuterol, lidocaine 4%, lidocaine (buffered or not buffered), [Held by provider] melatonin, OLANZapine, ondansetron, senna-docusate **OR** senna-docusate, sodium chloride (PF)                Family History: PERSONALLY REVIEWED.  Family History   Problem Relation Age of Onset     Alzheimer Disease Mother      Coronary Artery Disease Father      Heart Failure Mother      Heart Disease Father 56.00        Two heart attacks     Heart Failure Sister          at 47.     Kidney Disease Sister      Diabetes Daughter      No Known Problems Son      No Known Problems Son      Diabetes Daughter      Pertinent Family hx not pertinent to Chief Complaint or reason for visit.     Social History:  PERSONALLY REVIEWED.  Social History     Socioeconomic History     Marital status:      Spouse name: Not on file     Number of children: Not on file     Years of education: Not on file     Highest education level: Not on file   Occupational History     Not on file   Tobacco Use     Smoking status: Former Smoker     Years: 4.00     Types: Cigarettes     Smokeless tobacco: Never Used     Tobacco comment: Quit prior to .   Substance and Sexual Activity     Alcohol use: No     Comment: Alcoholic Drinks/day: Never a problem for him, he states.     Drug use: No     Sexual activity: Not on file   Other Topics Concern     Not on file    Social History Narrative    Should be using walker with ambulation. Patient does not use any home oxygen. Lives with wife.      Social Determinants of Health     Financial Resource Strain: Low Risk      Difficulty of Paying Living Expenses: Not hard at all   Food Insecurity: No Food Insecurity     Worried About Running Out of Food in the Last Year: Never true     Ran Out of Food in the Last Year: Never true   Transportation Needs: No Transportation Needs     Lack of Transportation (Medical): No     Lack of Transportation (Non-Medical): No   Physical Activity: Not on file   Stress: Not on file   Social Connections: Not on file   Intimate Partner Violence: Not on file   Housing Stability: Low Risk      Unable to Pay for Housing in the Last Year: No     Number of Places Lived in the Last Year: 1     Unstable Housing in the Last Year: No    not pertinent to Chief Complaint or reason for visit.             Allergies as of 06/01/2014 Reviewed     Review of Systems:As per HPI. Remainders of 12 point review of systems negative.    Review of Pertinent Laboratory:      PERSONALLY REVIEWED.    Physical Exam: Temp:  [99.2  F (37.3  C)-99.9  F (37.7  C)] 99.5  F (37.5  C)  Pulse:  [] 93  Resp:  [18-26] 20  BP: (133-199)/() 156/85  SpO2:  [82 %-97 %] 92 %   Vitals: reviewed in chart     Physical exam as per medical team: reviewed in chart      diagnoses, risk and benefits of medications discussed with staff. Care coordination with care management team.   Thank you for this consultation.       Elvia Anderson; NP  [facility]        This note was created with the help of Dragon dictation system. Grammatical and typing errors are not intentional.

## 2022-05-31 NOTE — PROGRESS NOTES
CLINICAL NUTRITION SERVICES - ASSESSMENT NOTE     Nutrition Prescription    RECOMMENDATIONS FOR MDs/PROVIDERS TO ORDER:  None    Malnutrition Status:    Unable to determine    Recommendations already ordered by Registered Dietitian (RD):  Add magic cup nutritional supplements to meal trays    Future/Additional Recommendations: Follow po intake, weight  Labs and plan of care       REASON FOR ASSESSMENT  Laz Kingston is a/an 87 year old male assessed by the dietitian for Admission Nutrition Risk Screen for positive-unsure about weight loss and decreased appetite    Pt with past medical history of DM 2, CKD, dementia brought in for evaluation of confusion in the setting of underlying alzheimer's dementia.  Pt was recently admitted to [facility] from 4/13-4/17 for confusion and COVID pneumonia with hypoxia.  Work-up at ED significant for chest x-ray with right basilar pneumonia vs atelectasis and small right pleural effusion.  UA with UTI.    NUTRITION HISTORY  Unable to obtain as pt with advanced dementia    CURRENT NUTRITION ORDERS  Diet: Level 4: Pureed Dysphagia Diet, moderately thick (level 3)  Intake/Tolerance: Ate 25% breakfast    LABS  Labs reviewed: K 3.3, urea nitrogen 33, Cr 1.95, alb 3, Glu 153, 139    MEDICATIONS  Medications reviewed: rocephin, novolog, flagyl, pantoprazole, thiamine, vancocin    ANTHROPOMETRICS  Height: 6'  Most Recent Weight: 88.9 kg (196 lb)    IBW: 80.9 kg  BMI: Overweight BMI 25-29.9  Weight History:   Wt Readings from Last 10 Encounters:   05/29/22 88.9 kg (196 lb)   05/05/22 88.9 kg (196 lb)   04/14/22 89.2 kg (196 lb 11.2 oz)   04/13/22 94.3 kg (208 lb)   03/11/22 95.3 kg (210 lb)   01/07/22 90.7 kg (200 lb)   08/05/21 85.7 kg (189 lb)   07/22/21 92.5 kg (203 lb 14.4 oz)   07/20/21 92.5 kg (203 lb 14.4 oz)   07/16/21 93.7 kg (206 lb 8 oz)   Weight down 14 lb in 2 months (6.7%)    Dosing Weight: 88.9 kg    ASSESSED NUTRITION NEEDS  Estimated Energy Needs: 2222+ kcals/day (25+  Kcal/Kg)  Justification: Maintenance  Estimated Protein Needs: 71-89 grams protein/day (0.8-1 g/Kg)  Justification: Maintenance  Estimated Fluid Needs: 7789-8237 mL/day (25 - 30 mL/kg)   Justification: Maintenance    PHYSICAL FINDINGS  See malnutrition section below.  Skin: redness/blanchable perineum, skin bruised rash/peeling  Damaso score 14, nutrition noted as probably inadequate  GI: WDL  Last BM: Not documented    MALNUTRITION:  % Weight Loss:  > 7.5% in 3 months (severe malnutrition)  % Intake:  Unsure  Subcutaneous Fat Loss:  Did not assess today  Muscle Loss:  Did not assess today  Fluid Retention:  None noted    Malnutrition Diagnosis: Unable to determine due to need nutrition history and NFPE    NUTRITION DIAGNOSIS  Inadequate oral intake related to acute illness as evidenced by ate only 25% breakfast meal this am and weight loss PTA      INTERVENTIONS  Implementation  Nutrition Education: Not appropriate at this time due to patient condition   Medical food supplement therapy -trial magic cups with meals  Each magic cup provides 290 Calories, 9 g protein and 38-40 g CHO (watch BG)    Goals  Patient to consume % of nutritionally adequate meals three times per day, or the equivalent with supplements/snacks.  Maintain weight     Monitoring/Evaluation  Progress toward goals will be monitored and evaluated per protocol.

## 2022-05-31 NOTE — PHARMACY-VANCOMYCIN DOSING SERVICE
Pharmacy Vancomycin Initial Note  Date of Service May 31, 2022  Patient's  1935  87 year old, male  Indication: Bacteremia  Current estimated CrCl = Estimated Creatinine Clearance: 39.3 mL/min (A) (based on SCr of 1.73 mg/dL (H)).  Creatinine for last 3 days  2022: 10:59 PM Creatinine 2.19 mg/dL  2022:  8:30 AM Creatinine 2.11 mg/dL  2022:  4:46 AM Creatinine 1.83 mg/dL;  3:10 PM Creatinine 1.73 mg/dL    Recent Vancomycin Level(s) for last 3 days  No results found for requested labs within last 72 hours.      Vancomycin IV Administrations (past 72 hours)      No vancomycin orders with administrations in past 72 hours.                Nephrotoxins and other renal medications (From now, onward)    Start     Dose/Rate Route Frequency Ordered Stop    22 0200  vancomycin (VANCOCIN) 2,000 mg in sodium chloride 0.9 % 500 mL intermittent infusion         20 mg/kg × 88.9 kg  over 2 Hours Intravenous ONCE 22 0121            Contrast Orders - past 72 hours (72h ago, onward)    Start     Dose/Rate Route Frequency Stop    22 0930  barium sulfate (VARIBAR THIN Liquid) 40 % oral suspension 24 g         60 mL Oral ONCE 22 0919    22 0930  barium sulfate 40% (VARIBAR NECTAR) oral suspension          Oral ONCE 22 0919    22 0930  barium sulfate (VARIBAR) 40 % pudding/paste 40 mL         40 mL Oral ONCE 22 0919    22 0930  barium sulfate 40% (VARIBAR THIN HONEY) oral suspension          Oral ONCE 22 0919    22 0900  perflutren lipid microsphere (DEFINITY) injection SUSP 2 mL         2 mL Intravenous ONCE 22 0859          [employer] Prediction of Planned Initial Vancomycin Regimen  Loading dose: 2000 mg at 02:00 2022.  Regimen: 1000 mg IV every 24 hours.  Start time: 01:24 on 2022  Exposure target: AUC24 (range)400-600 mg/L.hr   AUC24,ss: 532 mg/L.hr  Probability of AUC24 > 400: 79 %  Ctrough,ss: 17.7 mg/L  Probability of Ctrough,ss  > 20: 39 %  Probability of nephrotoxicity (Lodise CHANDRAKANT 2009): 14 %      Plan:  1. Start vancomycin  2000 mg IV x 1 then 1000 mg IV   q24h.   2. Vancomycin monitoring method: AUC  3. Vancomycin therapeutic monitoring goal: 400-600 mg*h/L  4. Pharmacy will check vancomycin levels as appropriate in 1-3 Days.    5. Serum creatinine levels will be ordered daily for the first week of therapy and at least twice weekly for subsequent weeks.      Kenisha Hernandez, RPH

## 2022-05-31 NOTE — PROGRESS NOTES
A/P    87-year-old male with past medical history of type II DM, CKD dementia brought for evaluation of confusion in the setting of underlying alzheimer's dementia.  Patient was recently admitted to [facility] from 4/13-4/17 for confusion and COVID pneumonia with hypoxia and treated with remdesivir and steroids improved and discharged without any need of home oxygen.  Work-up at ED significant for chest x-ray with right basilar pneumonia versus atelectasis and small right pleural effusion. UA with UTI,   He was started on IV antibiotics and admitted     Acute toxic/metabolic encephalopathy with superimposed alzheimer's dementia and associated agitated behaviors:  Agitated overnight so got seroquel and zyprexa-->somnolence this morning.  Likely from acute infection.  CT head unremarkable. LFT normal. TSH normal.   -IV antibiotics.    -Delirium protocol  -resume memantine and seroquel-on comfort cares  -cont exalon patch  -Psychiatry consulted  -MRI brain, limited study, negative for acute intracranial pathology  -IM Zyprexa 2.5mg TID prn agitation     Right lower lobe pneumonia: given AMS and dementia as well as speech therapy assessment on admission concern for aspiration pneumonia.  COVID in April 2022.  COVID PCR/influenza A & B negative this admission. BC's x2 NGTD.   -IV Ceftriaxone  -IV flagyl  -P.o. for comfort    UTI:  Renal US showed trace right hydro with renal cortical scarring, normal left kideny, no stones  Continue with IV ceftriaxone  -IV ceftriaxone     Gram-positive bacteremia, BC positive on 5/29/2023.  Contaminant versus true bacteremia.  On ceftriaxone.  Family decided not to escalate cares.     A. fib with RVR:  TTE (5/29/22) EF 60-65%, normal rv size/fxn, no WMA's, no hemodynamically significant valve dz.  -Metoprolol 5mg IV q6h until safe to swallow then resume metoprolol, Eliquis was on hold due to swallowing impairment  -5/31 care conference with family and palliative team: Data continue  cardiac medications in p.o. form, avoid AV.  Telemetry discontinued.     Hypertension  -5/31 resumed home metoprolol, amlodipine.  -Hydralazine IV prn     Type II DM  -Sliding scale insulin and accuchecks q4  -5/31 resume Lantus, as patient is eating for comfort     HEMALATHA on CKD 4-creatinine down to 1.83 from 2.19 on admission w/ IVF  - s/p IVF    Acute hypernatremia: 147->144 (w/ IVF at 75ml/h)->148  -1/2 NS 125ml/hr (increased from 75ml/hr this morning)  -5/31 started comfort cares, no daily labs.  P.o. to comfort.     Hypokalemia/hypomagnesemia  -Not monitoring electrolytes, patient comfort care     Hyperlipidemia-resume statin when safe to swallow     GERD-IV PPI until safe to swallow    Goals of care:  -palliative care consultation      Diet: Advance Diet as Tolerated: Clear Liquid Diet    DVT Prophylaxis: Pneumatic Compression Devices, heparin subcu q12 until safe to swallow then restart eliquis and stop heparin  God[place] Catheter: Not present  Central Lines: None  Cardiac Monitoring: ACTIVE order. Indication: Tachyarrhythmias, acute (48 hours)  Code Status: No CPR- Do NOT Intubate      discharge > 2d    Patricia (spouse) updated at 5:56PM    S: Patient is new to me.  Chart reviewed.  Patient was seen and examined, in the presence of his daughter and wife.  He just irritated from MRI, would not able to cooperate with study due to agitation.  With limited quality study, no evidence of acute stroke.  Patient is seen by palliative team, decision made to pursue comfort cares.  Medications adjusted according to comfort care goals.    O:  BP (!) 163/82 (BP Location: Right arm)   Pulse 93   Temp (!) 96.2  F (35.7  C) (Axillary)   Resp 24   Wt 88.9 kg (196 lb)   SpO2 93%   BMI 26.58 kg/m    gen somnolent, snoring (received zyprexa and seroquel)  cv irreg, irreg, mild tachy, no edema  Lungs decreased bases, nbon labored, no wheezing  abd bs+, nd  Neuro somnolent from sedation therefore unable to fully assess. [place].      Lab/imaging reviewed.  Tele reviewed a. Fib rvr 110

## 2022-05-31 NOTE — PROGRESS NOTES
Messaged on call MD overnight 0400 - Pt hypoxic, requiring increased O2, oxymask 10 L, coarse/crackles throughout. Also hypertensive 170's. Spoke with MD, pt had recently started on IV vanco, stated well covered for antibiotics, may be some component of fluid overload so MD ordered MIVF be placed on hold for now and requested resident physician be contacted to see pt as they were familiar with pt from last evening. Increased to 15 L oxymask fr ongoing hypoxia. Contacted resident who saw pt at bedside and ordered 1 time dose IV lasix, pt was also given PRN IV zyprexa at this time as pt was becoming quite restless and impulsive. Increased cardizem gtt to 15 mg/hr and rates have trended down to 80's-100's, less hypertensive, more calm, able to wean down to 10 L oxymask, may continue to wean down as able.

## 2022-05-31 NOTE — PLAN OF CARE
PRIMARY DIAGNOSIS: ACUTE PAIN  OUTPATIENT/OBSERVATION GOALS TO BE MET BEFORE DISCHARGE:  1. Pain Status: Improved-controlled with oral pain medications.    2. Return to near baseline physical activity: No    3. Cleared for discharge by consultants (if involved): No    Discharge Planner Nurse   Safe discharge environment identified: No  Barriers to discharge: Yes       Entered by: Erica Liu RN 05/31/2022 1:50 PM   Patient had physical therapy and needs to use walker and gait belt for safety. Daughter Laura with the patient and very supportive. Mepilex on coccyx since buttock red. Call light in reach.   Please review provider order for any additional goals.   Nurse to notify provider when observation goals have been met and patient is ready for discharge.Goal Outcome Evaluation:

## 2022-05-31 NOTE — PROGRESS NOTES
Patient unable to do to cooperate in MRI of the brain. Fighting and now back in room and moist  congestion and mostly in the neck area. Respiratory therapy here. O2 Sats 90% on 10 liters oximask.. Afib 140's. Cardizem gtt at 10 mg/hr. DNR/DNI. 1 to 1 sitter. Will continue to monitor patient condition. Messaged hospitalist.

## 2022-05-31 NOTE — PROGRESS NOTES
Pt assessed due to multiple calls regarding increase oxygen needs and wheezing.   Per Dr. Magana,    Pt has Chronic Kidney failure:. Currently fluid up per Intake and Output chart.    Chronic Anemia - Currently 9.7   Hypertension .  A-Fib with RVR .     Pt is confused, unable to obtain accurate oxygen saturation due to AFIB.  Pt has upper airway wheezes ( exertional ? Fluid overload ? ) .   Crackles are appreciated in lung bases .    Offered neb but feel this may not have the affect that is expected .     RN questioned Bipap, As I can not officially order this , it was directed to touch base with MD and family as it is mentioned in pts notes that Goals of Care : Palliative Care Consultation      Will await direction from MD regarding further orders.     /70   Pulse 89   Temp 99.5  F (37.5  C) (Axillary)   Resp 22   Wt 88.9 kg (196 lb)   SpO2 90%   BMI 26.58 kg/m      Rosie Anthony, RT

## 2022-05-31 NOTE — PROGRESS NOTES
Clinic Care Coordination Contact  Ambulatory Care Coordination to Inpatient Care Management   Hand-In Communication    Date:  May 31, 2022  Name: Laz Kingston is enrolled in Ambulatory Care Coordination program and I am the Lead Care Coordinator.  CC Contact Information: Epic InPressysket + phone  Payor Source: Payor: MEDICARE / Plan: RR MEDICARE / Product Type: Medicare /   Current services in place:     Please see the CC Snaphot and Care Management Flowsheets for specific  details of this Laz Kingston care plan.   Additional details/specific concerns r/t this admission:    Psychosocial caregiver support    I will follow this admission in Epic. Please feel free to contact me with questions or for further collaboration in discharge planning.

## 2022-05-31 NOTE — PROGRESS NOTES
Tried to provide pt w/PRN nebs for SOB, Pt was agitated, moving away and trying to get out of bed. Stop neb

## 2022-05-31 NOTE — PROGRESS NOTES
Assessment/Plan:  1.  Atrial fibrillation with RVR: The patient could not take oral medication due to aspiration pneumonia and altered mental status change.  His oral medication was held.  Currently he is on diltiazem infusion 5 mg/h and IV metoprolol 5 mg every 6 hours.  His ventricular rate is controlled.  His Eliquis was held.    Consider to start oral medication when he is able to do it.    2.  Benign essential hypertension: He cannot take oral medication at this time.  He is on IV diltiazem and metoprolol as mentioned above.    3.  Altered mental status on the setting of dementia, right lower lobe pneumonia, type 2 diabetes, stage IV CKD: Deferred to primary team of management.    Clinically Significant Risk Factors Present on Admission            # Overweight: Estimated body mass index is 26.58 kg/m  as calculated from the following:    Height as of 5/5/22: 1.829 m (6').    Weight as of this encounter: 88.9 kg (196 lb).    Cardiovascular: Cardiac Arrhythmia: Atrial fibrillation: Persistent    Fluid & Electrolyte Disorders: Other disorders of electrolyte and fluid balance, not elsewhere classified    Nephrology: CKD POA List: Stage 4 (GFR 15-29)    Neurology: Dementia: Alzheimer's disease, unspecified    Pulmonology: Other Pulmonary Conditions: Acute interstitial pneumonitis    Systemic: Chronic Fatigue and Other Debilities: Other reduced mobility       Subjective:  Interval History: The patient is confused, not able to provide medical history.  Discussed with his nurse, no acute events overnight.    Current Medications:  Scheduled Meds:    [Held by provider] amLODIPine  10 mg Oral Daily     [Held by provider] apixaban ANTICOAGULANT  2.5 mg Oral BID     cefTRIAXone  1 g Intravenous Q24H     heparin ANTICOAGULANT  5,000 Units Subcutaneous Q12H     insulin aspart  1-7 Units Subcutaneous Q4H     [Held by provider] insulin glargine  18 Units Subcutaneous At Bedtime     [Held by provider] memantine  10 mg Oral  BID     metoprolol  5 mg Intravenous Q6H     [Held by provider] metoprolol tartrate  50 mg Oral BID     metroNIDAZOLE  500 mg Intravenous Q12H     pantoprazole (PROTONIX) IV  40 mg Intravenous Daily with breakfast     [Held by provider] QUEtiapine  12.5 mg Oral At Bedtime     rivastigmine  1 patch Topical Daily     sodium chloride (PF)  3 mL Intracatheter Q8H     thiamine  100 mg Intravenous Daily     [START ON 6/1/2022] vancomycin  1,000 mg Intravenous Q24H     Continuous Infusions:    dilTIAZem HCl-Sodium Chloride 5 mg/hr (05/31/22 0836)     [Held by provider] NaCl Stopped (05/31/22 0403)     PRN Meds:.acetaminophen, [Held by provider] acetaminophen, [Held by provider] benztropine, glucose **OR** dextrose **OR** glucagon, levalbuterol, lidocaine 4%, lidocaine (buffered or not buffered), [Held by provider] melatonin, OLANZapine, ondansetron, senna-docusate **OR** senna-docusate, sodium chloride (PF)    Objective:   Vital signs in last 24 hours:  Temp:  [99.2  F (37.3  C)-99.9  F (37.7  C)] 99.5  F (37.5  C)  Pulse:  [] 93  Resp:  [18-26] 20  BP: (133-199)/() 156/85  SpO2:  [82 %-97 %] 92 %  Weight:   [unfilled]     Physical Exam:  General Appearance:   Awake, Alert, confused   HEENT:  No scleral icterus; the mucous membranes were moist.   Neck: No cervical bruits. No jugular venous distention   Lungs:   Diminishied breathing sounds. No crackles. No wheezing.   Cardiovascular:   IRRR, normal first and second heart sounds with no murmurs. No rubs or gallops.     Abdomen:  Obese. Soft. No tenderness. Bowels sounds are present   Extremities: No leg edema. Equal posterior tibial pulsese.   Skin: Warm, Dry. No rashes.   Neurologic: No focal deficits.         Cardiographics:   Report: personally reviewed. .      Tele monitoring -atrial fibrillation with controlled ventricular rate.    Echocardiogram on 5 29 2022  1. The left ventricle is normal in size.  Left ventricular function is normal.The ejection  fraction is 60-65%.No  regional wall motion abnormalities noted.  2. Normal right ventricle size and systolic function.  3. The left atrium is moderately dilated.    Lab Results:   personally reviewed.     Lab Results   Component Value Date     05/31/2022    CO2 17 05/31/2022    BUN 33 05/31/2022     Lab Results   Component Value Date    TROPONINI 0.04 04/13/2022     Lab Results   Component Value Date    WBC 8.1 05/31/2022    HGB 8.5 05/31/2022    HCT 29.2 05/31/2022    MCV 73 05/31/2022     05/31/2022     Lab Results   Component Value Date    CHOL 160 02/15/2021    TRIG 359 02/15/2021    HDL 32 02/15/2021    LDL 56 02/15/2021    LDL 83 04/21/2015

## 2022-05-31 NOTE — SIGNIFICANT EVENT
Significant Event Note    Time of event: 10:07 PM May 30, 2022    Description of event:  Paged earlier in the evening by bedside RN due to A. fib with RVR and increasing agitation.  Ordered an extra dose of IM Zyprexa and IV metoprolol.    Paged again due to ongoing tachycardia, heart rates 130-150s.  Patient now also tachypneic.  I went to examine the patient and found him laying in bed.  He reported feeling like he was having a hard time breathing.  However, due to advanced dementia he was not able to answer other questions.    Exam:   BP (!) 171/112   Pulse (!) 150   Temp 99.5  F (37.5  C) (Axillary)   Resp 22   Wt 88.9 kg (196 lb)   SpO2 91%   BMI 26.58 kg/m     GENERAL: Laying in bed, appears confused  RESP: Increased work of breathing, accessory muscle use, breath sounds coarse throughout.  CV: extremities well perfused, irregularly irregular    Plan:  Apparently, patient was somnolent for most of the day and was NPO.  Did not get many of his meds or much to eat or drink.  Suspect this A. fib is related to being somewhat dry, known infection, and not getting his regular meds.   -Diltiazam loading dose and drip  -500 mL NS bolus  -IM Zyprexa 2.5 mg  -Lactate, CBC, bmp    Discussed with: bedside nurse, charge nurse.  Text page sent to on-call hospitalist.    Clarke Meléndez MD    10:31 PM  Stayed in the room while diltiazem loading dose was given.  Good response.  Heart rate decreased to .  Blood pressure improved to 150s/90's.  Work of breathing significantly decreased.  Patient appears significantly more comfortable. However, he does continue to be on 15L oxymask.      4:15 AM   Paged by bedside nurse again due to increasing work of breathing and decreasing oxygen saturation.  Went to evaluate the patient and found him sitting up in bed.  Significantly increased work of breathing.  Coarse breath sounds throughout both lung fields.  He is more restless and agitated again.  Nurse preparing his next  dose of Zyprexa.  This helped significantly when he had similar presentation earlier in the night.  He is already on appropriate antibiotics for possible aspiration pneumonia.  However, I do also suspect some element of volume overload.  -Give previously ordered Zyprexa  -40 mg Lasix IV

## 2022-05-31 NOTE — PROGRESS NOTES
Patient encouraged to cough to clear throat congestions. Cardizem gtt at 15 mg/hr to slow Heart rate down. Atrial Fib 120's. . O2 sats 93% on 10 liters/nc. He has calmed himself as he rested in bed. Lungs sound coarse through out. Resp. Rate 28- 30. Patient alert and able to talk yet looks fatigued. Potassium level being drawn now. 1 to 1 sitter for safety.

## 2022-05-31 NOTE — PROGRESS NOTES
Spiritual care and Palliative with the patient and the spouse. Breanne, Palliative , discussed the information to the spouse and the patient slept on and off. 1 to 1 sitter. Cardizem drip at 5 mg/hr. HR 90's afib.  Call light in reach.

## 2022-05-31 NOTE — CONSULTS
[facility]  Palliative Care Consultation Note    Patient: Laz Kingston  Date of Admission:  5/28/2022    Requesting Clinician / Team: [place] Godfrey [place]  Reason for consult: Goals of care    Impression & Recommendations:  Laz has advanced alzheimer's dementia (MR suggests possible vascular component also) and presented with evidence of end-stage disease: aspiration pneumonia and hypernatremia.  Counselled wife Patricia (and recognized her strength and skill as a caregiver over the decade she's cared for Elan) and daughters Estephania and Luz that they are at cross-roads with option of life-prolonging therapy vs transitioning to comfort focus.   Patricia notes she had prior conversations with Elan and he would not want to continue life prolonging treatments with as profoundly he's been affected by the Alzheimer's dementia.      What's most important: comfort , getting home.    Life expectancy: appears to be days but could be shorter.  Requested compassionate exception to visitor policy which was approved nursing supervisor for up to six visitors in a 24 hour period to be able to visit in groups of two.    Is pt stable for discharge: not yet--symptoms of agitation, air hunger and needs to coordinate with hospice for support.  Anticipate could be stable for discharge in 48hrs if remains clinically stable.    Agitation, delirium (metabolic) and advanced alzheimer's dementia.  - family wishes to focus on comfort at this time, refrain from lab draws  - prn lorazepam for comfort if agitation, would consider as second line.  - prn olanzapine ODT if agitation (losing ability to swallow so quetiapine could be an issue) and 5mg olanzapine at HS  - attend to bowel function and incomplete bladder emptying as both could contribute to agitation.    Constipation:  ---no BM since admission, schedule senna BID if able to take PO.  ---start w biscodyl supp today.    Pain, dyspnea at end of life  - creatinine 1.95;  would refrain from morphine due to risk of accumulating toxic metabolite.s  - use oxycodone intensol (unable to swallow and SL hydromorphone not on formulary) scheduled low dose 3mg SL q6H and may have add'l 3-5mg q3H prn SL for breakthrough pain/dyspnea.  --in particular for advanced dementia patients, recommend use of nonverbal signs/symptoms of pain to dose/administer PRNs--if pt is grimacing, brow is furrowed, is tachypneic or using accessory muscles to breathe, would have low threshold to provide PRN dose of oxycodone intensol.    Urine frequency, history nocturia x2-3 at baseline prior to admission.  - bladder scan PRN, if PVR >500 would consider placing borges (agree want to avoid tethers but if significant retention could contribute to agitation).    Goals of care: comfort-focused goals of treatment.  - discussed with family: wish is to continue IV abx to finish course of [place] [facility]  - family wishes to stop IV fluids, no desire for artificial nutrition  - stop telemetry  - hospice consult ordered- family wishes to work with [facility] hospice.  Reviewed that if there is evidence EOL approaching sooner than we'd like, possible GIP support but fam firm in desire to have pt have end of life at home if at all possible.    ACP: reviewed POLST, DNR/DNI  - no formal HCD on file.  - wife Patricia is surrogate decision-maker; Elan lacks decisional capacity.  - would need POLST updated (DNR/comfort focused) prior to discharge.    Support: four adult children, three grandchildren, four great grandchildren.  Protestant.  Received anointing 5/31/2022   Palliative will continue to follow along with spiritual care.    These recommendations have been discussed with Dr Wilson of [facility] and with Laz's family.      Thank you for the opportunity to participate in the care of this patient and family. Our team: will continue to follow.     During regular M-F work hours -- if you are not sure who specifically to  contact -- please contact us by calling us directly at the Palliative Care Main Line [phone]    After regular work hours and on weekends/holidays, you can leave a message at [phone]      Assessments:     87-year-old male with past medical history of type II DM, CKD dementia brought for evaluation of confusion in the setting of underlying alzheimer's dementia.  Mr. Kingston was recently admitted to [facility] from 4/13-4/17 for confusion and COVID pneumonia with hypoxia and treated with remdesivir and steroids improved and discharged without any need of home oxygen.  Work-up at ED significant for chest x-ray with right basilar pneumonia versus atelectasis and small right pleural effusion. UA with UTI,   He was started on IV antibiotics and admitted.  He has been encephalopathic, reduced oral intake and had evidence aspiration w speech pathologist.         Today, the patient was seen for:  Initial palliative medicine visit, goals of care conversation and family care conference to discuss goals of treatment for this patient with advanced end-stage dementia, aspiration pneumonia concern, a fib w rvr and CKD w HEMALATHA.    Prognosis, Goals, & Planning:      Functional Status just prior to hospitalization: 3 (Capable of only limited self-care; needs help with ADLs; in bed/chair >50% of waking hours)--fully dependent in ADLs, able to walk w gait belt and walker, unable to toilet or dress.      Prognosis, Goals, and/or Advance Care Planning were addressed today: Yes        Summary/Comments: see above      Patient's decision making preferences: unable to assess          Patient has decision-making capacity today for complex decisions: No            I have concerns about the patient/family's health literacy today: No           Patient has a completed Health Care Directive: Yes, and on file.      Code status: No CPR / No Intubation    Coping, Meaning, & Spirituality:   Mood, coping, and/or meaning in the context of  serious illness were addressed today: Yes   [employer]on yanick, attended services until symptoms precluded Elan being able to go to [employer].  Elan loved to go fishing and bowling.  Hasn't been able to do these activities for several years.  Supported by his family and loves seeing grandchildren.    Social:     Living situation: lives with wife of 64 years, Patricia, and one son Avery.    Angelo family / caregivers: four adult children (two sons, two daughters), 3 grandchildren and 4 great grandchildren.    Occupational history: retired [employer], worked for the [employer].  grew up in [place].    Current in-home services: in home home care w [facility].    History of Present Illness:  History gathered today from: patient, family/loved ones, medical chart, medical team members, unit team members, outside records including Care Everywhere    Laz Kingston is an 87-year-old male with past medical history of type II DM, CKD dementia brought for evaluation of confusion in the setting of underlying alzheimer's dementia.  Mr. Kingston was recently admitted to [facility] from 4/13-4/17 for confusion and COVID pneumonia with hypoxia and treated with remdesivir and steroids improved and discharged without any need of home oxygen.  Work-up at ED significant for chest x-ray with right basilar pneumonia versus atelectasis and small right pleural effusion. UA with UTI,   He was started on IV antibiotics and admitted.  He has been encephalopathic, reduced oral intake and had evidence aspiration w speech pathologist.       Mr. Kingston has not worked with palliative medicine previously.  I introduced Palliative medicine as a specialty that works to help patients with serious illness live as well as possible, and manage symptoms/side effects of the illness or its treatment.  Palliative medicine also works to provide an extra layer of support to patients experiencing serious illness and their families.  Our specialty also  helps with advance care planning (making health care directives and helping a person receive care that is in keeping with their individual hopes and values).     Patricia has been primary caregiver for Elan since his dementia diagnosis 10 years ago and has managed on her own with occasional home care support and support from her son Avery as well.  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    Mr. Kingston has lost 50 lbs over the past several years, has been eating less and less.  He has had falls over the past two years but none recent per wife Patricia.  He is dependent in all ADLs except was able to feed himself until 3 days prior to admission.  He has had episodes of decline clinically, and in the past had improved somewhat but did not achieve his prior baseline level of functioning.      Patricia, Luz and Estephania are aware Laz has advanced dementia.  Patricia notes it's very important if end of life is approaching that Elan be at home for EOL.  They've worked with [facility] home care and would like to work with [facility] hospice as well.      Key Palliative Symptom Data:  Elan denies pain.  He is unable to provide answer regarding dyspnea.      ROS:  Unable to complete ROS due to severe encephalopathy and advanced dementia     Past Medical History:  Past Medical History:   Diagnosis Date     Acute renal failure with acute tubular necrosis superimposed on stage 3 chronic kidney disease (H) 5/2/2021     Alzheimer's dementia (H)      BCE (basal cell epithelioma) 10/19/2015     Diabetes mellitus (H)      Dysplastic nevus 10/19/2015     GERD (gastroesophageal reflux disease)      Hyperlipidemia      Hypertension      Onychomycosis      Personal history of prostate cancer      Splenomegaly 01/10/2017        Past Surgical History:  Past Surgical History:   Procedure Laterality Date     CYSTOSCOPY, TRANSURETHRAL RESECTION (TUR)  TUMOR BLADDER, COMBINED N/A 2021    Procedure: CYSTOSCOPY, WITH TRANSURETHRAL RESECTION BLADDER TUMOR;  Surgeon: Amos Ackerman MD;  [place] [facility] [place]     CYSTOURETEROSCOPY, WITH LITHOTRIPSY USING ITZEL 120P LASER AND URETERAL STENT INSERTION Right 2021    Procedure: RIGHT URETEROSCOPY, LASER LITHOTRIPSY AND RIGHT URETERAL STENT EXCHANGE;  Surgeon: Amos Ackerman MD;  [place] [facility] [place]     [facility] CYSTOSCOPY,INSERT URETERAL STENT Right 2021    Procedure: CYSTOSCOPY, WITH URETERAL STENT INSERTION;  Surgeon: Amos Ackerman MD;  [place] [facility] [place]  Service: Urology     IR MISCELLANEOUS PROCEDURE  3/13/2014     IR URETER DILATION BILATERAL  3/13/2014     JOINT REPLACEMENT Bilateral     Both knees     DE ARTHROPLASTY TIBIAL PLATEAU      Description: Knee Replacement;  Recorded: 2013;     PROSTATECTOMY           Family History:  Family History   Problem Relation Age of Onset     Alzheimer Disease Mother      Coronary Artery Disease Father      Heart Failure Mother      Heart Disease Father 56.00        Two heart attacks     Heart Failure Sister          at 47.     Kidney Disease Sister      Diabetes Daughter      No Known Problems Son      No Known Problems Son      Diabetes Daughter           Allergies:  Allergies   Allergen Reactions     Ibuprofen Rash and GI Disturbance        Medications:  I have reviewed this patient's medication profile and medications from this hospitalization.   Noted:  Reviewed meds which include vancomycin, ceftriaxone, and metronidazole IV, IV metoprolol and IV cardizem, seroquel and exelon patch.  No opioid medications.    PERTINENT PHYSICAL EXAMINATION:  Vital Signs: Blood pressure (!) 156/85, pulse 93, temperature 99.5  F (37.5  C), temperature source Axillary, resp. rate 20, weight 88.9 kg (196 lb), SpO2 92 %.   GENERAL: encephalopathic 88 yo male, supine in bed w oxymask present.  Brow furrowed.  SKIN: Warm and dry    HEENT: Normocephalic, anicteric sclera, moist mucous membranes and temporal muscle wasting noted.  LUNGS: Scattered rales and rhonchi audible anteriorly bilaterally.  Mild accessory muscle use and tachypneic.  CARDIAC: RRR, normal s1/s2, w/o m/r/g   ABDOMINAL: BS (+), soft, non distended, non tender  : borges in place  MUSKL: no gross joint deformities   VASC: radial pulses palpable.  EXTREMITIES: No edema or cyanosis, pulses 2+ and symmetrical  NEUROLOGIC: hard of hearing.  No tremor or myoclonus.  PSYCH: calm at time of my visit.    Data reviewed:  Recent imaging reviewed, my comments on pertinents:   MRI attempted, unable to be completed fully.    Limited study negative for acute stroke.    TTE: LVEF 60-65% with normal RV size and R systolic function.    Renal ultrasound: trace R sided hydronephrosis.    VFSS: confirmed deep laryngeal penetration w thin liquid.    Head CT w/o contrast:  IMPRESSION:   1.  No acute intracranial abnormality.     2.  Stable age-related and chronic ischemic changes.     3.  Soft tissue density in the inferior medial left orbit is slightly more prominent than on 4/13/2022 suggesting it could be an orbital varix. Consider CT orbits for further evaluation on a nonemergent basis.    CXR 5/28/22 (portable)    FINDINGS: The heart is at the upper limits of normal in size. There is no pulmonary edema. There is again a rounded mass at the right lung base similar to the previous exam. There are mild bibasilar infiltrates. Probable right pleural effusion.    Impression:     IMPRESSION: Right basilar atelectasis or pneumonia. Small right pleural effusion.         Recent lab data reviewed, my comments on pertinents:   Last Comprehensive Metabolic Panel:  Sodium   Date Value Ref Range Status   05/31/2022 145 136 - 145 mmol/L Final     Potassium   Date Value Ref Range Status   05/31/2022 3.3 (L) 3.5 - 5.0 mmol/L Final     Chloride   Date Value Ref Range Status   05/31/2022 119 (H) 98 - 107 mmol/L  Final     Carbon Dioxide (CO2)   Date Value Ref Range Status   05/31/2022 17 (L) 22 - 31 mmol/L Final     Anion Gap   Date Value Ref Range Status   05/31/2022 9 5 - 18 mmol/L Final     Glucose   Date Value Ref Range Status   05/31/2022 153 (H) 70 - 125 mg/dL Final     GLUCOSE BY METER POCT   Date Value Ref Range Status   05/31/2022 139 (H) 70 - 99 mg/dL Final     Urea Nitrogen   Date Value Ref Range Status   05/31/2022 33 (H) 8 - 28 mg/dL Final     Creatinine   Date Value Ref Range Status   05/31/2022 1.95 (H) 0.70 - 1.30 mg/dL Final     GFR Estimate   Date Value Ref Range Status   05/31/2022 33 (L) >60 mL/min/1.73m2 Final     Comment:     Effective December 21, 2021 eGFRcr in adults is calculated using the 2021 CKD-EPI creatinine equation which includes age and gender (Lyndsey tamayo al., [place], DOI: 10.1056/CSDRcu1704141)   07/07/2021 26 (L) >60 mL/min/1.73m2 Final     Calcium   Date Value Ref Range Status   05/31/2022 8.8 8.5 - 10.5 mg/dL Final     CBC RESULTS: Recent Labs   Lab Test 05/31/22  0439   WBC 8.1   RBC 3.98*   HGB 8.5*   HCT 29.2*   MCV 73*   MCH 21.4*   MCHC 29.1*   RDW 17.2*        Lab Results   Component Value Date    ALBUMIN 3.0 05/31/2022     Kiarra Rondon MD  [facility],  Palliative Medicine Service  [phone] department number  Can page via [employer]

## 2022-06-01 PROBLEM — Z51.5 HOSPICE CARE: Status: ACTIVE | Noted: 2022-01-01

## 2022-06-01 NOTE — PLAN OF CARE
Physical Therapy Discharge Summary    Reason for therapy discharge:    Pt is going onto hospice    Progress towards therapy goal(s). See goals on Care Plan in [facility] electronic health record for goal details.  Pt is going onto hospice    Therapy recommendation(s):    No further therapy is recommended.

## 2022-06-01 NOTE — PLAN OF CARE
Occupational Therapy Discharge Summary    Reason for therapy discharge:    No further expectations of functional progress.    Progress towards therapy goal(s). See goals on Care Plan in [facility] electronic health record for goal details.  Goals not met.  Barriers to achieving goals:   plan for comfort cares/hospice.    Therapy recommendation(s):    No further therapy is recommended.

## 2022-06-01 NOTE — PHARMACY-ADMISSION MEDICATION HISTORY
Admission medication history completed at [facility]. During non-hospice admission. Please see Pharmacy - Admission Medication History note from 5//29/2022.

## 2022-06-01 NOTE — DISCHARGE SUMMARY
[facility]  Hospitalist Discharge Summary      Date of Admission:  5/28/2022  Date of Discharge:  6/1/2022  Discharging Provider: Alondra Wilson MD  Discharge Service: Hospitalist Service    Discharge Diagnoses   Terminal delirium  Advanced Alzheimer's dementia  Acute toxic metabolic encephalopathy  Community-acquired pneumonia  Urinary tract infection  Staph epidermidis bacteremia  Atrial fibrillation with rapid ventricular response  Acute respiratory failure with hypoxia  Acute renal failure  Chronic kidney disease stage IV  Acute hyponatremia  Dehydration  Hypokalemia  Hypomagnesemia  COVID-pneumonia convalescent    Follow-ups Needed After Discharge   Follow-up with hospice provider    Unresulted Labs Ordered in the Past 30 Days of this Admission     Date and Time Order Name Status Description    5/30/2022  3:38 PM Vitamin B1 whole blood In process     5/29/2022 11:18 AM Blood Culture Line, venous Preliminary     5/29/2022 11:18 AM Blood Culture Arm, Right Preliminary         Discharge Disposition   Inpatient hospice  Condition at discharge: End-of-life care    Hospital Course   Patient is 87-year-old gentleman with PMH of advanced dementia, suspect vascular, atrial fibrillation, hypertension, CKD 3, type II DM, presented to ED with worsening confusion.  He was diagnosed with aspiration pneumonia, dehydration, hyponatremia, UTI, staff epidermidis bacteremia, rapid atrial fibrillation.  Patient was recently on April 30-17 admitted for COVID-19 pneumonia with hypoxia and confusion.  He was treated with remdesivir, steroids and improved at discharge.  Patient was treated with ceftriaxone/Flagyl/vancomycin for aspiration and healthcare associated pneumonia.    Blood culture 5/29/2022 grew Staph epidermidis, resistant to fluoroquinolones, ofloxacin.    On presentation she was in atrial fibrillation, with RVR.  Unclear duration of atrial fibrillation. Patient without known coronary artery  disease or structural heart disease.  He was evaluated by cardiology, started on Eliquis for stroke prevention.    Acute toxic metabolic encephalopathy due to acute renal failure, pneumonia, superimposed on Alzheimer's dementia.  Limited due to patient's poor cooperation brain MRI without acute intracranial pathology.  PTA Exelon patch, Seroquel, memantine were resumed.  He was seen by psychiatry.    UTI.  Renal ultrasound showed trace right hydronephrosis with renal cortical scaring, normal left kidney, no stones.  On ceftriaxone.  Urine culture NGTD.    Acute respiratory failure with hypoxia due to pneumonia with parapneumonic effusion.    On 5/31 required 10 LPM O2 to maintain saturations above 90%.  Patient having intermittent dyspnea and the audible expiratory wheezes.    Despite pathognomonic treatment, patient continues having agitated delirium, requiring one-to-one sitter.  Palliative team consulted for symptom management and discussion goals of care with family.  Patient's daughter Mariann and Luz, wife Patricia opted out for transitioning to comfort cares.  Patient was evaluated by hospice, with plans to admit to inpatient hospice.    Consultations This Hospital Stay   PHARMACY IP CONSULT  OCCUPATIONAL THERAPY ADULT IP CONSULT  PHYSICAL THERAPY ADULT IP CONSULT  CARDIOLOGY IP CONSULT  SPEECH LANGUAGE PATH ADULT IP CONSULT  CARE MANAGEMENT / SOCIAL WORK IP CONSULT  PALLIATIVE CARE ADULT IP CONSULT  PSYCHIATRY IP CONSULT  PHARMACY TO DOSE [facility] SERVICES IP CONSULT  HOSPITALIST IP CONSULT  INPATIENT HOSPICE ADULT CONSULT  CARE MANAGEMENT / SOCIAL WORK IP CONSULT  [facility] INPATIENT HOSPICE ADULT CONSULT  HOSPITALIST IP CONSULT  INPATIENT HOSPICE ADULT CONSULT  CARE MANAGEMENT / SOCIAL WORK IP CONSULT  [facility] INPATIENT HOSPICE ADULT CONSULT    Code Status   No CPR- Do NOT Intubate    Time Spent on this Encounter   Alondra RAMOS MD, personally saw the patient today and spent greater than 30 minutes  discharging this patient.     Alondra Wilson MD  [facility]  [address] [place] [id number]  Phone: [phone]  Fax: [phone]  ______________________________________________________________________    Physical Exam   Vital Signs: Temp: 98.8  F (37.1  C) Temp src: Oral BP: (!) 166/116 Pulse: 64   Resp: 22 SpO2: 90 % O2 Device: Nasal cannula Oxygen Delivery: 4 LPM  Weight: 196 lbs 0 oz  General: Patient is somnolent, responding to tactile stimuli  HEENT: Not assessed, patient not cooperative with exam  CV: Irregular regular S1-S2  Lungs: Distant rhonchi  Abdomen: Audible bowel sounds       Primary Care Physician   Bianca Rodriguez    Discharge Orders   No discharge procedures on file.    Significant Results and Procedures   Results for orders placed or performed during the hospital encounter of 05/28/22   XR Chest Port 1 View    Narrative    EXAM: XR CHEST PORT 1 VIEW  LOCATION: [facility]  DATE/TIME: 5/28/2022 10:36 PM    INDICATION: Fever.  COMPARISON: 4/13/2022.    FINDINGS: The heart is at the upper limits of normal in size. There is no pulmonary edema. There is again a rounded mass at the right lung base similar to the previous exam. There are mild bibasilar infiltrates. Probable right pleural effusion.      Impression    IMPRESSION: Right basilar atelectasis or pneumonia. Small right pleural effusion.   Head CT w/o contrast    Narrative    EXAM: CT HEAD W/O CONTRAST  LOCATION: [facility]  DATE/TIME: 5/28/2022 10:28 PM    INDICATION: Mental status change, unknown cause.  COMPARISON: 4/13/2022  TECHNIQUE: Routine CT Head without IV contrast. Multiplanar reformats. Dose reduction techniques were used.    FINDINGS:  INTRACRANIAL CONTENTS: Mildly limited by motion. No definite intracranial hemorrhage, extraaxial collection, or mass effect. No CT evidence of acute infarct. Moderate volume loss and mild burden  presumed chronic small vessel ischemia are stable.    VISUALIZED ORBITS/SINUSES/MASTOIDS: No intraorbital abnormality. Mild scattered paranasal sinus mucosal disease. Soft tissue nodule within the medial left orbit between the medial and inferior rectus muscles again noted. This was present dating back to   2/12/2015. It is nonspecific. It is slightly more prominent than the prior head CT suggesting it may be a venous varix.    BONES/SOFT TISSUES: No acute abnormality.      Impression    IMPRESSION:  1.  No acute intracranial abnormality.    2.  Stable age-related and chronic ischemic changes.    3.  Soft tissue density in the inferior medial left orbit is slightly more prominent than on 4/13/2022 suggesting it could be an orbital varix. Consider CT orbits for further evaluation on a nonemergent basis.   XR Video Swallow with SLP or OT    Narrative    EXAM: XR VIDEO SWALLOW WITH SLP OR OT  LOCATION: [facility]  DATE/TIME: 5/29/2022 8:41 AM    INDICATION: Difficulty swallowing. Right basilar pneumonia. Assess for aspiration.  COMPARISON: Portable chest 05/20/2022    TECHNIQUE: Routine swallow study with speech pathology using multiple barium thicknesses.    FINDINGS:   FLUOROSCOPIC TIME: 2.6 minutes  NUMBER OF IMAGES: 6 digital fluoroscopy cine clips.    Swallow study with Speech Pathology using multiple barium thicknesses.     Oral phase was delayed, mainly due to chronic negative issues. This was especially pronounced with the pureed consistency.    Delayed triggering of the swallow reflex with complete inversion epiglottis. Reflex delay demonstrates deep laryngeal penetration with thin liquid consistency the level of the cords with no cough reflex at the cords. Patient had some trace aspiration of   pyriform sinus residual with some delayed throat clearing. Vallecular stasis noted with pureed consistency.    Please see Speech Pathology report for further details the exam and  recommendations.   US Renal Complete    Narrative    EXAM: US RENAL COMPLETE  LOCATION: [facility]  DATE/TIME: 5/29/2022 11:17 AM    INDICATION: HEMALATHA CKD, UTI, h o stenting. assess for hydroureteronephrosis  COMPARISON: CT dated 9/4/2021  TECHNIQUE: Routine Bilateral Renal and Bladder Ultrasound.    FINDINGS:    RIGHT KIDNEY: 9.7 cm. Trace right-sided hydronephrosis. Renal cortical scarring. 2.3 x 1.9 cm simple right renal cysts, no follow-up required.    LEFT KIDNEY: 11.2 cm. Normal without hydronephrosis or masses.     BLADDER: Normal.      Impression    IMPRESSION:  1.  Trace right-sided hydronephrosis.   MR Brain w/o Contrast    Narrative    EXAM: MR BRAIN W/O CONTRAST  LOCATION: [facility]  DATE/TIME: 5/31/2022 11:21 AM    INDICATION: Mental status change, unknown cause  COMPARISON: Head CT dated 05/20/2022.  TECHNIQUE: Head MRI without IV contrast performed according to the quick brain protocol with rapid imaging sequences. Limited brain MRI 2 axial diffusion imaging secondary to patient confusion and inability to hold still.    FINDINGS: Study somewhat compromised by motion artifact. Limited brain MRI.  INTRACRANIAL CONTENTS: No acute or subacute infarct. No mass, acute hemorrhage, or extra-axial fluid collections. Scattered nonspecific T2/FLAIR hyperintensities within the cerebral white matter most consistent with mild chronic microvascular ischemic   change. Mild generalized cerebral atrophy. No hydrocephalus. Normal position of the cerebellar tonsils.     OTHER: Accounting for technique no additional abnormalities identified.      Impression    IMPRESSION:  1.  Limited brain MRI secondary to patient confusion and inability to hold still.  2.  No acute stroke or other acute abnormality identified.   Echocardiogram Complete     Value    LVEF  60-65%    Narrative    [id number]  [id number]  [id number]  [id number]^ABERRA^[facility]  [address] [place] [address]     Name: GISELE QUIJANO  MRN: [id number]  : 1935  Study Date: 2022 07:56 AM  Age: 87 yrs  Gender: Male  Patient Location: [facility]  Reason For Study: Atrial Fibrillation  Ordering Physician: LEANDRO WEBBER  Performed By: MB     BSA: 2.1 m2  Height: 72 in  Weight: 196 lb  HR: 99  BP: 160/128 mmHg  ______________________________________________________________________________  Procedure  Complete Echo Adult. Definity (NDC #39316-219) given intravenously.  Technically difficult study.Extremely difficult acoustic windows despite the  use of contrast for endcardial border definition.  ______________________________________________________________________________  Interpretation Summary     1. The left ventricle is normal in size.  Left ventricular function is normal.The ejection fraction is 60-65%.No  regional wall motion abnormalities noted.  2. Normal right ventricle size and systolic function.  3. The left atrium is moderately dilated.  ______________________________________________________________________________  Left Ventricle  The left ventricle is normal in size. Left ventricular function is normal.The  ejection fraction is 60-65%. There is moderate concentric left ventricular  hypertrophy. Diastolic function not assessed due to atrial fibrillation. No  regional wall motion abnormalities noted.     Right Ventricle  Normal right ventricle size and systolic function.     Atria  The left atrium is moderately dilated. Right atrial size is normal. There is  no color Doppler evidence of an atrial shunt.     Mitral Valve  There is moderate mitral annular calcification. The mitral valve leaflets are  mildly thickened. There is trace mitral regurgitation. There is no mitral  valve stenosis.     Tricuspid Valve  Right ventricular systolic pressure could not be approximated due to  inadequate tricuspid regurgitation.     Aortic Valve  Aortic valve leaflets appear normal.  There is no evidence of aortic stenosis  or clinically significant aortic regurgitation.     Pulmonic Valve  The pulmonic valve is not well seen, but is grossly normal. This degree of  valvular regurgitation is within normal limits.     Vessels  The aorta root is normal. Normal size ascending aorta. Inferior vena cava not  well visualized for estimation of right atrial pressure.     Pericardium  There is no pericardial effusion.     Rhythm  The rhythm was atrial fibrillation.  ______________________________________________________________________________  MMode/2D Measurements & Calculations     IVSd: 1.6 cm  LVIDd: 4.6 cm  LVIDs: 3.3 cm  LVPWd: 1.5 cm  FS: 28.4 %  LV mass(C)d: 300.4 grams  LV mass(C)dI: 142.2 grams/m2  Ao root diam: 3.9 cm  asc Aorta Diam: 4.2 cm  LVOT diam: 2.1 cm  LVOT area: 3.4 cm2  LA Volume Indexed (AL/bp): 39.7 ml/m2  RWT: 0.65     Doppler Measurements & Calculations  MV E max donnell: 85.9 cm/sec  MV max PG: 3.9 mmHg  MV mean P.5 mmHg  MV V2 VTI: 19.7 cm  MVA(VTI): 2.1 cm2  MV dec slope: 370.3 cm/sec2  MV dec time: 0.23 sec  Ao V2 max: 129.2 cm/sec  Ao max P.0 mmHg  Ao V2 mean: 94.1 cm/sec  Ao mean PG: 3.9 mmHg  Ao V2 VTI: 20.6 cm  MARY(I,D): 2.0 cm2  MARY(V,D): 2.1 cm2  LV V1 max P.5 mmHg  LV V1 max: 78.6 cm/sec  LV V1 VTI: 12.0 cm  SV(LVOT): 40.5 ml  SI(LVOT): 19.2 ml/m2  PA acc time: 0.10 sec  PI end-d donnell: 137.1 cm/sec  AV Donnell Ratio (DI): 0.61  MARY Index (cm2/m2): 0.93  E/E': 11.8  E/E' av.5  Lateral E/e': 13.2  Medial E/e': 11.8  Peak E' Donnell: 7.3 cm/sec     ______________________________________________________________________________  Report approved by: Fritz Valle 2022 04:15 PM               Discharge Medications   Current Discharge Medication List      START taking these medications    Details   acetaminophen (TYLENOL) 650 MG suppository Place 1 suppository (650 mg) rectally every 4 hours as needed for fever  Qty: 4 suppository, Refills: 0    Associated  Diagnoses: End of life care      artificial saliva (BIOTENE MT) SOLN solution Take 2 mLs (2 sprays) by mouth every hour as needed for dry mouth  Qty: 44.3 mL, Refills: 0    Associated Diagnoses: End of life care      atropine 1 % ophthalmic solution Take 2 drops by mouth, place under tongue or place inside cheek every 4 hours as needed for secretions  Qty: 5 mL, Refills: 0    Associated Diagnoses: End of life care      bisacodyl (DULCOLAX) 10 MG suppository Place 1 suppository (10 mg) rectally daily as needed for constipation  Qty: 2 suppository, Refills: 0    Associated Diagnoses: End of life care      carboxymethylcellulose PF (REFRESH PLUS) 0.5 % ophthalmic solution Place 1-2 drops into both eyes every hour as needed for dry eyes    Associated Diagnoses: End of life care      haloperidol (HALDOL) 2 MG/ML (HIGH CONC) solution Take 0.5 mLs (1 mg) by mouth or place under tongue every 4 hours as needed for agitation or other (nausea)  Qty: 10 mL, Refills: 0    Associated Diagnoses: End of life care      LORazepam (LORAZEPAM INTENSOL) 2 MG/ML (HIGH CONC) oral solution Take 0.25 mLs (0.5 mg) by mouth or place under tongue every 4 hours as needed for anxiety (restlessness)  Qty: 30 mL, Refills: 0    Associated Diagnoses: End of life care      OLANZapine zydis (ZYPREXA) 5 MG ODT Take 1 tablet (5 mg) by mouth every 6 hours as needed for agitation (delirium)  Qty: 30 tablet, Refills: 0    Comments: Take 1 tab at bedtime and vwey 6 hrs as needed for agitation/delirium.  Associated Diagnoses: End of life care      oxyCODONE (ROXICODONE INTENSOL) 20 mg/mL (HIGH CONC) solution Take 0.25 mLs (5 mg) by mouth every 2 hours as needed for severe pain  Qty: 15 mL, Refills: 0    Associated Diagnoses: End of life care      scopolamine (TRANSDERM) 1 MG/3DAYS 72 hr patch Place 2 patches onto the skin every 72 hours for 8 doses  Qty: 16 patch, Refills: 0    Associated Diagnoses: End of life care         CONTINUE these medications which  have NOT CHANGED    Details   rivastigmine (EXELON) 13.3 MG/24HR 24 hr patch Apply 1 patch topically daily  Qty: 30 patch, Refills: 11    Associated Diagnoses: Late onset Alzheimer's disease with behavioral disturbance (H)         STOP taking these medications       amLODIPine (NORVASC) 10 MG tablet Comments:   Reason for Stopping:         aspirin (ASA) 81 MG chewable tablet Comments:   Reason for Stopping:         cloNIDine (CATAPRES) 0.1 MG tablet Comments:   Reason for Stopping:         glimepiride (AMARYL) 4 MG tablet Comments:   Reason for Stopping:         hydrALAZINE (APRESOLINE) 100 MG tablet Comments:   Reason for Stopping:         LANTUS SOLOSTAR 100 UNIT/ML soln Comments:   Reason for Stopping:         memantine (NAMENDA) 10 MG tablet Comments:   Reason for Stopping:         metoprolol succinate ER (TOPROL-XL) 50 MG 24 hr tablet Comments:   Reason for Stopping:         multivitamin, therapeutic (THERA-VIT) TABS tablet Comments:   Reason for Stopping:         omeprazole (PRILOSEC) 20 MG DR capsule Comments:   Reason for Stopping:         QUEtiapine (SEROQUEL) 25 MG tablet Comments:   Reason for Stopping:         simvastatin (ZOCOR) 20 MG tablet Comments:   Reason for Stopping:         TRULICITY 1.5 MG/0.5ML pen Comments:   Reason for Stopping:             Allergies   Allergies   Allergen Reactions     Ibuprofen Rash and GI Disturbance

## 2022-06-01 NOTE — PLAN OF CARE
"Goal Outcome Evaluation:      Problem: Plan of Care - These are the overarching goals to be used throughout the patient stay.    Goal: Plan of Care Review/Shift Note  Description: The Plan of Care Review/Shift note should be completed every shift.  The Outcome Evaluation is a brief statement about your assessment that the patient is improving, declining, or no change.  This information will be displayed automatically on your shift note.  Outcome: Adequate for Care Transition  Goal: Patient-Specific Goal (Individualized)  Description: You can add care plan individualizations to a care plan. Examples of Individualization might be:  \"Parent requests to be called daily at 9am for status\", \"I have a hard time hearing out of my right ear\", or \"Do not touch me to wake me up as it startles me\".  Outcome: Adequate for Care Transition  Goal: Absence of Hospital-Acquired Illness or Injury  Outcome: Adequate for Care Transition  Goal: Optimal Comfort and Wellbeing  Outcome: Adequate for Care Transition  Goal: Readiness for Transition of Care  Outcome: Adequate for Care Transition     Problem: Risk for Delirium  Goal: Optimal Coping  Outcome: Adequate for Care Transition  Goal: Improved Behavioral Control  Outcome: Adequate for Care Transition  Goal: Improved Attention and Thought Clarity  Outcome: Adequate for Care Transition  Goal: Improved Sleep  Outcome: Adequate for Care Transition     Problem: Suicide Risk  Goal: Absence of Self-Harm  Outcome: Adequate for Care Transition     Problem: Fluid Imbalance (Pneumonia)  Goal: Fluid Balance  Outcome: Adequate for Care Transition     Problem: Infection (Pneumonia)  Goal: Resolution of Infection Signs and Symptoms  Outcome: Adequate for Care Transition     Problem: Respiratory Compromise (Pneumonia)  Goal: Effective Oxygenation and Ventilation  Outcome: Adequate for Care Transition     Problem: Dysrhythmia  Goal: Normalized Cardiac Rhythm  Outcome: Adequate for Care Transition   "   Pt is discharging to hospice IP.

## 2022-06-01 NOTE — CONSULTS
[facility]    Consult Note - [facility]    ______________________________________________________________________    [facility] 24/7 Contact Number: [phone]    - Providers: Please contact [facility] with changes in orders or clinical plan of care   - Nursing: Please contact [facility] with significant changes in patient condition    Hospice will notify the care team (including the hospitalist) to confirm date of inpatient hospice (GIP) admission.    New Epic encounter will not be created until hospice completes admission.   ______________________________________________________________________        Hospice Diagnosis: ES Alzheimer's dementia    Indication for Inpatient Hospice: Terminal restlessness/agitation, terminal secretions    Goals for Hospital Discharge: Transition patient to sublingual medications to prepare for discharge home on hospice. Terminal agitation, secretions and pain managed with only oral medications for goal of discharging home with family. Symptoms will be managed at a level comfortable to patient AEB needing <4 prns in 24 hours for 24 hours. Patient will tolerate sublingual medications w/o the need for IV intervention.    Plan of Care Discussed with the Following:   - Nurse: PASTORA Ambrocio, Jenny RN nurse manager  - Hospitalist/Rounding Provider: Dr Wilson, Dr Rondon   - Laz's Family/Preferred Contact: spouse Patricia  - Hospice Provider:  Dr Ang Aguayo    Summary of Visit (includes assessment, medications and any new orders):   Met with spouse Patricia to discuss the hospice program. [facility] hosp[facility] benefit, EOL care, and goals of care for the patient and family. Family would like to bring patient home when symptoms are managed and patient is on sublingual medications only. [facility] hospice to coordinate discharge home with hospice once patient is stable for discharge.     New order for oral haldol prn  appreciated. Recommend using oral medications when possible to start the transition from IV to oral.       Linnea Mckeon RN  [phone]

## 2022-06-01 NOTE — PLAN OF CARE
Speech Language Therapy Discharge Summary    Pt is being discharged to Hospice. No further ST recommended at this time.

## 2022-06-01 NOTE — PLAN OF CARE
Problem: Risk for Delirium  Goal: Improved Behavioral Control  Outcome: Unable to Meet, Plan Revised  Goal: Improved Attention and Thought Clarity  Outcome: Unable to Meet, Plan Revised  Goal: Improved Sleep  Outcome: Ongoing, Progressing     Problem: Infection (Pneumonia)  Goal: Resolution of Infection Signs and Symptoms  Outcome: Unable to Meet, Plan Revised   Goal Outcome Evaluation:      Pt on comfort cares, non tele. Pt got restless around 2300 H, Lorazepam 1 mg IV given, pt calmed down and went to sleep. Expiratory wheezes and coarse crackles noted on both lung fields. He is on O2 at 4 LPM per nasal cannula, comfortable after Lorazepam given. VS monitoring discontinued per MD's order. At 0455, Lorazepam one mg  SL given for restlessness. Pt went back to sleep after a few minutes. Offered sips of water when pt was awake. Placed in a high flower position when giving fluids. Will continue to monitor.

## 2022-06-01 NOTE — PLAN OF CARE
Problem: Plan of Care - These are the overarching goals to be used throughout the patient stay.    Goal: Absence of Hospital-Acquired Illness or Injury  Intervention: Identify and Manage Fall Risk  Recent Flowsheet Documentation  Taken 5/31/2022 1630 by Joseph Brooks RN  Safety Promotion/Fall Prevention:   assistive device/personal items within reach   activity supervised   bed alarm on   clutter free environment maintained   fall prevention program maintained   increased rounding and observation   increase visualization of patient   nonskid shoes/slippers when out of bed   patient and family education   room organization consistent   safety round/check completed   room door open   sitter at bedside   supervised activity     Problem: Plan of Care - These are the overarching goals to be used throughout the patient stay.    Goal: Absence of Hospital-Acquired Illness or Injury  Intervention: Prevent Skin Injury  Recent Flowsheet Documentation  Taken 5/31/2022 1615 by Joseph Brooks RN  Body Position: supine     Problem: Plan of Care - These are the overarching goals to be used throughout the patient stay.    Goal: Optimal Comfort and Wellbeing  5/31/2022 2214 by Joseph Brooks RN  Outcome: Ongoing, Progressing  5/31/2022 2205 by Joseph Brooks RN  Outcome: Ongoing, Progressing     Problem: Risk for Delirium  Goal: Improved Behavioral Control  5/31/2022 2214 by Joseph Brooks RN  Outcome: Ongoing, Progressing  5/31/2022 2205 by Joseph Brooks RN  Outcome: Ongoing, Progressing   Goal Outcome Evaluation:    Pt is a 1:1. Patient was restless and pulling on his IV tubing and primofit. Oxycodone solution x 1 and ativan x 1 administered. Pt calmed down and slept afterwards. Expiratory wheezing and coarse crackles heard on auscultation. Patient only oriented to self. On 4 L of O2 via NC. Oral meds not given due to pt being sleepy throughout shift.

## 2022-06-01 NOTE — PROGRESS NOTES
[facility]  Palliative Care Daily Progress Note       Recommendations & Counseling     Discussed with Dr Wilson and [facility] hospice liaison.  Suspect Elan would qualify for GIP with terminal delirium.    Life expectancy: likely hours-days.    Agitation, delirium (metabolic) and advanced alzheimer's dementia.  - family wishes to focus on comfort at this time, refrain from lab draws  - prn lorazepam for comfort if agitation, would consider as second line.  - prn olanzapine ODT if agitation (losing ability to swallow so quetiapine could be an issue) and 5mg olanzapine at HS  - attend to bowel function and incomplete bladder emptying as both could contribute to agitation.     Constipation:  ---no BM since admission, schedule senna BID if able to take PO.  ---ordered biscodyl 5/31, wasn't given d/t agitation  - recommend providing supp today when pt more calm.     Pain, dyspnea at end of life  - creatinine 1.95; would refrain from morphine due to risk of accumulating toxic metabolites  - use oxycodone intensol (unable to swallow and SL hydromorphone not on formulary) increase scheduled oxcodone to 3mg SL q4H and may have add'l 3-5mg q3H prn SL for breakthrough pain/dyspnea.  --in particular for advanced dementia patients, recommend use of nonverbal signs/symptoms of pain to dose/administer PRNs--if pt is grimacing, brow is furrowed, is tachypneic or using accessory muscles to breathe, would have low threshold to provide PRN dose of oxycodone intensol.     Urine frequency, history nocturia x2-3 at baseline prior to admission.  - bladder scan PRN ordered; agree with placing borges catheter for end of life cares.  - has pulled out borges catheter in past in prior hospitalizations/episodes of agitaiton.     Goals of care: comfort-focused goals of treatment.  - discussed with family: initially their wish was to continue IV abx to finish course of treatment recommended by [facility]--agree w [facility] in stopping IV  abx after further discussion with family.  - family wishes to stop IV fluids, no desire for artificial nutrition  - stopped telemetry  - hospice consult ordered- family wishes to work with [facility] hospice.  Reviewed today that with agitation/delirium, would want to have symptoms better managed prior to discharge.  Family open to [facility] hospice support.  Family does desire to have pt have end of life at home if at all possible.     ACP: reviewed POLST, DNR/DNI  - no formal HCD on file.  - wife Patricia is surrogate decision-maker; Elan lacks decisional capacity.  - would need POLST updated (DNR/comfort focused) prior to discharge if survives to discharge to home w hospice.     Support: four adult children, three grandchildren, four great grandchildren.  Jewish.  Received anointing 5/31/2022   Palliative will continue to follow along with spiritual care.          Assessments          87-year-old male with past medical history of type II DM, CKD dementia brought for evaluation of confusion in the setting of underlying alzheimer's dementia.  Mr. Kingston was recently admitted to [facility] from 4/13-4/17 for confusion and COVID pneumonia with hypoxia and treated with remdesivir and steroids improved and discharged without any need of home oxygen.  Work-up at ED significant for chest x-ray with right basilar pneumonia versus atelectasis and small right pleural effusion. UA with UTI,   He was started on IV antibiotics and admitted.  He has been encephalopathic, reduced oral intake and had evidence aspiration w speech pathologist.       Today, the patient was seen for:  End of life symptom management: pain, dyspnea, secretions, delirium    Prognosis, Goals, or Advance Care Planning was addressed today with: Yes.  Mood, coping, and/or meaning in the context of serious illness were addressed today: Yes.              Interval History:     Chart review/discussion with unit or clinical team members:   Chart reviewed, needed  prn lorazepam overnight.  Requiring 1:1 due to agitation and delirium.   Bedside RN noted increased agitation over the 2 hours since placement of scopolamine patch.  [place] catheter ordered by [facility].    Per patient or family/caregivers today:  Elan is obtunded, unable to answer questions.    Wife Patricia at bedside. Notes Elan has not responded to her today.  She notes Elan's face looks relaxed at time of visit.  Family open to starting with hospice support GIP if he would qualify (suspect he would due to significant terminal delirium).             Review of Systems:     Unable to complete ROS due to severe encephalopathy          Medications:     I have reviewed this patient's medication profile and medications during this hospitalization.    Noted meds:    Current Facility-Administered Medications   Medication     acetaminophen (TYLENOL) Suppository 650 mg     acetaminophen (TYLENOL) tablet 650 mg     artificial saliva (BIOTENE MT) solution 2 spray     [START ON 6/3/2022] bisacodyl (DULCOLAX) Suppository 10 mg     bisacodyl (DULCOLAX) Suppository 10 mg     carboxymethylcellulose PF (REFRESH PLUS) 0.5 % ophthalmic solution 1-2 drop     glycopyrrolate (ROBINUL) injection 0.2 mg     haloperidol (HALDOL) 2 MG/ML (HIGH CONC) solution 2 mg     HYDROmorphone (PF) (DILAUDID) injection 0.3-0.5 mg     LORazepam (ATIVAN) injection 1 mg    Or     LORazepam (ATIVAN) tablet 1 mg     OLANZapine zydis (zyPREXA) ODT tab 10 mg     OLANZapine zydis (zyPREXA) ODT tab 5 mg     OLANZapine zydis (zyPREXA) ODT tab 5 mg     ondansetron (ZOFRAN) injection 4 mg     oxyCODONE (ROXICODONE INTENSOL) 20 mg/mL (HIGH CONC) solution 3 mg     oxyCODONE (ROXICODONE INTENSOL) 20 mg/mL (HIGH CONC) solution 3-5 mg     sodium chloride (PF) 0.9% PF flush 3 mL   24-hr OME: 9mg oxycodone = 11.25 OME  24-hr benzodiazepine = 4mg lorazepam in past 20 hours             Physical Exam:   Vital Signs: Blood pressure (!) 163/82, pulse 64, temperature (!) 96.2  F (35.7  C),  temperature source Axillary, resp. rate 20, weight 88.9 kg (196 lb), SpO2 (!) 87 %.   GENERAL: obtunded, restless male.    SKIN: Warm and dry, no diaphoresis  HEENT: Normocephalic, anicteric sclera, moist mucous membranes  LUNGS: coarse rhonchi noted bilaterally, mild tachypnea and mild accessory muscle use.  CARDIAC:irreg irreg and tachycardic.  ABDOMINAL: BS (+), soft, non distended, no grimace with palpation.  MUSKL: no gross joint deformities   EXTREMITIES: No edema or cyanosis, pulses 2+ and symmetrical  NEUROLOGIC: no myoclonus, no tremor.  Restless, calms with gentle touch to hands.               Data Reviewed:     Reviewed recent pertinent imaging:   No new imaging.    Reviewed recent labs:   Creatinine   Date Value Ref Range Status   06/01/2022 2.02 (H) 0.70 - 1.30 mg/dL Final     CBC RESULTS: Recent Labs   Lab Test 05/31/22  0439   WBC 8.1   RBC 3.98*   HGB 8.5*   HCT 29.2*   MCV 73*   MCH 21.4*   MCHC 29.1*   RDW 17.2*

## 2022-06-01 NOTE — PLAN OF CARE
"  Problem: Plan of Care - These are the overarching goals to be used throughout the patient stay.    Goal: Plan of Care Review/Shift Note  Description: The Plan of Care Review/Shift note should be completed every shift.  The Outcome Evaluation is a brief statement about your assessment that the patient is improving, declining, or no change.  This information will be displayed automatically on your shift note.  Outcome: Unable to Meet, Plan Revised   Goal Outcome Evaluation:  Pt transferring to inpatient hospice care.  Wife expressing desire to \"do whatever it takes\" to make him comfortable.  He has been quite restless this afternoon, even with use of prn medications.  Repositioned frequently.  Pt's wife requesting that broges cath placement be delayed until Pt is deep asleep or sedated more as she feels \"he will just pull it out like he did before\".  Current male Pure Wick appears to be working for urinary incontinence.                      "

## 2022-06-02 NOTE — CONSULTS
[facility]    Progress Note - [facility]    ______________________________________________________________________    [facility]  Contact Number: [phone]    - Providers: Please contact [facility] with changes in orders or clinical plan of care   - Nursing: Please contact [facility] with significant changes in patient condition  ______________________________________________________________________        Plan of Care Discussed with the Following:   - Nurse: Precious RANDALL  - Hospitalist/Rounding Provider: Dr. Alondra Wilson   - Laz's Family/Preferred Contact: Spouse Patricia  - Hospice Provider: Dr. Ang Paez    Summary of Visit (includes assessment, medications and any new orders):   Pt is transitioning to end of life. Opens his eyes and mumbles nonsensically. Continues to be restless AEB fidgeting, pulling at bed sheet. There is a sleeve on his arm to prevent pulling at IV. Resp rate is 28. O2 is currently off as pt is pulling it off frequently. Has needed 1:1 sitter d/t terminal agitation. Staff and spouse doing frequent mouth care. Provided active listening, reminiscing, and end of life teaching with pt's spouse Patricia.      Home: [address] [phone]    Med recommendations per hospice provider Dr. gAuayo include:    discontinue scheduled Zyprexa. Change PRN dose to 10 mg PO/SL BID PRN restlessness    Haloperidol 2mg/mL, give 2 mg PO/SL q 6 hrs scheduled and 2 mg PO/SL Q 2 hrs PRN agitation/restlessness    Oxycodone 20 mg/mL, give 10 mg PO/SL Q 4 hrs scheduled and 5-10 mg PO/SL Q 1 hr PRN pain/dyspnea       Jill Schoenecker, RN

## 2022-06-02 NOTE — PLAN OF CARE
Problem: Plan of Care - These are the overarching goals to be used throughout the patient stay.    Goal: Plan of Care Review/Shift Note  Description: The Plan of Care Review/Shift note should be completed every shift.  The Outcome Evaluation is a brief statement about your assessment that the patient is improving, declining, or no change.  This information will be displayed automatically on your shift note.  Outcome: Ongoing, Progressing  Flowsheets (Taken 6/2/2022 1602)  Plan of Care Reviewed With:   patient   spouse     Problem: Plan of Care - These are the overarching goals to be used throughout the patient stay.    Goal: Absence of Hospital-Acquired Illness or Injury  Intervention: Identify and Manage Fall Risk  Recent Flowsheet Documentation  Taken 6/2/2022 1030 by Precious Cabello RN  Safety Promotion/Fall Prevention:   safety round/check completed   room organization consistent   room door open   patient and family education   nonskid shoes/slippers when out of bed   lighting adjusted   fall prevention program maintained   clutter free environment maintained   bed alarm on     Problem: End-of-Life Care  Goal: Comfort, Peace and Preserved Dignity  Outcome: Ongoing, Progressing   Goal Outcome Evaluation:    Plan of Care Reviewed With: patient, spouse     Writer worked closely this shift with palliative and hospice team. Medications have been reviewed, changed and administered for improved comfort. Pt's wife had been visiting and has been updated. Pt has been 1:1  off and on for safety/agitation risk at pulling at lines.Will continue to monitor pt's comfort and notify Hospice if needed. Argelia from Hospice # is on pt's white board.  Pt is assist two & is on bedrest. Bed alarm is in use.

## 2022-06-02 NOTE — PROGRESS NOTES
[facility]  Hospitalist Discharge Summary      Date of Admission:  5/28/2022  Date of Discharge:  6/1/2022  Discharging Provider: Alondra Wilson MD  Discharge Service: Hospitalist Service    Discharge Diagnoses     Terminal delirium  Advanced Alzheimer's dementia  Acute toxic metabolic encephalopathy  Community-acquired pneumonia  Urinary tract infection  Staph epidermidis bacteremia  Atrial fibrillation with rapid ventricular response  Acute renal failure  Chronic kidney disease stage IV  Acute hyponatremia  Dehydration  Hypokalemia  Hypomagnesemia  COVID-pneumonia convalescent    Follow-ups Needed After Discharge   Follow-up with hospice provider    Unresulted Labs Ordered in the Past 30 Days of this Admission     Date and Time Order Name Status Description    5/30/2022  3:38 PM Vitamin B1 whole blood In process     5/29/2022 11:18 AM Blood Culture Arm, Right Preliminary         Discharge Disposition   Inpatient hospice  Condition at discharge: End-of-life care    Hospital Course   Patient is 87-year-old gentleman with PMH of advanced dementia, suspect vascular, atrial fibrillation, hypertension, CKD 3, type II DM, presented to ED with worsening confusion.  He was diagnosed with aspiration pneumonia, dehydration, hyponatremia, UTI, staff epidermidis bacteremia, rapid atrial fibrillation.  Patient was recently on April 30-17 admitted for COVID-19 pneumonia with hypoxia and confusion.  He was treated with remdesivir, steroids and improved at discharge.  Patient was treated with ceftriaxone/Flagyl/vancomycin for aspiration and healthcare associated pneumonia.    Blood culture 5/29/2022 grew Staph epidermidis, resistant to fluoroquinolones, ofloxacin.    On presentation she was in atrial fibrillation, with RVR.  Unclear duration of atrial fibrillation. Patient without known coronary artery disease or structural heart disease.  He was evaluated by cardiology, started on Eliquis for stroke  prevention.    Acute toxic metabolic encephalopathy due to acute renal failure, pneumonia, superimposed on Alzheimer's dementia.  Limited due to patient's poor cooperation brain MRI without acute intracranial pathology.  PTA Exelon patch, Seroquel, memantine were resumed.  He was seen by psychiatry.    UTI.  Renal ultrasound showed trace right hydronephrosis with renal cortical scaring, normal left kidney, no stones.  On ceftriaxone.  Urine culture NGTD.    Despite pathognomonic treatment, patient continues having agitated delirium, requiring one-to-one sitter.  Palliative team consulted for symptom management and discussion goals of care with family.  Patient's daughter Mariann and Luz, wife Patricia opted out for transitioning to comfort cares.  Patient was evaluated by hospice, with plans to admit to inpatient hospice.    Consultations This Hospital Stay   PHARMACY IP CONSULT  OCCUPATIONAL THERAPY ADULT IP CONSULT  PHYSICAL THERAPY ADULT IP CONSULT  CARDIOLOGY IP CONSULT  SPEECH LANGUAGE PATH ADULT IP CONSULT  CARE MANAGEMENT / SOCIAL WORK IP CONSULT  PALLIATIVE CARE ADULT IP CONSULT  PSYCHIATRY IP CONSULT  PHARMACY TO [facility] IP CONSULT  HOSPITALIST IP CONSULT  INPATIENT HOSPICE ADULT CONSULT  CARE MANAGEMENT / SOCIAL WORK IP CONSULT  [facility] INPATIENT HOSPICE ADULT CONSULT  HOSPITALIST IP CONSULT  INPATIENT HOSPICE ADULT CONSULT  CARE MANAGEMENT / SOCIAL WORK IP CONSULT  [facility] INPATIENT HOSPICE ADULT CONSULT    Code Status   No CPR- Do NOT Intubate    Time Spent on this Encounter   I, Alondra Wilson MD, personally saw the patient today and spent greater than 30 minutes discharging this patient.     Alondra Wilson MD  [facility]  [address] [place] [id number]  Phone: [phone]  Fax: [phone]  ______________________________________________________________________    Physical Exam   Vital Signs: Temp: 98.5  F (36.9  C) Temp src: Oral  BP: (!) 189/108 (pt stiffened his arm whnile taking BP) Pulse: 114   Resp: 28 SpO2: 92 % O2 Device: Nasal cannula Oxygen Delivery: 2 LPM  Weight: 0 lbs 0 oz  General: Patient is somnolent, responding to tactile stimuli  HEENT: Not assessed, patient not cooperative with exam  CV: Irregular regular S1-S2  Lungs: Distant rhonchi  Abdomen: Audible bowel sounds       Primary Care Physician   Bianca Rodriguez    Discharge Orders   No discharge procedures on file.    Significant Results and Procedures   Results for orders placed or performed during the hospital encounter of 05/28/22   XR Chest Port 1 View    Narrative    EXAM: XR CHEST PORT 1 VIEW  LOCATION: [facility]  DATE/TIME: 5/28/2022 10:36 PM    INDICATION: Fever.  COMPARISON: 4/13/2022.    FINDINGS: The heart is at the upper limits of normal in size. There is no pulmonary edema. There is again a rounded mass at the right lung base similar to the previous exam. There are mild bibasilar infiltrates. Probable right pleural effusion.      Impression    IMPRESSION: Right basilar atelectasis or pneumonia. Small right pleural effusion.   Head CT w/o contrast    Narrative    EXAM: CT HEAD W/O CONTRAST  LOCATION: [facility]  DATE/TIME: 5/28/2022 10:28 PM    INDICATION: Mental status change, unknown cause.  COMPARISON: 4/13/2022  TECHNIQUE: Routine CT Head without IV contrast. Multiplanar reformats. Dose reduction techniques were used.    FINDINGS:  INTRACRANIAL CONTENTS: Mildly limited by motion. No definite intracranial hemorrhage, extraaxial collection, or mass effect. No CT evidence of acute infarct. Moderate volume loss and mild burden presumed chronic small vessel ischemia are stable.    VISUALIZED ORBITS/SINUSES/MASTOIDS: No intraorbital abnormality. Mild scattered paranasal sinus mucosal disease. Soft tissue nodule within the medial left orbit between the medial and inferior rectus muscles again noted. This was  present dating back to   2/12/2015. It is nonspecific. It is slightly more prominent than the prior head CT suggesting it may be a venous varix.    BONES/SOFT TISSUES: No acute abnormality.      Impression    IMPRESSION:  1.  No acute intracranial abnormality.    2.  Stable age-related and chronic ischemic changes.    3.  Soft tissue density in the inferior medial left orbit is slightly more prominent than on 4/13/2022 suggesting it could be an orbital varix. Consider CT orbits for further evaluation on a nonemergent basis.   XR Video Swallow with SLP or OT    Narrative    EXAM: XR VIDEO SWALLOW WITH SLP OR OT  LOCATION: [facility]  DATE/TIME: 5/29/2022 8:41 AM    INDICATION: Difficulty swallowing. Right basilar pneumonia. Assess for aspiration.  COMPARISON: Portable chest 05/20/2022    TECHNIQUE: Routine swallow study with speech pathology using multiple barium thicknesses.    FINDINGS:   FLUOROSCOPIC TIME: 2.6 minutes  NUMBER OF IMAGES: 6 digital fluoroscopy cine clips.    Swallow study with Speech Pathology using multiple barium thicknesses.     Oral phase was delayed, mainly due to chronic negative issues. This was especially pronounced with the pureed consistency.    Delayed triggering of the swallow reflex with complete inversion epiglottis. Reflex delay demonstrates deep laryngeal penetration with thin liquid consistency the level of the cords with no cough reflex at the cords. Patient had some trace aspiration of   pyriform sinus residual with some delayed throat clearing. Vallecular stasis noted with pureed consistency.    Please see Speech Pathology report for further details the exam and recommendations.   US Renal Complete    Narrative    EXAM: US RENAL COMPLETE  LOCATION: [facility]  DATE/TIME: 5/29/2022 11:17 AM    INDICATION: HEMALATHA CKD, UTI, h o stenting. assess for hydroureteronephrosis  COMPARISON: CT dated 9/4/2021  TECHNIQUE: Routine Bilateral Renal  and Bladder Ultrasound.    FINDINGS:    RIGHT KIDNEY: 9.7 cm. Trace right-sided hydronephrosis. Renal cortical scarring. 2.3 x 1.9 cm simple right renal cysts, no follow-up required.    LEFT KIDNEY: 11.2 cm. Normal without hydronephrosis or masses.     BLADDER: Normal.      Impression    IMPRESSION:  1.  Trace right-sided hydronephrosis.   MR Brain w/o Contrast    Narrative    EXAM: MR BRAIN W/O CONTRAST  LOCATION: [facility]  DATE/TIME: 2022 11:21 AM    INDICATION: Mental status change, unknown cause  COMPARISON: Head CT dated 2022.  TECHNIQUE: Head MRI without IV contrast performed according to the quick brain protocol with rapid imaging sequences. Limited brain MRI 2 axial diffusion imaging secondary to patient confusion and inability to hold still.    FINDINGS: Study somewhat compromised by motion artifact. Limited brain MRI.  INTRACRANIAL CONTENTS: No acute or subacute infarct. No mass, acute hemorrhage, or extra-axial fluid collections. Scattered nonspecific T2/FLAIR hyperintensities within the cerebral white matter most consistent with mild chronic microvascular ischemic   change. Mild generalized cerebral atrophy. No hydrocephalus. Normal position of the cerebellar tonsils.     OTHER: Accounting for technique no additional abnormalities identified.      Impression    IMPRESSION:  1.  Limited brain MRI secondary to patient confusion and inability to hold still.  2.  No acute stroke or other acute abnormality identified.   Echocardiogram Complete     Value    LVEF  60-65%    Narrative    [id number]  [id number]  [id number]  [id number]^LAWANDA^[place] [address]     Name: GISELE QUIJANO  MRN: [id number]  : 1935  Study Date: 2022 07:56 AM  Age: 87 yrs  Gender: Male  Patient Location: [facility]  Reason For Study: Atrial Fibrillation  Ordering Physician: LEANDRO WEBBER  Performed By: MB     BSA: 2.1 m2  Height: 72  in  Weight: 196 lb  HR: 99  BP: 160/128 mmHg  ______________________________________________________________________________  Procedure  Complete Echo Adult. Definity (NDC #03700-622) given intravenously.  Technically difficult study.Extremely difficult acoustic windows despite the  use of contrast for endcardial border definition.  ______________________________________________________________________________  Interpretation Summary     1. The left ventricle is normal in size.  Left ventricular function is normal.The ejection fraction is 60-65%.No  regional wall motion abnormalities noted.  2. Normal right ventricle size and systolic function.  3. The left atrium is moderately dilated.  ______________________________________________________________________________  Left Ventricle  The left ventricle is normal in size. Left ventricular function is normal.The  ejection fraction is 60-65%. There is moderate concentric left ventricular  hypertrophy. Diastolic function not assessed due to atrial fibrillation. No  regional wall motion abnormalities noted.     Right Ventricle  Normal right ventricle size and systolic function.     Atria  The left atrium is moderately dilated. Right atrial size is normal. There is  no color Doppler evidence of an atrial shunt.     Mitral Valve  There is moderate mitral annular calcification. The mitral valve leaflets are  mildly thickened. There is trace mitral regurgitation. There is no mitral  valve stenosis.     Tricuspid Valve  Right ventricular systolic pressure could not be approximated due to  inadequate tricuspid regurgitation.     Aortic Valve  Aortic valve leaflets appear normal. There is no evidence of aortic stenosis  or clinically significant aortic regurgitation.     Pulmonic Valve  The pulmonic valve is not well seen, but is grossly normal. This degree of  valvular regurgitation is within normal limits.     Vessels  The aorta root is normal. Normal size ascending aorta.  Inferior vena cava not  well visualized for estimation of right atrial pressure.     Pericardium  There is no pericardial effusion.     Rhythm  The rhythm was atrial fibrillation.  ______________________________________________________________________________  MMode/2D Measurements & Calculations     IVSd: 1.6 cm  LVIDd: 4.6 cm  LVIDs: 3.3 cm  LVPWd: 1.5 cm  FS: 28.4 %  LV mass(C)d: 300.4 grams  LV mass(C)dI: 142.2 grams/m2  Ao root diam: 3.9 cm  asc Aorta Diam: 4.2 cm  LVOT diam: 2.1 cm  LVOT area: 3.4 cm2  LA Volume Indexed (AL/bp): 39.7 ml/m2  RWT: 0.65     Doppler Measurements & Calculations  MV E max donnell: 85.9 cm/sec  MV max PG: 3.9 mmHg  MV mean P.5 mmHg  MV V2 VTI: 19.7 cm  MVA(VTI): 2.1 cm2  MV dec slope: 370.3 cm/sec2  MV dec time: 0.23 sec  Ao V2 max: 129.2 cm/sec  Ao max P.0 mmHg  Ao V2 mean: 94.1 cm/sec  Ao mean PG: 3.9 mmHg  Ao V2 VTI: 20.6 cm  MARY(I,D): 2.0 cm2  MARY(V,D): 2.1 cm2  LV V1 max P.5 mmHg  LV V1 max: 78.6 cm/sec  LV V1 VTI: 12.0 cm  SV(LVOT): 40.5 ml  SI(LVOT): 19.2 ml/m2  PA acc time: 0.10 sec  PI end-d donnell: 137.1 cm/sec  AV Donnell Ratio (DI): 0.61  MARY Index (cm2/m2): 0.93  E/E': 11.8  E/E' av.5  Lateral E/e': 13.2  Medial E/e': 11.8  Peak E' Donnell: 7.3 cm/sec     ______________________________________________________________________________  Report approved by: Fritz Valle 2022 04:15 PM           Discharge Medications   Current Discharge Medication List      START taking these medications    Details   acetaminophen (TYLENOL) 650 MG suppository Place 1 suppository (650 mg) rectally every 4 hours as needed for fever  Qty: 4 suppository, Refills: 0    Associated Diagnoses: End of life care      artificial saliva (BIOTENE MT) SOLN solution Take 2 mLs (2 sprays) by mouth every hour as needed for dry mouth  Qty: 44.3 mL, Refills: 0    Associated Diagnoses: End of life care      atropine 1 % ophthalmic solution Take 2 drops by mouth, place under tongue or place  inside cheek every 4 hours as needed for secretions  Qty: 5 mL, Refills: 0    Associated Diagnoses: End of life care      bisacodyl (DULCOLAX) 10 MG suppository Place 1 suppository (10 mg) rectally daily as needed for constipation  Qty: 2 suppository, Refills: 0    Associated Diagnoses: End of life care      carboxymethylcellulose PF (REFRESH PLUS) 0.5 % ophthalmic solution Place 1-2 drops into both eyes every hour as needed for dry eyes    Associated Diagnoses: End of life care      haloperidol (HALDOL) 2 MG/ML (HIGH CONC) solution Take 0.5 mLs (1 mg) by mouth or place under tongue every 4 hours as needed for agitation or other (nausea)  Qty: 10 mL, Refills: 0    Associated Diagnoses: End of life care      LORazepam (LORAZEPAM INTENSOL) 2 MG/ML (HIGH CONC) oral solution Take 0.25 mLs (0.5 mg) by mouth or place under tongue every 4 hours as needed for anxiety (restlessness)  Qty: 30 mL, Refills: 0    Associated Diagnoses: End of life care      OLANZapine zydis (ZYPREXA) 5 MG ODT Take 1 tablet (5 mg) by mouth every 6 hours as needed for agitation (delirium)  Qty: 30 tablet, Refills: 0    Comments: Take 1 tab at bedtime and vwey 6 hrs as needed for agitation/delirium.  Associated Diagnoses: End of life care      oxyCODONE (ROXICODONE INTENSOL) 20 mg/mL (HIGH CONC) solution Take 0.25 mLs (5 mg) by mouth every 2 hours as needed for severe pain  Qty: 15 mL, Refills: 0    Associated Diagnoses: End of life care      scopolamine (TRANSDERM) 1 MG/3DAYS 72 hr patch Place 2 patches onto the skin every 72 hours for 8 doses  Qty: 16 patch, Refills: 0    Associated Diagnoses: End of life care         CONTINUE these medications which have NOT CHANGED    Details   rivastigmine (EXELON) 13.3 MG/24HR 24 hr patch Apply 1 patch topically daily  Qty: 30 patch, Refills: 11    Associated Diagnoses: Late onset Alzheimer's disease with behavioral disturbance (H)         STOP taking these medications       amLODIPine (NORVASC) 10 MG tablet  Comments:   Reason for Stopping:         aspirin (ASA) 81 MG chewable tablet Comments:   Reason for Stopping:         cloNIDine (CATAPRES) 0.1 MG tablet Comments:   Reason for Stopping:         glimepiride (AMARYL) 4 MG tablet Comments:   Reason for Stopping:         hydrALAZINE (APRESOLINE) 100 MG tablet Comments:   Reason for Stopping:         LANTUS SOLOSTAR 100 UNIT/ML soln Comments:   Reason for Stopping:         memantine (NAMENDA) 10 MG tablet Comments:   Reason for Stopping:         metoprolol succinate ER (TOPROL-XL) 50 MG 24 hr tablet Comments:   Reason for Stopping:         multivitamin, therapeutic (THERA-VIT) TABS tablet Comments:   Reason for Stopping:         omeprazole (PRILOSEC) 20 MG DR capsule Comments:   Reason for Stopping:         QUEtiapine (SEROQUEL) 25 MG tablet Comments:   Reason for Stopping:         simvastatin (ZOCOR) 20 MG tablet Comments:   Reason for Stopping:         TRULICITY 1.5 MG/0.5ML pen Comments:   Reason for Stopping:             Allergies   Allergies   Allergen Reactions     Ibuprofen Rash and GI Disturbance

## 2022-06-02 NOTE — H&P
[facility]    History and Physical - Hospitalist Service       Date of Admission:  6/1/2022    Assessment & Plan      Laz Kingston is a 87 year old male admitted on 6/1/2022 to inpatient hospice.    Terminal delirium  Advanced Alzheimer's dementia  Acute toxic metabolic encephalopathy  Community-acquired pneumonia and parapneumonic effusion  Acute respiratory failure with hypoxia  Urinary tract infection  Staph epidermidis bacteremia  Atrial fibrillation with rapid ventricular response  Acute renal failure  Chronic kidney disease stage IV  Acute hyponatremia  Dehydration  Hypokalemia  Hypomagnesemia  COVID-pneumonia convalescent.     Plan:  Comfort cares.  Symptom management per hospice team- recommendations reviewed.  Met with patient's wife at the bedside.  Reviewed today's events, treatment plan.  Questions answered.       Diet: Room Service  Snacks/Supplements Adult: Magic Cup; With Meals  Regular Diet Adult    DVT Prophylaxis: None, on comfort cares  Godoy Catheter: PRESENT, indication: End of Life  Central Lines: None  Cardiac Monitoring: None  Code Status: No CPR- Do NOT Intubate      Clinically Significant Risk Factors Present on Admission                  # Overweight: Estimated body mass index is 26.58 kg/m  as calculated from the following:    Height as of 5/5/22: 1.829 m (6').    Weight as of 5/29/22: 88.9 kg (196 lb).      Disposition Plan   Expected Discharge: 2-5 days.       The patient's care was discussed with the Bedside Nurse, Care Coordinator/ and Patient's Family.    Alondra Wilson MD  Hospitalist Service  [facility]  Securely message with the Vocera Web Console (learn more here)  Text page via [employer] Paging/Directory   _____________________________________________________________________    Chief Complaint   Agitation, dyspnea    History of Present Illness     Patient is 87-year-old gentleman with PMH of advanced dementia,  suspect vascular, atrial fibrillation, hypertension, CKD 3, type II DM, presented to ED with worsening confusion.  He was diagnosed with aspiration pneumonia, dehydration, hyponatremia, UTI, staff epidermidis bacteremia, rapid atrial fibrillation while hospitalized for COVID-19 pneumonia in April, with subsequent bacterial pneumonia requiring hospitalization at the end of May, acute hypoxic respiratory failure.  Recent hospitalization developed delirium, respiratory failure.  Patient was recently on April 30-17 admitted for COVID-19 pneumonia with hypoxia and confusion.  He was treated with remdesivir, steroids and improved at discharge.  Patient was treated with ceftriaxone/Flagyl/vancomycin for aspiration and healthcare associated pneumonia.  Blood culture 5/29/2022 grew Staph epidermidis, resistant to fluoroquinolones, ofloxacin.  Despite pathognomonic treatment of pneumonia, UTI, patient continues having agitated delirium, requiring one-to-one sitter.  Palliative team consulted for symptom management and discussion goals of care with family.  Patient's daughter Mariann and Luz, wife Patricia opted out for transitioning to comfort cares on 5/31.  Patient was evaluated by hospice, with plans to admit to inpatient hospice.  He was very sleepy yesterday.  Today's patient intermittently agitated.  He is fidgety, pulling on bed sheets, mumbles nonsensically.  Patient's wife at the bedside this morning.    Review of Systems    Review of systems not obtained due to patient factors - confusion    Past Medical History    I have reviewed this patient's medical history and updated it with pertinent information if needed.   Past Medical History:   Diagnosis Date     Acute renal failure with acute tubular necrosis superimposed on stage 3 chronic kidney disease (H) 5/2/2021     Alzheimer's dementia (H)      BCE (basal cell epithelioma) 10/19/2015     Diabetes mellitus (H)      Dysplastic nevus 10/19/2015     GERD (gastroesophageal  reflux disease)      Hyperlipidemia      Hypertension      Onychomycosis      Personal history of prostate cancer      Splenomegaly 01/10/2017     Past Surgical History   I have reviewed this patient's surgical history and updated it with pertinent information if needed.  Past Surgical History:   Procedure Laterality Date     CYSTOSCOPY, TRANSURETHRAL RESECTION (TUR) TUMOR BLADDER, COMBINED N/A 8/5/2021    Procedure: CYSTOSCOPY, WITH TRANSURETHRAL RESECTION BLADDER TUMOR;  Surgeon: Amos Ackerman [place];  [place] [facility] [place]STOURETEROSCOPY, WITH LITHOTRIPSY USING ITZEL 120P LASER AND URETERAL STENT INSERTION Right 8/5/2021    Procedure: RIGHT URETEROSCOPY, LASER LITHOTRIPSY AND RIGHT URETERAL STENT EXCHANGE;  Surgeon: Amos Ackerman MD;  Location[place] [facility] [place]     HC CYSTOSCOPY,INSERT URETERAL STENT Right 7/4/2021    Procedure: CYSTOSCOPY, WITH URETERAL STENT INSERTION;  Surgeon: Amos Ackerman MD;  Location: [facility];  Service: Urology     IR MISCELLANEOUS PROCEDURE  3/13/2014     IR URETER DILATION BILATERAL  3/13/2014     JOINT REPLACEMENT Bilateral     Both knees     TN ARTHROPLASTY TIBIAL PLATEAU      Description: Knee Replacement;  Recorded: 11/06/2013;     PROSTATECTOMY         Social History   I have reviewed this patient's social history and updated it with pertinent information if needed.  Social History     Tobacco Use     Smoking status: Former Smoker     Years: 4.00     Types: Cigarettes     Smokeless tobacco: Never Used     Tobacco comment: Quit prior to 1966.   Substance Use Topics     Alcohol use: No     Comment: Alcoholic Drinks/day: Never a problem for him, he states.     Drug use: No       Family History   I have reviewed this patient's family history and updated it with pertinent information if needed.  Family History   Problem Relation Age of Onset     Alzheimer Disease Mother      Coronary Artery Disease Father      Heart Failure Mother      Heart  Disease Father 56.00        Two heart attacks     Heart Failure Sister          at 47.     Kidney Disease Sister      Diabetes Daughter      No Known Problems Son      No Known Problems Son      Diabetes Daughter        Prior to Admission Medications   Prior to Admission Medications   Prescriptions Last Dose Informant Patient Reported? Taking?   CONTOUR NEXT TEST test strip   No No   Si strip by In Vitro route 2 times daily   LORazepam (LORAZEPAM INTENSOL) 2 MG/ML (HIGH CONC) oral solution   No No   Sig: Take 0.25 mLs (0.5 mg) by mouth or place under tongue every 4 hours as needed for anxiety (restlessness)   OLANZapine zydis (ZYPREXA) 5 MG ODT   No No   Sig: Take 1 tablet (5 mg) by mouth every 6 hours as needed for agitation (delirium)   acetaminophen (TYLENOL) 650 MG suppository   No No   Sig: Place 1 suppository (650 mg) rectally every 4 hours as needed for fever   artificial saliva (BIOTENE MT) SOLN solution   No No   Sig: Take 2 mLs (2 sprays) by mouth every hour as needed for dry mouth   atropine 1 % ophthalmic solution   No No   Sig: Take 2 drops by mouth, place under tongue or place inside cheek every 4 hours as needed for secretions   bisacodyl (DULCOLAX) 10 MG suppository   No No   Sig: Place 1 suppository (10 mg) rectally daily as needed for constipation   carboxymethylcellulose PF (REFRESH PLUS) 0.5 % ophthalmic solution   No No   Sig: Place 1-2 drops into both eyes every hour as needed for dry eyes   haloperidol (HALDOL) 2 MG/ML (HIGH CONC) solution   No No   Sig: Take 0.5 mLs (1 mg) by mouth or place under tongue every 4 hours as needed for agitation or other (nausea)   oxyCODONE (ROXICODONE INTENSOL) 20 mg/mL (HIGH CONC) solution   No No   Sig: Take 0.25 mLs (5 mg) by mouth every 2 hours as needed for severe pain   rivastigmine (EXELON) 13.3 MG/24HR 24 hr patch   No No   Sig: Apply 1 patch topically daily   scopolamine (TRANSDERM) 1 MG/3DAYS 72 hr patch   No No   Sig: Place 2 patches onto the  skin every 72 hours for 8 doses      Facility-Administered Medications: None     Allergies   Allergies   Allergen Reactions     Ibuprofen Rash and GI Disturbance       Physical Exam   Vital Signs: Temp: 98.5  F (36.9  C) Temp src: Oral BP: (!) 189/108 (pt stiffened his arm whnile taking BP) Pulse: 114   Resp: 28 SpO2: 92 % O2 Device: Nasal cannula Oxygen Delivery: 2 LPM  Weight: 0 lbs 0 oz    General: Patient is somnolent, responding to tactile stimuli  HEENT: Not assessed, patient not cooperative with exam  CV: Irregular regular S1-S2  Lungs: Distant rhonchi  Abdomen: Audible bowel sounds}    Data   Data reviewed today: I reviewed all medications, new labs and imaging results over the last 24 hours. I personally reviewed    Recent Labs   Lab 06/01/22  0435 05/31/22  1204 05/31/22  1153 05/31/22  0749 05/31/22  0439 05/30/22  1654 05/30/22  1649 05/30/22  1510 05/30/22  0813 05/30/22  0446 05/29/22  1224 05/29/22  1208   WBC  --   --   --   --  8.1  --  8.2  --   --   --   --  7.5   HGB  --   --   --   --  8.5*  --  9.1*  --   --   --   --  8.7*   MCV  --   --   --   --  73*  --  74*  --   --   --   --  73*   PLT  --   --   --   --  220  --  215  --   --   --   --  210   NA  --   --   --   --  145  --   --  145  145  --  148*  --   --    POTASSIUM  --  3.3*  --   --  3.3*  --   --  3.5  --  3.5   < >  --    CHLORIDE  --   --   --   --  119*  --   --  118*  --  120*  --   --    CO2  --   --   --   --  17*  --   --  18*  --  20*  --   --    BUN  --   --   --   --  33*  --   --  36*  --  42*  --   --    CR 2.02*  --   --   --  1.95*  --   --  1.73*  --  1.83*  --   --    ANIONGAP  --   --   --   --  9  --   --  9  --  8  --   --    DEJAN  --   --   --   --  8.8  --   --  9.5  --  9.6  --   --    GLC  --   --  188* 139* 153*   < >  --  116   < > 110   < >  --    ALBUMIN  --   --   --   --  3.0*  --   --   --   --  3.1*  --   --    PROTTOTAL  --   --   --   --  5.6*  --   --   --   --  5.8*  --   --    BILITOTAL  --   --    --   --  0.5  --   --   --   --  0.4  --   --    ALKPHOS  --   --   --   --  53  --   --   --   --  51  --   --    ALT  --   --   --   --  12  --   --   --   --  11  --   --    AST  --   --   --   --  14  --   --   --   --  13  --   --     < > = values in this interval not displayed.     N/A    \    N/A    /    N/A   N N/A    L N/A    N/A    N/A    N/A /   ------------------------------------ N/A   ALT N/A   AST N/A   AP N/A   ALB N/A   Ca N/A  N/A    N/A    N/A \    % RETIC N/A    LDH N/A  Troponin N/A    BNP N/A    CK N/A  INR N/A   PTT N/A    D-dimer N/A    Fibrinogen N/A    Antithrombin N/A  Ferritin N/A  CRP N/A    IL-6 N/A  No results found for this or any previous visit (from the past 24 hour(s)).

## 2022-06-02 NOTE — PLAN OF CARE
Problem: End-of-Life Care  Goal: Comfort, Peace and Preserved Dignity  Outcome: Ongoing, Progressing  Intervention: Promote Physical Comfort  Flowsheets (Taken 6/1/2022 2018)  Airway/Ventilation Support:    comfort measures provided    dyspnea relief promoted  Sensory Stimulation Regulation:    auditory stimulation minimized    quiet environment promoted    visual stimulation minimized    care clustered    tactile stimulation minimized  Environmental Support:    calm environment promoted    distractions minimized    environmental consistency promoted    rest periods encouraged   Goal Outcome Evaluation:    Pt was sleepy throughout shift, but comfortable. Bladder scan revealed 18 ml. Borges catheter inserted per order. Suppository held because pt had a soft BM yesterday and has no fecal impaction. Spouse and son visited today. Pt is now inpatient hospice. Pt still on 1:1; pt still trying to remove IV line and borges, but is redirectable. Plan to discontinue 1:1 at shift change. Soft 1:1 will be provided.

## 2022-06-02 NOTE — PROGRESS NOTES
Contact   Chart Review     Situation: Patient chart reviewed by .    Background: Hospitalized.    Assessment: Patient has entered hospice.     Plan/Recommendations: Closing to care coordination due to hospice support.  No further outreach planned.      Sola Luna,   [facility]  [phone]

## 2022-06-02 NOTE — PLAN OF CARE
Goal Outcome Evaluation:        Pt's wife is here, was interested in having VS taken. Pt does not cooperate with BP. Pt stiffens his arm and BP is not accurate. Will continue POC

## 2022-06-02 NOTE — CONSULTS
Palliative Medicine Brief Consult Note    Mr. Kingston is known to palliative medicine service, see prior consult note from 5/31/22.  In the spirit of continuity, palliative medicine will continue to follow to asist with end of life symptom management and emotional support to Mr. Kingston and his family while he is on GIP.    Kiarra Rondon MD  [facility],  Palliative Medicine Service  [phone] department number  Can page via [place]

## 2022-06-02 NOTE — PLAN OF CARE
Goal Outcome Evaluation:    Plan of Care Reviewed With: patient     Overall Patient Progress: no change         Vitals:    06/01/22 1750 06/02/22 0529   Pulse: (!) 130    Resp:  20   Temp:  98.5  F (36.9  C)   TempSrc:  Oral    Checked a temp due to patient taking off covers. On 2 liters nasal cannula. Patient was quite restless most of the night prn zyprexa given with minimal relief. So Prn ativan given patient then slept well for about 2 hours. Repositioned as tolerated. Oral cares. Scheduled pain meds given. Borges 200 ml output. On soft 1:1 due to occasionally pulling at borges and IV sites. Used some essential oils. Yajaira Greer RN

## 2022-06-02 NOTE — PROGRESS NOTES
[facility]  Palliative Care Daily Progress Note       Recommendations & Counseling     Continued significant agitation over past 24 hours requiring medication adjustments.      Life expectancy: appears hours-short days.  Minimal oral intake.    Symptoms:  Agitation, delirium (metabolic) and advanced alzheimer's dementia.  - family wishes to focus on comfort at this time, refrain from lab draws  - prn lorazepam for comfort if agitation,   consider as second line.  - change olanzapine ODT to 10mg BID PRN  - discussed with hospice RN liaison Jill Schoeneker and agree with change to haloperidol 2mg SL q6H scheduled (ordered) and 2mg SL q2H prn agitation/nausea/anxiety.     - attend to bowel function and incomplete bladder emptying as both could contribute to agitation.    Constipation:  - has had BM in past 48 hours.  - senna BID if able to take PO.    Pain, Dyspnea at end of life  - creatinine >2, avoid morphine   - oxycodone intensol started at 3mg q4H and increased to 5mg q4H.  Remains tachypneic and discussed with hospice RN africa, agree with increase to oxycodone 10mg SL q4H scheduled (hold if RR<10) w additional 5-10mg SL q1H prn dyspnea or breakthrough pain    Urine frequency, history nocturia x2-3 at baseline prior to admission.  - bladder scan PRN ordered; agree with placing borges catheter for end of life cares.  - has pulled out borges catheter in past in prior hospitalizations/episodes of agitaiton.    Goals of care: comfort-focused goals of treatment.  Initially family hoping for discharge to home for end of life but due to terminal delirium, accepting of [facility] hospice.  At this time, goals of treatment are comfort, calm, and lessening agitation.     ACP: reviewed POLST, DNR/DNI  - no formal HCD on file.  - wife Patricia is surrogate decision-maker; Elan lacks decisional capacity.  - will need POLST updated (DNR/comfort focused) prior to discharge if survives to discharge to home w  hospice.     Support: four adult children, three grandchildren, four great grandchildren.  Voodoo.  Received anointing 5/31/2022   Palliative will continue to follow along with spiritual care.        Assessments          87-year-old male with past medical history of type II DM, CKD dementia brought for evaluation of confusion in the setting of underlying alzheimer's dementia.  Mr. Kingston was recently admitted to [facility] from 4/13-4/17 for confusion and COVID pneumonia with hypoxia and treated with remdesivir and steroids improved and discharged without any need of home oxygen.  Work-up at ED significant for chest x-ray with right basilar pneumonia versus atelectasis and small right pleural effusion. UA with UTI,   He was started on IV antibiotics and admitted.  He has been encephalopathic, reduced oral intake and had evidence aspiration w speech pathologist.     Changed to comfort care 5/31 and started medications.  Was very sleepy  Yesterday 6/1 but intermittently agitated.      Today, the patient was seen for:  Follow up on end of life symptom management/support to Elan and wife Patricia.    Prognosis, Goals, or Advance Care Planning was addressed today with: Yes.  Mood, coping, and/or meaning in the context of serious illness were addressed today: Yes.              Interval History:     Chart review/discussion with unit or clinical team members:   Chart reviewed, had BM per RN 5/31/22   Agitated intermittently, was in soft restraints for short time.  Remains on 1:1  More awake today.    Per patient or family/caregivers today:  Elan is unable to provide coherent history.    Wife Patricia at bedside, Elan has been more active, alert and picking at clothing/bed linens.  Patricia notes as Elan is currently, she would not feel comfortable managing symptoms at home w hospice support.             Review of Systems:     Unable to perform due to advanced dementia with delirium.          Medications:     I have reviewed this  patient's medication profile and medications during this hospitalization.    Noted meds:    Current Facility-Administered Medications   Medication     acetaminophen (TYLENOL) Suppository 650 mg     acetaminophen (TYLENOL) tablet 650 mg     artificial saliva (BIOTENE MT) solution 2 spray     [START ON 6/3/2022] bisacodyl (DULCOLAX) Suppository 10 mg     carboxymethylcellulose PF (REFRESH PLUS) 0.5 % ophthalmic solution 1-2 drop     glycopyrrolate (ROBINUL) injection 0.2 mg     haloperidol (HALDOL) 2 MG/ML (HIGH CONC) solution 2 mg     HYDROmorphone (PF) (DILAUDID) injection 0.3-0.5 mg     LORazepam (ATIVAN) injection 1 mg    Or     LORazepam (ATIVAN) tablet 1 mg     naloxone (NARCAN) injection 0.2 mg    Or     naloxone (NARCAN) injection 0.4 mg    Or     naloxone (NARCAN) injection 0.2 mg    Or     naloxone (NARCAN) injection 0.4 mg     OLANZapine zydis (zyPREXA) ODT tab 10 mg     OLANZapine zydis (zyPREXA) ODT tab 5 mg     OLANZapine zydis (zyPREXA) ODT tab 5-10 mg     ondansetron (ZOFRAN) injection 4 mg     oxyCODONE (ROXICODONE INTENSOL) 20 mg/mL (HIGH CONC) solution 3 mg     oxyCODONE (ROXICODONE INTENSOL) 20 mg/mL (HIGH CONC) solution 3-5 mg     sodium chloride (PF) 0.9% PF flush 3 mL   24-hr OME: 15mg oxycodone = 18.75 OME  24-hr benzodiazepine: 2mg lorazepam  24-hr neuroleptic: 30mg olanzapine (3 scheduled, one prn dose)             Physical Exam:   Vital Signs: Pulse (!) 130, temperature 98.5  F (36.9  C), temperature source Oral, resp. rate 20.     Intake/Output Summary (Last 24 hours) at 6/2/2022 0813  Last data filed at 6/2/2022 0533  Gross per 24 hour   Intake 130 ml   Output 1000 ml   Net -870 ml     GENERAL: Alert 86 yo male, engages in floccillation.  Opens eyes and mumbles in response to questions, answers unintelligible.   SKIN: Warm and dry   HEENT: Normocephalic, anicteric sclera, moist mucous membranes  LUNGS: Clear to auscultation anterolaterally; tachypneic but no rhonchi.  No accessory muscle  use.   CARDIAC: RRR, normal s1/s2, w/o m/r/g   ABDOMINAL: BS (+), soft, non distended, non tender  : borges in place w clear urine.  MUSKL: no gross joint deformities   EXTREMITIES: trace leg edema   NEUROLOGIC: no tremor.  Moves arms, legs.  Doesn't attempt to get out of bed.  PSYCH: confused.             Data Reviewed:     Reviewed recent pertinent imaging:   No new imaging since last visit yesterday.    Reviewed recent labs:   Creatinine   Date Value Ref Range Status   06/01/2022 2.02 (H) 0.70 - 1.30 mg/dL Final           Kiarra Rondon MD  [facility],  Palliative Medicine Service  [phone] department number  Can page via [employer]

## 2022-06-02 NOTE — PLAN OF CARE
Problem: End-of-Life Care  Goal: Comfort, Peace and Preserved Dignity  Outcome: Ongoing, Progressing     Problem: Plan of Care - These are the overarching goals to be used throughout the patient stay.    Goal: Optimal Comfort and Wellbeing  Outcome: Ongoing, Progressing   Goal Outcome Evaluation:    Plan of Care Reviewed With: patient     Overall Patient Progress: improving    Pt has a soft 1:1, has a borges catheter that he will be staying in with discharge. Pt appears calm and was sleeping most of the time. Family came to visit.

## 2022-06-03 NOTE — CONSULTS
[facility]    Progress Note - [facility]    ______________________________________________________________________    [facility] 24/7 Contact Number: [phone]    - Providers: Please contact [facility] with changes in orders or clinical plan of care   - Nursing: Please contact [facility] with significant changes in patient condition  ______________________________________________________________________        Plan of Care Discussed with the Following:   - Nurse: Juliette  - Hospitalist/Rounding Provider: Alondra Wilson    - Laz's Family/Preferred Contact: Spouse Patricia  - Hospice Provider: Dr. Ang Aguayo    Summary of Visit (includes assessment, medications and any new orders):   Laz is minimally responsive. Opens his eyes but is not responding verbally. Pt continues to have terminal agitation AEB fidgeting, pulling at bed sheets. RR is 28 and slight congestion is heard. Spoke with floor nurse, Juliette, who will start Atropine and give PRN Oxycodone and Haldol to keep Elan as comfortable as possible as he nears end of life. Support and further education about end of life given to spouse Patricia.       Jill Schoenecker, RN

## 2022-06-03 NOTE — PLAN OF CARE
Goal Outcome Evaluation:    Plan of Care Reviewed With: patient     Overall Patient Progress: no change         Vitals:    06/02/22 0529 06/02/22 0933 06/02/22 1537 06/02/22 2332   BP:  (!) 189/108  (!) 177/96   BP Location:  Right arm  Right arm   Patient Position:  Semi-Chiang's     Pulse:  114  112   Resp: 20  28 20   Temp: 98.5  F (36.9  C)   98.3  F (36.8  C)   TempSrc: Oral   Axillary   SpO2:  92%  90%    Spot checked vitals patient felt warm to touch. Only opens eyes with positioning. Has periods of being restless on and off. 1L nasal cannula for comfort. Repositioned as tolerated. Mouth swabbed and Vaseline to lips. Godoy cath 250 ml output. Did not drink anything. Coughs occasionally, lungs clear and diminished. Scheduled medications given. Removed 1 leaking IV. Continues to be a soft 1:1. Yajaira Greer RN

## 2022-06-03 NOTE — PROGRESS NOTES
Assessment/plan:    Patient is 87-year-old gentleman with PMH of advanced dementia, suspect vascular, atrial fibrillation, hypertension, CKD 3, type II DM, presented to ED with worsening confusion.  He was diagnosed with aspiration pneumonia, dehydration, hyponatremia, UTI, staff epidermidis bacteremia, rapid atrial fibrillation.  Patient was recently on April 30-17 admitted for COVID-19 pneumonia with hypoxia and confusion.  He was treated with remdesivir, steroids and improved at discharge.  Patient was treated with ceftriaxone/Flagyl/vancomycin for aspiration and healthcare associated pneumonia.  Blood culture 5/29/2022 grew Staph epidermidis, resistant to fluoroquinolones, ofloxacin.  Initiated to ceftriaxone.  Family decided not to continue antibiotics while on comfort cares.    He was admitted on 6/1/2022 to inpatient hospice.     Terminal delirium  Advanced Alzheimer's dementia  Acute toxic metabolic encephalopathy  Community-acquired pneumonia and parapneumonic effusion  Acute respiratory failure with hypoxia  Urinary tract infection  Staph epidermidis bacteremia  Atrial fibrillation with rapid ventricular response  Acute renal failure  Chronic kidney disease stage IV  Acute hyponatremia  Dehydration  Hypokalemia  Hypomagnesemia  COVID-pneumonia convalescent.      Plan:  Comfort cares.  Symptom management per hospice team- recommendations reviewed.  Met with patient's wife Patricia at the bedside.  Reviewed today's events, treatment plan. Questions answered.       Diet:  as tolerating  Code Status: No CPR- Do NOT Intubate      S:   Uneventful night.  Patient resting comfortably.  Not responding to verbal stimuli, somewhat responding to tactile stimuli.  Occasionally pulling on his oxygen tubing.  No recent rhonchi anymore.  Wife at bedside, working here needing project.  Updated on pt's progress.  O:  BP (!) 177/96 (BP Location: Right arm)   Pulse 112   Temp 98.3  F (36.8  C) (Axillary)   Resp 20   SpO2 90%    Somnolent, not responding to verbal stimuli, responding to tactile stimuli  cv irreg, irreg, mild tachy  Coarse breath sounds, no wheezing    Alondra Wilson MD

## 2022-06-03 NOTE — PROGRESS NOTES
[facility]  Palliative Care Daily Progress Note       Recommendations & Counseling     Improved level of calm with increase in haloperidol, now more waking this morning.    With secretions recommend use of IV glycopyrrolate as first line given issues with agitation/terminal delirium.   Did add SL atropine drops for use PRN as second line.    Life expectancy: hours-days.  Not drinking and not eating.    Symptoms:  Agitation, terminal delirium, advanced/end-stage alzheimer's dementia.  - haloperidol 2mg SL q6H w add'l 2mg SL q2H prn.  - lorazepam as second line for agitation.  - continue olanzapine ODT 10mg BID prn agitation    Constipation:  -  Continue Senna BID while taking PO    Pain, Dyspnea at end of life  - creatinine >2, avoid morphine   - oxycodone intensol started at 3mg q4H and increased to 5mg q4H.  Remains tachypneic and discussed with hospice RN africa, agree with increase to oxycodone 10mg SL q4H scheduled (hold if RR<10) w additional 5-10mg SL q1H prn dyspnea or breakthrough pain    Goals of care: comfort-focused goals of treatment.  Initially family hoping for discharge to home for end of life but due to terminal delirium, accepting of [facility] hospice.  At this time, goals of treatment are comfort, calm, and lessening agitation.     ACP: reviewed POLST, DNR/DNI  - no formal HCD on file.  - wife Patricia is surrogate decision-maker; Elan lacks decisional capacity.  - will need POLST updated (DNR/comfort focused) prior to discharge if survives to discharge to home w hospice.     Support: four adult children, three grandchildren, four great grandchildren.  [employer].  Received anointing 5/31/2022   Palliative will continue to follow along with spiritual care.     Discussed with Jill Schoeneker with [facility] team.      Assessments          87-year-old male with past medical history of type II DM, CKD dementia brought for evaluation of confusion in the setting of underlying  alzheimer's dementia.  Mr. Kingston was recently admitted to [facility] from 4/13-4/17 for confusion and COVID pneumonia with hypoxia and treated with remdesivir and steroids improved and discharged without any need of home oxygen.  Work-up at ED significant for chest x-ray with right basilar pneumonia versus atelectasis and small right pleural effusion. UA with UTI,   He was started on IV antibiotics and admitted.  He has been encephalopathic, reduced oral intake and had evidence aspiration w speech pathologist.     Changed to comfort care 5/31 and started medications.  Was very sleepy  Yesterday 6/1 but intermittently agitated on 6/2 and neuroleptic doses increased.   Admitted to [facility] 6/1.    Today, the patient was seen for:  Support/symptom management at end of life.    Mood, coping, and/or meaning in the context of serious illness were addressed today: Yes.              Interval History:     Chart review/discussion with unit or clinical team members:   Continues with borges and >1000ml urine output in past 24 hours.  No documented BM in nursing flowchart in past 48 hrs.  Intermittently restless.    Per patient or family/caregivers today:  Elan is nonverbal.    Wife Patricia is at bedside.  She feels supported by multidisciplinary and nursing teams.  Feels comfortable with anticipatory guidance provided by hospice and palliative teams. Notes Elan is more alert again this morning, but otherwise seems comfortable.               Review of Systems:     Unable to complete ROS due to severe encephalopathy            Medications:     I have reviewed this patient's medication profile and medications during this hospitalization.    Noted meds:    Current Facility-Administered Medications   Medication     acetaminophen (TYLENOL) Suppository 650 mg     acetaminophen (TYLENOL) tablet 650 mg     artificial saliva (BIOTENE MT) solution 2 spray     bisacodyl (DULCOLAX) Suppository 10 mg     carboxymethylcellulose PF (REFRESH PLUS)  0.5 % ophthalmic solution 1-2 drop     glycopyrrolate (ROBINUL) injection 0.2 mg     haloperidol (HALDOL) 2 MG/ML (HIGH CONC) solution 2 mg     haloperidol (HALDOL) 2 MG/ML (HIGH CONC) solution 2 mg     HYDROmorphone (PF) (DILAUDID) injection 0.3-0.5 mg     LORazepam (ATIVAN) injection 1 mg    Or     LORazepam (ATIVAN) tablet 1 mg     OLANZapine zydis (zyPREXA) ODT tab 10 mg     ondansetron (ZOFRAN) injection 4 mg     oxyCODONE (ROXICODONE INTENSOL) 20 mg/mL (HIGH CONC) solution 10 mg     oxyCODONE (ROXICODONE INTENSOL) 20 mg/mL (HIGH CONC) solution 5-10 mg     sodium chloride (PF) 0.9% PF flush 3 mL     24-hr OME: 60mg oxycodone SL = 75 MME  Prior 24-hr received 21mg oxycodone= 26.3 OME             Physical Exam:   Vital Signs: Blood pressure (!) 177/96, pulse 112, temperature 98.3  F (36.8  C), temperature source Axillary, resp. rate 20, SpO2 90 %.     Intake/Output Summary (Last 24 hours) at 6/3/2022 0841  Last data filed at 6/3/2022 0400  Gross per 24 hour   Intake 0 ml   Output 1075 ml   Net -1075 ml       GENERAL: restless 86 yo male, intermittently opens eyes.  SKIN: Warm and dry   HEENT: Normocephalic, anicteric sclera, moist mucous membranes, did oral cares with removal of some dried crust.  Mandibular movement with respiration noted.  LUNGS: Clear to auscultation anterolaterally; no accessory use, scattered rhonchi.   CARDIAC: irreg irreg.  Strong radial pusles.  ABDOMINAL: BS (+), soft, non distended, non tender  : borges in place  MUSKL: no gross joint deformities   EXTREMITIES: 1+ bilateral leg edema, no cyanosis, pulses 2+ and symmetrical  NEUROLOGIC: no myoclonus or tremor.  Moves arms/legs at times.  PSYCH: confused.             Data Reviewed:       Reviewed recent labs:   Creatinine   Date Value Ref Range Status   06/01/2022 2.02 (H) 0.70 - 1.30 mg/dL Final           MD WANDA [facility],  Palliative Medicine Service  [phone] department number  Can page via [employer]

## 2022-06-03 NOTE — PLAN OF CARE
Problem: Plan of Care - These are the overarching goals to be used throughout the patient stay.    Goal: Optimal Comfort and Wellbeing  Outcome: Ongoing, Progressing     Problem: End-of-Life Care  Goal: Comfort, Peace and Preserved Dignity  Outcome: Ongoing, Progressing   Goal Outcome Evaluation:    Plan of Care Reviewed With: patient     Overall Patient Progress: improving    Pt is on inpatient Hospice, has 1 IV on his left arm, has a borges catheter that is to stay in with discharge and is on 1L NC for comfort. Pt is generally unresponsive to voice, but will open eyes during position changes, but will not make eye contact.

## 2022-06-03 NOTE — PLAN OF CARE
Problem: End-of-Life Care  Goal: Comfort, Peace and Preserved Dignity  Outcome: Ongoing, Progressing   Goal Outcome Evaluation:  Patient remains on comfort cares.  Unresponsive to voice, opens his eyes with repositioning.  Occasionally becomes restless.  Taking SL oxycodone and haldol for pain and agitation.  Turned and repositioned every 2 hours.  Godoy intact and draining.  Wife Patricia at bedside this morning.

## 2022-06-04 NOTE — PROGRESS NOTES
Patient passed away, pronounced at 1240. Family present at time of death.  home notified, waiting for eye donation to speak with family.  home to  after decision is made regarding eye donation.

## 2022-06-04 NOTE — PLAN OF CARE
Problem: End-of-Life Care  Goal: Comfort, Peace and Preserved Dignity  Outcome: Ongoing, Progressing  Intervention: Promote Physical Comfort  Recent Flowsheet Documentation  Taken 6/4/2022 0800 by Kayla Bledsoe RN  Airway/Ventilation Support: comfort measures provided   Goal Outcome Evaluation:      Patient is unresponsive. Tachypneic with periods of apnea. Scheduled medications given. Lungs coarse, gurgling, atropine given. Wife at bedside.

## 2022-06-04 NOTE — DISCHARGE SUMMARY
[facility]    Death Summary - Hospitalist Service     Date of Admission:  6/1/2022  Date of Death: 6/4/2022  Discharging Provider: Twila Caicedo MD    Discharge Diagnoses    Terminal delirium  Advanced Alzheimer's dementia  Acute toxic metabolic encephalopathy  Community-acquired pneumonia and parapneumonic effusion  Acute respiratory failure with hypoxia  Urinary tract infection  Staph epidermidis bacteremia  Atrial fibrillation with rapid ventricular response  Acute renal failure  Chronic kidney disease stage IV  Acute hyponatremia  Dehydration  Hypokalemia  Hypomagnesemia    Cause of death: Metabolic derangements in the setting of renal failure, bacteremia, urinary tract infection, and hypoxic respiratory failure    Hospital Course   Mr. Kingston was an 87-year-old gentleman with PMH of advanced dementia, suspect vascular, atrial fibrillation, hypertension, CKD 3, type II DM, presented to ED with worsening confusion.  He was diagnosed with aspiration pneumonia, dehydration, hyponatremia, UTI, staff epidermidis bacteremia, rapid atrial fibrillation.  Patient was recently on April 30-17 admitted for COVID-19 pneumonia with hypoxia and confusion.  He was treated with remdesivir, steroids and improved at discharge.    He was treated with ceftriaxone/Flagyl/vancomycin for aspiration and healthcare associated pneumonia.    Blood culture 5/29/2022 grew Staph epidermidis, resistant to fluoroquinolones, ofloxacin.  Initiated to ceftriaxone.     Family decided not to continue antibiotics while on comfort cares. He was admitted on 6/1/2022 to inpatient hospice.    He was pronounced at 1240 on 6/4/2022 with family at bedside.      Twila Caicedo MD  [facility]  ______________________________________________________________________      Significant Results and Procedures   Most Recent 3 CBC's:Recent Labs   Lab Test 05/31/22  0439 05/30/22  1649 05/29/22  1208   WBC  8.1 8.2 7.5   HGB 8.5* 9.1* 8.7*   MCV 73* 74* 73*    215 210     Most Recent 3 BMP's:Recent Labs   Lab Test 06/01/22  0435 05/31/22  1204 05/31/22  1153 05/31/22  0749 05/31/22  0439 05/30/22  1654 05/30/22  1510 05/30/22  0813 05/30/22  0446   NA  --   --   --   --  145  --  145  145  --  148*   POTASSIUM  --  3.3*  --   --  3.3*  --  3.5  --  3.5   CHLORIDE  --   --   --   --  119*  --  118*  --  120*   CO2  --   --   --   --  17*  --  18*  --  20*   BUN  --   --   --   --  33*  --  36*  --  42*   CR 2.02*  --   --   --  1.95*  --  1.73*  --  1.83*   ANIONGAP  --   --   --   --  9  --  9  --  8   DEJAN  --   --   --   --  8.8  --  9.5  --  9.6   GLC  --   --  188* 139* 153*   < > 116   < > 110    < > = values in this interval not displayed.     Most Recent 2 LFT's:Recent Labs   Lab Test 05/31/22 0439 05/30/22 0446   AST 14 13   ALT 12 11   ALKPHOS 53 51   BILITOTAL 0.5 0.4     Most Recent 6 Bacteria Isolates From Any Culture (See EPIC Reports for Culture Details):No lab results found.,   Results for orders placed or performed during the hospital encounter of 05/28/22   XR Chest Port 1 View    Narrative    EXAM: XR CHEST PORT 1 VIEW  LOCATION: [facility]  DATE/TIME: 5/28/2022 10:36 PM    INDICATION: Fever.  COMPARISON: 4/13/2022.    FINDINGS: The heart is at the upper limits of normal in size. There is no pulmonary edema. There is again a rounded mass at the right lung base similar to the previous exam. There are mild bibasilar infiltrates. Probable right pleural effusion.      Impression    IMPRESSION: Right basilar atelectasis or pneumonia. Small right pleural effusion.   Head CT w/o contrast    Narrative    EXAM: CT HEAD W/O CONTRAST  LOCATION: [facility]  DATE/TIME: 5/28/2022 10:28 PM    INDICATION: Mental status change, unknown cause.  COMPARISON: 4/13/2022  TECHNIQUE: Routine CT Head without IV contrast. Multiplanar reformats. Dose reduction  techniques were used.    FINDINGS:  INTRACRANIAL CONTENTS: Mildly limited by motion. No definite intracranial hemorrhage, extraaxial collection, or mass effect. No CT evidence of acute infarct. Moderate volume loss and mild burden presumed chronic small vessel ischemia are stable.    VISUALIZED ORBITS/SINUSES/MASTOIDS: No intraorbital abnormality. Mild scattered paranasal sinus mucosal disease. Soft tissue nodule within the medial left orbit between the medial and inferior rectus muscles again noted. This was present dating back to   2/12/2015. It is nonspecific. It is slightly more prominent than the prior head CT suggesting it may be a venous varix.    BONES/SOFT TISSUES: No acute abnormality.      Impression    IMPRESSION:  1.  No acute intracranial abnormality.    2.  Stable age-related and chronic ischemic changes.    3.  Soft tissue density in the inferior medial left orbit is slightly more prominent than on 4/13/2022 suggesting it could be an orbital varix. Consider CT orbits for further evaluation on a nonemergent basis.   XR Video Swallow with SLP or OT    Narrative    EXAM: XR VIDEO SWALLOW WITH SLP OR OT  LOCATION: [facility]  DATE/TIME: 5/29/2022 8:41 AM    INDICATION: Difficulty swallowing. Right basilar pneumonia. Assess for aspiration.  COMPARISON: Portable chest 05/20/2022    TECHNIQUE: Routine swallow study with speech pathology using multiple barium thicknesses.    FINDINGS:   FLUOROSCOPIC TIME: 2.6 minutes  NUMBER OF IMAGES: 6 digital fluoroscopy cine clips.    Swallow study with Speech Pathology using multiple barium thicknesses.     Oral phase was delayed, mainly due to chronic negative issues. This was especially pronounced with the pureed consistency.    Delayed triggering of the swallow reflex with complete inversion epiglottis. Reflex delay demonstrates deep laryngeal penetration with thin liquid consistency the level of the cords with no cough reflex at the cords.  Patient had some trace aspiration of   pyriform sinus residual with some delayed throat clearing. Vallecular stasis noted with pureed consistency.    Please see Speech Pathology report for further details the exam and recommendations.   US Renal Complete    Narrative    EXAM: US RENAL COMPLETE  LOCATION: [facility]  DATE/TIME: 5/29/2022 11:17 AM    INDICATION: HEMALATHA CKD, UTI, h o stenting. assess for hydroureteronephrosis  COMPARISON: CT dated 9/4/2021  TECHNIQUE: Routine Bilateral Renal and Bladder Ultrasound.    FINDINGS:    RIGHT KIDNEY: 9.7 cm. Trace right-sided hydronephrosis. Renal cortical scarring. 2.3 x 1.9 cm simple right renal cysts, no follow-up required.    LEFT KIDNEY: 11.2 cm. Normal without hydronephrosis or masses.     BLADDER: Normal.      Impression    IMPRESSION:  1.  Trace right-sided hydronephrosis.   MR Brain w/o Contrast    Narrative    EXAM: MR BRAIN W/O CONTRAST  LOCATION: [facility]  DATE/TIME: 5/31/2022 11:21 AM    INDICATION: Mental status change, unknown cause  COMPARISON: Head CT dated 05/20/2022.  TECHNIQUE: Head MRI without IV contrast performed according to the quick brain protocol with rapid imaging sequences. Limited brain MRI 2 axial diffusion imaging secondary to patient confusion and inability to hold still.    FINDINGS: Study somewhat compromised by motion artifact. Limited brain MRI.  INTRACRANIAL CONTENTS: No acute or subacute infarct. No mass, acute hemorrhage, or extra-axial fluid collections. Scattered nonspecific T2/FLAIR hyperintensities within the cerebral white matter most consistent with mild chronic microvascular ischemic   change. Mild generalized cerebral atrophy. No hydrocephalus. Normal position of the cerebellar tonsils.     OTHER: Accounting for technique no additional abnormalities identified.      Impression    IMPRESSION:  1.  Limited brain MRI secondary to patient confusion and inability to hold still.  2.   No acute stroke or other acute abnormality identified.   Echocardiogram Complete     Value    LVEF  60-65%    Narrative    [id number]  [id number]  [id number]  [id number]^LAWANDA^[place] [address]     Name: GISELE QUIJANO  MRN: [id number]  : 1935  Study Date: 2022 07:56 AM  Age: 87 yrs  Gender: Male  Patient Location: [facility]  Reason For Study: Atrial Fibrillation  Ordering Physician: LEANDRO WEBBER  Performed By: MB     BSA: 2.1 m2  Height: 72 in  Weight: 196 lb  HR: 99  BP: 160/128 mmHg  ______________________________________________________________________________  Procedure  Complete Echo Adult. Definity (NDC #10094-029) given intravenously.  Technically difficult study.Extremely difficult acoustic windows despite the  use of contrast for endcardial border definition.  ______________________________________________________________________________  Interpretation Summary     1. The left ventricle is normal in size.  Left ventricular function is normal.The ejection fraction is 60-65%.No  regional wall motion abnormalities noted.  2. Normal right ventricle size and systolic function.  3. The left atrium is moderately dilated.  ______________________________________________________________________________  Left Ventricle  The left ventricle is normal in size. Left ventricular function is normal.The  ejection fraction is 60-65%. There is moderate concentric left ventricular  hypertrophy. Diastolic function not assessed due to atrial fibrillation. No  regional wall motion abnormalities noted.     Right Ventricle  Normal right ventricle size and systolic function.     Atria  The left atrium is moderately dilated. Right atrial size is normal. There is  no color Doppler evidence of an atrial shunt.     Mitral Valve  There is moderate mitral annular calcification. The mitral valve leaflets are  mildly thickened. There is trace mitral regurgitation. There is no  mitral  valve stenosis.     Tricuspid Valve  Right ventricular systolic pressure could not be approximated due to  inadequate tricuspid regurgitation.     Aortic Valve  Aortic valve leaflets appear normal. There is no evidence of aortic stenosis  or clinically significant aortic regurgitation.     Pulmonic Valve  The pulmonic valve is not well seen, but is grossly normal. This degree of  valvular regurgitation is within normal limits.     Vessels  The aorta root is normal. Normal size ascending aorta. Inferior vena cava not  well visualized for estimation of right atrial pressure.     Pericardium  There is no pericardial effusion.     Rhythm  The rhythm was atrial fibrillation.  ______________________________________________________________________________  MMode/2D Measurements & Calculations     IVSd: 1.6 cm  LVIDd: 4.6 cm  LVIDs: 3.3 cm  LVPWd: 1.5 cm  FS: 28.4 %  LV mass(C)d: 300.4 grams  LV mass(C)dI: 142.2 grams/m2  Ao root diam: 3.9 cm  asc Aorta Diam: 4.2 cm  LVOT diam: 2.1 cm  LVOT area: 3.4 cm2  LA Volume Indexed (AL/bp): 39.7 ml/m2  RWT: 0.65     Doppler Measurements & Calculations  MV E max donnell: 85.9 cm/sec  MV max PG: 3.9 mmHg  MV mean P.5 mmHg  MV V2 VTI: 19.7 cm  MVA(VTI): 2.1 cm2  MV dec slope: 370.3 cm/sec2  MV dec time: 0.23 sec  Ao V2 max: 129.2 cm/sec  Ao max P.0 mmHg  Ao V2 mean: 94.1 cm/sec  Ao mean PG: 3.9 mmHg  Ao V2 VTI: 20.6 cm  MARY(I,D): 2.0 cm2  MARY(V,D): 2.1 cm2  LV V1 max P.5 mmHg  LV V1 max: 78.6 cm/sec  LV V1 VTI: 12.0 cm  SV(LVOT): 40.5 ml  SI(LVOT): 19.2 ml/m2  PA acc time: 0.10 sec  PI end-d donnell: 137.1 cm/sec  AV Donnell Ratio (DI): 0.61  MARY Index (cm2/m2): 0.93  E/E': 11.8  E/E' av.5  Lateral E/e': 13.2  Medial E/e': 11.8  Peak E' Donnell: 7.3 cm/sec     ______________________________________________________________________________  Report approved by: Fritz Valle 2022 04:15 PM               Consultations This Hospital Stay   HOSPITALIST IP  CONSULT  INPATIENT HOSPICE ADULT CONSULT  CARE MANAGEMENT / SOCIAL WORK IP CONSULT  GIP INPATIENT HOSPICE ADULT CONSULT  PALLIATIVE CARE ADULT IP CONSULT  PHARMACY IP CONSULT    Primary Care Physician   Bianca Rodriguez    Time Spent on this Encounter   I, Twila Caicedo MD, personally saw the patient today and spent less than or equal to 30 minutes discharging this patient.

## 2022-06-04 NOTE — PLAN OF CARE
Problem: End-of-Life Care  Goal: Comfort, Peace and Preserved Dignity  Outcome: Ongoing, Progressing  Intervention: Promote Physical Comfort  Pt sleeping for entirety of shift. Only opens eyes when repositioned or turned side to side. Repositioned as tolerated. Scheduled pain medications given and pt appears comfortable. PRN atropine drops given for secretions as crackles are audible. 1L NC on for comfort. Vitals checked x1 to spot check. Godoy patent with 150 mL out this shift.
